# Patient Record
Sex: FEMALE | Race: WHITE | NOT HISPANIC OR LATINO | Employment: FULL TIME | URBAN - METROPOLITAN AREA
[De-identification: names, ages, dates, MRNs, and addresses within clinical notes are randomized per-mention and may not be internally consistent; named-entity substitution may affect disease eponyms.]

---

## 2017-01-17 DIAGNOSIS — F90.9 ATTENTION DEFICIT HYPERACTIVITY DISORDER (ADHD), UNSPECIFIED ADHD TYPE: Primary | ICD-10-CM

## 2017-01-17 DIAGNOSIS — G43.109 MIGRAINE WITH AURA AND WITHOUT STATUS MIGRAINOSUS, NOT INTRACTABLE: Primary | ICD-10-CM

## 2017-01-17 RX ORDER — SUMATRIPTAN 100 MG/1
100 TABLET, FILM COATED ORAL DAILY PRN
Qty: 30 TABLET | Refills: 0 | Status: SHIPPED | OUTPATIENT
Start: 2017-01-17 | End: 2017-08-01

## 2017-01-17 RX ORDER — DEXTROAMPHETAMINE SACCHARATE, AMPHETAMINE ASPARTATE, DEXTROAMPHETAMINE SULFATE AND AMPHETAMINE SULFATE 2.5; 2.5; 2.5; 2.5 MG/1; MG/1; MG/1; MG/1
10 TABLET ORAL DAILY
Qty: 30 TABLET | Refills: 0 | Status: SHIPPED | OUTPATIENT
Start: 2017-01-17 | End: 2017-02-14

## 2017-01-27 ENCOUNTER — TELEPHONE (OUTPATIENT)
Dept: NEUROLOGY | Facility: CLINIC | Age: 52
End: 2017-01-27

## 2017-01-27 NOTE — TELEPHONE ENCOUNTER
PA Initiation    Medication: aubagio  Insurance Company:  [employer]   Pharmacy Filling the Rx: [facility] - [place], [place] - [facility] [address]  Filling Pharmacy Phone: [phone]  Filling Pharmacy Fax:    Start Date: 1/27/2017    [facility] Authorization Team   Phone: [phone]  Fax: [phone]

## 2017-02-03 ENCOUNTER — TELEPHONE (OUTPATIENT)
Dept: INTERNAL MEDICINE | Facility: CLINIC | Age: 52
End: 2017-02-03

## 2017-02-03 DIAGNOSIS — F32.A DEPRESSION: Primary | ICD-10-CM

## 2017-02-03 RX ORDER — BUPROPION HYDROCHLORIDE 150 MG/1
150 TABLET, EXTENDED RELEASE ORAL DAILY
Qty: 30 TABLET | Refills: 0 | Status: SHIPPED | OUTPATIENT
Start: 2017-02-03 | End: 2017-02-28

## 2017-02-03 NOTE — Clinical Note
[facility] PRIMARY CARE CLINIC  [address]  [address] [facility] [address]      February 3, 2017      Liyah Jernigan  [address] [address]        Dear Liyah,    This letter is a reminder that you are overdue to see your Primary Care Provider for an Annual Visit and needed labs. You must be seen by your Primary Care Provider on a yearly basis and have appropriate labs drawn for continued care and prescription refills. Please call [phone] to schedule an appointment for an Annual Visit with Dr Gautam TAYLOR.       You have been given a 30 day supply/refill of your   buPROPion (WELLBUTRIN SR) 150 MG 12 hr tablet    while you get your clinic visit/labs completed.      Regards,    Primary Care Center

## 2017-02-03 NOTE — TELEPHONE ENCOUNTER
buPROPion (WELLBUTRIN SR) 150 MG 12 hr tablet  Last Written Prescription Date: 2/5/16  Last Fill Quantity: 90,   # refills: 3  Last Office Visit with G, P or [facility] prescribing provider: 2/1/16  Future Office visit:   None    PHQ-9 score:  No flowsheet data found.      Routing refill request to provider for review/approval because:   buPROPion (WELLBUTRIN SR) 150 MG 12 hr tablet.. No phq9 found. Due for annual f/u,  Not scheduled. 30 day sent to pharmacy.

## 2017-02-10 NOTE — TELEPHONE ENCOUNTER
Received forms from [facility], called [phone] to complete PA via telephone:    1) Relapsing Remitting Multiple Sclerosis? Yes  2) Has pregnancy been excluded? Yes    Patient tried and failed Rebif and Tysabri in past

## 2017-02-10 NOTE — TELEPHONE ENCOUNTER
Prior Authorization Approval    Authorization Effective Date: 2/10/2017  Authorization Expiration Date: 2/10/2019  Medication: Aubagio 14mg APPROVED  Approved Dose/Quantity: 28 PER 28DAYS  Reference #: [id number]   Insurance Company: [employer]    Expected CoPay: $1 or $3    CoPay Card Available:      Foundation Assistance Needed:    Which Pharmacy is filling the prescription (Not needed for infusion/clinic administered): [facility] - [place], [place] - [facility] [address]  Pharmacy Notified: Yes  Patient Notified: Yes, Left VM

## 2017-02-14 DIAGNOSIS — F90.9 ATTENTION DEFICIT HYPERACTIVITY DISORDER (ADHD), UNSPECIFIED ADHD TYPE: ICD-10-CM

## 2017-02-14 RX ORDER — DEXTROAMPHETAMINE SACCHARATE, AMPHETAMINE ASPARTATE, DEXTROAMPHETAMINE SULFATE AND AMPHETAMINE SULFATE 2.5; 2.5; 2.5; 2.5 MG/1; MG/1; MG/1; MG/1
10 TABLET ORAL DAILY
Qty: 30 TABLET | Refills: 0 | Status: SHIPPED | OUTPATIENT
Start: 2017-02-16 | End: 2017-08-19

## 2017-02-21 ENCOUNTER — OFFICE VISIT (OUTPATIENT)
Dept: NEUROLOGY | Facility: CLINIC | Age: 52
End: 2017-02-21
Attending: PSYCHIATRY & NEUROLOGY
Payer: COMMERCIAL

## 2017-02-21 VITALS — DIASTOLIC BLOOD PRESSURE: 87 MMHG | SYSTOLIC BLOOD PRESSURE: 133 MMHG | HEIGHT: 67 IN | HEART RATE: 64 BPM

## 2017-02-21 DIAGNOSIS — B00.9 HSV (HERPES SIMPLEX VIRUS) INFECTION: ICD-10-CM

## 2017-02-21 DIAGNOSIS — G35 MS (MULTIPLE SCLEROSIS) (H): Primary | ICD-10-CM

## 2017-02-21 PROCEDURE — 40000809 ZZH STATISTIC NO DOCUMENTATION TO SUPPORT CHARGE

## 2017-02-21 ASSESSMENT — PAIN SCALES - GENERAL: PAINLEVEL: NO PAIN (0)

## 2017-02-21 NOTE — MR AVS SNAPSHOT
After Visit Summary   2/21/2017    Liyah Jernigan    MRN: [id number]           Patient Information     Date Of Birth          1965        Visit Information        Provider Department      2/21/2017 10:00 AM Jose A Hairston MD [facility] Multiple Sclerosis         Follow-ups after your visit        Follow-up notes from your care team     Return in about 6 months (around 8/21/2017).      Your next 10 appointments already scheduled     Feb 28, 2017  3:20 PM CST   [facility]    [address] [place] [address]   [phone]            Aug 22, 2017  1:30 PM CDT   (Arrive by 1:15 PM)   Return Multiple Sclerosis with Jose A Hairston MD   [facility] Multiple Sclerosis ([facility])    [address] [address] [phone]              Who to contact     If you have questions or need follow up information about today's clinic visit or your schedule please contact [facility] MULTIPLE SCLEROSIS directly at [phone].  Normal or non-critical lab and imaging results will be communicated to you by MyChart, letter or phone within 4 business days after the clinic has received the results. If you do not hear from us within 7 days, please contact the clinic through Pediushart or phone. If you have a critical or abnormal lab result, we will notify you by phone as soon as possible.  Submit refill requests through [employer] or call your pharmacy and they will forward the refill request to us. Please allow 3 business days for your refill to be completed.          Additional Information About Your Visit        MyChart Information     [employer] gives you secure access to your electronic health record. If you see a primary care provider, you can also send messages to your care team and make appointments. If you have questions, please call  "your primary care clinic.  If you do not have a primary care provider, please call [phone] and they will assist you.        Care EveryWhere ID     This is your Care EveryWhere ID. This could be used by other organizations to access your [facility] medical records  [id number]        Your Vitals Were     Pulse Height                64 1.702 m (5' 7\")           Blood Pressure from Last 3 Encounters:   02/21/17 133/87   12/15/16 118/74   08/23/16 138/86    Weight from Last 3 Encounters:   12/15/16 53.1 kg (117 lb)   08/23/16 52.2 kg (115 lb)   02/23/16 51.7 kg (114 lb)              Today, you had the following     No orders found for display       Primary Care Provider    None Specified       No primary provider on file.        Thank you!     Thank you for choosing [facility] MULTIPLE S[facility]  for your care. Our goal is always to provide you with excellent care. Hearing back from our patients is one way we can continue to improve our services. Please take a few minutes to complete the written survey that you may receive in the mail after your visit with us. Thank you!             Your Updated Medication List - Protect others around you: Learn how to safely use, store and throw away your medicines at www.disposemymeds.org.          This list is accurate as of: 2/21/17 10:24 AM.  Always use your most recent med list.                   Brand Name Dispense Instructions for use    amphetamine-dextroamphetamine 10 MG per tablet    ADDERALL    30 tablet    Take 1 tablet (10 mg) by mouth daily . Patient needs to see primary provider for further refills.       buPROPion 150 MG 12 hr tablet    WELLBUTRIN SR    30 tablet    Take 1 tablet (150 mg) by mouth daily       ferrous gluconate 325 (36 FE) MG Tabs     60 tablet    Take 325 mg by mouth daily       order for DME     1 each    Please measure and distribute 1 pair of 20mmHg - 30mmHg THIGH high open  toe compression stockings. Jobst ultrasheer or equivalent.       " rOPINIRole 0.5 MG tablet    REQUIP    90 tablet    Take 1 tablet (0.5 mg) by mouth At Bedtime       SUMAtriptan 100 MG tablet    IMITREX    30 tablet    Take 1 tablet (100 mg) by mouth daily as needed for migraine       teriflunomide 14 MG tablet    AUBAGIO    30 tablet    Take 1 tablet (14 mg) by mouth daily       tretinoin 0.05 % cream    RETIN-A     Apply topically At Bedtime       valACYclovir 500 MG tablet    VALTREX    90 tablet    Take 1 tablet (500 mg) by mouth daily       VITAMIN D (CHOLECALCIFEROL) PO      Take 5,000 Units by mouth daily

## 2017-02-21 NOTE — PROGRESS NOTES
REASON FOR VISIT:  Liyah Jernigan is a 52-year-old woman who I follow for multiple sclerosis and who returns for routine followup today.  I last saw her in August of last year.      HISTORY OF PRESENT ILLNESS:  Liyah has been stable from an MS perspective.  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  The alopecia she had experienced with the Aubagio seems to be improving and her hair is coming back.  She tolerates the Aubagio well with no GI side effects.  She had lab tests tested in December with a normal AST and ALT and a vitamin D level of 63.  She has essentially no residual symptoms from multiple sclerosis.  Her MS presentation has been unusual with several episodes of bilateral vision loss.  She has had no new medical issues outside of MS since the previous visit.      PHYSICAL EXAMINATION:   VITAL SIGNS:  Blood pressure 133/87.  Pulse 64.  Respiratory rate is 14.   GENERAL:  She is alert and oriented.  Affect is bright.  Language functions are normal.  Eye movements are full without diplopia or nystagmus.  Facial strength and sensation are normal.  Muscle bulk, tone, strength and dexterity are normal in the arms and legs.  Coordination testing is normal to finger tapping, toe tapping and finger-nose-finger.  Deep tendon reflexes are normal and symmetric and her gait is normal.      IMPRESSION:  Relapsing remitting multiple sclerosis, clinically stable on Aubagio.      I spent 20 minutes with Liyah today, greater than 50% of which was spent in counseling and coordination of care.  We went over safety monitoring with the Aubagio as well as how long she will likely need to be on the medication.  I would anticipate treating her at least through her mid-50s.  If she remains clinically or radiologically stable 5 years from now, we could consider stopping it and following with MRI surveillance.  Thankfully, she has essentially no residual neurologic  impairment.      PLAN:   1.  She will return to clinic in 6 months.   2.  I will plan a repeat surveillance MRI in about 1 year.      MD KAY Celaya MD             D: 2017 11:55   T: 2017 14:08   MT: AKA      Name:     HODA TRUJILLO   MRN:      [id number]        Account:      [id number]   :      1965           Service Date: 2017      Document: [id number]

## 2017-02-21 NOTE — LETTER
[facility] PRIMARY CARE CLINIC  [address]  [address] [facility] [address]      February 23, 2017      Liyah Jernigan  [address] [address]        Dear Liyah,    This letter is a reminder that you are overdue to see your Primary Care Provider for an Annual Visit and needed labs. You must be seen by your Primary Care Provider on a yearly basis and have appropriate labs drawn for continued care and prescription refills. Please call [phone] to schedule an appointment for an Annual Visit with Dr Sonia Florez MD.     You have been given a 90 day supply/refill of your Valtrex while you get your clinic visit/labs completed.    This is the second letter sent this month.      PLEASE LET YOUR PHARMACY AND THE CLINIC KNOW IF YOU ARE NO LONGER COMING TO OUR CLINIC FOR YOUR PRIMARY CARE.    Regards,    Primary Care Center

## 2017-02-21 NOTE — TELEPHONE ENCOUNTER
Valacyclovir 500 mg tabs      Last Written Prescription Date:  1-25-16  Last Fill Quantity: 90,   # refills: 3  Last Office Visit : 2-1-16  Future Office visit:  None    Creatinine   Date Value Ref Range Status   12/15/2016 0.93 0.52 - 1.04 mg/dL Final   ]      Kathleen M Doege RN

## 2017-02-23 RX ORDER — VALACYCLOVIR HYDROCHLORIDE 500 MG/1
500 TABLET, FILM COATED ORAL DAILY
Qty: 90 TABLET | Refills: 0 | Status: SHIPPED
Start: 2017-02-23 | End: 2017-06-20

## 2017-02-28 ENCOUNTER — OFFICE VISIT (OUTPATIENT)
Dept: FAMILY MEDICINE | Facility: CLINIC | Age: 52
End: 2017-02-28
Payer: COMMERCIAL

## 2017-02-28 VITALS
WEIGHT: 117.2 LBS | RESPIRATION RATE: 14 BRPM | SYSTOLIC BLOOD PRESSURE: 124 MMHG | DIASTOLIC BLOOD PRESSURE: 84 MMHG | TEMPERATURE: 98.4 F | BODY MASS INDEX: 18.39 KG/M2 | HEIGHT: 67 IN | HEART RATE: 68 BPM | OXYGEN SATURATION: 99 %

## 2017-02-28 DIAGNOSIS — F33.0 MILD EPISODE OF RECURRENT MAJOR DEPRESSIVE DISORDER (H): ICD-10-CM

## 2017-02-28 DIAGNOSIS — Z30.011 ENCOUNTER FOR INITIAL PRESCRIPTION OF CONTRACEPTIVE PILLS: Primary | ICD-10-CM

## 2017-02-28 PROCEDURE — 99214 OFFICE O/P EST MOD 30 MIN: CPT | Performed by: FAMILY MEDICINE

## 2017-02-28 RX ORDER — LEVONORGESTREL/ETHIN.ESTRADIOL 0.1-0.02MG
1 TABLET ORAL DAILY
Qty: 84 TABLET | Refills: 1 | Status: SHIPPED | OUTPATIENT
Start: 2017-02-28 | End: 2017-08-01

## 2017-02-28 ASSESSMENT — PAIN SCALES - GENERAL: PAINLEVEL: NO PAIN (0)

## 2017-02-28 NOTE — PROGRESS NOTES
"  SUBJECTIVE:                                                    Liyah Jernigan is a 52 year old female who presents to clinic today for the following health issues:    Depression Followup    Status since last visit: Stable     See PHQ-9 for current symptoms.  Other associated symptoms: trouble sleeping -     Complicating factors:   Significant life event:  No   Current substance abuse:  None  Anxiety or Panic symptoms:  No    PHQ-9  English PHQ-9   Any Language          1. Pt would like to discuss   - pt's grandmother had periods until she was 65  - pt has used condoms, oral contraception  - ectopics pregnancies -- two in the past    SUBJECTIVE:  Here today to discuss contraception. Reviewed medical history with the patient in her chart, including ectopic pregnancies on both sides leading to surgery. She consulted with the GYN about the possibility of tubal ligation but has been told she is not a surgical candidate because of the previous surgeries. Still cycling and has not had a change in regularity or flow. Starting to get a few hot flashes here in. She is concerned about reduction of pregnancy but is also cognizant about upcoming menopause. No history of clotting disorders.    Review of systems otherwise negative.  Past medical, family, and social history reviewed and updated in chart.    OBJECTIVE:  /84 (BP Location: Right arm, Patient Position: Right side, Cuff Size: Adult Regular)  Pulse 68  Temp 98.4  F (36.9  C) (Oral)  Resp 14  Ht 5' 7\" (1.702 m)  Wt 117 lb 3.2 oz (53.2 kg)  LMP 02/22/2017 (Exact Date)  SpO2 99%  BMI 18.36 kg/m2  Alert, pleasant, upbeat, and in no apparent discomfort.  S1 and S2 normal, no murmurs, clicks, gallops or rubs. Regular rate and rhythm. Chest is clear; no wheezes or rales. No edema or JVD.  Past labs reviewed with the patient.     ASSESSMENT / PLAN:  (Z30.011) Encounter for initial prescription of contraceptive pills  (primary encounter diagnosis)  Comment: We discussed the " different methods including implantable Nexplanon, IUDs, oral methods - including risks and benefits of all - and she is most interested in an oral contraceptive. This may have some impact on her headaches - likely good from a regulatory standpoint, but possibly bad. It will certainly mask menopausal symptoms which can be of benefit though it makes it tricky to know when she will actually go through menopause. I think she is very low risk for blood clots  Plan: levonorgestrel-ethinyl estradiol         (RUDY ATKINS,LESSINA) 0.1-20 MG-MCG per         tablet        Discussed mechanism of action of the proposed medication, as well as potential effects, both good and bad.  Patient expressed understanding and agreed with treatment.     30 minutes spent in face-to-face time during this visit.  30 minutes were spent on counseling and coordinating care.     Follow up - as per routine for Pap smear, etc.  S. Tab Barrow MD    (Chart documentation completed in part with Dragon voice-recognition software.  Even though reviewed some grammatical, spelling, and word errors may remain.)

## 2017-02-28 NOTE — MR AVS SNAPSHOT
After Visit Summary   2/28/2017    Liyah Jernigan    MRN: [id number]           Patient Information     Date Of Birth          1965        Visit Information        Provider Department      2/28/2017 3:20 PM Jasmine Barrow MD [facility]        Today's Diagnoses     Encounter for initial prescription of contraceptive pills    -  1       Follow-ups after your visit        Follow-up notes from your care team     Return if symptoms worsen or fail to improve.      Your next 10 appointments already scheduled     Aug 22, 2017  1:30 PM CDT   (Arrive by 1:15 PM)   Return Multiple Sclerosis with Jose A Hairston MD   [facility] Multiple Sclerosis ([facility])    [address]  [address] [facility] [address] [phone]              Who to contact     If you have questions or need follow up information about today's clinic visit or your schedule please contact [facility] directly at [phone].  Normal or non-critical lab and imaging results will be communicated to you by MyChart, letter or phone within 4 business days after the clinic has received the results. If you do not hear from us within 7 days, please contact the clinic through Gamadorhart or phone. If you have a critical or abnormal lab result, we will notify you by phone as soon as possible.  Submit refill requests through Inf[employer] or call your pharmacy and they will forward the refill request to us. Please allow 3 business days for your refill to be completed.          Additional Information About Your Visit        MyChart Information     [employer] gives you secure access to your electronic health record. If you see a primary care provider, you can also send messages to your care team and make appointments. If you have questions, please call your primary care clinic.  If you do not have a primary care provider, please call [phone] and they will assist you.        Care  "EveryWhere ID     This is your Care EveryWhere ID. This could be used by other organizations to access your [place] medical records  [id number]        Your Vitals Were     Pulse Temperature Respirations Height Last Period Pulse Oximetry    68 98.4  F (36.9  C) (Oral) 14 5' 7\" (1.702 m) 02/22/2017 (Exact Date) 99%    BMI (Body Mass Index)                   18.36 kg/m2            Blood Pressure from Last 3 Encounters:   02/28/17 124/84   02/21/17 133/87   12/15/16 118/74    Weight from Last 3 Encounters:   02/28/17 117 lb 3.2 oz (53.2 kg)   12/15/16 117 lb (53.1 kg)   08/23/16 115 lb (52.2 kg)              Today, you had the following     No orders found for display         Today's Medication Changes          These changes are accurate as of: 2/28/17  4:23 PM.  If you have any questions, ask your nurse or doctor.               Start taking these medicines.        Dose/Directions    levonorgestrel-ethinyl estradiol 0.1-20 MG-MCG per tablet   Commonly known as:  RUDY ATKINS LESSINA   Used for:  Encounter for initial prescription of contraceptive pills   Started by:  Jasmine Barrow MD        Dose:  1 tablet   Take 1 tablet by mouth daily   Quantity:  84 tablet   Refills:  1            Where to get your medicines      These medications were sent to [id number]  [place], [address] AT [address], [place] [address]     Phone:  [phone]     levonorgestrel-ethinyl estradiol 0.1-20 MG-MCG per tablet                Primary Care Provider    None Specified       No primary provider on file.        Thank you!     Thank you for choosing [facility]  for your care. Our goal is always to provide you with excellent care. Hearing back from our patients is one way we can continue to improve our services. Please take a few minutes to complete the written survey that you may receive in the mail after your visit with us. Thank you!      "        Your Updated Medication List - Protect others around you: Learn how to safely use, store and throw away your medicines at www.disposemymeds.org.          This list is accurate as of: 2/28/17  4:23 PM.  Always use your most recent med list.                   Brand Name Dispense Instructions for use    amphetamine-dextroamphetamine 10 MG per tablet    ADDERALL    30 tablet    Take 1 tablet (10 mg) by mouth daily . Patient needs to see primary provider for further refills.       buPROPion 150 MG 12 hr tablet    WELLBUTRIN SR    30 tablet    Take 1 tablet (150 mg) by mouth daily       levonorgestrel-ethinyl estradiol 0.1-20 MG-MCG per tablet    AVIANE,ALESSE,LESSINA    84 tablet    Take 1 tablet by mouth daily       order for DME     1 each    Please measure and distribute 1 pair of 20mmHg - 30mmHg THIGH high open  toe compression stockings. Jobst ultrasheer or equivalent.       rOPINIRole 0.5 MG tablet    REQUIP    90 tablet    Take 1 tablet (0.5 mg) by mouth At Bedtime       SUMAtriptan 100 MG tablet    IMITREX    30 tablet    Take 1 tablet (100 mg) by mouth daily as needed for migraine       teriflunomide 14 MG tablet    AUBAGIO    30 tablet    Take 1 tablet (14 mg) by mouth daily       valACYclovir 500 MG tablet    VALTREX    90 tablet    Take 1 tablet (500 mg) by mouth daily       VITAMIN D (CHOLECALCIFEROL) PO      Take 5,000 Units by mouth daily

## 2017-02-28 NOTE — NURSING NOTE
"Chief Complaint   Patient presents with     Contraception     discuss options       Initial /84 (BP Location: Right arm, Patient Position: Right side, Cuff Size: Adult Regular)  Pulse 68  Temp 98.4  F (36.9  C) (Oral)  Resp 14  Ht 1.702 m (5' 7\")  Wt 53.2 kg (117 lb 3.2 oz)  LMP 02/22/2017 (Exact Date)  SpO2 99%  BMI 18.36 kg/m2 Estimated body mass index is 18.36 kg/(m^2) as calculated from the following:    Height as of this encounter: 1.702 m (5' 7\").    Weight as of this encounter: 53.2 kg (117 lb 3.2 oz).  Medication Reconciliation: complete     Will Elly GREENFIELD      "

## 2017-03-02 PROBLEM — F33.0 MILD EPISODE OF RECURRENT MAJOR DEPRESSIVE DISORDER (H): Status: ACTIVE | Noted: 2017-03-02

## 2017-03-02 RX ORDER — BUPROPION HYDROCHLORIDE 150 MG/1
TABLET, EXTENDED RELEASE ORAL
Qty: 90 TABLET | Refills: 1 | Status: SHIPPED | OUTPATIENT
Start: 2017-03-02 | End: 2017-07-18

## 2017-03-17 ENCOUNTER — OFFICE VISIT (OUTPATIENT)
Dept: FAMILY MEDICINE | Facility: CLINIC | Age: 52
End: 2017-03-17
Payer: COMMERCIAL

## 2017-03-17 VITALS
WEIGHT: 115.8 LBS | DIASTOLIC BLOOD PRESSURE: 90 MMHG | HEART RATE: 78 BPM | HEIGHT: 67 IN | BODY MASS INDEX: 18.18 KG/M2 | TEMPERATURE: 98.1 F | OXYGEN SATURATION: 99 % | SYSTOLIC BLOOD PRESSURE: 128 MMHG | RESPIRATION RATE: 12 BRPM

## 2017-03-17 DIAGNOSIS — H69.93 DYSFUNCTION OF EUSTACHIAN TUBE, BILATERAL: Primary | ICD-10-CM

## 2017-03-17 PROCEDURE — 99213 OFFICE O/P EST LOW 20 MIN: CPT | Performed by: PHYSICIAN ASSISTANT

## 2017-03-17 NOTE — PROGRESS NOTES
"  SUBJECTIVE:                                                    Liyah Jerngian is a 52 year old female who presents to clinic today for the following health issues:      Concern - Ear problem     Onset: a few days ago    Description:   Sharp pain went through LT ear but they both have been feeling plugged    Intensity: mild, moderate    Progression of Symptoms:  same    Accompanying Signs & Symptoms:  Has had a cough       Previous history of similar problem:   no    Precipitating factors:   Worsened by: unusure    Alleviating factors:  Improved by: nothing       Therapies Tried and outcome: nothing    Sharp pain left ear intermittently for a few days and no sharp pain right ear intermittently.  No nasal congestion.  Cough productive with yellow sputum.  No shortness of breath.      Blood pressure a little high today and unusual for her.    Reports family history  Of hypertension and hyperlipidemia and heart disease but reports they are all overweight and \"she should be the picture of health. \"      Problem list and histories reviewed & adjusted, as indicated.  Additional history: as documented    Patient Active Problem List   Diagnosis     Attention deficit hyperactivity disorder (ADHD), unspecified ADHD type     Genital herpes simplex     MS (multiple sclerosis) (H)     Excessive or frequent menstruation     Varicose vein     Restless leg syndrome     Mild episode of recurrent major depressive disorder (H)     Past Surgical History   Procedure Laterality Date     Ent surgery       tympanoplasty     Gyn surgery       laparoscopies (5) laparotomies (2) for ectopic       Social History   Substance Use Topics     Smoking status: Never Smoker     Smokeless tobacco: Not on file     Alcohol use No     Family History   Problem Relation Age of Onset     DIABETES Father           Hypertension Father      Hyperlipidemia Father      Substance Abuse Father      Obesity Father      DIABETES Mother      " "\"borderline\" for years     Hypertension Mother      Hyperlipidemia Mother      Depression Mother      Substance Abuse Mother      Asthma Mother      Thyroid Disease Mother      Obesity Mother      CEREBROVASCULAR DISEASE Maternal Grandfather      he had a few     Substance Abuse Maternal Grandfather      Breast Cancer Maternal Grandmother      in 50s     Substance Abuse Paternal Grandfather          Current Outpatient Prescriptions   Medication Sig Dispense Refill     buPROPion (WELLBUTRIN SR) 150 MG 12 hr tablet TAKE 1 TABLET BY MOUTH DAILY 90 tablet 1     levonorgestrel-ethinyl estradiol (AVIANE,ALESSE,LESSINA) 0.1-20 MG-MCG per tablet Take 1 tablet by mouth daily 84 tablet 1     valACYclovir (VALTREX) 500 MG tablet Take 1 tablet (500 mg) by mouth daily 90 tablet 0     amphetamine-dextroamphetamine (ADDERALL) 10 MG per tablet Take 1 tablet (10 mg) by mouth daily . Patient needs to see primary provider for further refills. 30 tablet 0     SUMAtriptan (IMITREX) 100 MG tablet Take 1 tablet (100 mg) by mouth daily as needed for migraine 30 tablet 0     teriflunomide (AUBAGIO) 14 MG tablet Take 1 tablet (14 mg) by mouth daily 30 tablet 11     order for DME Please measure and distribute 1 pair of 20mmHg - 30mmHg THIGH high open  toe compression stockings. Jobst ultrasheer or equivalent. 1 each 1     rOPINIRole (REQUIP) 0.5 MG tablet Take 1 tablet (0.5 mg) by mouth At Bedtime 90 tablet 3     VITAMIN D, CHOLECALCIFEROL, PO Take 5,000 Units by mouth daily         Reviewed and updated as needed this visit by clinical staff  Tobacco  Allergies  Meds  Med Hx  Surg Hx  Fam Hx  Soc Hx      Reviewed and updated as needed this visit by Provider         ROS:  Constitutional, HEENT, cardiovascular, pulmonary, gi and gu systems are negative, except as otherwise noted.    OBJECTIVE:                                                    /90  Pulse 78  Temp 98.1  F (36.7  C) (Oral)  Resp 12  Ht 1.702 m (5' 7\")  Wt 52.5 kg (115 " lb 12.8 oz)  LMP 02/22/2017 (Exact Date)  SpO2 99%  BMI 18.14 kg/m2  Body mass index is 18.14 kg/(m^2).  GENERAL: healthy, alert and no distress  EYES: Eyes grossly normal to inspection, PERRL and conjunctivae and sclerae normal  HENT: ear canals and TM's normal, nose and mouth without ulcers or lesions  NECK: no adenopathy, no asymmetry, masses, or scars and thyroid normal to palpation  RESP: lungs clear to auscultation - no rales, rhonchi or wheezes  CV: regular rate and rhythm, normal S1 S2, no S3 or S4, no murmur, click or rub, no peripheral edema and peripheral pulses strong  MS: no gross musculoskeletal defects noted, no edema    Diagnostic Test Results:  none      ASSESSMENT/PLAN:                                                            1. Dysfunction of eustachian tube, bilateral  No evidence of infection. Recommended flonase nasal spray and follow up with us if no improvement in symptoms.  Return     Elevated blood pressure noted and offered MA visit to recheck blood pressure and declines at this time stating she has blood pressure monitor at home and will continue to monitor and follow up with us if > 140/90        Jaki Blas PA-C  [facility]

## 2017-03-17 NOTE — MR AVS SNAPSHOT
After Visit Summary   3/17/2017    Liyah Jernigan    MRN: [id number]           Patient Information     Date Of Birth          1965        Visit Information        Provider Department      3/17/2017 1:20 PM Jaki Blas PA-C [facility]        Today's Diagnoses     Dysfunction of eustachian tube, bilateral    -  1       Follow-ups after your visit        Your next 10 appointments already scheduled     Mar 24, 2017  3:15 PM CDT   MA SCREENING DIGITAL BILATERAL with BKMA1   [facility]    [address] [id number] [phone]           Do not use any powder, lotion or deodorant under your arms or on your breast. If you do, we will ask you to remove it before your exam.  Wear comfortable, two-piece clothing.  If you have any allergies, tell your care team.  Bring any previous mammograms from other facilities or have them mailed to the breast center.            Aug 22, 2017  1:30 PM CDT   (Arrive by 1:15 PM)   Return Multiple Sclerosis with Jose A Hairston MD   [facility] ([facility])    [address] [address] [phone]              Who to contact     If you have questions or need follow up information about today's clinic visit or your schedule please contact [facility] directly at [phone].  Normal or non-critical lab and imaging results will be communicated to you by MyChart, letter or phone within 4 business days after the clinic has received the results. If you do not hear from us within 7 days, please contact the clinic through MyChart or phone. If you have a critical or abnormal lab result, we will notify you by phone as soon as possible.  Submit refill requests through [employer] or call your pharmacy and they will forward the refill request to us. Please allow 3 business days for your  "refill to be completed.          Additional Information About Your Visit        [employer]le[employer] Information     [employer] gives you secure access to your electronic health record. If you see a primary care provider, you can also send messages to your care team and make appointments. If you have questions, please call your primary care clinic.  If you do not have a primary care provider, please call [phone] and they will assist you.        Care EveryWhere ID     This is your Care EveryWhere ID. This could be used by other organizations to access your [place] medical records  [id number]        Your Vitals Were     Pulse Temperature Respirations Height Last Period Pulse Oximetry    78 98.1  F (36.7  C) (Oral) 12 1.702 m (5' 7\") 02/22/2017 (Exact Date) 99%    BMI (Body Mass Index)                   18.14 kg/m2            Blood Pressure from Last 3 Encounters:   03/17/17 128/90   02/28/17 124/84   02/21/17 133/87    Weight from Last 3 Encounters:   03/17/17 52.5 kg (115 lb 12.8 oz)   02/28/17 53.2 kg (117 lb 3.2 oz)   12/15/16 53.1 kg (117 lb)              Today, you had the following     No orders found for display       Primary Care Provider    None Specified       No primary provider on file.        Thank you!     Thank you for choosing [facility]  for your care. Our goal is always to provide you with excellent care. Hearing back from our patients is one way we can continue to improve our services. Please take a few minutes to complete the written survey that you may receive in the mail after your visit with us. Thank you!             Your Updated Medication List - Protect others around you: Learn how to safely use, store and throw away your medicines at www.disposemymeds.org.          This list is accurate as of: 3/17/17  1:33 PM.  Always use your most recent med list.                   Brand Name Dispense Instructions for use    amphetamine-dextroamphetamine 10 MG per tablet    ADDERALL    30 tablet    " Take 1 tablet (10 mg) by mouth daily . Patient needs to see primary provider for further refills.       buPROPion 150 MG 12 hr tablet    WELLBUTRIN SR    90 tablet    TAKE 1 TABLET BY MOUTH DAILY       levonorgestrel-ethinyl estradiol 0.1-20 MG-MCG per tablet    AVIANE,ALESSE,LESSINA    84 tablet    Take 1 tablet by mouth daily       order for DME     1 each    Please measure and distribute 1 pair of 20mmHg - 30mmHg THIGH high open  toe compression stockings. Jobst ultrasheer or equivalent.       rOPINIRole 0.5 MG tablet    REQUIP    90 tablet    Take 1 tablet (0.5 mg) by mouth At Bedtime       SUMAtriptan 100 MG tablet    IMITREX    30 tablet    Take 1 tablet (100 mg) by mouth daily as needed for migraine       teriflunomide 14 MG tablet    AUBAGIO    30 tablet    Take 1 tablet (14 mg) by mouth daily       valACYclovir 500 MG tablet    VALTREX    90 tablet    Take 1 tablet (500 mg) by mouth daily       VITAMIN D (CHOLECALCIFEROL) PO      Take 5,000 Units by mouth daily

## 2017-03-17 NOTE — NURSING NOTE
"No chief complaint on file.      Initial /90 (BP Location: Right arm, Patient Position: Chair, Cuff Size: Adult Regular)  Pulse 78  Temp 98.1  F (36.7  C) (Oral)  Resp 12  Ht 1.702 m (5' 7\")  Wt 52.5 kg (115 lb 12.8 oz)  LMP 02/22/2017 (Exact Date)  SpO2 99%  BMI 18.14 kg/m2 Estimated body mass index is 18.14 kg/(m^2) as calculated from the following:    Height as of this encounter: 1.702 m (5' 7\").    Weight as of this encounter: 52.5 kg (115 lb 12.8 oz).  Medication Reconciliation: su Rodriguez        "

## 2017-03-22 ENCOUNTER — OFFICE VISIT (OUTPATIENT)
Dept: URGENT CARE | Facility: URGENT CARE | Age: 52
End: 2017-03-22
Payer: COMMERCIAL

## 2017-03-22 VITALS
OXYGEN SATURATION: 98 % | BODY MASS INDEX: 18.17 KG/M2 | SYSTOLIC BLOOD PRESSURE: 132 MMHG | DIASTOLIC BLOOD PRESSURE: 86 MMHG | HEART RATE: 92 BPM | TEMPERATURE: 98.9 F | WEIGHT: 116 LBS

## 2017-03-22 DIAGNOSIS — R68.89 FLU-LIKE SYMPTOMS: Primary | ICD-10-CM

## 2017-03-22 PROCEDURE — 99213 OFFICE O/P EST LOW 20 MIN: CPT | Performed by: NURSE PRACTITIONER

## 2017-03-22 RX ORDER — OSELTAMIVIR PHOSPHATE 75 MG/1
75 CAPSULE ORAL 2 TIMES DAILY
Qty: 10 CAPSULE | Refills: 0 | Status: SHIPPED | OUTPATIENT
Start: 2017-03-22 | End: 2017-03-27

## 2017-03-22 NOTE — PROGRESS NOTES
"No chief complaint on file.      Initial LMP 02/22/2017 (Exact Date) Estimated body mass index is 18.14 kg/(m^2) as calculated from the following:    Height as of 3/17/17: 5' 7\" (1.702 m).    Weight as of 3/17/17: 115 lb 12.8 oz (52.5 kg).  Medication Reconciliation: zo Leggett CMA        SUBJECTIVE:                                                    Liyah Jernigan is a 52 year old female who presents to clinic today for the following health issues:      RESPIRATORY SYMPTOMS      Duration: 2 weeks    Description  nasal congestion, rhinorrhea, cough, chills, fever 100 ear pain bilateral, fatigue/malaise, hoarse voice, myalgias, nausea, stomach ache and diarrhea    Severity: moderate    Accompanying signs and symptoms: None    History (predisposing factors):  Has MS    Precipitating or alleviating factors: None    Therapies tried and outcome:  rest and fluids         No Known Allergies    No past medical history on file.      Current Outpatient Prescriptions on File Prior to Visit:  buPROPion (WELLBUTRIN SR) 150 MG 12 hr tablet TAKE 1 TABLET BY MOUTH DAILY   levonorgestrel-ethinyl estradiol (AVIANE,ALESSE,LESSINA) 0.1-20 MG-MCG per tablet Take 1 tablet by mouth daily   valACYclovir (VALTREX) 500 MG tablet Take 1 tablet (500 mg) by mouth daily   amphetamine-dextroamphetamine (ADDERALL) 10 MG per tablet Take 1 tablet (10 mg) by mouth daily . Patient needs to see primary provider for further refills.   SUMAtriptan (IMITREX) 100 MG tablet Take 1 tablet (100 mg) by mouth daily as needed for migraine   teriflunomide (AUBAGIO) 14 MG tablet Take 1 tablet (14 mg) by mouth daily   order for DME Please measure and distribute 1 pair of 20mmHg - 30mmHg THIGH high open  toe compression stockings. Jobst ultrasheer or equivalent.   rOPINIRole (REQUIP) 0.5 MG tablet Take 1 tablet (0.5 mg) by mouth At Bedtime   VITAMIN D, CHOLECALCIFEROL, PO Take 5,000 Units by mouth daily     No current facility-administered medications on " file prior to visit.     Social History   Substance Use Topics     Smoking status: Never Smoker     Smokeless tobacco: Not on file     Alcohol use No       ROS:  Consitutional: As above  ENT: As above  Respiratory: As above    OBJECTIVE:  /86 (BP Location: Left arm, Patient Position: Chair, Cuff Size: Adult Regular)  Pulse 92  Temp 98.9  F (37.2  C) (Oral)  Wt 116 lb (52.6 kg)  LMP 02/22/2017 (Exact Date)  SpO2 98%  BMI 18.17 kg/m2  GENERAL APPEARANCE: healthy, alert and no distress  EYES: conjunctiva clear  EARS:small cerumen.   Ear canals w/o erythema, TM's intact w/o erythema.    NOSE/MOUTH: Nose and mouth without ulcers, erythema or lesions  THROAT: mild erythema w/ no tonsillar enlargement . no exudates  NECK: supple, nontender, no lymphadenopathy  RESP: lungs clear to auscultation - no rales, rhonchi or wheezes  CV: regular rates and rhythm, normal S1 S2, no murmur noted  NEURO: awake, alert        ASSESSMENT:     ICD-10-CM    1. Flu-like symptoms R68.89 oseltamivir (TAMIFLU) 75 MG capsule       PLAN:  Patient has been around an individual diagnosed with influenza.   Lots of rest and fluids.  RTC if any worsening symptoms or if not improving.    Meredith Bethea FNP-BC

## 2017-03-22 NOTE — MR AVS SNAPSHOT
After Visit Summary   3/22/2017    Liyah Jernigan    MRN: [id number]           Patient Information     Date Of Birth          1965        Visit Information        Provider Department      3/22/2017 6:50 PM Meredith Bethea NP [facility]        Today's Diagnoses     Flu-like symptoms    -  1      Care Instructions      Viral Upper Respiratory Illness (Adult)  You have a viral upper respiratory illness (URI), which is another term for the common cold. This illness is contagious during the first few days. It is spread through the air by coughing and sneezing. It may also be spread by direct contact (touching the sick person and then touching your own eyes, nose, or mouth). Frequent handwashing will decrease risk of spread. Most viral illnesses go away within 7 to 10 days with rest and simple home remedies. Sometimes the illness may last for several weeks. Antibiotics will not kill a virus, and they are generally not prescribed for this condition.    Home care    If symptoms are severe, rest at home for the first 2 to 3 days. When you resume activity, don't let yourself get too tired.    Avoid being exposed to cigarette smoke (yours or others ).    You may use acetaminophen or ibuprofen to control pain and fever, unless another medicine was prescribed. (Note: If you have chronic liver or kidney disease, have ever had a stomach ulcer or gastrointestinal bleeding, or are taking blood-thinning medicines, talk with your healthcare provider before using these medicines.) Aspirin should never be given to anyone under 18 years of age who is ill with a viral infection or fever. It may cause severe liver or brain damage.    Your appetite may be poor, so a light diet is fine. Avoid dehydration by drinking 6 to 8 glasses of fluids per day (water, soft drinks, juices, tea, or soup). Extra fluids will help loosen secretions in the nose and lungs.    Over-the-counter cold medicines will not shorten the  length of time you re sick, but they may be helpful for the following symptoms: cough, sore throat, and nasal and sinus congestion. (Note: Do not use decongestants if you have high blood pressure.)  Follow-up care  Follow up with your healthcare provider, or as advised.  When to seek medical advice  Call your healthcare provider right away if any of these occur:    Cough with lots of colored sputum (mucus)    Severe headache; face, neck, or ear pain    Difficulty swallowing due to throat pain    Fever of 100.4 F (38 C)  Call 911, or get immediate medical care  Call emergency services right away if any of these occur:    Chest pain, shortness of breath, wheezing, or difficulty breathing    Coughing up blood    Inability to swallow due to throat pain    8412-1070 [employer]. [address] [place] [address], [place] [address]. All rights reserved. This information is not intended as a substitute for professional medical care. Always follow your healthcare professional's instructions.              Follow-ups after your visit        Your next 10 appointments already scheduled     Mar 24, 2017  3:15 PM CDT   MA SCREENING DIGITAL BIL[facility]AL [facility] BKMA[facility]    [address] [place] [address] [phone]           Do not use any powder, lotion or deodorant under your arms or on your breast. If you do, we will ask you to remove it before your exam.  Wear comfortable, two-piece clothing.  If you have any allergies, tell your care team.  Bring any previous mammograms from other facilities or have them mailed to the breast center.            Aug 22, 2017  1:30 PM CDT   (Arrive by 1:15 PM)   Return Multiple Sclerosis with Jose A Hairston MD   [facility] ([facility])    [address] [facility] [facility]  3rd Floor  [facility] [address] [phone]              Who to contact     If you have  questions or need follow up information about today's clinic visit or your schedule please contact [facility] directly at [phone].  Normal or non-critical lab and imaging results will be communicated to you by [employer], letter or phone within 4 business days after the clinic has received the results. If you do not hear from us within 7 days, please contact the clinic through [employer] or phone. If you have a critical or abnormal lab result, we will notify you by phone as soon as possible.  Submit refill requests through [employer] or call your pharmacy and they will forward the refill request to us. Please allow 3 business days for your refill to be completed.          Additional Information About Your Visit        [employer] Information     [employer] gives you secure access to your electronic health record. If you see a primary care provider, you can also send messages to your care team and make appointments. If you have questions, please call your primary care clinic.  If you do not have a primary care provider, please call [phone] and they will assist you.        Care EveryWhere ID     This is your Care EveryWhere ID. This could be used by other organizations to access your [place] medical records  [id number]        Your Vitals Were     Pulse Temperature Last Period Pulse Oximetry BMI (Body Mass Index)       92 98.9  F (37.2  C) (Oral) 02/22/2017 (Exact Date) 98% 18.17 kg/m2        Blood Pressure from Last 3 Encounters:   03/22/17 132/86   03/17/17 128/90   02/28/17 124/84    Weight from Last 3 Encounters:   03/22/17 116 lb (52.6 kg)   03/17/17 115 lb 12.8 oz (52.5 kg)   02/28/17 117 lb 3.2 oz (53.2 kg)              Today, you had the following     No orders found for display         Today's Medication Changes          These changes are accurate as of: 3/22/17  7:17 PM.  If you have any questions, ask your nurse or doctor.               Start taking these medicines.        Dose/Directions     oseltamivir 75 MG capsule   Commonly known as:  TAMIFLU   Used for:  Flu-like symptoms   Started by:  Meredith Bethea NP        Dose:  75 mg   Take 1 capsule (75 mg) by mouth 2 times daily for 5 days   Quantity:  10 capsule   Refills:  0            Where to get your medicines      These medications were sent to [id number] - [place] - [address] AT [address] [place] [address]     Phone:  [phone]     oseltamivir 75 MG capsule                Primary Care Provider    None Specified       No primary provider on file.        Thank you!     Thank you for choosing [facility]  for your care. Our goal is always to provide you with excellent care. Hearing back from our patients is one way we can continue to improve our services. Please take a few minutes to complete the written survey that you may receive in the mail after your visit with us. Thank you!             Your Updated Medication List - Protect others around you: Learn how to safely use, store and throw away your medicines at www.disposemymeds.org.          This list is accurate as of: 3/22/17  7:17 PM.  Always use your most recent med list.                   Brand Name Dispense Instructions for use    amphetamine-dextroamphetamine 10 MG per tablet    ADDERALL    30 tablet    Take 1 tablet (10 mg) by mouth daily . Patient needs to see primary provider for further refills.       buPROPion 150 MG 12 hr tablet    WELLBUTRIN SR    90 tablet    TAKE 1 TABLET BY MOUTH DAILY       levonorgestrel-ethinyl estradiol 0.1-20 MG-MCG per tablet    AVIANE,ALESSE,LESSINA    84 tablet    Take 1 tablet by mouth daily       order for DME     1 each    Please measure and distribute 1 pair of 20mmHg - 30mmHg THIGH high open  toe compression stockings. Jobst ultrasheer or equivalent.       oseltamivir 75 MG capsule    TAMIFLU    10 capsule    Take 1 capsule (75 mg) by mouth 2 times daily for 5 days        rOPINIRole 0.5 MG tablet    REQUIP    90 tablet    Take 1 tablet (0.5 mg) by mouth At Bedtime       SUMAtriptan 100 MG tablet    IMITREX    30 tablet    Take 1 tablet (100 mg) by mouth daily as needed for migraine       teriflunomide 14 MG tablet    AUBAGIO    30 tablet    Take 1 tablet (14 mg) by mouth daily       valACYclovir 500 MG tablet    VALTREX    90 tablet    Take 1 tablet (500 mg) by mouth daily       VITAMIN D (CHOLECALCIFEROL) PO      Take 5,000 Units by mouth daily

## 2017-03-23 NOTE — PATIENT INSTRUCTIONS

## 2017-05-09 ENCOUNTER — DOCUMENTATION ONLY (OUTPATIENT)
Dept: LAB | Facility: CLINIC | Age: 52
End: 2017-05-09

## 2017-05-09 ENCOUNTER — OFFICE VISIT (OUTPATIENT)
Dept: FAMILY MEDICINE | Facility: CLINIC | Age: 52
End: 2017-05-09
Payer: COMMERCIAL

## 2017-05-09 VITALS
SYSTOLIC BLOOD PRESSURE: 131 MMHG | WEIGHT: 119 LBS | OXYGEN SATURATION: 96 % | DIASTOLIC BLOOD PRESSURE: 86 MMHG | HEART RATE: 79 BPM | TEMPERATURE: 98.3 F | BODY MASS INDEX: 18.68 KG/M2 | HEIGHT: 67 IN

## 2017-05-09 DIAGNOSIS — Z23 NEED FOR VACCINATION: ICD-10-CM

## 2017-05-09 DIAGNOSIS — Z12.31 VISIT FOR SCREENING MAMMOGRAM: ICD-10-CM

## 2017-05-09 DIAGNOSIS — Z11.3 SCREEN FOR STD (SEXUALLY TRANSMITTED DISEASE): ICD-10-CM

## 2017-05-09 DIAGNOSIS — R03.0 ELEVATED BLOOD PRESSURE READING WITHOUT DIAGNOSIS OF HYPERTENSION: Primary | ICD-10-CM

## 2017-05-09 DIAGNOSIS — Z12.11 SCREEN FOR COLON CANCER: ICD-10-CM

## 2017-05-09 LAB
ALBUMIN SERPL-MCNC: 3.5 G/DL (ref 3.4–5)
ANION GAP SERPL CALCULATED.3IONS-SCNC: 6 MMOL/L (ref 3–14)
BUN SERPL-MCNC: 17 MG/DL (ref 7–30)
CALCIUM SERPL-MCNC: 9.2 MG/DL (ref 8.5–10.1)
CHLORIDE SERPL-SCNC: 106 MMOL/L (ref 94–109)
CO2 SERPL-SCNC: 27 MMOL/L (ref 20–32)
CREAT SERPL-MCNC: 0.92 MG/DL (ref 0.52–1.04)
GFR SERPL CREATININE-BSD FRML MDRD: 64 ML/MIN/1.7M2
GLUCOSE SERPL-MCNC: 86 MG/DL (ref 70–99)
PHOSPHATE SERPL-MCNC: 3.4 MG/DL (ref 2.5–4.5)
POTASSIUM SERPL-SCNC: 4.1 MMOL/L (ref 3.4–5.3)
SODIUM SERPL-SCNC: 139 MMOL/L (ref 133–144)

## 2017-05-09 PROCEDURE — 87340 HEPATITIS B SURFACE AG IA: CPT | Performed by: FAMILY MEDICINE

## 2017-05-09 PROCEDURE — 86706 HEP B SURFACE ANTIBODY: CPT | Performed by: FAMILY MEDICINE

## 2017-05-09 PROCEDURE — 80069 RENAL FUNCTION PANEL: CPT | Performed by: FAMILY MEDICINE

## 2017-05-09 PROCEDURE — 87389 HIV-1 AG W/HIV-1&-2 AB AG IA: CPT | Performed by: FAMILY MEDICINE

## 2017-05-09 PROCEDURE — 36415 COLL VENOUS BLD VENIPUNCTURE: CPT | Performed by: FAMILY MEDICINE

## 2017-05-09 PROCEDURE — 86803 HEPATITIS C AB TEST: CPT | Performed by: FAMILY MEDICINE

## 2017-05-09 PROCEDURE — 99213 OFFICE O/P EST LOW 20 MIN: CPT | Performed by: FAMILY MEDICINE

## 2017-05-09 PROCEDURE — 86780 TREPONEMA PALLIDUM: CPT | Performed by: FAMILY MEDICINE

## 2017-05-09 ASSESSMENT — PAIN SCALES - GENERAL: PAINLEVEL: NO PAIN (0)

## 2017-05-09 NOTE — PROGRESS NOTES
SUBJECTIVE:                                                    Liyah Jernigan is a 52 year old female who presents to clinic today for the following health issues:    Answers for HPI/ROS submitted by the patient on 5/9/2017   Annual Exam:  Getting at least 3 servings of Calcium per day:: Yes  Bi-annual eye exam:: Yes  Dental care twice a year:: NO  Sleep apnea or symptoms of sleep apnea:: None  Diet:: Low fat/cholesterol, Carbohydrate counting, Gluten-free/reduced  Frequency of exercise:: 6-7 days/week  Taking medications regularly:: Yes  Medication side effects:: Not applicable  Additional concerns today:: No  Q1: Little interest or pleasure in doing things: 0=Not at all  Q2: Feeling down, depressed or hopeless: 0=Not at all  PHQ-2 Score: 0  Duration of exercise:: 15-30 minutes    Concern - STD check     Onset: 1 year with same partner then broke up with him.     Description:   Patient had unprotected sex with one partner. She found recently that he had unprotected sex with 15 women in the last few years. She wants to do STD testings to rule out    Intensity: NA    Progression of Symptoms:  NA    Accompanying Signs & Symptoms:  None       Previous history of similar problem:   None    Precipitating factors:   Worsened by: NA    Alleviating factors:  Improved by: NA       Therapies Tried and outcome: None            Problem list and histories reviewed & adjusted, as indicated.  Additional history: as documented    Patient Active Problem List   Diagnosis     Attention deficit hyperactivity disorder (ADHD), unspecified ADHD type     Genital herpes simplex     MS (multiple sclerosis) (H)     Excessive or frequent menstruation     Varicose vein     Restless leg syndrome     Mild episode of recurrent major depressive disorder (H)     Past Surgical History:   Procedure Laterality Date     ENT SURGERY  2005    tympanoplasty     GYN SURGERY  1990    laparoscopies (5) laparotomies (2) for ectopic       Social History  "  Substance Use Topics     Smoking status: Never Smoker     Smokeless tobacco: Not on file     Alcohol use No     Family History   Problem Relation Age of Onset     DIABETES Father           Hypertension Father      Hyperlipidemia Father      Substance Abuse Father      Obesity Father      DIABETES Mother      \"borderline\" for years     Hypertension Mother      Hyperlipidemia Mother      Depression Mother      Substance Abuse Mother      Asthma Mother      Thyroid Disease Mother      Obesity Mother      CEREBROVASCULAR DISEASE Maternal Grandfather      he had a few     Substance Abuse Maternal Grandfather      Breast Cancer Maternal Grandmother      in 50s     Substance Abuse Paternal Grandfather          Current Outpatient Prescriptions   Medication Sig Dispense Refill     buPROPion (WELLBUTRIN SR) 150 MG 12 hr tablet TAKE 1 TABLET BY MOUTH DAILY 90 tablet 1     levonorgestrel-ethinyl estradiol (AVIANE,ALESSE,LESSINA) 0.1-20 MG-MCG per tablet Take 1 tablet by mouth daily 84 tablet 1     valACYclovir (VALTREX) 500 MG tablet Take 1 tablet (500 mg) by mouth daily 90 tablet 0     amphetamine-dextroamphetamine (ADDERALL) 10 MG per tablet Take 1 tablet (10 mg) by mouth daily . Patient needs to see primary provider for further refills. 30 tablet 0     SUMAtriptan (IMITREX) 100 MG tablet Take 1 tablet (100 mg) by mouth daily as needed for migraine 30 tablet 0     teriflunomide (AUBAGIO) 14 MG tablet Take 1 tablet (14 mg) by mouth daily 30 tablet 11     order for DME Please measure and distribute 1 pair of 20mmHg - 30mmHg THIGH high open  toe compression stockings. Jobst ultrasheer or equivalent. 1 each 1     rOPINIRole (REQUIP) 0.5 MG tablet Take 1 tablet (0.5 mg) by mouth At Bedtime 90 tablet 3     VITAMIN D, CHOLECALCIFEROL, PO Take 5,000 Units by mouth daily       No Known Allergies  Recent Labs   Lab Test  12/15/16   1626  16   1021  16   1041  16   1529  11/04/15   1409   LDL   --   100*   -- " "   --    --    HDL   --   121   --    --    --    TRIG   --   50   --    --    --    ALT  33   --   42   --   38   CR  0.93   --    --    --    --    GFRESTIMATED  64   --    --    --    --    GFRESTBLACK  78   --    --    --    --    POTASSIUM  3.7   --    --    --    --    TSH   --    --    --   1.93   --       BP Readings from Last 3 Encounters:   05/09/17 131/86   03/22/17 132/86   03/17/17 128/90    Wt Readings from Last 3 Encounters:   05/09/17 119 lb (54 kg)   03/22/17 116 lb (52.6 kg)   03/17/17 115 lb 12.8 oz (52.5 kg)                  Labs reviewed in EPIC    Reviewed and updated as needed this visit by clinical staff  Tobacco  Allergies  Meds  Med Hx  Surg Hx  Fam Hx  Soc Hx      Reviewed and updated as needed this visit by Provider         ROS:  Constitutional, HEENT, cardiovascular, pulmonary, gi and gu systems are negative, except as otherwise noted.    OBJECTIVE:                                                    /86 (BP Location: Right arm, Patient Position: Chair, Cuff Size: Adult Regular)  Pulse 79  Temp 98.3  F (36.8  C) (Oral)  Ht 5' 7\" (1.702 m)  Wt 119 lb (54 kg)  LMP 04/25/2017 (Approximate)  SpO2 96%  Breastfeeding? No  BMI 18.64 kg/m2  Body mass index is 18.64 kg/(m^2).  GENERAL APPEARANCE: healthy, alert and no distress  EYES: Eyes grossly normal to inspection, PERRL and conjunctivae and sclerae normal  HENT: ear canals and TM's normal, nose and mouth without ulcers or lesions, oropharynx clear and oral mucous membranes moist  NECK: no adenopathy, no asymmetry, masses, or scars and thyroid normal to palpation  RESP: lungs clear to auscultation - no rales, rhonchi or wheezes  CV: regular rates and rhythm, normal S1 S2, no S3 or S4, no murmur, click or rub, no peripheral edema and peripheral pulses strong  ABDOMEN: soft, nontender, no hepatosplenomegaly, no masses and bowel sounds normal  MS: no musculoskeletal defects are noted and gait is age appropriate without " ataxia  SKIN: no suspicious lesions or rashes  NEURO: Normal strength and tone, sensory exam grossly normal, mentation intact and speech normal  PSYCH: mentation appears normal and affect normal/bright     Diagnostic Test Results:  Results for orders placed or performed in visit on 12/15/16   Comprehensive metabolic panel   Result Value Ref Range    Sodium 141 133 - 144 mmol/L    Potassium 3.7 3.4 - 5.3 mmol/L    Chloride 108 94 - 109 mmol/L    Carbon Dioxide 24 20 - 32 mmol/L    Anion Gap 9 3 - 14 mmol/L    Glucose 91 70 - 99 mg/dL    Urea Nitrogen 23 7 - 30 mg/dL    Creatinine 0.93 0.52 - 1.04 mg/dL    GFR Estimate 64 >60 mL/min/1.7m2    GFR Estimate If Black 78 >60 mL/min/1.7m2    Calcium 8.6 8.5 - 10.1 mg/dL    Bilirubin Total 0.2 0.2 - 1.3 mg/dL    Albumin 3.8 3.4 - 5.0 g/dL    Protein Total 7.0 6.8 - 8.8 g/dL    Alkaline Phosphatase 45 40 - 150 U/L    ALT 33 0 - 50 U/L    AST 17 0 - 45 U/L   Vitamin D Deficiency   Result Value Ref Range    Vitamin D Deficiency screening 63 20 - 75 ug/L        ASSESSMENT/PLAN:                                                        Tobacco Cessation:   reports that she has never smoked. She does not have any smokeless tobacco history on file.          ICD-10-CM    1. Elevated blood pressure reading without diagnosis of hypertension R03.0 Renal panel- last GFR 64 and will check to make sure this is not declining.    2. Screen for colon cancer Z12.11 Fecal colorectal cancer screen FIT - Future (S+30)     GASTROENTEROLOGY ADULT REF PROCEDURE ONLY- colonoscopy   3. Visit for screening mammogram Z12.31 MA SCREENING DIGITAL BILAT - Future  (s+30)   4. Screen for STD (sexually transmitted disease)- high risk partner Z11.3 HIV Antigen Antibody Combo     Anti Treponema     Hepatitis C antibody     Hepatitis B surface antigen     Hepatitis B Surface Antibody     Chlamydia trachomatis PCR     Neisseria gonorrhoeae PCR                 FUTURE APPOINTMENTS:       - Follow-up for annual visit  or as needed with primary care provider at [facility], which is close to her home.   See Patient Instructions    Piedad Holt MD  [facility]

## 2017-05-09 NOTE — PATIENT INSTRUCTIONS
How to contact your care team: [phone] Pharmacy [phone]   ENRICO BHAGAT MD KATYA GEORGIEV, PA-C CHRIS JONES, PA-C NAM HO, MD JONATHAN BATES, MD ARVIN VOCAL, MD    Clinic hours M-Th 7am-7pm Fri 7am-5pm.   Urgent care M-F 11am-9pm  Sat/Sun 9am-5pm.   Pharmacy   Mon-Th:  8:00am-8pm   Fri:  8:00am-6:00pm  Sat/Sun  8:00am-5:00 pm       Understanding STDs  When it comes to sex, nothing is risk-free. Any sexual contact with the penis, vagina, anus, or mouth can spread an STD. The only sure way to prevent STDs is abstinence (not having sex). But there are ways to make sex safer. Use a latex condom each time you have sex. And choose your partner wisely.    Use condoms for safer sex  If you have sex, latex condoms provide the best protection against STDs. Latex condoms stop the exchange of body fluids that carry certain STDs. They also limit contact with affected skin. Be aware though, a condom doesn t cover all skin. So affected skin that is not covered can still transfer disease. But you re safer with a condom than without one. Use a condom even if you use other birth control. While birth control methods like the pill or IUD help prevent pregnancy, they do not protect against STDs.  Choose the right condom  Condoms made of latex prevent disease best. If you re allergic to latex, use polyurethane condoms instead. Male condoms fit over the penis. Female condoms line the vagina. Before buying a condom, read the label to be sure it prevents disease. Some novelty condoms don t.  The right lubricant helps  Buy lubricated condoms or use lubricant. This provides greater comfort and reduces the risk of condom breakage. Use only water-based lubricants such as K-Y or Astroglide. Don t use oil, lotion, or petroleum jelly. They can weaken the condom, causing breakage. Also, you may want to choose lubricants without nonoxynol-9. It s now known that this spermicide does not prevent disease and  may cause irritation.  Use condoms correctly  For condoms to work, they must be used the right way. Keep these tips in mind.    Use a new latex condom each time you have sex. Slip the condom on the penis before any contact is made.    When ready to withdraw, hold the rim of the condom as the penis pulls out. This prevents the condom from slipping off.    Check the expiration date before using a condom.    Don t store condoms in places that can get hot, such as a car or a wallet that is carried in a back pocket.  Get to know your partner  Safer sex is a process. It involves getting to know your partner and making informed choices. Ask each other how many partners you have had in the past, and how many you have now. Find out if either of you has an STD. If you decide to have sex, use a condom each time. Don t stop using condoms unless you re sure neither of you has other partners and you ve both been tested to confirm you don t have STDs. Then stay free of disease by having sex only with each other (monogamy).  Keep your cool  Don t let alcohol or drugs cloud your judgment. They could lead you to have sex with someone you wouldn t have chosen if you were sober. Or, you might forget to use a condom. If you do plan to have sex, keep a latex condom with you. Don t wait until you re in the heat of passion to try to find one.  Consider abstinence  The only way to be sure you won t get an STD is to abstain from sex. Abstinence is a choice that many people make at some point in their lives. Maybe you want to wait until you are sure you re ready before you have sex. Maybe you d like a break from the responsibilities of sex for a while. Or maybe you just want to know your partner better before taking the next step. Abstinence is a choice you can make now to protect your future.    5772-8822 [employer]. [address] [place] [address], [place] [address]. All rights reserved. This information is not intended as a  substitute for professional medical care. Always follow your healthcare professional's instructions.

## 2017-05-09 NOTE — MR AVS SNAPSHOT
After Visit Summary   5/9/2017    Liyah Jernigan    MRN: [id number]           Patient Information     Date Of Birth          1965        Visit Information        Provider Department      5/9/2017 3:40 PM Piedad Holt MD [facility]        Today's Diagnoses     Elevated blood pressure reading without diagnosis of hypertension    -  1    Screen for colon cancer        Visit for screening mammogram        Screen for STD (sexually transmitted disease)          Care Instructions    How to contact your care team: [phone] Pharmacy [phone]   ENRICO BHAGAT MD KATYA GEORGIEV, PA-C CHRIS JONES, PA-C NAM HO, MD JONATHAN BATES, MD ARVIN VOCAL, MD    Clinic hours M-Th 7am-7pm Fri 7am-5pm.   Urgent care M-F 11am-9pm  Sat/Sun 9am-5pm.   Pharmacy   Mon-Th:  8:00am-8pm   Fri:  8:00am-6:00pm  Sat/Sun  8:00am-5:00 pm       Understanding STDs  When it comes to sex, nothing is risk-free. Any sexual contact with the penis, vagina, anus, or mouth can spread an STD. The only sure way to prevent STDs is abstinence (not having sex). But there are ways to make sex safer. Use a latex condom each time you have sex. And choose your partner wisely.    Use condoms for safer sex  If you have sex, latex condoms provide the best protection against STDs. Latex condoms stop the exchange of body fluids that carry certain STDs. They also limit contact with affected skin. Be aware though, a condom doesn t cover all skin. So affected skin that is not covered can still transfer disease. But you re safer with a condom than without one. Use a condom even if you use other birth control. While birth control methods like the pill or IUD help prevent pregnancy, they do not protect against STDs.  Choose the right condom  Condoms made of latex prevent disease best. If you re allergic to latex, use polyurethane condoms instead. Male condoms fit over the penis. Female condoms line  the vagina. Before buying a condom, read the label to be sure it prevents disease. Some novelty condoms don t.  The right lubricant helps  Buy lubricated condoms or use lubricant. This provides greater comfort and reduces the risk of condom breakage. Use only water-based lubricants such as K-Y or Astroglide. Don t use oil, lotion, or petroleum jelly. They can weaken the condom, causing breakage. Also, you may want to choose lubricants without nonoxynol-9. It s now known that this spermicide does not prevent disease and may cause irritation.  Use condoms correctly  For condoms to work, they must be used the right way. Keep these tips in mind.    Use a new latex condom each time you have sex. Slip the condom on the penis before any contact is made.    When ready to withdraw, hold the rim of the condom as the penis pulls out. This prevents the condom from slipping off.    Check the expiration date before using a condom.    Don t store condoms in places that can get hot, such as a car or a wallet that is carried in a back pocket.  Get to know your partner  Safer sex is a process. It involves getting to know your partner and making informed choices. Ask each other how many partners you have had in the past, and how many you have now. Find out if either of you has an STD. If you decide to have sex, use a condom each time. Don t stop using condoms unless you re sure neither of you has other partners and you ve both been tested to confirm you don t have STDs. Then stay free of disease by having sex only with each other (monogamy).  Keep your cool  Don t let alcohol or drugs cloud your judgment. They could lead you to have sex with someone you wouldn t have chosen if you were sober. Or, you might forget to use a condom. If you do plan to have sex, keep a latex condom with you. Don t wait until you re in the heat of passion to try to find one.  Consider abstinence  The only way to be sure you won t get an STD is to abstain  from sex. Abstinence is a choice that many people make at some point in their lives. Maybe you want to wait until you are sure you re ready before you have sex. Maybe you d like a break from the responsibilities of sex for a while. Or maybe you just want to know your partner better before taking the next step. Abstinence is a choice you can make now to protect your future.    8228-5061 [employer]. [address] [place] [address], [place] [address]. All rights reserved. This information is not intended as a substitute for professional medical care. Always follow your healthcare professional's instructions.              Follow-ups after your visit        Additional Services     GASTROENTEROLOGY ADULT REF PROCEDURE ONLY       Last Lab Result: Creatinine (mg/dL)       Date                     Value                 12/15/2016               0.93             ----------  Body mass index is 18.64 kg/(m^2).      Patient will be contacted to schedule procedure.     Please be aware that coverage of these services is subject to the terms and limitations of your health insurance plan.  Call member services at your health plan with any benefit or coverage questions.  Any procedures must be performed at a [place] facility OR coordinated by your clinic's referral office.    Please bring the following with you to your appointment:    (1) Any X-Rays, CTs or MRIs which have been performed.  Contact the facility where they were done to arrange for  prior to your scheduled appointment.    (2) List of current medications   (3) This referral request   (4) Any documents/labs given to you for this referral                  Follow-up notes from your care team     Return if symptoms worsen or fail to improve.      Your next 10 appointments already scheduled     Aug 22, 2017  1:30 PM CDT   (Arrive by 1:15 PM)   Return Multiple Sclerosis with Jose A Hairston MD   [facility] Multiple Sclerosis ([facility]  "[place])    [address] [id number] [phone]              Future tests that were ordered for you today     Open Future Orders        Priority Expected Expires Ordered    Chlamydia trachomatis PCR Routine  5/9/2018 5/9/2017    Neisseria gonorrhoeae PCR Routine  5/9/2018 5/9/2017    MA SCREENING DIGITAL BILAT - Future  (s+30) Routine  5/9/2018 5/9/2017    Fecal colorectal cancer screen FIT - Future (S+30) Routine 5/30/2017 6/8/2017 5/9/2017            Who to contact     If you have questions or need follow up information about today's clinic visit or your schedule please contact [facility] directly at [phone].  Normal or non-critical lab and imaging results will be communicated to you by MyChart, letter or phone within 4 business days after the clinic has received the results. If you do not hear from us within 7 days, please contact the clinic through MyChart or phone. If you have a critical or abnormal lab result, we will notify you by phone as soon as possible.  Submit refill requests through [employer] or call your pharmacy and they will forward the refill request to us. Please allow 3 business days for your refill to be completed.          Additional Information About Your Visit        [employer] Information     [employer] gives you secure access to your electronic health record. If you see a primary care provider, you can also send messages to your care team and make appointments. If you have questions, please call your primary care clinic.  If you do not have a primary care provider, please call [phone] and they will assist you.        Care EveryWhere ID     This is your Care EveryWhere ID. This could be used by other organizations to access your [facility] medical records  [id number]        Your Vitals Were     Pulse Temperature Height Last Period Pulse Oximetry Breastfeeding?    79 98.3  F (36.8  C) (Oral) 5' 7\" (1.702 m) 04/25/2017 (Approximate) 96% " No    BMI (Body Mass Index)                   18.64 kg/m2            Blood Pressure from Last 3 Encounters:   05/09/17 131/86   03/22/17 132/86   03/17/17 128/90    Weight from Last 3 Encounters:   05/09/17 119 lb (54 kg)   03/22/17 116 lb (52.6 kg)   03/17/17 115 lb 12.8 oz (52.5 kg)              We Performed the Following     Anti Treponema     GASTROENTEROLOGY ADULT REF PROCEDURE ONLY     Hepatitis B Surface Antibody     Hepatitis B surface antigen     Hepatitis C antibody     HIV Antigen Antibody Combo     Renal panel        Primary Care Provider Office Phone # Fax #    Jasmine Tab Barrow [phone]       [address] [address]        Thank you!     Thank you for choosing [facility]  for your care. Our goal is always to provide you with excellent care. Hearing back from our patients is one way we can continue to improve our services. Please take a few minutes to complete the written survey that you may receive in the mail after your visit with us. Thank you!             Your Updated Medication List - Protect others around you: Learn how to safely use, store and throw away your medicines at www.disposemymeds.org.          This list is accurate as of: 5/9/17  5:34 PM.  Always use your most recent med list.                   Brand Name Dispense Instructions for use    amphetamine-dextroamphetamine 10 MG per tablet    ADDERALL    30 tablet    Take 1 tablet (10 mg) by mouth daily . Patient needs to see primary provider for further refills.       buPROPion 150 MG 12 hr tablet    WELLBUTRIN SR    90 tablet    TAKE 1 TABLET BY MOUTH DAILY       levonorgestrel-ethinyl estradiol 0.1-20 MG-MCG per tablet    AVIANE,ALERAEANNE,LESSINA    84 tablet    Take 1 tablet by mouth daily       order for DME     1 each    Please measure and distribute 1 pair of 20mmHg - 30mmHg THIGH high open  toe compression stockings. Jobst ultrasheer or  equivalent.       rOPINIRole 0.5 MG tablet    REQUIP    90 tablet    Take 1 tablet (0.5 mg) by mouth At Bedtime       SUMAtriptan 100 MG tablet    IMITREX    30 tablet    Take 1 tablet (100 mg) by mouth daily as needed for migraine       teriflunomide 14 MG tablet    AUBAGIO    30 tablet    Take 1 tablet (14 mg) by mouth daily       valACYclovir 500 MG tablet    VALTREX    90 tablet    Take 1 tablet (500 mg) by mouth daily       VITAMIN D (CHOLECALCIFEROL) PO      Take 5,000 Units by mouth daily

## 2017-05-10 ENCOUNTER — TELEPHONE (OUTPATIENT)
Dept: FAMILY MEDICINE | Facility: CLINIC | Age: 52
End: 2017-05-10

## 2017-05-10 LAB
HBV SURFACE AB SERPL IA-ACNC: 0.09 M[IU]/ML
HBV SURFACE AG SERPL QL IA: NONREACTIVE
HCV AB SERPL QL IA: NORMAL
HIV 1+2 AB+HIV1 P24 AG SERPL QL IA: NORMAL

## 2017-05-10 NOTE — TELEPHONE ENCOUNTER
Pt left urine sample 5-9-17 and is checking for results. Pt left a sample but received a call from a male  that insisted she needed to come back for a swab as she filled the sample over the line that was drawn on the cup. Please call pt at the above # to advise.

## 2017-05-10 NOTE — PROGRESS NOTES
Dear Liyah    Your test results are attached. I am happy to let you know that they are stable.    The screen for infection was negative or normal. We are still waiting for the chlamydia test results. You are not immune to hepatitis B and I recommend that you get the 3 shot series to protect you from hepatitis B. You can have these at [facility]. I will put in the order. I will let you know about the other tests when they come in.     Please contact me by [employer] if you have any questions about your labs or management.    Piedad Holt MD

## 2017-05-10 NOTE — TELEPHONE ENCOUNTER
RN spoke to lab was informed that they are unable to process labs if urine is over the line on the container.  Options are to drop off another urine sample, or do a self collected swab.    Patient notified.  Lab appointment was made for tomorrow.    Radha Hanson RN

## 2017-05-11 DIAGNOSIS — Z11.3 SCREEN FOR STD (SEXUALLY TRANSMITTED DISEASE): ICD-10-CM

## 2017-05-11 LAB — T PALLIDUM IGG+IGM SER QL: NEGATIVE

## 2017-05-11 PROCEDURE — 87591 N.GONORRHOEAE DNA AMP PROB: CPT | Performed by: FAMILY MEDICINE

## 2017-05-11 PROCEDURE — 87491 CHLMYD TRACH DNA AMP PROBE: CPT | Performed by: FAMILY MEDICINE

## 2017-05-12 LAB
C TRACH DNA SPEC QL NAA+PROBE: NORMAL
N GONORRHOEA DNA SPEC QL NAA+PROBE: NORMAL
SPECIMEN SOURCE: NORMAL
SPECIMEN SOURCE: NORMAL

## 2017-05-13 NOTE — PROGRESS NOTES
Dear Liyah    Your test results are attached. I am happy to let you know that they are stable.    The screen for infection was negative or normal.     Please contact me by [employer] if you have any questions about your labs or management.    Piedad Holt MD

## 2017-05-13 NOTE — PROGRESS NOTES
Dear Liyah    Your test results are attached.     All of the tests are negative.    Please contact me by MyClassest if you have any questions about your labs or management.    Piedad Holt MD

## 2017-06-05 ENCOUNTER — MYC MEDICAL ADVICE (OUTPATIENT)
Dept: NEUROLOGY | Facility: CLINIC | Age: 52
End: 2017-06-05

## 2017-06-05 NOTE — TELEPHONE ENCOUNTER
Dr Hairston, please see Liyah's [employer] message; I could put her in a spot close to the end of July, or she could come see Ceci, with regards to her insurance issues; With regards to the numbness/tingling, do you think this could be related to her Aubagio? For the vision loss, could this be migraine, or related to her elevated blood pressure? Let me know what you think she should do; Thank you.    Liyah Rosado, MS RN Care Coordinator

## 2017-06-05 NOTE — TELEPHONE ENCOUNTER
[employer] message sent to Liyah with Dr Hairston's input.    Liyah Rosado, MS RN Care Coordinator

## 2017-06-05 NOTE — TELEPHONE ENCOUNTER
1.  Aubagio can cause some increase in BP.  Usually it is small (a couple of points in both systolic and diastolic).  If she is consistsently at or above 140/90 (the most common cutoff for high blood pressure), then her PCP may want to intervene - which could be lifestyle alterations (more exercise, low salt diet), or medications.  In other words, can manage it the same as in anyone else.      2.  Aubagio can, very rarely, cause peripheral neuropathy.  If the tingling comes and goes, it is probably not that (as sensory disturbance in neuropathy tends to be present all the time, but can wax and wane).  Waking up in the night with tingling in the hands is very common.  Can check her sensory exam at next visit (whether with me, or Ceci, either is fine).    3.  I don't think either of these things at present warrant switching from the Aubagio (which she tolerates well, and seems to have her MS under control).    4.  The description of her vision abnormalities sounds like scintillating scotoma (i.e., migraine visual aura).  Does she typically get those?

## 2017-06-20 DIAGNOSIS — B00.9 HSV (HERPES SIMPLEX VIRUS) INFECTION: ICD-10-CM

## 2017-06-20 NOTE — TELEPHONE ENCOUNTER
valACYclovir (VALTREX) 500 MG tablet     Last Written Prescription Date: 02/23/17  Last Fill Quantity: 90, # refills: 0  Last Office Visit with G, [facility] or [facility] prescribing provider: 05/09/17        Creatinine   Date Value Ref Range Status   05/09/2017 0.92 0.52 - 1.04 mg/dL Final

## 2017-06-23 RX ORDER — VALACYCLOVIR HYDROCHLORIDE 500 MG/1
TABLET, FILM COATED ORAL
Qty: 90 TABLET | Refills: 1 | Status: SHIPPED | OUTPATIENT
Start: 2017-06-23 | End: 2018-04-03

## 2017-06-23 NOTE — TELEPHONE ENCOUNTER
Routing refill request to provider for review/approval because:  Medication has been filled in the past by another provider. Please advise if willing to fill.    Racquel Kong RN, [facility]

## 2017-07-18 ENCOUNTER — TELEPHONE (OUTPATIENT)
Dept: INTERNAL MEDICINE | Facility: CLINIC | Age: 52
End: 2017-07-18

## 2017-07-18 DIAGNOSIS — F33.0 MILD EPISODE OF RECURRENT MAJOR DEPRESSIVE DISORDER (H): ICD-10-CM

## 2017-07-19 NOTE — TELEPHONE ENCOUNTER
buPROPion (WELLBUTRIN SR) 150 MG 12 hr tablet       Last Written Prescription Date: 3/21/17  Last Fill Quantity: 90; # refills: 1  Last Office Visit with [facility], [facility] or [facility] prescribing provider:  3/17/17        Last PHQ-9 score on record= No flowsheet data found.    Lab Results   Component Value Date    AST 17 12/15/2016     Lab Results   Component Value Date    ALT 33 12/15/2016

## 2017-07-20 DIAGNOSIS — G35 MULTIPLE SCLEROSIS (H): ICD-10-CM

## 2017-07-20 RX ORDER — TERIFLUNOMIDE 14 MG/1
14 TABLET, FILM COATED ORAL DAILY
Qty: 30 TABLET | Refills: 11 | Status: SHIPPED | OUTPATIENT
Start: 2017-07-20 | End: 2018-07-16

## 2017-07-20 RX ORDER — BUPROPION HYDROCHLORIDE 150 MG/1
150 TABLET, EXTENDED RELEASE ORAL DAILY
Qty: 30 TABLET | Refills: 0 | Status: SHIPPED | OUTPATIENT
Start: 2017-07-20 | End: 2017-08-01

## 2017-07-20 NOTE — TELEPHONE ENCOUNTER
Received refill request for Aubagio from [facility]; Patient was last seen in February and has follow up appointment in August with Dr Hairston; Refilled for 1 year per MS refill protocol.    Liyah Rosado MS RN Care Coordinator

## 2017-07-20 NOTE — TELEPHONE ENCOUNTER
This writer attempted to contact Liyah on 07/20/17      Reason for call appointment and assessment and left detailed message.      When patient calls back, please schedule Office Visit appointment within 1 month with PCP, document that pt called and close encounter . Sent PHQ9 GAD7 over [employer].        Manuel Wild [facility]

## 2017-07-20 NOTE — TELEPHONE ENCOUNTER
Given 30 day supply. Please call patient and complete PHQ9 and GAD7.  Advise no further refills without follow up with Dr. Barrow

## 2017-07-20 NOTE — TELEPHONE ENCOUNTER
Routing refill request to provider for review/approval because:  No PHQ-9 on file for the patient.    Racquel Kong RN, [facility]

## 2017-07-24 NOTE — TELEPHONE ENCOUNTER
This writer attempted to contact Liyah on 07/24/17      Reason for call complete questionnaires - pt did read my chart message and left message to return call.      When patient calls back, please contact [facility] Care Team (MA/NORMA). If no one available, document that pt called and route to care team. routine priority .          Bo Sanabria MA

## 2017-07-28 NOTE — TELEPHONE ENCOUNTER
This writer attempted to contact Liyah on 07/28/17      Reason for call complete questionnaires and left message to return call.      When patient calls back, please contact [facility] Care Team (MA/TC). If no one available, document that pt called and route to care team. routine priority .      No response from pt - routing to PCP    Bo Sanabria MA

## 2017-07-31 DIAGNOSIS — Z30.011 ENCOUNTER FOR INITIAL PRESCRIPTION OF CONTRACEPTIVE PILLS: ICD-10-CM

## 2017-07-31 RX ORDER — LEVONORGESTREL AND ETHINYL ESTRADIOL 0.1-0.02MG
KIT ORAL
Qty: 84 TABLET | Refills: 0 | Status: CANCELLED | OUTPATIENT
Start: 2017-07-31

## 2017-07-31 NOTE — TELEPHONE ENCOUNTER
levonorgestrel-ethinyl estradiol (AVIANE,RUDY,LESSINA) 0.1-20 MG-MCG per tablet      Last Written Prescription Date: 2/28/17  Last Fill Quantity: 84, # refills: 0  Last Office Visit with G, P or [facility] prescribing provider: 5/9/17       BP Readings from Last 3 Encounters:   05/09/17 131/86   03/22/17 132/86   03/17/17 128/90     Date of last Breast Exam:

## 2017-08-01 ENCOUNTER — OFFICE VISIT (OUTPATIENT)
Dept: FAMILY MEDICINE | Facility: CLINIC | Age: 52
End: 2017-08-01
Payer: COMMERCIAL

## 2017-08-01 VITALS
RESPIRATION RATE: 12 BRPM | SYSTOLIC BLOOD PRESSURE: 128 MMHG | OXYGEN SATURATION: 98 % | DIASTOLIC BLOOD PRESSURE: 92 MMHG | HEART RATE: 75 BPM | BODY MASS INDEX: 18.15 KG/M2 | WEIGHT: 115.9 LBS | TEMPERATURE: 98.1 F

## 2017-08-01 DIAGNOSIS — G35 MS (MULTIPLE SCLEROSIS) (H): ICD-10-CM

## 2017-08-01 DIAGNOSIS — Z13.220 SCREENING FOR CHOLESTEROL LEVEL: ICD-10-CM

## 2017-08-01 DIAGNOSIS — F33.0 MILD EPISODE OF RECURRENT MAJOR DEPRESSIVE DISORDER (H): Primary | ICD-10-CM

## 2017-08-01 DIAGNOSIS — Z30.41 ENCOUNTER FOR SURVEILLANCE OF CONTRACEPTIVE PILLS: ICD-10-CM

## 2017-08-01 DIAGNOSIS — G43.109 MIGRAINE WITH AURA AND WITHOUT STATUS MIGRAINOSUS, NOT INTRACTABLE: ICD-10-CM

## 2017-08-01 PROCEDURE — 80053 COMPREHEN METABOLIC PANEL: CPT | Performed by: NURSE PRACTITIONER

## 2017-08-01 PROCEDURE — 36415 COLL VENOUS BLD VENIPUNCTURE: CPT | Performed by: NURSE PRACTITIONER

## 2017-08-01 PROCEDURE — 80061 LIPID PANEL: CPT | Performed by: NURSE PRACTITIONER

## 2017-08-01 PROCEDURE — 99214 OFFICE O/P EST MOD 30 MIN: CPT | Performed by: NURSE PRACTITIONER

## 2017-08-01 RX ORDER — BUPROPION HYDROCHLORIDE 150 MG/1
150 TABLET, EXTENDED RELEASE ORAL DAILY
Qty: 90 TABLET | Refills: 1 | Status: SHIPPED | OUTPATIENT
Start: 2017-08-01 | End: 2018-03-29

## 2017-08-01 RX ORDER — SUMATRIPTAN 100 MG/1
100 TABLET, FILM COATED ORAL DAILY PRN
Qty: 30 TABLET | Refills: 3 | Status: SHIPPED | OUTPATIENT
Start: 2017-08-01 | End: 2020-04-27

## 2017-08-01 RX ORDER — LEVONORGESTREL/ETHIN.ESTRADIOL 0.1-0.02MG
1 TABLET ORAL DAILY
Qty: 84 TABLET | Refills: 3 | Status: ON HOLD | OUTPATIENT
Start: 2017-08-01 | End: 2017-10-13

## 2017-08-01 ASSESSMENT — ANXIETY QUESTIONNAIRES
5. BEING SO RESTLESS THAT IT IS HARD TO SIT STILL: NOT AT ALL
3. WORRYING TOO MUCH ABOUT DIFFERENT THINGS: NOT AT ALL
IF YOU CHECKED OFF ANY PROBLEMS ON THIS QUESTIONNAIRE, HOW DIFFICULT HAVE THESE PROBLEMS MADE IT FOR YOU TO DO YOUR WORK, TAKE CARE OF THINGS AT HOME, OR GET ALONG WITH OTHER PEOPLE: NOT DIFFICULT AT ALL
1. FEELING NERVOUS, ANXIOUS, OR ON EDGE: SEVERAL DAYS
1. FEELING NERVOUS, ANXIOUS, OR ON EDGE: SEVERAL DAYS
5. BEING SO RESTLESS THAT IT IS HARD TO SIT STILL: NOT AT ALL
2. NOT BEING ABLE TO STOP OR CONTROL WORRYING: NOT AT ALL
3. WORRYING TOO MUCH ABOUT DIFFERENT THINGS: NOT AT ALL
6. BECOMING EASILY ANNOYED OR IRRITABLE: NOT AT ALL
IF YOU CHECKED OFF ANY PROBLEMS ON THIS QUESTIONNAIRE, HOW DIFFICULT HAVE THESE PROBLEMS MADE IT FOR YOU TO DO YOUR WORK, TAKE CARE OF THINGS AT HOME, OR GET ALONG WITH OTHER PEOPLE: NOT DIFFICULT AT ALL
7. FEELING AFRAID AS IF SOMETHING AWFUL MIGHT HAPPEN: NOT AT ALL
7. FEELING AFRAID AS IF SOMETHING AWFUL MIGHT HAPPEN: NOT AT ALL
2. NOT BEING ABLE TO STOP OR CONTROL WORRYING: NOT AT ALL
GAD7 TOTAL SCORE: 1
6. BECOMING EASILY ANNOYED OR IRRITABLE: NOT AT ALL
GAD7 TOTAL SCORE: 1

## 2017-08-01 ASSESSMENT — PATIENT HEALTH QUESTIONNAIRE - PHQ9
5. POOR APPETITE OR OVEREATING: NOT AT ALL
5. POOR APPETITE OR OVEREATING: NOT AT ALL

## 2017-08-01 NOTE — MR AVS SNAPSHOT
After Visit Summary   8/1/2017    Liyah Jernigan    MRN: [id number]           Patient Information     Date Of Birth          1965        Visit Information        Provider Department      8/1/2017 6:00 PM Tanya Clarke NP [facility]        Today's Diagnoses     Mild episode of recurrent major depressive disorder (H)    -  1    Encounter for surveillance of contraceptive pills        MS (multiple sclerosis) (H)        Migraine with aura and without status migrainosus, not intractable        Screening for cholesterol level           Follow-ups after your visit        Follow-up notes from your care team     Return if symptoms worsen or fail to improve.      Your next 10 appointments already scheduled     Aug 22, 2017  1:30 PM CDT   (Arrive by 1:15 PM)   Return Multiple Sclerosis with Jose A Hairston MD   [facility] Multiple Sclerosis ([facility])    [address] [place] [id number] [phone]              Who to contact     If you have questions or need follow up information about today's clinic visit or your schedule please contact [facility] directly at [phone].  Normal or non-critical lab and imaging results will be communicated to you by MyChart, letter or phone within 4 business days after the clinic has received the results. If you do not hear from us within 7 days, please contact the clinic through [email or web address] or phone. If you have a critical or abnormal lab result, we will notify you by phone as soon as possible.  Submit refill requests through Zuki or call your pharmacy and they will forward the refill request to us. Please allow 3 business days for your refill to be completed.          Additional Information About Your Visit        MyChart Information     Zuki gives you secure access to your electronic health record. If you see a primary care provider, you can also send messages to your care team  and make appointments. If you have questions, please call your primary care clinic.  If you do not have a primary care provider, please call [phone] and they will assist you.        Care EveryWhere ID     This is your Care EveryWhere ID. This could be used by other organizations to access your [place] medical records  [id number]        Your Vitals Were     Pulse Temperature Respirations Pulse Oximetry BMI (Body Mass Index)       75 98.1  F (36.7  C) (Oral) 12 98% 18.15 kg/m2        Blood Pressure from Last 3 Encounters:   08/01/17 (!) 128/92   05/09/17 131/86   03/22/17 132/86    Weight from Last 3 Encounters:   08/01/17 52.6 kg (115 lb 14.4 oz)   05/09/17 54 kg (119 lb)   03/22/17 52.6 kg (116 lb)              We Performed the Following     Comprehensive metabolic panel (BMP + Alb, Alk Phos, ALT, AST, Total. Bili, TP)     Lipid panel reflex to direct LDL          Where to get your medicines      These medications were sent to [facility] [id number] [place] [address] AT [address], [facility] [address]     Phone:  [phone]     buPROPion 150 MG 12 hr tablet    levonorgestrel-ethinyl estradiol 0.1-20 MG-MCG per tablet    SUMAtriptan 100 MG tablet          Primary Care Provider Office Phone # Fax #    Jasmine Tab Barrow [phone]       [address] [address]        Equal Access to Services     [facility] DEANA : Hadii ema vasquez hadasho Soomaali, waaxda luqadaha, qaybta kaalmada andrade grayson. So [facility] [phone].    ATENCIÓN: Si habla español, tiene a thorne disposición servicios gratuitos de asistencia lingüística. Llame al [phone].    We comply with applicable federal civil rights laws and [place] laws. We do not discriminate on the basis of race, color, national origin, age, disability sex, sexual orientation or gender identity.             Thank you!     Thank you for choosing [facility]  for your care. Our goal is always to provide you with excellent care. Hearing back from our patients is one way we can continue to improve our services. Please take a few minutes to complete the written survey that you may receive in the mail after your visit with us. Thank you!             Your Updated Medication List - Protect others around you: Learn how to safely use, store and throw away your medicines at www.disposemymeds.org.          This list is accurate as of: 8/1/17  6:23 PM.  Always use your most recent med list.                   Brand Name Dispense Instructions for use Diagnosis    amphetamine-dextroamphetamine 10 MG per tablet    ADDERALL    30 tablet    Take 1 tablet (10 mg) by mouth daily . Patient needs to see primary provider for further refills.    Attention deficit hyperactivity disorder (ADHD), unspecified ADHD type       buPROPion 150 MG 12 hr tablet    WELLBUTRIN SR    90 tablet    Take 1 tablet (150 mg) by mouth daily Appointment required prior to any additional refills    Mild episode of recurrent major depressive disorder (H)       levonorgestrel-ethinyl estradiol 0.1-20 MG-MCG per tablet    AVIANE,ALESSE,LESSINA    84 tablet    Take 1 tablet by mouth daily    Encounter for surveillance of contraceptive pills       order for DME     1 each    Please measure and distribute 1 pair of 20mmHg - 30mmHg THIGH high open  toe compression stockings. Jobst ultrasheer or equivalent.    Venous insufficiency       rOPINIRole 0.5 MG tablet    REQUIP    90 tablet    Take 1 tablet (0.5 mg) by mouth At Bedtime    Restless leg syndrome       SUMAtriptan 100 MG tablet    IMITREX    30 tablet    Take 1 tablet (100 mg) by mouth daily as needed for migraine    Migraine with aura and without status migrainosus, not intractable       teriflunomide 14 MG tablet    AUBAGIO    30 tablet    Take 1 tablet (14 mg) by mouth daily    Multiple sclerosis  (H)       valACYclovir 500 MG tablet    VALTREX    90 tablet    TAKE 1 TABLET BY MOUTH DAILY    HSV (herpes simplex virus) infection       VITAMIN D (CHOLECALCIFEROL) PO      Take by mouth daily

## 2017-08-01 NOTE — NURSING NOTE
"Chief Complaint   Patient presents with     Refill Request       Initial BP (!) 128/92 (BP Location: Right arm, Patient Position: Chair, Cuff Size: Adult Regular)  Pulse 75  Temp 98.1  F (36.7  C) (Oral)  Resp 12  Wt 52.6 kg (115 lb 14.4 oz)  SpO2 98%  BMI 18.15 kg/m2 Estimated body mass index is 18.15 kg/(m^2) as calculated from the following:    Height as of 5/9/17: 1.702 m (5' 7\").    Weight as of this encounter: 52.6 kg (115 lb 14.4 oz).  Medication Reconciliation: zo Rodriguez        "

## 2017-08-01 NOTE — PROGRESS NOTES
"  SUBJECTIVE:                                                    Liyah Jernigan is a 52 year old female who presents to clinic today for the following health issues:      Depression Followup    Status since last visit: Stable     See PHQ-9 for current symptoms.  Other associated symptoms: None    Complicating factors:   Significant life event:  No   Current substance abuse:  None  Anxiety or Panic symptoms:  No    PHQ-9  English  PHQ-9   Any Language      Amount of exercise or physical activity: 6-7 days/week for an average of 30-45 minutes    Problems taking medications regularly: No    Medication side effects: none  Diet: regular (no restrictions)    Some trouble sleeping: caring for son. Melatonin is not helping. Daily or every few days. Last 2-3 months.     MS is stable-Aubagio can affect liver, will check today.    Birth control refill-going well no issues      Problem list and histories reviewed & adjusted, as indicated.  Additional history: as documented    Patient Active Problem List   Diagnosis     Attention deficit hyperactivity disorder (ADHD), unspecified ADHD type     Genital herpes simplex     MS (multiple sclerosis) (H)     Excessive or frequent menstruation     Varicose vein     Restless leg syndrome     Mild episode of recurrent major depressive disorder (H)     Past Surgical History:   Procedure Laterality Date     ENT SURGERY      tympanoplasty     GYN SURGERY      laparoscopies (5) laparotomies (2) for ectopic       Social History   Substance Use Topics     Smoking status: Never Smoker     Smokeless tobacco: Never Used     Alcohol use No     Family History   Problem Relation Age of Onset     DIABETES Father           Hypertension Father      Hyperlipidemia Father      Substance Abuse Father      Obesity Father      DIABETES Mother      \"borderline\" for years     Hypertension Mother      Hyperlipidemia Mother      Depression Mother      Substance Abuse Mother      Asthma Mother      " Thyroid Disease Mother      Obesity Mother      CEREBROVASCULAR DISEASE Maternal Grandfather      he had a few     Substance Abuse Maternal Grandfather      Breast Cancer Maternal Grandmother      in 50s     Substance Abuse Paternal Grandfather          Current Outpatient Prescriptions   Medication Sig Dispense Refill     buPROPion (WELLBUTRIN SR) 150 MG 12 hr tablet Take 1 tablet (150 mg) by mouth daily Appointment required prior to any additional refills 90 tablet 1     levonorgestrel-ethinyl estradiol (AVIANE,ALESSE,LESSINA) 0.1-20 MG-MCG per tablet Take 1 tablet by mouth daily 84 tablet 3     SUMAtriptan (IMITREX) 100 MG tablet Take 1 tablet (100 mg) by mouth daily as needed for migraine 30 tablet 3     teriflunomide (AUBAGIO) 14 MG tablet Take 1 tablet (14 mg) by mouth daily 30 tablet 11     valACYclovir (VALTREX) 500 MG tablet TAKE 1 TABLET BY MOUTH DAILY 90 tablet 1     amphetamine-dextroamphetamine (ADDERALL) 10 MG per tablet Take 1 tablet (10 mg) by mouth daily . Patient needs to see primary provider for further refills. 30 tablet 0     order for DME Please measure and distribute 1 pair of 20mmHg - 30mmHg THIGH high open  toe compression stockings. Jobst ultrasheer or equivalent. 1 each 1     rOPINIRole (REQUIP) 0.5 MG tablet Take 1 tablet (0.5 mg) by mouth At Bedtime 90 tablet 3     VITAMIN D, CHOLECALCIFEROL, PO Take by mouth daily        [DISCONTINUED] buPROPion (WELLBUTRIN SR) 150 MG 12 hr tablet Take 1 tablet (150 mg) by mouth daily Appointment required prior to any additional refills 30 tablet 0     [DISCONTINUED] levonorgestrel-ethinyl estradiol (AVIANE,ALESSE,LESSINA) 0.1-20 MG-MCG per tablet Take 1 tablet by mouth daily 84 tablet 1     No Known Allergies      Reviewed and updated as needed this visit by clinical staffTobacco  Allergies  Meds  Problems  Med Hx  Surg Hx  Fam Hx  Soc Hx        Reviewed and updated as needed this visit by Provider  Allergies  Meds  Problems          ROS:  Constitutional, HEENT, cardiovascular, pulmonary, gi and gu systems are negative, except as otherwise noted.      OBJECTIVE:   BP (!) 128/92 (BP Location: Right arm, Patient Position: Chair, Cuff Size: Adult Regular)  Pulse 75  Temp 98.1  F (36.7  C) (Oral)  Resp 12  Wt 52.6 kg (115 lb 14.4 oz)  SpO2 98%  BMI 18.15 kg/m2  Body mass index is 18.15 kg/(m^2).  GENERAL: healthy, alert and no distress  RESP: lungs clear to auscultation - no rales, rhonchi or wheezes  CV: regular rate and rhythm, normal S1 S2, no S3 or S4, no murmur, click or rub, no peripheral edema  ABDOMEN: soft, nontender, no hepatosplenomegaly, no masses and bowel sounds normal  MS: no gross musculoskeletal defects noted, no edema    Diagnostic Test Results:  none     ASSESSMENT/PLAN:         1. Mild episode of recurrent major depressive disorder (H)  Stable. No changes. Having some trouble sleeping, has not been using requip, will trial that as she feels her body can't relax at night.   - buPROPion (WELLBUTRIN SR) 150 MG 12 hr tablet; Take 1 tablet (150 mg) by mouth daily Appointment required prior to any additional refills  Dispense: 90 tablet; Refill: 1    2. Encounter for surveillance of contraceptive pills  Refilled.   - levonorgestrel-ethinyl estradiol (AVIANE,ALESSE,LESSINA) 0.1-20 MG-MCG per tablet; Take 1 tablet by mouth daily  Dispense: 84 tablet; Refill: 3    3. MS (multiple sclerosis) (H)  Stable. Has follow up with neurologist in few weeks/month. Check liver and renal function  - Comprehensive metabolic panel (BMP + Alb, Alk Phos, ALT, AST, Total. Bili, TP)    4. Migraine with aura and without status migrainosus, not intractable  Having a few headaches lately, refill.   - SUMAtriptan (IMITREX) 100 MG tablet; Take 1 tablet (100 mg) by mouth daily as needed for migraine  Dispense: 30 tablet; Refill: 3    5. Screening for cholesterol level  Check today, non-fasting. Has not eaten much today.  - Lipid panel reflex to direct  LDL    FUTURE APPOINTMENTS:       - Follow-up visit  prn    MARRY Goldstein, NP-C  [facility]

## 2017-08-02 ENCOUNTER — TELEPHONE (OUTPATIENT)
Dept: INTERVENTIONAL RADIOLOGY/VASCULAR | Facility: CLINIC | Age: 52
End: 2017-08-02

## 2017-08-02 ENCOUNTER — MYC MEDICAL ADVICE (OUTPATIENT)
Dept: NEUROLOGY | Facility: CLINIC | Age: 52
End: 2017-08-02

## 2017-08-02 DIAGNOSIS — I73.9 PVD (PERIPHERAL VASCULAR DISEASE) (H): Primary | ICD-10-CM

## 2017-08-02 LAB
ALBUMIN SERPL-MCNC: 3.3 G/DL (ref 3.4–5)
ALP SERPL-CCNC: 45 U/L (ref 40–150)
ALT SERPL W P-5'-P-CCNC: 28 U/L (ref 0–50)
ANION GAP SERPL CALCULATED.3IONS-SCNC: 10 MMOL/L (ref 3–14)
AST SERPL W P-5'-P-CCNC: 19 U/L (ref 0–45)
BILIRUB SERPL-MCNC: 0.2 MG/DL (ref 0.2–1.3)
BUN SERPL-MCNC: 15 MG/DL (ref 7–30)
CALCIUM SERPL-MCNC: 8.8 MG/DL (ref 8.5–10.1)
CHLORIDE SERPL-SCNC: 106 MMOL/L (ref 94–109)
CHOLEST SERPL-MCNC: 185 MG/DL
CO2 SERPL-SCNC: 21 MMOL/L (ref 20–32)
CREAT SERPL-MCNC: 0.78 MG/DL (ref 0.52–1.04)
GFR SERPL CREATININE-BSD FRML MDRD: 78 ML/MIN/1.7M2
GLUCOSE SERPL-MCNC: 99 MG/DL (ref 70–99)
HDLC SERPL-MCNC: 82 MG/DL
LDLC SERPL CALC-MCNC: 64 MG/DL
NONHDLC SERPL-MCNC: 103 MG/DL
POTASSIUM SERPL-SCNC: 4.1 MMOL/L (ref 3.4–5.3)
PROT SERPL-MCNC: 6.8 G/DL (ref 6.8–8.8)
SODIUM SERPL-SCNC: 137 MMOL/L (ref 133–144)
TRIGL SERPL-MCNC: 194 MG/DL

## 2017-08-02 ASSESSMENT — ANXIETY QUESTIONNAIRES: GAD7 TOTAL SCORE: 1

## 2017-08-02 ASSESSMENT — PATIENT HEALTH QUESTIONNAIRE - PHQ9: SUM OF ALL RESPONSES TO PHQ QUESTIONS 1-9: 4

## 2017-08-02 NOTE — TELEPHONE ENCOUNTER
Dr Hairston, please see Liyah's MyChart message; Please let me know what you recommend; Thank you.    Liyah Rosado MS RN Care Coordinator

## 2017-08-02 NOTE — TELEPHONE ENCOUNTER
I called and spoke with patient after receiving a message that she would like to be treated for Varicose vein symptoms.  Pt has seen Dr. Maria but her old insurance denied coverage even after an appeal.  We discussed she is more symptomatic, bilateral achy feeling and restless legs at night.  She is taking iron and requip for the restless legs prescribed by her PCP, but I still having issues.  She denies swelling.  Pt wears compression occasionally but doesn't tolerate when it is hot out.  She states with her MS when she gets warm she is less functional mentally.  Patients last imaging was 6/2016.  We discussed possibility of getting repeat imaging and clinic visit with Dr. Maria.  Britni Aceves RNCC IR was updated and will contact patient.  SHAUNA Foley RN, BSN  Interventional Radiology Care Coordinator   Phone:  [phone]

## 2017-08-04 NOTE — TELEPHONE ENCOUNTER
[email or web address] message sent to patient with Dr Hairston's input.    Liyah Rosado, MS RN Care Coordinator

## 2017-08-16 ASSESSMENT — ENCOUNTER SYMPTOMS
WEIGHT LOSS: 0
TINGLING: 1
HALLUCINATIONS: 0
HEADACHES: 0
POLYDIPSIA: 0
MEMORY LOSS: 0
POLYPHAGIA: 0
TREMORS: 0
FEVER: 0
WEIGHT GAIN: 0
DIZZINESS: 1
FATIGUE: 1
NIGHT SWEATS: 1
DECREASED APPETITE: 0
LOSS OF CONSCIOUSNESS: 0
WEAKNESS: 1
INCREASED ENERGY: 1
DISTURBANCES IN COORDINATION: 0
SEIZURES: 0
ALTERED TEMPERATURE REGULATION: 0
PARALYSIS: 0
CHILLS: 0
SPEECH CHANGE: 0
NUMBNESS: 1

## 2017-08-19 ENCOUNTER — MYC REFILL (OUTPATIENT)
Dept: INTERNAL MEDICINE | Facility: CLINIC | Age: 52
End: 2017-08-19

## 2017-08-19 DIAGNOSIS — F90.9 ATTENTION DEFICIT HYPERACTIVITY DISORDER (ADHD), UNSPECIFIED ADHD TYPE: ICD-10-CM

## 2017-08-21 ENCOUNTER — OFFICE VISIT (OUTPATIENT)
Dept: RADIOLOGY | Facility: CLINIC | Age: 52
End: 2017-08-21

## 2017-08-21 VITALS
DIASTOLIC BLOOD PRESSURE: 95 MMHG | RESPIRATION RATE: 15 BRPM | HEART RATE: 72 BPM | SYSTOLIC BLOOD PRESSURE: 125 MMHG | OXYGEN SATURATION: 100 %

## 2017-08-21 DIAGNOSIS — I87.2 VENOUS (PERIPHERAL) INSUFFICIENCY: Primary | ICD-10-CM

## 2017-08-21 ASSESSMENT — PAIN SCALES - GENERAL: PAINLEVEL: MODERATE PAIN (4)

## 2017-08-21 NOTE — NURSING NOTE
Chief Complaint   Patient presents with     RECHECK     Follow up varicose veins.        Vitals:    08/21/17 1016   BP: (!) 125/95   BP Location: Left arm   Pulse: 72   Resp: 15   SpO2: 100%       There is no height or weight on file to calculate BMI.            Ca Mullins LPN

## 2017-08-21 NOTE — LETTER
8/21/2017       RE: Liyah Jernigan  [address] [facility] [address]     Dear Colleague,    Thank you for referring your patient, Liyah Jernigan, to the [facility] at [facility]. Please see a copy of my visit note below.    Mrs. Jernigan is a 52-year-old female who is seen in followup and re-evaluation for lower extremity venous insufficiency. She was originally seen in this clinic on 2/16/2016. Minimal classic symptoms of venous insufficiency were present in this patient at the time of initial presentation other than mild swelling and fatigue bilaterally, as well as bilateral restless leg sensation. She does have bilateral spider veins/telangiectasias which she states continue to progress despite ongoing regular use of compression garments. As reported at the time of her last visit, she still notes significant improvement in the restless sensation in her legs, as well as minor swelling and fatigue when she uses the stockings. She wishes to pursue treatment of her bilateral segmental great saphenous vein incompetency to manage her restless leg symptoms. Non-invasive imaging was updated today.        General: Healthy, alert, in no distress.  Cardiac: Regular rate and rhythm.  Respiratory: Lungs clear bilaterally.  Abdomen: Soft, nontender, no masses or organomegaly.  Skin: No discoloration, dryness, excoriations, or wounds on either lower extremity.  Extremities: Lower extremity symmetric in size with no appreciable subcutaneous edema.  Vascular: Extremities pink with normal capillary refill. No bulging varicosities. Reticular/spider veins scattered over both lower extremities. This includes the skin over the left popliteal fossa (reportedly new per the patient).     Imaging: Updated venous competency exam reveals worsening incompetence of the right GSV, previously involving the distal thigh and knee segments, now extending below the knee to the proximal calf.  There is also worsening incompetence of the left GSV, previously involving the mid thigh through proximal calf, now from the proximal thigh through proximal calf. The left PASV is also incompetent in the proximal thigh.      Assessment/plan: Symptomatic bilateral GSV incompetence resulting in swelling/fatigue of the lower extremities, and more importantly to the patient, restless leg sensation. Throughout a long trial of compression therapy (>1 yr) there has been progression of incompetence of the GSV bilaterally, with new reticular veins appearing.  Despite this, stocking use has resulted in significant improvement in this restless leg sensation, as well as swelling and fatigue, suggesting a clear indication for more definitive treatment. Based on the current imaging, the plan would be for endovenous laser ablation of the right above knee great saphenous vein, as well as direct stick sclerotherapy of the incompetent  truncal varicosity which arises from the great saphenous vein in the proximal to mid calf. Additionally, we will perform endovenous laser ablation of the  left above knee great saphenous vein.  Her PASV is incompetent in the proximal thigh as well but is too small for laser ablation. Will plan for direct stick sclerotherapy of the left PASV.      Continue stocking use.     I spent a total of 20 minutes with this patient, >50% of which was spent in counseling.     Again, thank you for allowing me to participate in the care of your patient.      Sincerely,    Saul Maria MD

## 2017-08-22 ENCOUNTER — OFFICE VISIT (OUTPATIENT)
Dept: NEUROLOGY | Facility: CLINIC | Age: 52
End: 2017-08-22
Attending: PSYCHIATRY & NEUROLOGY
Payer: COMMERCIAL

## 2017-08-22 VITALS
HEART RATE: 70 BPM | DIASTOLIC BLOOD PRESSURE: 95 MMHG | SYSTOLIC BLOOD PRESSURE: 139 MMHG | WEIGHT: 118.8 LBS | BODY MASS INDEX: 18.65 KG/M2 | HEIGHT: 67 IN

## 2017-08-22 DIAGNOSIS — G35 MS (MULTIPLE SCLEROSIS) (H): Primary | ICD-10-CM

## 2017-08-22 PROCEDURE — 99212 OFFICE O/P EST SF 10 MIN: CPT | Mod: ZF

## 2017-08-22 ASSESSMENT — PAIN SCALES - GENERAL: PAINLEVEL: MILD PAIN (3)

## 2017-08-22 NOTE — LETTER
"8/22/2017      RE: Liyah Jernigan  [address] [facility] [address]       REASON FOR VISIT:  Liyah Jernigan is a 52-year-old woman who I follow for multiple sclerosis and who returns for regular follow-up.  I last saw her in 02/2017.      HISTORY OF PRESENT ILLNESS:  Liyah has not had any definite new MS relapses but has had several episodes that she interprets as \"MS tapping on my shoulder to let me know it's still there.\"  These include \"weird\" pains in her arms and legs and 4 episodes where she has had vision changes affecting both eyes and lasting about an hour at a time.  This she describes as her central vision being \" like quicksilver.\"  Her initial neurologic symptoms were bilateral vision loss but on those occasions the duration was for weeks rather than minutes or hours.  She continues to take Aubagio and tolerates it pretty well.  She had rather severe alopecia but she has found her hair to be growing back.  She is still wearing hair extensions, however.  She gets occasional episodes of imbalance but has had no falls.      PHYSICAL EXAMINATION:   VITAL SIGNS:  Blood pressure elevated at 139/95.  Pulse 70.  Weight 118 pounds.   NEUROLOGIC:  She is alert and oriented.  Affect is bright and language functions are normal.  Visual fields are full to confrontation.  Eye movements are full without diplopia or nystagmus.  Facial strength and sensation are normal.  Muscle bulk, tone, strength and dexterity are normal in the arms and legs.  Light touch is intact in the hands and feet.  Coordination testing is normal to finger tapping, toe tapping and finger-nose-finger.  Deep tendon reflexes are normal and symmetric and her gait is normal.      IMPRESSION:  Relapsing remitting multiple sclerosis, clinically stable on Aubagio.  I spent 35 minutes with Liyah, greater than 50% of which was spent in counseling and coordination of care.  She asked about a friend who was on an IV infusion every 6 months and in " response to that I described ocrelizumab and rituximab in some detail.  She was intrigued and I told her I am using rituximab more and more often for relapsing MS, but I would only consider switching her to that should the Aubagio show clear evidence of inadequate disease control.  The fluctuating symptoms she has had are almost certainly not reminiscent of MS relapse, and I do not think switching to a more potent immunosuppressive medicine would make sense unless there are new CNS lesions or indisputable new relapse.      PLAN:     1.  Given her new symptoms and her concern over them, we will speed up her next surveillance MRI and do that sometime in the next few weeks.   2.  I will see her back in 6 months unless her MRI shows changes warranting earlier return.      D: 2017 16:42   T: 2017 17:05   MT: nh      Name:     HODA TRUJILLO   MRN:      [id number]        Account:      [id number]   :      1965           Service Date: 2017      Document: [id number]        Jose A Hairston MD

## 2017-08-22 NOTE — PROGRESS NOTES
"REASON FOR VISIT:  Liyah Jernigan is a 52-year-old woman who I follow for multiple sclerosis and who returns for regular follow-up.  I last saw her in 02/2017.      HISTORY OF PRESENT ILLNESS:  Liyah has not had any definite new MS relapses but has had several episodes that she interprets as \"MS tapping on my shoulder to let me know it's still there.\"  These include \"weird\" pains in her arms and legs and 4 episodes where she has had vision changes affecting both eyes and lasting about an hour at a time.  This she describes as her central vision being \" like quicksilver.\"  Her initial neurologic symptoms were bilateral vision loss but on those occasions the duration was for weeks rather than minutes or hours.  She continues to take Aubagio and tolerates it pretty well.  She had rather severe alopecia but she has found her hair to be growing back.  She is still wearing hair extensions, however.  She gets occasional episodes of imbalance but has had no falls.      PHYSICAL EXAMINATION:   VITAL SIGNS:  Blood pressure elevated at 139/95.  Pulse 70.  Weight 118 pounds.   NEUROLOGIC:  She is alert and oriented.  Affect is bright and language functions are normal.  Visual fields are full to confrontation.  Eye movements are full without diplopia or nystagmus.  Facial strength and sensation are normal.  Muscle bulk, tone, strength and dexterity are normal in the arms and legs.  Light touch is intact in the hands and feet.  Coordination testing is normal to finger tapping, toe tapping and finger-nose-finger.  Deep tendon reflexes are normal and symmetric and her gait is normal.      IMPRESSION:  Relapsing remitting multiple sclerosis, clinically stable on Aubagio.  I spent 35 minutes with Liyah, greater than 50% of which was spent in counseling and coordination of care.  She asked about a friend who was on an IV infusion every 6 months and in response to that I described ocrelizumab and rituximab in some detail.  She was intrigued " and I told her I am using rituximab more and more often for relapsing MS, but I would only consider switching her to that should the Aubagio show clear evidence of inadequate disease control.  The fluctuating symptoms she has had are almost certainly not reminiscent of MS relapse, and I do not think switching to a more potent immunosuppressive medicine would make sense unless there are new CNS lesions or indisputable new relapse.      PLAN:     1.  Given her new symptoms and her concern over them, we will speed up her next surveillance MRI and do that sometime in the next few weeks.   2.  I will see her back in 6 months unless her MRI shows changes warranting earlier return.         KAY DOLAN MD             D: 2017 16:42   T: 2017 17:05   MT: nh      Name:     HODA TRUJILLO   MRN:      [id number]        Account:      [id number]   :      1965           Service Date: 2017      Document: [id number]

## 2017-08-22 NOTE — MR AVS SNAPSHOT
After Visit Summary   8/22/2017    Liyah Jernigan    MRN: [id number]           Patient Information     Date Of Birth          1965        Visit Information        Provider Department      8/22/2017 1:30 PM Jose A Hairston MD [facility] Multiple Sclerosis        Today's Diagnoses     MS (multiple sclerosis) (H)    -  1       Follow-ups after your visit        Follow-up notes from your care team     Return in about 6 months (around 2/22/2018).      Your next 10 appointments already scheduled     Aug 24, 2017  9:00 AM CDT   MR BRAIN W/O & W CONTRAST with UUMR2   [facility] [place], MRI ([facility])    [address] [facility] [address] [phone]           Take your medicines as usual, unless your doctor tells you not to. Bring a list of your current medicines to your exam (including vitamins, minerals and over-the-counter drugs).  You will be given intravenous contrast for this exam. To prepare:   The day before your exam, drink extra fluids at least six 8-ounce glasses (unless your doctor tells you to restrict your fluids).   Have a blood test (creatinine test) within 30 days of your exam. Go to your clinic or Diagnostic Imaging Department for this test.  The MRI machine uses a strong magnet. Please wear clothes without metal (snaps, zippers). A sweatsuit works well, or we may give you a hospital gown.  Please remove any body piercings and hair extensions before you arrive. You will also remove watches, jewelry, hairpins, wallets, dentures, partial dental plates and hearing aids. You may wear contact lenses, and you may be able to wear your rings. We have a safe place to keep your personal items, but it is safer to leave them at home.   **IMPORTANT** THE INSTRUCTIONS BELOW ARE ONLY FOR THOSE PATIENTS WHO HAVE BEEN TOLD THEY WILL RECEIVE SEDATION OR GENERAL ANESTHESIA DURING THEIR MRI PROCEDURE:  IF YOU WILL RECEIVE SEDATION (take  medicine to help you relax during your exam):   You must get the medicine from your doctor before you arrive. Bring the medicine to the exam. Do not take it at home.   Arrive one hour early. Bring someone who can take you home after the test. Your medicine will make you sleepy. After the exam, you may not drive, take a bus or take a taxi by yourself.   No eating 8 hours before your exam. You may have clear liquids up until 4 hours before your exam. (Clear liquids include water, clear tea, black coffee and fruit juice without pulp.)  IF YOU WILL RECEIVE ANESTHESIA (be asleep for your exam):   Arrive 1 1/2 hours early. Bring someone who can take you home after the test. You may not drive, take a bus or take a taxi by yourself.   No eating 8 hours before your exam. You may have clear liquids up until 4 hours before your exam. (Clear liquids include water, clear tea, black coffee and fruit juice without pulp.)  Please call the Imaging Department at your exam site with any questions.            Feb 20, 2018 10:00 AM CST   (Arrive by 9:45 AM)   Return Multiple Sclerosis with Jose A Hairston MD   [facility] Multiple Sclerosis ([facility])    [address] [address] [phone]              Future tests that were ordered for you today     Open Future Orders        Priority Expected Expires Ordered    MR Brain w/o & w Contrast Routine  8/22/2018 8/22/2017            Who to contact     If you have questions or need follow up information about today's clinic visit or your schedule please contact [facility] MULTIPLE SCLEROSIS directly at [phone].  Normal or non-critical lab and imaging results will be communicated to you by MyChart, letter or phone within 4 business days after the clinic has received the results. If you do not hear from us within 7 days, please contact the clinic through MyChart or phone. If you have a critical or abnormal lab result, we  "will notify you by phone as soon as possible.  Submit refill requests through [facility] or call your pharmacy and they will forward the refill request to us. Please allow 3 business days for your refill to be completed.          Additional Information About Your Visit        [facility] Information     [facility] gives you secure access to your electronic health record. If you see a primary care provider, you can also send messages to your care team and make appointments. If you have questions, please call your primary care clinic.  If you do not have a primary care provider, please call [phone] and they will assist you.        Care EveryWhere ID     This is your Care EveryWhere ID. This could be used by other organizations to access your [place] medical records  [id number]        Your Vitals Were     Pulse Height BMI (Body Mass Index)             70 1.702 m (5' 7\") 18.61 kg/m2          Blood Pressure from Last 3 Encounters:   08/22/17 (!) 139/95   08/21/17 (!) 125/95   08/01/17 (!) 128/92    Weight from Last 3 Encounters:   08/22/17 53.9 kg (118 lb 12.8 oz)   08/01/17 52.6 kg (115 lb 14.4 oz)   05/09/17 54 kg (119 lb)               Primary Care Provider Office Phone # Fax #    Jasmine Tab Barrow [phone] [address] [facility] [address]        Equal Access to Services     [facility] AH: Hadii aad ku hadasho Soomaali, waaxda luqadaha, qaybta kaalmada adeegyada, andrade duncan . So [facility] [phone].    ATENCIÓN: Si habla español, tiene a thorne disposición servicios gratuitos de asistencia lingüística. Llame al [phone].    We comply with applicable federal civil rights laws and [place] laws. We do not discriminate on the basis of race, color, national origin, age, disability sex, sexual orientation or gender identity.            Thank you!     Thank you for choosing [facility] MULTIPLE SCLEROSIS  for your care. Our goal is always to provide you with " excellent care. Hearing back from our patients is one way we can continue to improve our services. Please take a few minutes to complete the written survey that you may receive in the mail after your visit with us. Thank you!             Your Updated Medication List - Protect others around you: Learn how to safely use, store and throw away your medicines at www.disposemymeds.org.          This list is accurate as of: 8/22/17  2:58 PM.  Always use your most recent med list.                   Brand Name Dispense Instructions for use Diagnosis    amphetamine-dextroamphetamine 10 MG per tablet    ADDERALL    30 tablet    Take 1 tablet (10 mg) by mouth daily . Patient needs to see primary provider for further refills.    Attention deficit hyperactivity disorder (ADHD), unspecified ADHD type       buPROPion 150 MG 12 hr tablet    WELLBUTRIN SR    90 tablet    Take 1 tablet (150 mg) by mouth daily Appointment required prior to any additional refills    Mild episode of recurrent major depressive disorder (H)       levonorgestrel-ethinyl estradiol 0.1-20 MG-MCG per tablet    AVIANE,ALESSE,LESSINA    84 tablet    Take 1 tablet by mouth daily    Encounter for surveillance of contraceptive pills       order for DME     1 each    Please measure and distribute 1 pair of 20mmHg - 30mmHg THIGH high open  toe compression stockings. Jobst ultrasheer or equivalent.    Venous insufficiency       rOPINIRole 0.5 MG tablet    REQUIP    90 tablet    Take 1 tablet (0.5 mg) by mouth At Bedtime    Restless leg syndrome       SUMAtriptan 100 MG tablet    IMITREX    30 tablet    Take 1 tablet (100 mg) by mouth daily as needed for migraine    Migraine with aura and without status migrainosus, not intractable       teriflunomide 14 MG tablet    AUBAGIO    30 tablet    Take 1 tablet (14 mg) by mouth daily    Multiple sclerosis (H)       valACYclovir 500 MG tablet    VALTREX    90 tablet    TAKE 1 TABLET BY MOUTH DAILY    HSV (herpes simplex virus)  infection       VITAMIN D (CHOLECALCIFEROL) PO      Take by mouth daily

## 2017-08-22 NOTE — NURSING NOTE
"Chief Complaint   Patient presents with     RECHECK     [facility] RETURN - MULTIPLE SCLEROSIS       Initial BP (!) 139/95 (BP Location: Left arm, Patient Position: Sitting, Cuff Size: Adult Regular)  Pulse 70  Ht 1.702 m (5' 7\")  Wt 53.9 kg (118 lb 12.8 oz)  BMI 18.61 kg/m2 Estimated body mass index is 18.61 kg/(m^2) as calculated from the following:    Height as of this encounter: 1.702 m (5' 7\").    Weight as of this encounter: 53.9 kg (118 lb 12.8 oz).  Medication Reconciliation: complete     Rosanna Brenner MA    .  "

## 2017-08-24 ENCOUNTER — HOSPITAL ENCOUNTER (OUTPATIENT)
Dept: MRI IMAGING | Facility: CLINIC | Age: 52
Discharge: HOME | End: 2017-08-24
Attending: PSYCHIATRY & NEUROLOGY | Admitting: PSYCHIATRY & NEUROLOGY
Payer: COMMERCIAL

## 2017-08-24 DIAGNOSIS — G35 MS (MULTIPLE SCLEROSIS) (H): ICD-10-CM

## 2017-08-24 PROCEDURE — 70553 MRI BRAIN STEM W/O & W/DYE: CPT

## 2017-08-24 PROCEDURE — 25000128 H RX IP 250 OP 636: Performed by: PSYCHIATRY & NEUROLOGY

## 2017-08-24 PROCEDURE — A9585 GADOBUTROL INJECTION: HCPCS | Performed by: PSYCHIATRY & NEUROLOGY

## 2017-08-24 RX ORDER — GADOBUTROL 604.72 MG/ML
7.5 INJECTION INTRAVENOUS ONCE
Status: COMPLETED | OUTPATIENT
Start: 2017-08-24 | End: 2017-08-24

## 2017-08-24 RX ADMIN — GADOBUTROL 5 ML: 604.72 INJECTION INTRAVENOUS at 09:48

## 2017-08-28 NOTE — PROGRESS NOTES
Mrs. Jernigan is a 52-year-old female who is seen in followup and re-evaluation for lower extremity venous insufficiency. She was originally seen in this clinic on 2/16/2016. Minimal classic symptoms of venous insufficiency were present in this patient at the time of initial presentation other than mild swelling and fatigue bilaterally, as well as bilateral restless leg sensation. She does have bilateral spider veins/telangiectasias which she states continue to progress despite ongoing regular use of compression garments. As reported at the time of her last visit, she still notes significant improvement in the restless sensation in her legs, as well as minor swelling and fatigue when she uses the stockings. She wishes to pursue treatment of her bilateral segmental great saphenous vein incompetency to manage her restless leg symptoms. Non-invasive imaging was updated today.        General: Healthy, alert, in no distress.  Cardiac: Regular rate and rhythm.  Respiratory: Lungs clear bilaterally.  Abdomen: Soft, nontender, no masses or organomegaly.  Skin: No discoloration, dryness, excoriations, or wounds on either lower extremity.  Extremities: Lower extremity symmetric in size with no appreciable subcutaneous edema.  Vascular: Extremities pink with normal capillary refill. No bulging varicosities. Reticular/spider veins scattered over both lower extremities. This includes the skin over the left popliteal fossa (reportedly new per the patient).     Imaging: Updated venous competency exam reveals worsening incompetence of the right GSV, previously involving the distal thigh and knee segments, now extending below the knee to the proximal calf. There is also worsening incompetence of the left GSV, previously involving the mid thigh through proximal calf, now from the proximal thigh through proximal calf. The left PASV is also incompetent in the proximal thigh.      Assessment/plan: Symptomatic bilateral GSV incompetence  resulting in swelling/fatigue of the lower extremities, and more importantly to the patient, restless leg sensation. Throughout a long trial of compression therapy (>1 yr) there has been progression of incompetence of the GSV bilaterally, with new reticular veins appearing.  Despite this, stocking use has resulted in significant improvement in this restless leg sensation, as well as swelling and fatigue, suggesting a clear indication for more definitive treatment. Based on the current imaging, the plan would be for endovenous laser ablation of the right above knee great saphenous vein, as well as direct stick sclerotherapy of the incompetent  truncal varicosity which arises from the great saphenous vein in the proximal to mid calf. Additionally, we will perform endovenous laser ablation of the  left above knee great saphenous vein.  Her PASV is incompetent in the proximal thigh as well but is too small for laser ablation. Will plan for direct stick sclerotherapy of the left PASV.      Continue stocking use.     I spent a total of 20 minutes with this patient, >50% of which was spent in counseling.

## 2017-08-29 ENCOUNTER — TELEPHONE (OUTPATIENT)
Dept: FAMILY MEDICINE | Facility: CLINIC | Age: 52
End: 2017-08-29

## 2017-08-29 RX ORDER — DEXTROAMPHETAMINE SACCHARATE, AMPHETAMINE ASPARTATE, DEXTROAMPHETAMINE SULFATE AND AMPHETAMINE SULFATE 2.5; 2.5; 2.5; 2.5 MG/1; MG/1; MG/1; MG/1
10 TABLET ORAL DAILY
Qty: 30 TABLET | Refills: 0 | Status: SHIPPED | OUTPATIENT
Start: 2017-08-29 | End: 2018-02-08 | Stop reason: ALTCHOICE

## 2017-08-29 NOTE — TELEPHONE ENCOUNTER
Recent visit 8/1/17 but ADD not addressed.  Never seen by Dr Barrow or this dyad's providers for this.

## 2017-08-29 NOTE — TELEPHONE ENCOUNTER
Reason for Call:  Medication or medication refill:    Do you use a [facility]?  Name of the pharmacy and phone number for the current request:  [facility] [address] [place] [address] AT [facility]    Name of the medication requested: amphetamine-dextroamphetamine (ADDERALL) 10 MG     Other request: none    Can we leave a detailed message on this number? YES    Phone number patient can be reached at: Cell number on file:    Telephone Information:   Mobile [phone]       Best Time: Any    Call taken on 8/29/2017 at 12:26 PM by Evaristo Eldridge

## 2017-09-12 ENCOUNTER — TELEPHONE (OUTPATIENT)
Dept: INTERVENTIONAL RADIOLOGY/VASCULAR | Facility: CLINIC | Age: 52
End: 2017-09-12

## 2017-09-12 ENCOUNTER — TEAM CONFERENCE (OUTPATIENT)
Dept: INTERVENTIONAL RADIOLOGY/VASCULAR | Facility: CLINIC | Age: 52
End: 2017-09-12

## 2017-09-12 DIAGNOSIS — I83.899 VARICOSE VEINS OF LOWER EXTREMITIES WITH COMPLICATIONS: Primary | ICD-10-CM

## 2017-09-12 NOTE — TELEPHONE ENCOUNTER
This is approved for 3 visits from 08/29/17-02/28/17.     Thank you,     Migel White  Financial

## 2017-09-12 NOTE — TELEPHONE ENCOUNTER
Called and left a msg asking for pt to return my call regarding Prior Auth for her varicose vein treatment.   Informed her that I did hear back from her insurance company.    Left direct line for her.     Britni Aceves RN, BSN  Interventional Radiology Nurse Coordinator   Phone: [phone]

## 2017-09-13 ENCOUNTER — OFFICE VISIT (OUTPATIENT)
Dept: FAMILY MEDICINE | Facility: CLINIC | Age: 52
End: 2017-09-13
Payer: COMMERCIAL

## 2017-09-13 VITALS
OXYGEN SATURATION: 99 % | BODY MASS INDEX: 18.94 KG/M2 | WEIGHT: 120.7 LBS | HEART RATE: 76 BPM | SYSTOLIC BLOOD PRESSURE: 126 MMHG | HEIGHT: 67 IN | TEMPERATURE: 98.3 F | RESPIRATION RATE: 16 BRPM | DIASTOLIC BLOOD PRESSURE: 86 MMHG

## 2017-09-13 DIAGNOSIS — F33.0 MILD EPISODE OF RECURRENT MAJOR DEPRESSIVE DISORDER (H): ICD-10-CM

## 2017-09-13 DIAGNOSIS — F90.9 ATTENTION DEFICIT HYPERACTIVITY DISORDER (ADHD), UNSPECIFIED ADHD TYPE: Primary | ICD-10-CM

## 2017-09-13 DIAGNOSIS — N92.0 EXCESSIVE OR FREQUENT MENSTRUATION: ICD-10-CM

## 2017-09-13 DIAGNOSIS — Z23 NEED FOR HEPATITIS B VACCINATION: ICD-10-CM

## 2017-09-13 DIAGNOSIS — Z23 NEED FOR PROPHYLACTIC VACCINATION AND INOCULATION AGAINST INFLUENZA: ICD-10-CM

## 2017-09-13 LAB
FSH SERPL-ACNC: 0.5 IU/L
LH SERPL-ACNC: <0.2 IU/L
TSH SERPL DL<=0.005 MIU/L-ACNC: 1.57 MU/L (ref 0.4–4)

## 2017-09-13 PROCEDURE — 90471 IMMUNIZATION ADMIN: CPT | Performed by: FAMILY MEDICINE

## 2017-09-13 PROCEDURE — 84443 ASSAY THYROID STIM HORMONE: CPT | Performed by: FAMILY MEDICINE

## 2017-09-13 PROCEDURE — 90472 IMMUNIZATION ADMIN EACH ADD: CPT | Performed by: FAMILY MEDICINE

## 2017-09-13 PROCEDURE — 90686 IIV4 VACC NO PRSV 0.5 ML IM: CPT | Performed by: FAMILY MEDICINE

## 2017-09-13 PROCEDURE — 99214 OFFICE O/P EST MOD 30 MIN: CPT | Mod: 25 | Performed by: FAMILY MEDICINE

## 2017-09-13 PROCEDURE — 83002 ASSAY OF GONADOTROPIN (LH): CPT | Performed by: FAMILY MEDICINE

## 2017-09-13 PROCEDURE — 36415 COLL VENOUS BLD VENIPUNCTURE: CPT | Performed by: FAMILY MEDICINE

## 2017-09-13 PROCEDURE — 83001 ASSAY OF GONADOTROPIN (FSH): CPT | Performed by: FAMILY MEDICINE

## 2017-09-13 PROCEDURE — 90746 HEPB VACCINE 3 DOSE ADULT IM: CPT | Performed by: FAMILY MEDICINE

## 2017-09-13 RX ORDER — DEXTROAMPHETAMINE SACCHARATE, AMPHETAMINE ASPARTATE, DEXTROAMPHETAMINE SULFATE AND AMPHETAMINE SULFATE 2.5; 2.5; 2.5; 2.5 MG/1; MG/1; MG/1; MG/1
10 TABLET ORAL DAILY
Qty: 30 TABLET | Refills: 0 | Status: SHIPPED | OUTPATIENT
Start: 2017-11-14 | End: 2017-12-14

## 2017-09-13 RX ORDER — DEXTROAMPHETAMINE SACCHARATE, AMPHETAMINE ASPARTATE, DEXTROAMPHETAMINE SULFATE AND AMPHETAMINE SULFATE 2.5; 2.5; 2.5; 2.5 MG/1; MG/1; MG/1; MG/1
10 TABLET ORAL DAILY
Qty: 30 TABLET | Refills: 0 | Status: SHIPPED | OUTPATIENT
Start: 2017-09-13 | End: 2017-10-13

## 2017-09-13 RX ORDER — DEXTROAMPHETAMINE SACCHARATE, AMPHETAMINE ASPARTATE, DEXTROAMPHETAMINE SULFATE AND AMPHETAMINE SULFATE 2.5; 2.5; 2.5; 2.5 MG/1; MG/1; MG/1; MG/1
10 TABLET ORAL DAILY
Qty: 30 TABLET | Refills: 0 | Status: SHIPPED | OUTPATIENT
Start: 2017-10-14 | End: 2017-11-13

## 2017-09-13 RX ORDER — MEDROXYPROGESTERONE ACETATE 10 MG
10 TABLET ORAL DAILY
Qty: 10 TABLET | Refills: 0 | Status: SHIPPED | OUTPATIENT
Start: 2017-09-13 | End: 2017-09-23

## 2017-09-13 ASSESSMENT — PAIN SCALES - GENERAL: PAINLEVEL: NO PAIN (0)

## 2017-09-13 NOTE — NURSING NOTE
"Chief Complaint   Patient presents with     Recheck Medication       Initial /86 (BP Location: Right arm, Patient Position: Right side, Cuff Size: Adult Regular)  Pulse 76  Temp 98.3  F (36.8  C) (Oral)  Resp 16  Ht 1.702 m (5' 7\")  Wt 54.7 kg (120 lb 11.2 oz)  SpO2 99%  BMI 18.9 kg/m2 Estimated body mass index is 18.9 kg/(m^2) as calculated from the following:    Height as of this encounter: 1.702 m (5' 7\").    Weight as of this encounter: 54.7 kg (120 lb 11.2 oz).  Medication Reconciliation: complete     Will Elly GREENFIELD      "

## 2017-09-13 NOTE — MR AVS SNAPSHOT
After Visit Summary   9/13/2017    Liyah Jernigan    MRN: [id number]           Patient Information     Date Of Birth          1965        Visit Information        Provider Department      9/13/2017 10:20 AM Jasmine Barrow MD [facility]        Today's Diagnoses     Attention deficit hyperactivity disorder (ADHD), unspecified ADHD type    -  1    Excessive or frequent menstruation        Mild episode of recurrent major depressive disorder (H)        Need for prophylactic vaccination and inoculation against influenza        Need for hepatitis B vaccination           Follow-ups after your visit        Follow-up notes from your care team     Return if symptoms worsen or fail to improve.      Your next 10 appointments already scheduled     Feb 20, 2018 10:00 AM CST   (Arrive by 9:45 AM)   Return Multiple Sclerosis with Jose A Hairston MD   [facility] Multiple Sclerosis ([facility])    [address] [place] [id number] [phone]              Future tests that were ordered for you today     Open Future Orders        Priority Expected Expires Ordered    IR Stab Phlebectomy < 10 Stabs Routine  9/12/2018 9/13/2017    IR Stab Phlebectomy < 10 Stabs Routine  9/12/2018 9/13/2017    US Lower Extremity Venous Duplex Right Routine  9/13/2018 9/13/2017            Who to contact     If you have questions or need follow up information about today's clinic visit or your schedule please contact [facility] directly at [phone].  Normal or non-critical lab and imaging results will be communicated to you by MyChart, letter or phone within 4 business days after the clinic has received the results. If you do not hear from us within 7 days, please contact the clinic through MyChart or phone. If you have a critical or abnormal lab result, we will notify you by phone as soon as possible.  Submit refill requests through [employer]  "or call your pharmacy and they will forward the refill request to us. Please allow 3 business days for your refill to be completed.          Additional Information About Your Visit        [employer] Information     [employer] gives you secure access to your electronic health record. If you see a primary care provider, you can also send messages to your care team and make appointments. If you have questions, please call your primary care clinic.  If you do not have a primary care provider, please call [phone] and they will assist you.        Care EveryWhere ID     This is your Care EveryWhere ID. This could be used by other organizations to access your [facility] medical records  [id number]        Your Vitals Were     Pulse Temperature Respirations Height Last Period Pulse Oximetry    76 98.3  F (36.8  C) (Oral) 16 1.702 m (5' 7\") 08/18/2017 (Exact Date) 99%    BMI (Body Mass Index)                   18.9 kg/m2            Blood Pressure from Last 3 Encounters:   09/13/17 126/86   08/22/17 (!) 139/95   08/21/17 (!) 125/95    Weight from Last 3 Encounters:   09/13/17 54.7 kg (120 lb 11.2 oz)   08/22/17 53.9 kg (118 lb 12.8 oz)   08/01/17 52.6 kg (115 lb 14.4 oz)              We Performed the Following     FLU VAC, SPLIT VIRUS IM > 3 YO (QUADRIVALENT) [73248]     Follicle stimulating hormone     HEPATITIS B VACCINE,  ADULT  [46656]     Lutropin     TSH with free T4 reflex     Vaccine Administration, Initial [25838]          Today's Medication Changes          These changes are accurate as of: 9/13/17 12:47 PM.  If you have any questions, ask your nurse or doctor.               Start taking these medicines.        Dose/Directions    medroxyPROGESTERone 10 MG tablet   Commonly known as:  PROVERA   Used for:  Excessive or frequent menstruation   Started by:  Jasmine Barrow MD        Dose:  10 mg   Take 1 tablet (10 mg) by mouth daily for 10 days   Quantity:  10 tablet   Refills:  0         These medicines have " changed or have updated prescriptions.        Dose/Directions    * amphetamine-dextroamphetamine 10 MG per tablet   Commonly known as:  ADDERALL   This may have changed:  Another medication with the same name was added. Make sure you understand how and when to take each.   Used for:  Attention deficit hyperactivity disorder (ADHD), unspecified ADHD type   Changed by:  Sonia Florez MD        Dose:  10 mg   Take 1 tablet (10 mg) by mouth daily . Patient needs to see primary provider for further refills.   Quantity:  30 tablet   Refills:  0       * amphetamine-dextroamphetamine 10 MG per tablet   Commonly known as:  ADDERALL   This may have changed:  You were already taking a medication with the same name, and this prescription was added. Make sure you understand how and when to take each.   Used for:  Attention deficit hyperactivity disorder (ADHD), unspecified ADHD type   Changed by:  Jasmine Barrow MD        Dose:  10 mg   Take 1 tablet (10 mg) by mouth daily   Quantity:  30 tablet   Refills:  0       * amphetamine-dextroamphetamine 10 MG per tablet   Commonly known as:  ADDERALL   This may have changed:  You were already taking a medication with the same name, and this prescription was added. Make sure you understand how and when to take each.   Used for:  Attention deficit hyperactivity disorder (ADHD), unspecified ADHD type   Changed by:  Jasmine Barrow MD        Dose:  10 mg   Start taking on:  10/14/2017   Take 1 tablet (10 mg) by mouth daily   Quantity:  30 tablet   Refills:  0       * amphetamine-dextroamphetamine 10 MG per tablet   Commonly known as:  ADDERALL   This may have changed:  You were already taking a medication with the same name, and this prescription was added. Make sure you understand how and when to take each.   Used for:  Attention deficit hyperactivity disorder (ADHD), unspecified ADHD type   Changed by:  Jasmine Barrow MD        Dose:  10 mg   Start taking  on:  11/14/2017   Take 1 tablet (10 mg) by mouth daily   Quantity:  30 tablet   Refills:  0       * Notice:  This list has 4 medication(s) that are the same as other medications prescribed for you. Read the directions carefully, and ask your doctor or other care provider to review them with you.         Where to get your medicines      These medications were sent to [id number] - [place] [address] AT [address] [place], [place] [address]     Phone:  [phone]     medroxyPROGESTERone 10 MG tablet         Some of these will need a paper prescription and others can be bought over the counter.  Ask your nurse if you have questions.     Bring a paper prescription for each of these medications     amphetamine-dextroamphetamine 10 MG per tablet    amphetamine-dextroamphetamine 10 MG per tablet    amphetamine-dextroamphetamine 10 MG per tablet                Primary Care Provider Office Phone # Fax #    Jasmine Tab Barrow [phone] [address] [facility] [address]        Equal Access to Services     [facility]: Hadii aad ku hadasho Soomaali, waaxda luqadaha, qaybta kaalmada adeegyada, waxmaribel duncan . So [facility] [phone].    ATENCIÓN: Si habla español, tiene a thorne disposición servicios gratuitos de asistencia lingüística. Llame al [phone].    We comply with applicable federal civil rights laws and [place] laws. We do not discriminate on the basis of race, color, national origin, age, disability sex, sexual orientation or gender identity.            Thank you!     Thank you for choosing [facility]  for your care. Our goal is always to provide you with excellent care. Hearing back from our patients is one way we can continue to improve our services. Please take a few minutes to complete the written survey that you may receive in the mail after your visit with us. Thank  you!             Your Updated Medication List - Protect others around you: Learn how to safely use, store and throw away your medicines at www.disposemymeds.org.          This list is accurate as of: 9/13/17 12:47 PM.  Always use your most recent med list.                   Brand Name Dispense Instructions for use Diagnosis    * amphetamine-dextroamphetamine 10 MG per tablet    ADDERALL    30 tablet    Take 1 tablet (10 mg) by mouth daily . Patient needs to see primary provider for further refills.    Attention deficit hyperactivity disorder (ADHD), unspecified ADHD type       * amphetamine-dextroamphetamine 10 MG per tablet    ADDERALL    30 tablet    Take 1 tablet (10 mg) by mouth daily    Attention deficit hyperactivity disorder (ADHD), unspecified ADHD type       * amphetamine-dextroamphetamine 10 MG per tablet   Start taking on:  10/14/2017    ADDERALL    30 tablet    Take 1 tablet (10 mg) by mouth daily    Attention deficit hyperactivity disorder (ADHD), unspecified ADHD type       * amphetamine-dextroamphetamine 10 MG per tablet   Start taking on:  11/14/2017    ADDERALL    30 tablet    Take 1 tablet (10 mg) by mouth daily    Attention deficit hyperactivity disorder (ADHD), unspecified ADHD type       buPROPion 150 MG 12 hr tablet    WELLBUTRIN SR    90 tablet    Take 1 tablet (150 mg) by mouth daily Appointment required prior to any additional refills    Mild episode of recurrent major depressive disorder (H)       levonorgestrel-ethinyl estradiol 0.1-20 MG-MCG per tablet    AVIANE,ALESSE,LESSINA    84 tablet    Take 1 tablet by mouth daily    Encounter for surveillance of contraceptive pills       medroxyPROGESTERone 10 MG tablet    PROVERA    10 tablet    Take 1 tablet (10 mg) by mouth daily for 10 days    Excessive or frequent menstruation       order for DME     1 each    Please measure and distribute 1 pair of 20mmHg - 30mmHg THIGH high open  toe compression stockings. Jobst ultrasheer or equivalent.     Venous insufficiency       rOPINIRole 0.5 MG tablet    REQUIP    90 tablet    Take 1 tablet (0.5 mg) by mouth At Bedtime    Restless leg syndrome       SUMAtriptan 100 MG tablet    IMITREX    30 tablet    Take 1 tablet (100 mg) by mouth daily as needed for migraine    Migraine with aura and without status migrainosus, not intractable       teriflunomide 14 MG tablet    AUBAGIO    30 tablet    Take 1 tablet (14 mg) by mouth daily    Multiple sclerosis (H)       valACYclovir 500 MG tablet    VALTREX    90 tablet    TAKE 1 TABLET BY MOUTH DAILY    HSV (herpes simplex virus) infection       VITAMIN D (CHOLECALCIFEROL) PO      Take by mouth daily        * Notice:  This list has 4 medication(s) that are the same as other medications prescribed for you. Read the directions carefully, and ask your doctor or other care provider to review them with you.

## 2017-09-13 NOTE — TELEPHONE ENCOUNTER
Returning pt's phone call. She is aware that she was approved as well and would like to schedule her procedure however she is going into  A talk right now and so we will touchbase tomorrow.     Britni Aceves RN, BSN  Interventional Radiology Nurse Coordinator   Phone: [phone]

## 2017-09-15 DIAGNOSIS — I83.899 VARICOSE VEINS WITH COMPLICATIONS: Primary | ICD-10-CM

## 2017-09-15 NOTE — PROGRESS NOTES
Well,  Your hormone levels look just fine.  Definitely not in menopause (although I'm not really sure how the OCP affects the test).  So what are the odds of us finding that original brand at maybe a different pharmacy?  Or should we change to something else?  And has the provera helped?  TAMI Barrow M.D.

## 2017-09-18 ENCOUNTER — TELEPHONE (OUTPATIENT)
Dept: INTERVENTIONAL RADIOLOGY/VASCULAR | Facility: CLINIC | Age: 52
End: 2017-09-18

## 2017-09-22 ENCOUNTER — APPOINTMENT (OUTPATIENT)
Dept: INTERVENTIONAL RADIOLOGY/VASCULAR | Facility: CLINIC | Age: 52
End: 2017-09-22
Attending: RADIOLOGY
Payer: COMMERCIAL

## 2017-09-22 ENCOUNTER — HOSPITAL ENCOUNTER (OUTPATIENT)
Facility: CLINIC | Age: 52
Discharge: HOME | End: 2017-09-22
Attending: RADIOLOGY | Admitting: RADIOLOGY
Payer: COMMERCIAL

## 2017-09-22 ENCOUNTER — APPOINTMENT (OUTPATIENT)
Dept: MEDSURG UNIT | Facility: CLINIC | Age: 52
End: 2017-09-22
Attending: RADIOLOGY
Payer: COMMERCIAL

## 2017-09-22 VITALS
HEART RATE: 81 BPM | OXYGEN SATURATION: 97 % | DIASTOLIC BLOOD PRESSURE: 102 MMHG | SYSTOLIC BLOOD PRESSURE: 127 MMHG | TEMPERATURE: 97.8 F | RESPIRATION RATE: 18 BRPM

## 2017-09-22 DIAGNOSIS — I83.899 VARICOSE VEINS OF LOWER EXTREMITIES WITH COMPLICATIONS: ICD-10-CM

## 2017-09-22 PROCEDURE — 25000132 ZZH RX MED GY IP 250 OP 250 PS 637: Performed by: PHYSICIAN ASSISTANT

## 2017-09-22 PROCEDURE — 27210732 ZZH ACCESSORY CR1

## 2017-09-22 PROCEDURE — 36478 ENDOVENOUS LASER 1ST VEIN: CPT

## 2017-09-22 PROCEDURE — 27210908 ZZH NEEDLE CR4

## 2017-09-22 PROCEDURE — 40000166 ZZH STATISTIC PP CARE STAGE 1

## 2017-09-22 PROCEDURE — 76937 US GUIDE VASCULAR ACCESS: CPT

## 2017-09-22 PROCEDURE — 25000125 ZZHC RX 250: Performed by: RADIOLOGY

## 2017-09-22 PROCEDURE — 25000128 H RX IP 250 OP 636: Performed by: RADIOLOGY

## 2017-09-22 PROCEDURE — 27210776

## 2017-09-22 RX ORDER — ACETAMINOPHEN 325 MG/1
650 TABLET ORAL EVERY 4 HOURS PRN
Status: DISCONTINUED | OUTPATIENT
Start: 2017-09-22 | End: 2017-09-22

## 2017-09-22 RX ORDER — LIDOCAINE HYDROCHLORIDE 10 MG/ML
10 INJECTION, SOLUTION EPIDURAL; INFILTRATION; INTRACAUDAL; PERINEURAL ONCE
Status: COMPLETED | OUTPATIENT
Start: 2017-09-22 | End: 2017-09-22

## 2017-09-22 RX ORDER — DIAZEPAM 5 MG
10 TABLET ORAL ONCE
Status: COMPLETED | OUTPATIENT
Start: 2017-09-22 | End: 2017-09-22

## 2017-09-22 RX ORDER — ACETAMINOPHEN 500 MG
1000 TABLET ORAL EVERY 4 HOURS PRN
Status: DISCONTINUED | OUTPATIENT
Start: 2017-09-22 | End: 2017-09-22 | Stop reason: HOSPADM

## 2017-09-22 RX ORDER — ACETAMINOPHEN 325 MG/1
975 TABLET ORAL EVERY 4 HOURS PRN
Status: DISCONTINUED | OUTPATIENT
Start: 2017-09-22 | End: 2017-09-22

## 2017-09-22 RX ADMIN — TETRADECYL HYDROGEN SULFATE (ESTER) 12.5 MG: 10 INJECTION, SOLUTION INTRAVENOUS at 12:33

## 2017-09-22 RX ADMIN — LIDOCAINE HYDROCHLORIDE 100 MG: 10 INJECTION, SOLUTION EPIDURAL; INFILTRATION; INTRACAUDAL; PERINEURAL at 12:41

## 2017-09-22 RX ADMIN — DIAZEPAM 10 MG: 5 TABLET ORAL at 10:10

## 2017-09-22 RX ADMIN — Medication: at 12:20

## 2017-09-22 NOTE — PROGRESS NOTES
Liyah arrived on 2a to prepare for her stab phlebectomy procedure.  Pre-procedure assessment is complete.  SHe has not been consented.  She is prepped and ready for consent.

## 2017-09-22 NOTE — H&P
Interventional Radiology Pre-Procedure Sedation Assessment   Time of Assessment: 10:03 AM    Expected Level: Local anesthetic and oral anxiolysis    Indication: Sedation is required for the following type of Procedure: Venous    Sedation and procedural consent: Risks, benefits and alternatives were discussed with Patient    PO Intake: Appropriately NPO for procedure    ASA Class: Class 2 - MILD SYSTEMIC DISEASE, NO ACUTE PROBLEMS, NO FUNCTIONAL LIMITATIONS.    Mallampati: Grade 2:  Soft palate, base of uvula, tonsillar pillars, and portion of posterior pharyngeal wall visible    Lungs: Lungs Clear with good breath sounds bilaterally    Heart: Normal heart sounds and rate    Focused history and physical completed prior to procedure. I have reviewed the lab findings, diagnostic data, medications, and the plan for sedation. I have determined this patient to be an appropriate candidate for the planned sedation and procedure and have reassessed the patient IMMEDIATELY PRIOR to sedation and procedure.    Jayson Mixon MD

## 2017-09-22 NOTE — PROGRESS NOTES
Interventional Radiology Intra-procedural Nursing Note    Patient Name: Liyah Jernigan  Medical Record Number: [id number]  Today's Date: September 22, 2017  Time Out :  1128  Start Time:  1129  End of procedure time: 1240  Procedure: Left above knee greater saphenous vein laser ablation and direct stick sclero below left knee  Report given to: BRANDAN Miller 2A  Time pt departs:  1245  : No  Other Notes:  Patient arrived from 2A and drowsy due to valium taken on 2A.  Reviewed consent with the patient and she has no further questions.  Pre procedure ultrasound done by Dr Maria.  Prep completed per protocol.  Tumescent anesthesia infiltrated by Dr Maria.  Laser ablation and direct stick sclero completed on the left lower extremity.  Sites cleansed , dressings and compression stocking positioned.     Trixie Hudson

## 2017-09-22 NOTE — BRIEF OP NOTE
Interventional Radiology Brief Post Procedure Note    Procedure: Left lower extremity venous laser ablation and sclerotherapy    Proceduralist: Saul Maria MD    Assistant: Jayson Mixon MD    Time Out: Prior to the start of the procedure and with procedural staff participation, I verbally confirmed the patient s identity using two indicators, relevant allergies, that the procedure was appropriate and matched the consent or emergent situation, and that the correct equipment/implants were available. Immediately prior to starting the procedure I conducted the Time Out with the procedural staff and re-confirmed the patient s name, procedure, and site/side. (The Joint Commission universal protocol was followed.)  Yes        Sedation: None. Local Anesthestic used    Findings: Left GSV endovascular laser ablation and sclerotherapy.    Estimated Blood Loss: None    Fluoroscopy Time:  minute(s)    SPECIMENS: None    Complications: 1. None     Condition: Stable    Plan: Patient to 2A for postprocedure cares. 1 week followup ultrasound. Will return in 3 weeks for sclero check and right leg treatment. Followup to be arranged by Dr Maria's nurse coordinator.    Comments: See dictated procedure note for full details.    Jayson Mixon MD

## 2017-09-22 NOTE — PROGRESS NOTES
Liayh did well throughout her recovery.  She took 1000 mg tylenol for her leg discomfort.  Discharge instructions were reviewed prior to discharge.  She was discharged to home accompanied by her mother.

## 2017-09-22 NOTE — IP AVS SNAPSHOT
[address] [address]                                       After Visit Summary   9/22/2017    Liyah Jernigan    MRN: [id number]           After Visit Summary Signature Page     I have received my discharge instructions, and my questions have been answered. I have discussed any challenges I see with this plan with the nurse or doctor.    ..........................................................................................................................................  Patient/Patient Representative Signature      ..........................................................................................................................................  Patient Representative Print Name and Relationship to Patient    ..................................................               ................................................  Date                                            Time    ..........................................................................................................................................  Reviewed by Signature/Title    ...................................................              ..............................................  Date                                                            Time

## 2017-09-22 NOTE — IP AVS SNAPSHOT
MRN:[id number]                      After Visit Summary   9/22/2017    Liyah Jernigan    MRN: [id number]           Visit Information        Department      9/22/2017  8:41 AM [address] [facility]          Review of your medicines      UNREVIEWED medicines. Ask your doctor about these medicines        Dose / Directions    * amphetamine-dextroamphetamine 10 MG per tablet   Commonly known as:  ADDERALL   Used for:  Attention deficit hyperactivity disorder (ADHD), unspecified ADHD type        Dose:  10 mg   Take 1 tablet (10 mg) by mouth daily . Patient needs to see primary provider for further refills.   Quantity:  30 tablet   Refills:  0       * amphetamine-dextroamphetamine 10 MG per tablet   Commonly known as:  ADDERALL   Used for:  Attention deficit hyperactivity disorder (ADHD), unspecified ADHD type        Dose:  10 mg   Take 1 tablet (10 mg) by mouth daily   Quantity:  30 tablet   Refills:  0       * amphetamine-dextroamphetamine 10 MG per tablet   Commonly known as:  ADDERALL   Used for:  Attention deficit hyperactivity disorder (ADHD), unspecified ADHD type        Dose:  10 mg   Start taking on:  10/14/2017   Take 1 tablet (10 mg) by mouth daily   Quantity:  30 tablet   Refills:  0       * amphetamine-dextroamphetamine 10 MG per tablet   Commonly known as:  ADDERALL   Used for:  Attention deficit hyperactivity disorder (ADHD), unspecified ADHD type        Dose:  10 mg   Start taking on:  11/14/2017   Take 1 tablet (10 mg) by mouth daily   Quantity:  30 tablet   Refills:  0       biotin 2.5 mg/mL Susp        Dose:  20 mg   Take 20 mg by mouth daily   Refills:  0       buPROPion 150 MG 12 hr tablet   Commonly known as:  WELLBUTRIN SR   Used for:  Mild episode of recurrent major depressive disorder (H)        Dose:  150 mg   Take 1 tablet (150 mg) by mouth daily Appointment required prior to any additional refills   Quantity:  90 tablet   Refills:  1       IRON SUPPLEMENT PO        Dose:  1  tablet   Take 1 tablet by mouth daily   Refills:  0       levonorgestrel-ethinyl estradiol 0.1-20 MG-MCG per tablet   Commonly known as:  RUDY ATKINS LESSINA   Used for:  Encounter for surveillance of contraceptive pills        Dose:  1 tablet   Take 1 tablet by mouth daily   Quantity:  84 tablet   Refills:  3       medroxyPROGESTERone 10 MG tablet   Commonly known as:  PROVERA   Used for:  Excessive or frequent menstruation        Dose:  10 mg   Take 1 tablet (10 mg) by mouth daily for 10 days   Quantity:  10 tablet   Refills:  0       rOPINIRole 0.5 MG tablet   Commonly known as:  REQUIP   Used for:  Restless leg syndrome        Dose:  0.5 mg   Take 1 tablet (0.5 mg) by mouth At Bedtime   Quantity:  90 tablet   Refills:  3       SUMAtriptan 100 MG tablet   Commonly known as:  IMITREX   Used for:  Migraine with aura and without status migrainosus, not intractable        Dose:  100 mg   Take 1 tablet (100 mg) by mouth daily as needed for migraine   Quantity:  30 tablet   Refills:  3       teriflunomide 14 MG tablet   Commonly known as:  AUBAGIO   Used for:  Multiple sclerosis (H)        Dose:  14 mg   Take 1 tablet (14 mg) by mouth daily   Quantity:  30 tablet   Refills:  11       valACYclovir 500 MG tablet   Commonly known as:  VALTREX   Used for:  HSV (herpes simplex virus) infection        TAKE 1 TABLET BY MOUTH DAILY   Quantity:  90 tablet   Refills:  1       VITAMIN C PO        Dose:  500 mg   Take 500 mg by mouth daily   Refills:  0       VITAMIN D (CHOLECALCIFEROL) PO        Take by mouth daily   Refills:  0       * Notice:  This list has 4 medication(s) that are the same as other medications prescribed for you. Read the directions carefully, and ask your doctor or other care provider to review them with you.      CONTINUE these medicines which have NOT CHANGED        Dose / Directions    order for DME   Used for:  Venous insufficiency        Please measure and distribute 1 pair of 20mmHg - 30mmHg THIGH  high open  toe compression stockings. Jobst ultrasheer or equivalent.   Quantity:  1 each   Refills:  1                Protect others around you: Learn how to safely use, store and throw away your medicines at www.disposemymeds.org.         Follow-ups after your visit        Your next 10 appointments already scheduled     Sep 28, 2017  9:00 AM CDT   US LOWER EXTREMITY VENOUS DUPLEX RIGHT with UCUSV2   [facility] US ([facility])    [address]  1st Floor  [facility] [id number] [phone]           Please bring a list of your medicines (including vitamins, minerals and over-the-counter drugs). Also, tell your doctor about any allergies you may have. Wear comfortable clothes and leave your valuables at home.  You do not need to do anything special to prepare for your exam.  Please call the Imaging Department at your exam site with any questions.            Oct 13, 2017 11:30 AM CDT   Procedure 4.5 hour with U2A ROOM 2   [address] [facility] [place] ([facility])    [address] [id number]               Oct 13, 2017  1:30 PM CDT   IR STAB PHLEBECTOMY < 10 STABS with UUIR5   [facility] [place], Interventional Radiology ([facility])    [address] [id number] [phone]           1. You will need to have had a history and physical exam within 7 days of the procedure. 2. Laboratory test are to be obtained by your doctor prior to the exam (CBCP, INR and PTT) 3. Someone will need to drive you to and from the hospital. 4. If you are or may be pregnant, contact your doctor or a Radiology nurse prior to the day of the exam. 5. If you have diabetes, check with your doctor or a Radiology nurse to see if your insulin needs to be adjusted for the exam. 6. If you are taking Coumadin (to thin you blood) please contact your doctor or a Radiology nurse at  least 3 days before the exam for special instructions. 7. The day before your exam you may eat your regular diet and are encouraged to drink at least 2 quarts of clear liquids. Drink no alcoholic beverages for 24 hours prior to the exam. 8. Do not eat any solid food or milk products for 6 hours prior to the exam. You may drink clear liquids until 2 hours prior to the exam. Clear liquids include the following: water, Jell-O, clear broth, apple juice or any noncarbonated drink that you can see through (no pop!) 9. The morning of the exam you may brush your teeth and take medications as directed with a sip of water. 10. Tell the Radiology nurse if you have any allergies.            Oct 20, 2017  9:00 AM CDT   US LOWER EXTREMITY VENOUS DUPLEX LEFT with UCUSV2   [facility] ([facility])    [address] [place] [id number] [phone]           Please bring a list of your medicines (including vitamins, minerals and over-the-counter drugs). Also, tell your doctor about any allergies you may have. Wear comfortable clothes and leave your valuables at home.  You do not need to do anything special to prepare for your exam.  Please call the Imaging Department at your exam site with any questions.            Feb 20, 2018 10:00 AM CST   (Arrive by 9:45 AM)   Return Multiple Sclerosis with Jose A Hairston MD   [facility] Multiple Sclerosis ([facility])    [address] [place] [id number] [phone]               Care Instructions        After Care Instructions     Discharge Instructions       Activity:  Resume your normal activity and go for at least a 20 min walk per day.     Dressings and compression hose:    Stocking needs to be kept on over the first night and then worn daily for 2 weeks. Takeoff stockings at night to shower after first night and continue to do so for two weeks.     Apply moist heat  to treated areas twice a day as needed for discomfort.   Warm compress - warm wash cloth     Medication:  Ibuprofen 600 mg by mouth every 8 hours as needed for leg discomfort.     Follow up visits:  You will have a follow up IR clinic appointment in three weeks which should already be set up for you.    For any questions contact the clinic number: [phone]. ([facility])                  Further instructions from your care team                                                                                                                                                   Additional Information About Your Visit        MyChart Information     [employer] gives you secure access to your electronic health record. If you see a primary care provider, you can also send messages to your care team and make appointments. If you have questions, please call your primary care clinic.  If you do not have a primary care provider, please call [phone] and they will assist you.        Care EveryWhere ID     This is your Care EveryWhere ID. This could be used by other organizations to access your [place] medical records  [id number]        Your Vitals Were     Blood Pressure Pulse Temperature Respirations Last Period Pulse Oximetry    127/102 (BP Location: Right arm) 81 97.8  F (36.6  C) 18 08/18/2017 (Exact Date) 97%       Primary Care Provider Office Phone # Fax #    Jasmine Tab Barrow [phone]      Equal Access to Services     MARY LEBLANC : Aislinn Doherty, wajoshda berna, qaybta kaalmada adenazyada, andrade aj. So [facility] [phone].    ATENCIÓN: Si habla español, tiene a thorne disposición servicios gratuitos de asistencia lingüística. Llame al [phone].    We comply with applicable federal civil rights laws and [place] laws. We do not discriminate on the basis of race, color, national origin, age, disability sex, sexual orientation or gender identity.             Thank you!     Thank you for choosing [facility] for your care. Our goal is always to provide you with excellent care. Hearing back from our patients is one way we can continue to improve our services. Please take a few minutes to complete the written survey that you may receive in the mail after you visit with us. Thank you!             Medication List: This is a list of all your medications and when to take them. Check marks below indicate your daily home schedule. Keep this list as a reference.      Medications           Morning Afternoon Evening Bedtime As Needed    * amphetamine-dextroamphetamine 10 MG per tablet   Commonly known as:  ADDERALL   Take 1 tablet (10 mg) by mouth daily . Patient needs to see primary provider for further refills.                                * amphetamine-dextroamphetamine 10 MG per tablet   Commonly known as:  ADDERALL   Take 1 tablet (10 mg) by mouth daily                                * amphetamine-dextroamphetamine 10 MG per tablet   Commonly known as:  ADDERALL   Take 1 tablet (10 mg) by mouth daily   Start taking on:  10/14/2017                                * amphetamine-dextroamphetamine 10 MG per tablet   Commonly known as:  ADDERALL   Take 1 tablet (10 mg) by mouth daily   Start taking on:  11/14/2017                                biotin 2.5 mg/mL Susp   Take 20 mg by mouth daily                                buPROPion 150 MG 12 hr tablet   Commonly known as:  WELLBUTRIN SR   Take 1 tablet (150 mg) by mouth daily Appointment required prior to any additional refills                                IRON SUPPLEMENT PO   Take 1 tablet by mouth daily                                levonorgestrel-ethinyl estradiol 0.1-20 MG-MCG per tablet   Commonly known as:  AVIANE,ALESSE,LESSINA   Take 1 tablet by mouth daily                                medroxyPROGESTERone 10 MG tablet   Commonly known as:  PROVERA   Take 1 tablet (10 mg) by mouth daily for 10 days                                 order for DME   Please measure and distribute 1 pair of 20mmHg - 30mmHg THIGH high open  toe compression stockings. Jobst ultrasheer or equivalent.                                rOPINIRole 0.5 MG tablet   Commonly known as:  REQUIP   Take 1 tablet (0.5 mg) by mouth At Bedtime                                SUMAtriptan 100 MG tablet   Commonly known as:  IMITREX   Take 1 tablet (100 mg) by mouth daily as needed for migraine                                teriflunomide 14 MG tablet   Commonly known as:  AUBAGIO   Take 1 tablet (14 mg) by mouth daily                                valACYclovir 500 MG tablet   Commonly known as:  VALTREX   TAKE 1 TABLET BY MOUTH DAILY                                VITAMIN C PO   Take 500 mg by mouth daily                                VITAMIN D (CHOLECALCIFEROL) PO   Take by mouth daily                                * Notice:  This list has 4 medication(s) that are the same as other medications prescribed for you. Read the directions carefully, and ask your doctor or other care provider to review them with you.

## 2017-09-22 NOTE — PROGRESS NOTES
Pt back from IR s/p left leg satb phlebectomy. VSS.  Pt alert and oriented x4. No IV sedation given during the procedure.  Pt c/o pain in left lower leg. Left leg sites F/D/I.  Pt's mother will be picking her up.

## 2017-10-04 ENCOUNTER — TELEPHONE (OUTPATIENT)
Dept: INTERVENTIONAL RADIOLOGY/VASCULAR | Facility: CLINIC | Age: 52
End: 2017-10-04

## 2017-10-04 NOTE — TELEPHONE ENCOUNTER
Called pt to f/u on her s/p varicose vein treatment in which she states that she's doing well. She is currently wearing her compression stockings.     I informed her that I wanted to f/u on her US in which all looks good. The treated veins look closed and that there is no clot noted.   I also informed her of her f/u appt for her leg treatment in which she states that she is aware it is next week Friday 10/13 with Dr. Maria.   She verbalized understanding and will call with any other questions.     Britni Aceves RN, BSN  Interventional Radiology Nurse Coordinator   Phone: [phone]

## 2017-10-09 ENCOUNTER — TELEPHONE (OUTPATIENT)
Dept: INTERVENTIONAL RADIOLOGY/VASCULAR | Facility: CLINIC | Age: 52
End: 2017-10-09

## 2017-10-13 ENCOUNTER — HOSPITAL ENCOUNTER (OUTPATIENT)
Facility: CLINIC | Age: 52
Discharge: HOME | End: 2017-10-13
Attending: RADIOLOGY | Admitting: RADIOLOGY
Payer: COMMERCIAL

## 2017-10-13 ENCOUNTER — APPOINTMENT (OUTPATIENT)
Dept: INTERVENTIONAL RADIOLOGY/VASCULAR | Facility: CLINIC | Age: 52
End: 2017-10-13
Attending: RADIOLOGY
Payer: COMMERCIAL

## 2017-10-13 ENCOUNTER — APPOINTMENT (OUTPATIENT)
Dept: MEDSURG UNIT | Facility: CLINIC | Age: 52
End: 2017-10-13
Attending: RADIOLOGY
Payer: COMMERCIAL

## 2017-10-13 VITALS
HEIGHT: 67 IN | BODY MASS INDEX: 18.05 KG/M2 | SYSTOLIC BLOOD PRESSURE: 136 MMHG | RESPIRATION RATE: 16 BRPM | OXYGEN SATURATION: 98 % | WEIGHT: 115 LBS | DIASTOLIC BLOOD PRESSURE: 99 MMHG | TEMPERATURE: 98.6 F | HEART RATE: 81 BPM

## 2017-10-13 DIAGNOSIS — I83.899 VARICOSE VEINS OF LOWER EXTREMITIES WITH COMPLICATIONS: ICD-10-CM

## 2017-10-13 LAB — B-HCG SERPL-ACNC: 2 IU/L (ref 0–5)

## 2017-10-13 PROCEDURE — 40000166 ZZH STATISTIC PP CARE STAGE 1

## 2017-10-13 PROCEDURE — 25000125 ZZHC RX 250: Performed by: RADIOLOGY

## 2017-10-13 PROCEDURE — 25000125 ZZHC RX 250: Performed by: PHYSICIAN ASSISTANT

## 2017-10-13 PROCEDURE — 36470 NJX SCLRSNT 1 INCMPTNT VEIN: CPT

## 2017-10-13 PROCEDURE — 27210732 ZZH ACCESSORY CR1

## 2017-10-13 PROCEDURE — 84702 CHORIONIC GONADOTROPIN TEST: CPT | Performed by: RADIOLOGY

## 2017-10-13 PROCEDURE — 25000132 ZZH RX MED GY IP 250 OP 250 PS 637: Performed by: PHYSICIAN ASSISTANT

## 2017-10-13 PROCEDURE — 76942 ECHO GUIDE FOR BIOPSY: CPT

## 2017-10-13 RX ORDER — FLUMAZENIL 0.1 MG/ML
0.2 INJECTION, SOLUTION INTRAVENOUS
Status: DISCONTINUED | OUTPATIENT
Start: 2017-10-13 | End: 2017-10-13 | Stop reason: HOSPADM

## 2017-10-13 RX ORDER — NALOXONE HYDROCHLORIDE 0.4 MG/ML
.1-.4 INJECTION, SOLUTION INTRAMUSCULAR; INTRAVENOUS; SUBCUTANEOUS
Status: DISCONTINUED | OUTPATIENT
Start: 2017-10-13 | End: 2017-10-13 | Stop reason: HOSPADM

## 2017-10-13 RX ORDER — DIAZEPAM 5 MG
10 TABLET ORAL ONCE
Status: COMPLETED | OUTPATIENT
Start: 2017-10-13 | End: 2017-10-13

## 2017-10-13 RX ORDER — FENTANYL CITRATE 50 UG/ML
25-50 INJECTION, SOLUTION INTRAMUSCULAR; INTRAVENOUS EVERY 5 MIN PRN
Status: DISCONTINUED | OUTPATIENT
Start: 2017-10-13 | End: 2017-10-13 | Stop reason: HOSPADM

## 2017-10-13 RX ADMIN — TETRADECYL HYDROGEN SULFATE (ESTER) 20 MG: 10 INJECTION, SOLUTION INTRAVENOUS at 15:29

## 2017-10-13 RX ADMIN — DIAZEPAM 10 MG: 5 TABLET ORAL at 14:50

## 2017-10-13 RX ADMIN — LIDOCAINE HYDROCHLORIDE 1 ML: 10 INJECTION, SOLUTION EPIDURAL; INFILTRATION; INTRACAUDAL; PERINEURAL at 15:29

## 2017-10-13 NOTE — BRIEF OP NOTE
Interventional Radiology Brief Post Procedure Note    Procedure: Right GSV sclerotherapy    Proceduralist: Saul Maria MD    Assistant: Jayson Mixon MD    Time Out: Prior to the start of the procedure and with procedural staff participation, I verbally confirmed the patient s identity using two indicators, relevant allergies, that the procedure was appropriate and matched the consent or emergent situation, and that the correct equipment/implants were available. Immediately prior to starting the procedure I conducted the Time Out with the procedural staff and re-confirmed the patient s name, procedure, and site/side. (The Joint Commission universal protocol was followed.)  Yes        Sedation: None. Local Anesthestic used    Findings: Sclerotherapy of right GSV    Estimated Blood Loss: None    Fluoroscopy Time:  minute(s)    SPECIMENS: None    Complications: 1. None     Condition: Stable    Plan: Bedrest for 1 hour or until meets criteria. Followup in IR in 3 weeks for sclero check with Dr. Maria.    Comments: See dictated procedure note for full details.    Jayson Mixon MD

## 2017-10-13 NOTE — PROGRESS NOTES
Tolerated bedrest without problems.  VSS.  Right leg covered with elastic stocking, CDI.  Reviewed discharge instructions with patient.  PIV removed.  Discharged to home with mother.

## 2017-10-13 NOTE — IP AVS SNAPSHOT
MRN:[id number]                      After Visit Summary   10/13/2017    Liyah Jernigan    MRN: [id number]           Visit Information        Department      10/13/2017 11:27 AM [address] [facility]          Review of your medicines      UNREVIEWED medicines. Ask your doctor about these medicines        Dose / Directions    * amphetamine-dextroamphetamine 10 MG per tablet   Commonly known as:  ADDERALL   Used for:  Attention deficit hyperactivity disorder (ADHD), unspecified ADHD type        Dose:  10 mg   Take 1 tablet (10 mg) by mouth daily . Patient needs to see primary provider for further refills.   Quantity:  30 tablet   Refills:  0       * amphetamine-dextroamphetamine 10 MG per tablet   Commonly known as:  ADDERALL   Used for:  Attention deficit hyperactivity disorder (ADHD), unspecified ADHD type        Dose:  10 mg   Take 1 tablet (10 mg) by mouth daily   Quantity:  30 tablet   Refills:  0       * amphetamine-dextroamphetamine 10 MG per tablet   Commonly known as:  ADDERALL   Used for:  Attention deficit hyperactivity disorder (ADHD), unspecified ADHD type        Dose:  10 mg   Start taking on:  10/14/2017   Take 1 tablet (10 mg) by mouth daily   Quantity:  30 tablet   Refills:  0       * amphetamine-dextroamphetamine 10 MG per tablet   Commonly known as:  ADDERALL   Used for:  Attention deficit hyperactivity disorder (ADHD), unspecified ADHD type        Dose:  10 mg   Start taking on:  11/14/2017   Take 1 tablet (10 mg) by mouth daily   Quantity:  30 tablet   Refills:  0       biotin 2.5 mg/mL Susp        Dose:  20 mg   Take 20 mg by mouth daily   Refills:  0       buPROPion 150 MG 12 hr tablet   Commonly known as:  WELLBUTRIN SR   Used for:  Mild episode of recurrent major depressive disorder (H)        Dose:  150 mg   Take 1 tablet (150 mg) by mouth daily Appointment required prior to any additional refills   Quantity:  90 tablet   Refills:  1       IRON SUPPLEMENT PO        Dose:  1  tablet   Take 1 tablet by mouth daily   Refills:  0       rOPINIRole 0.5 MG tablet   Commonly known as:  REQUIP   Used for:  Restless leg syndrome        Dose:  0.5 mg   Take 1 tablet (0.5 mg) by mouth At Bedtime   Quantity:  90 tablet   Refills:  3       SUMAtriptan 100 MG tablet   Commonly known as:  IMITREX   Used for:  Migraine with aura and without status migrainosus, not intractable        Dose:  100 mg   Take 1 tablet (100 mg) by mouth daily as needed for migraine   Quantity:  30 tablet   Refills:  3       teriflunomide 14 MG tablet   Commonly known as:  AUBAGIO   Used for:  Multiple sclerosis (H)        Dose:  14 mg   Take 1 tablet (14 mg) by mouth daily   Quantity:  30 tablet   Refills:  11       valACYclovir 500 MG tablet   Commonly known as:  VALTREX   Used for:  HSV (herpes simplex virus) infection        TAKE 1 TABLET BY MOUTH DAILY   Quantity:  90 tablet   Refills:  1       VITAMIN C PO        Dose:  500 mg   Take 500 mg by mouth daily   Refills:  0       VITAMIN D (CHOLECALCIFEROL) PO        Take by mouth daily   Refills:  0       * Notice:  This list has 4 medication(s) that are the same as other medications prescribed for you. Read the directions carefully, and ask your doctor or other care provider to review them with you.      CONTINUE these medicines which have NOT CHANGED        Dose / Directions    order for DME   Used for:  Venous insufficiency        Please measure and distribute 1 pair of 20mmHg - 30mmHg THIGH high open  toe compression stockings. Jobst ultrasheer or equivalent.   Quantity:  1 each   Refills:  1                Protect others around you: Learn how to safely use, store and throw away your medicines at www.disposemymeds.org.         Follow-ups after your visit        Your next 10 appointments already scheduled     Oct 20, 2017  9:00 AM CDT   US LOWER EXTREMITY VENOUS DUPLEX RIGHT with UCUSV2    [facility])    [address]    [address] [facility] [place] [facility] [address] [phone]           Please bring a list of your medicines (including vitamins, minerals and over-the-counter drugs). Also, tell your doctor about any allergies you may have. Wear comfortable clothes and leave your valuables at home.  You do not need to do anything special to prepare for your exam.  Please call the Imaging Department at your exam site with any questions.            Feb 20, 2018 10:00 AM CST   (Arrive by 9:45 AM)   Return Multiple Sclerosis with Jose A Hairston MD   [facility] Multiple Sclerosis ([facility])    [address] [address] [phone]               Care Instructions        After Care Instructions     Discharge Instructions       Activity:  Resume your normal activity and go for at least a 20 min walk per day.     Dressings and compression hose:    Stocking needs to be kept on over the first night and then worn daily for 2 weeks. Takeoff stockings at night to shower after first night and continue to do so for two weeks.     Apply moist heat to treated areas twice a day as needed for discomfort.   Warm compress - warm wash cloth     Medication:  Ibuprofen 600 mg by mouth every 8 hours as needed for leg discomfort.     Follow up visits:  You will have a follow up IR clinic appointment in three weeks which should already be set up for you.    For any questions contact the clinic number: [phone]. ([facility])                  Further instructions from your care team       [facility]  Going Home after Sedation for a GSV Sclerotherapy      For 24 hours:    An adult should stay with you.    Relax and take it easy.    DO NOT make any important legal decisions.    DO NOT drive or operate machines at home or at work.    Resume your regular diet and drink plenty of fluids.    CALL THE PHYSICIAN IF:    You develop nausea or vomiting    You develop hives or a rash or any  "unexplained itching    ADDITIONAL INSTRUCTIONS:  Britni Aceves will telephone with follow up appointment.    [facility] INTERVENTIONAL RADIOLOGY DEPARTMENT        Procedure Physician:  Marleni Maria and Oral                               Date of procedure:October 13, 2017        Telephone numbers:     [phone]      Monday-Friday 8:00 am to 4:30 pm                                              [phone]       After 4:30 pm Monday-Friday, Weekends  & Holidays. Ask for the Interventional                                                                                     Radiologist on call. Someone is  available 24 hours/day        [facility] toll free number: [phone]  Monday-Friday 8:00 am to 4:30 pm                                                                                                                                                                                                                        Additional Information About Your Visit        [facility] Information     [employer] gives you secure access to your electronic health record. If you see a primary care provider, you can also send messages to your care team and make appointments. If you have questions, please call your primary care clinic.  If you do not have a primary care provider, please call [phone] and they will assist you.        Care EveryWhere ID     This is your Care EveryWhere ID. This could be used by other organizations to access your [facility] medical records  [id number]        Your Vitals Were     Blood Pressure Pulse Temperature Respirations Height Weight    136/99 (BP Location: Right arm) 81 98.6  F (37  C) (Oral) 16 1.702 m (5' 7\") 52.2 kg (115 lb)    Last Period Pulse Oximetry BMI (Body Mass Index)             08/18/2017 (Exact Date) 98% 18.01 kg/m2          Primary Care Provider Office Phone # Fax #    Jasmine Tab Barrow [phone]      Equal Access to Services     MARY LEBLANC AH: Aislinn cook " Chas, aidenda luhariadaha, qaelizabethta kanguyễn grayson, andrade sterlingin hayaan brittanaz noemibobo ladaynanestor madai. [facility] [phone].    ATENCIÓN: Si jeane hernández, tiene a thorne disposición servicios gratuitos de asistencia lingüística. Cheryl al [phone].    We comply with applicable federal civil rights laws and [place] laws. We do not discriminate on the basis of race, color, national origin, age, disability, sex, sexual orientation, or gender identity.            Thank you!     Thank you for choosing [facility] for your care. Our goal is always to provide you with excellent care. Hearing back from our patients is one way we can continue to improve our services. Please take a few minutes to complete the written survey that you may receive in the mail after you visit with us. Thank you!             Medication List: This is a list of all your medications and when to take them. Check marks below indicate your daily home schedule. Keep this list as a reference.      Medications           Morning Afternoon Evening Bedtime As Needed    * amphetamine-dextroamphetamine 10 MG per tablet   Commonly known as:  ADDERALL   Take 1 tablet (10 mg) by mouth daily . Patient needs to see primary provider for further refills.                                * amphetamine-dextroamphetamine 10 MG per tablet   Commonly known as:  ADDERALL   Take 1 tablet (10 mg) by mouth daily                                * amphetamine-dextroamphetamine 10 MG per tablet   Commonly known as:  ADDERALL   Take 1 tablet (10 mg) by mouth daily   Start taking on:  10/14/2017                                * amphetamine-dextroamphetamine 10 MG per tablet   Commonly known as:  ADDERALL   Take 1 tablet (10 mg) by mouth daily   Start taking on:  11/14/2017                                biotin 2.5 mg/mL Susp   Take 20 mg by mouth daily                                buPROPion 150 MG 12 hr tablet   Commonly known as:  WELLBUTRIN SR   Take 1 tablet (150 mg) by mouth daily  Appointment required prior to any additional refills                                IRON SUPPLEMENT PO   Take 1 tablet by mouth daily                                order for DME   Please measure and distribute 1 pair of 20mmHg - 30mmHg THIGH high open  toe compression stockings. Jobst ultrasheer or equivalent.                                rOPINIRole 0.5 MG tablet   Commonly known as:  REQUIP   Take 1 tablet (0.5 mg) by mouth At Bedtime                                SUMAtriptan 100 MG tablet   Commonly known as:  IMITREX   Take 1 tablet (100 mg) by mouth daily as needed for migraine                                teriflunomide 14 MG tablet   Commonly known as:  AUBAGIO   Take 1 tablet (14 mg) by mouth daily                                valACYclovir 500 MG tablet   Commonly known as:  VALTREX   TAKE 1 TABLET BY MOUTH DAILY                                VITAMIN C PO   Take 500 mg by mouth daily                                VITAMIN D (CHOLECALCIFEROL) PO   Take by mouth daily                                * Notice:  This list has 4 medication(s) that are the same as other medications prescribed for you. Read the directions carefully, and ask your doctor or other care provider to review them with you.

## 2017-10-13 NOTE — PROGRESS NOTES
Interventional Radiology Intra-procedural Nursing Note    Patient Name: Liyah Jernigan  Medical Record Number: [id number]  Today's Date: 10/13/17    Start Time: 1445  End of procedure time: 1525  Procedure: Saphenous vein sclerotherapy   Report given to: 2A  Time pt departs:  1530    Patient arrived to IR #5 from 2A. 10 mg Valium given po prior to procedure. Positioned supine on table, prepped and draped by technologist.  Sclerotherapy completed by Dr. Maria and Dr. Mixon.  VSS,  Patient tolerated procedure well. No sedation given    Ned Giraldo RN

## 2017-10-13 NOTE — PROCEDURES
Interventional Radiology Pre-Procedure Sedation Assessment   Time of Assessment: 1:11 PM    Expected Level: Moderate Sedation    Indication: Sedation is required for the following type of Procedure: microphlebectomy    Sedation consent: Risks, benefits and alternatives were discussed with the patient    PO Intake: Appropriately NPO for procedure    ASA Class: Class 2 - MILD SYSTEMIC DISEASE, NO ACUTE PROBLEMS, NO FUNCTIONAL LIMITATIONS.    Mallampati: Grade 1:  Soft palate, uvula, tonsillar pillars, and posterior pharyngeal wall visible    Lungs: Lungs Clear with good breath sounds bilaterally    Heart: Normal heart sounds and rate    History and physical reviewed and no updates needed. I have reviewed the lab findings, diagnostic data, medications, and the plan for sedation. I have determined this patient to be an appropriate candidate for the planned sedation and procedure and have reassessed the patient IMMEDIATELY PRIOR to sedation and procedure.    Tab Cisneros PA-C

## 2017-10-13 NOTE — IP AVS SNAPSHOT
[address] [address]                                       After Visit Summary   10/13/2017    Liyah Jernigan    MRN: [id number]           After Visit Summary Signature Page     I have received my discharge instructions, and my questions have been answered. I have discussed any challenges I see with this plan with the nurse or doctor.    ..........................................................................................................................................  Patient/Patient Representative Signature      ..........................................................................................................................................  Patient Representative Print Name and Relationship to Patient    ..................................................               ................................................  Date                                            Time    ..........................................................................................................................................  Reviewed by Signature/Title    ...................................................              ..............................................  Date                                                            Time

## 2017-10-13 NOTE — DISCHARGE INSTRUCTIONS
[facility]  Going Home after Sedation for a GSV Sclerotherapy      For 24 hours:    An adult should stay with you.    Relax and take it easy.    DO NOT make any important legal decisions.    DO NOT drive or operate machines at home or at work.    Resume your regular diet and drink plenty of fluids.    CALL THE PHYSICIAN IF:    You develop nausea or vomiting    You develop hives or a rash or any unexplained itching    ADDITIONAL INSTRUCTIONS:  Britni Ketan will telephone with follow up appointment.    [facility] INTERVENTIONAL RADIOLOGY DEPARTMENT        Procedure Physician:  Marleni Maria and Oral                               Date of procedure:October 13, 2017        Telephone numbers:     [phone]      Monday-Friday 8:00 am to 4:30 pm                                              [phone]       After 4:30 pm Monday-Friday, Weekends  & Holidays. Ask for the Interventional                                                                                     Radiologist on call. Someone is  available 24 hours/day        [facility] toll free number: [phone]  Monday-Friday 8:00 am to 4:30 pm

## 2017-10-13 NOTE — PROGRESS NOTES
"Arrived from home for a phlebectomy of leg.  Patient has concern about her diastolic blood pressure, and is c/o a headache and feeling pressure in neck and head.  Anxious about procedure, as last time the vein \"got stuck\" and  Procedure was then very painful for patient.  Notified team of patient's concerns for pain.  PIV placed.  Hcg sent.  Needs consent.  "

## 2017-10-17 DIAGNOSIS — I83.899 VARICOSE VEINS OF LOWER EXTREMITIES WITH COMPLICATIONS: Primary | ICD-10-CM

## 2017-10-30 ENCOUNTER — MYC MEDICAL ADVICE (OUTPATIENT)
Dept: FAMILY MEDICINE | Facility: CLINIC | Age: 52
End: 2017-10-30

## 2017-10-31 ENCOUNTER — TELEPHONE (OUTPATIENT)
Dept: INTERVENTIONAL RADIOLOGY/VASCULAR | Facility: CLINIC | Age: 52
End: 2017-10-31

## 2017-11-02 ENCOUNTER — HOSPITAL ENCOUNTER (OUTPATIENT)
Facility: CLINIC | Age: 52
Discharge: HOME | End: 2017-11-02
Attending: RADIOLOGY | Admitting: RADIOLOGY
Payer: COMMERCIAL

## 2017-11-02 ENCOUNTER — APPOINTMENT (OUTPATIENT)
Dept: INTERVENTIONAL RADIOLOGY/VASCULAR | Facility: CLINIC | Age: 52
End: 2017-11-02
Attending: RADIOLOGY
Payer: COMMERCIAL

## 2017-11-02 DIAGNOSIS — I83.899 VARICOSE VEINS OF LOWER EXTREMITIES WITH COMPLICATIONS: ICD-10-CM

## 2017-11-02 PROCEDURE — 76942 ECHO GUIDE FOR BIOPSY: CPT

## 2017-11-02 PROCEDURE — 25000125 ZZHC RX 250: Performed by: RADIOLOGY

## 2017-11-02 PROCEDURE — 36470 NJX SCLRSNT 1 INCMPTNT VEIN: CPT

## 2017-11-02 RX ADMIN — TETRADECYL HYDROGEN SULFATE (ESTER) 20 MG: 10 INJECTION, SOLUTION INTRAVENOUS at 12:52

## 2017-11-02 NOTE — BRIEF OP NOTE
Interventional Radiology Brief Post Procedure Note    Procedure: IR FOLLOW UP VISIT OUTPATIENT    Proceduralist: Saul Maria MD    Assistant: Tj Atkinson MD    Time Out: Prior to the start of the procedure and with procedural staff participation, I verbally confirmed the patient s identity using two indicators, relevant allergies, that the procedure was appropriate and matched the consent or emergent situation, and that the correct equipment/implants were available. Immediately prior to starting the procedure I conducted the Time Out with the procedural staff and re-confirmed the patient s name, procedure, and site/side. (The Joint Commission universal protocol was followed.)  Yes    Medications   Medication Event Details Admin User Admin Time   sodium tetradecyl sulfate (SOTRADECOL) injection 20 mg Medication Given by Other Dose: 20 mg; Route: Intravenous; Scheduled Time: 12:45 PM Lisset Jaimes RN 11/2/2017 12:52 PM       Sedation: None. Local Anesthestic used    Findings: sclerotherapy performed of the right thigh GSV    Estimated Blood Loss: None    Fluoroscopy Time: 0 minute(s)    SPECIMENS: None    Complications: 1. None     Condition: Stable    Plan: d/c.     Comments: See dictated procedure note for full details.    Tj Atkinson MD

## 2017-11-02 NOTE — PROGRESS NOTES
Patient Name: Liyah Jernigan  Medical Record Number: [id number]  Today's Date: 11/2/2017    Procedure: sclerotherapy   Proceduralist: Dr. Maria     No sedation / 1% sotradecol used by provider      Pt arrived ambulatory to IR room 6 from [facility] non accompanied. Pt denies any questions or concerns regarding procedure. Pt positioned supine and monitored per protocol. Pt tolerated procedure without any noted complications.

## 2018-01-04 ENCOUNTER — TELEPHONE (OUTPATIENT)
Dept: NEUROLOGY | Facility: CLINIC | Age: 53
End: 2018-01-04

## 2018-01-04 NOTE — TELEPHONE ENCOUNTER
PA approved.  Effective date: 01/04/2018-01/04/2019  PA reference #: [id number]  Pt. notified:   Yes   Pt. informed directly.    [facility] Prior Authorization Team   Phone: [phone]  Fax: [phone]    *DID NOT RECE PA APPROVAL LETTER.

## 2018-01-08 ENCOUNTER — TELEPHONE (OUTPATIENT)
Dept: FAMILY MEDICINE | Facility: CLINIC | Age: 53
End: 2018-01-08

## 2018-01-08 NOTE — TELEPHONE ENCOUNTER
Received PA for bupropion 150mg tablets    Phone# [phone]    ID# [id number]    PA or Alternative    Venita Cotto

## 2018-01-16 NOTE — TELEPHONE ENCOUNTER
Central Prior Authorization Team   Phone: [phone]      PA Initiation    Medication: bupropion 150mg tablets  Insurance Company: [employer] - Phone [phone] Fax [phone]  Pharmacy Filling the Rx: [facility] [address] [address] AT [facility] [address]  Filling Pharmacy Phone: [phone]  Filling Pharmacy Fax:    Start Date: 1/16/2018

## 2018-01-23 ENCOUNTER — OFFICE VISIT (OUTPATIENT)
Dept: FAMILY MEDICINE | Facility: CLINIC | Age: 53
End: 2018-01-23
Payer: COMMERCIAL

## 2018-01-23 VITALS
HEART RATE: 78 BPM | WEIGHT: 119.2 LBS | BODY MASS INDEX: 18.67 KG/M2 | SYSTOLIC BLOOD PRESSURE: 136 MMHG | RESPIRATION RATE: 16 BRPM | OXYGEN SATURATION: 97 % | TEMPERATURE: 98.5 F | DIASTOLIC BLOOD PRESSURE: 94 MMHG

## 2018-01-23 DIAGNOSIS — F33.0 MILD EPISODE OF RECURRENT MAJOR DEPRESSIVE DISORDER (H): ICD-10-CM

## 2018-01-23 DIAGNOSIS — F51.01 PRIMARY INSOMNIA: ICD-10-CM

## 2018-01-23 DIAGNOSIS — N91.2 AMENORRHEA: ICD-10-CM

## 2018-01-23 DIAGNOSIS — I10 BENIGN ESSENTIAL HYPERTENSION: Primary | ICD-10-CM

## 2018-01-23 DIAGNOSIS — G35 MS (MULTIPLE SCLEROSIS) (H): ICD-10-CM

## 2018-01-23 LAB
ALBUMIN SERPL-MCNC: 3.9 G/DL (ref 3.4–5)
ALP SERPL-CCNC: 58 U/L (ref 40–150)
ALT SERPL W P-5'-P-CCNC: 46 U/L (ref 0–50)
ANION GAP SERPL CALCULATED.3IONS-SCNC: 7 MMOL/L (ref 3–14)
AST SERPL W P-5'-P-CCNC: 32 U/L (ref 0–45)
BILIRUB SERPL-MCNC: 0.3 MG/DL (ref 0.2–1.3)
BUN SERPL-MCNC: 28 MG/DL (ref 7–30)
CALCIUM SERPL-MCNC: 9.5 MG/DL (ref 8.5–10.1)
CHLORIDE SERPL-SCNC: 105 MMOL/L (ref 94–109)
CO2 SERPL-SCNC: 27 MMOL/L (ref 20–32)
CREAT SERPL-MCNC: 0.95 MG/DL (ref 0.52–1.04)
FSH SERPL-ACNC: 68.3 IU/L
GFR SERPL CREATININE-BSD FRML MDRD: 62 ML/MIN/1.7M2
GLUCOSE SERPL-MCNC: 92 MG/DL (ref 70–99)
LH SERPL-ACNC: 38.9 IU/L
POTASSIUM SERPL-SCNC: 4.2 MMOL/L (ref 3.4–5.3)
PROT SERPL-MCNC: 7.3 G/DL (ref 6.8–8.8)
SODIUM SERPL-SCNC: 139 MMOL/L (ref 133–144)
TSH SERPL DL<=0.005 MIU/L-ACNC: 2.37 MU/L (ref 0.4–4)

## 2018-01-23 PROCEDURE — 80053 COMPREHEN METABOLIC PANEL: CPT | Performed by: FAMILY MEDICINE

## 2018-01-23 PROCEDURE — 99214 OFFICE O/P EST MOD 30 MIN: CPT | Performed by: FAMILY MEDICINE

## 2018-01-23 PROCEDURE — 36415 COLL VENOUS BLD VENIPUNCTURE: CPT | Performed by: FAMILY MEDICINE

## 2018-01-23 PROCEDURE — 84443 ASSAY THYROID STIM HORMONE: CPT | Performed by: FAMILY MEDICINE

## 2018-01-23 PROCEDURE — 83001 ASSAY OF GONADOTROPIN (FSH): CPT | Performed by: FAMILY MEDICINE

## 2018-01-23 PROCEDURE — 83002 ASSAY OF GONADOTROPIN (LH): CPT | Performed by: FAMILY MEDICINE

## 2018-01-23 RX ORDER — METOPROLOL SUCCINATE 25 MG/1
25 TABLET, EXTENDED RELEASE ORAL DAILY
Qty: 30 TABLET | Refills: 1 | Status: SHIPPED | OUTPATIENT
Start: 2018-01-23 | End: 2018-03-05

## 2018-01-23 RX ORDER — TRAZODONE HYDROCHLORIDE 50 MG/1
50 TABLET, FILM COATED ORAL AT BEDTIME
Qty: 30 TABLET | Refills: 1 | Status: SHIPPED | OUTPATIENT
Start: 2018-01-23 | End: 2018-03-05

## 2018-01-23 ASSESSMENT — PATIENT HEALTH QUESTIONNAIRE - PHQ9
SUM OF ALL RESPONSES TO PHQ QUESTIONS 1-9: 14
5. POOR APPETITE OR OVEREATING: SEVERAL DAYS

## 2018-01-23 ASSESSMENT — ANXIETY QUESTIONNAIRES
GAD7 TOTAL SCORE: 5
1. FEELING NERVOUS, ANXIOUS, OR ON EDGE: SEVERAL DAYS
5. BEING SO RESTLESS THAT IT IS HARD TO SIT STILL: SEVERAL DAYS
3. WORRYING TOO MUCH ABOUT DIFFERENT THINGS: SEVERAL DAYS
7. FEELING AFRAID AS IF SOMETHING AWFUL MIGHT HAPPEN: NOT AT ALL
6. BECOMING EASILY ANNOYED OR IRRITABLE: NOT AT ALL
2. NOT BEING ABLE TO STOP OR CONTROL WORRYING: SEVERAL DAYS

## 2018-01-23 ASSESSMENT — PAIN SCALES - GENERAL: PAINLEVEL: NO PAIN (0)

## 2018-01-23 NOTE — PATIENT INSTRUCTIONS
Labs today. I will notify you of results when I receive them.     Continue healthy eating habits with low sodium diet.     Take Metoprolol Succinate 25mg daily for blood pressure.     Follow up in three weeks for blood pressure with the Medical Assistant.    Take Trazodone up to 50mg as needed for sleep aid.      At [facility], we strive to deliver an exceptional experience to you, every time we see you.  If you receive a survey in the mail, please send us back your thoughts. We really do value your feedback.    Based on your medical history, these are the current health maintenance/preventive care services that you are due for (some may have been done at this visit.)  Health Maintenance Due   Topic Date Due     MAMMO SCREEN Q2 YR (SYSTEM ASSIGNED)  01/02/2017     COLON CANCER SCREEN (SYSTEM ASSIGNED)  01/02/2017         Suggested websites for health information:  Www.[employer].org : Up to date and easily searchable information on multiple topics.  Www.[employer].gov : medication info, interactive tutorials, watch real surgeries online  Www.familydoctor.org : good info from the Academy of Family Physicians  Www.cdc.gov : public health info, travel advisories, epidemics (H1N1)  Www.aap.org : children's health info, normal development, vaccinations  Www.health.state.[place].us : [place] dept of health, public health issues in [place], N1N1    Your care team:     Family Medicine   ENRICO Mccabe MD Emily Bunt, APRN CNP   S. MD Yajaira Oates MD Angela Wermerskirchen, MD         Clinic hours: Monday - Wednesday 7 am-7 pm   Thursdays and Fridays 7 am-5 pm.     [facility]: Monday - Friday 11 am-9 pm,   Saturday and Sunday 9 am-5 pm.    [facility]: Monday -Thursday 8 am-8 pm; Friday 8 am-6 pm; Saturday and Sunday 9 am-5 pm.     [facility]: Monday Thursday 8 am   7 pm; Friday 8 am   6 pm    Clinic: [phone]   [facility]: [phone]   [facility]: [phone]

## 2018-01-23 NOTE — MR AVS SNAPSHOT
After Visit Summary   1/23/2018    Liyah Jernigan    MRN: [id number]           Patient Information     Date Of Birth          1965        Visit Information        Provider Department      1/23/2018 10:00 AM Yajaira Anand MD [facility]        Today's Diagnoses     Benign essential hypertension    -  1    Primary insomnia        Amenorrhea          Care Instructions    Labs today. I will notify you of results when I receive them.     Continue healthy eating habits with low sodium diet.     Take Metoprolol Succinate 25mg daily for blood pressure.     Follow up in three weeks for blood pressure with the Medical Assistant.    Take Trazodone up to 50mg as needed for sleep aid.      At [facility], we strive to deliver an exceptional experience to you, every time we see you.  If you receive a survey in the mail, please send us back your thoughts. We really do value your feedback.    Based on your medical history, these are the current health maintenance/preventive care services that you are due for (some may have been done at this visit.)  Health Maintenance Due   Topic Date Due     MAMMO SCREEN Q2 YR (SYSTEM ASSIGNED)  01/02/2017     COLON CANCER SCREEN (SYSTEM ASSIGNED)  01/02/2017         Suggested websites for health information:  Www.SeniorLiving.Net : Up to date and easily searchable information on multiple topics.  Www.medlineplus.gov : medication info, interactive tutorials, watch real surgeries online  Www.familydoctor.org : good info from the Academy of Family Physicians  Www.cdc.gov : public health info, travel advisories, epidemics (H1N1)  Www.aap.org : children's health info, normal development, vaccinations  Www.health.state.[place].us : [place] dept of health, public health issues in [place], N1N1    Your care team:     Family Medicine   ENRICO Mccabe MD Emily Bunt, APRN CNP   S. MD Yajaira Oates MD Angela  MD [place]         Clinic hours: Monday - Wednesday 7 am-7 pm   Thursdays and Fridays 7 am-5 pm.     [facility]: Monday - Friday 11 am-9 pm,   Saturday and Sunday 9 am-5 pm.    [facility]: Monday -Thursday 8 am-8 pm; Friday 8 am-6 pm; Saturday and Sunday 9 am-5 pm.     [facility]: Monday - Thursday 8 am - 7 pm; Friday 8 am - 6 pm    Clinic: [phone]   [facility]: [phone]   [facility]: [phone]                  Follow-ups after your visit        Your next 10 appointments already scheduled     Feb 20, 2018 10:00 AM CST   (Arrive by 9:45 AM)   Return Multiple Sclerosis with Jose A Hairston MD   [facility] Multiple Sclerosis ([facility])    [address] [place] [id number] [phone]              Who to contact     If you have questions or need follow up information about today's clinic visit or your schedule please contact [facility] directly at [phone].  Normal or non-critical lab and imaging results will be communicated to you by MyChart, letter or phone within 4 business days after the clinic has received the results. If you do not hear from us within 7 days, please contact the clinic through MyChart or phone. If you have a critical or abnormal lab result, we will notify you by phone as soon as possible.  Submit refill requests through [employer] or call your pharmacy and they will forward the refill request to us. Please allow 3 business days for your refill to be completed.          Additional Information About Your Visit        [employer] Information     [employer] gives you secure access to your electronic health record. If you see a primary care provider, you can also send messages to your care team and make appointments. If you have questions, please call your primary care clinic.  If you do not have a primary care provider, please call  [phone] and they will assist you.        Care EveryWhere ID     This is your Care EveryWhere ID. This could be used by other organizations to access your [facility] medical records  [id number]        Your Vitals Were     Pulse Temperature Respirations Last Period Pulse Oximetry Breastfeeding?    78 98.5  F (36.9  C) (Oral) 16 08/23/2017 97% No    BMI (Body Mass Index)                   18.67 kg/m2            Blood Pressure from Last 3 Encounters:   01/23/18 (!) 136/94   10/13/17 (!) 136/99   09/22/17 (!) 127/102    Weight from Last 3 Encounters:   01/23/18 54.1 kg (119 lb 3.2 oz)   10/13/17 52.2 kg (115 lb)   09/13/17 54.7 kg (120 lb 11.2 oz)              We Performed the Following     Comprehensive metabolic panel (BMP + Alb, Alk Phos, ALT, AST, Total. Bili, TP)     Follicle stimulating hormone     Lutropin     TSH with free T4 reflex          Today's Medication Changes          These changes are accurate as of: 1/23/18 10:50 AM.  If you have any questions, ask your nurse or doctor.               Start taking these medicines.        Dose/Directions    metoprolol succinate 25 MG 24 hr tablet   Commonly known as:  TOPROL-XL   Used for:  Benign essential hypertension   Started by:  Yajaira Anand MD        Dose:  25 mg   Take 1 tablet (25 mg) by mouth daily   Quantity:  30 tablet   Refills:  1       traZODone 50 MG tablet   Commonly known as:  DESYREL   Used for:  Primary insomnia   Started by:  Yajaira Anand MD        Dose:  50 mg   Take 1 tablet (50 mg) by mouth At Bedtime   Quantity:  30 tablet   Refills:  1            Where to get your medicines      These medications were sent to [id number] - [place] RUBALCAVA - [address] AT [address] [place], [place] [address]     Phone:  [phone]     metoprolol succinate 25 MG 24 hr tablet    traZODone 50 MG tablet                Primary Care Provider Office Phone # Fax #    Jasmine Barrow MD  [phone]       [address] [facility] [address]        Equal Access to Services     MARY LEBLANC : Hadii aad ku haddarline Doherty, wajoshda luemiliano, qaelizabethta karahelda kenan, andrade mccann brittanaz ignacio laJayneroxanna aj. So [facility] [phone].    ATENCIÓN: Si habla español, tiene a thorne disposición servicios gratuitos de asistencia lingüística. Llame al [phone].    We comply with applicable federal civil rights laws and [place] laws. We do not discriminate on the basis of race, color, national origin, age, disability, sex, sexual orientation, or gender identity.            Thank you!     Thank you for choosing [facility]  for your care. Our goal is always to provide you with excellent care. Hearing back from our patients is one way we can continue to improve our services. Please take a few minutes to complete the written survey that you may receive in the mail after your visit with us. Thank you!             Your Updated Medication List - Protect others around you: Learn how to safely use, store and throw away your medicines at www.disposemymeds.org.          This list is accurate as of: 1/23/18 10:50 AM.  Always use your most recent med list.                   Brand Name Dispense Instructions for use Diagnosis    amphetamine-dextroamphetamine 10 MG per tablet    ADDERALL    30 tablet    Take 1 tablet (10 mg) by mouth daily . Patient needs to see primary provider for further refills.    Attention deficit hyperactivity disorder (ADHD), unspecified ADHD type       biotin 2.5 mg/mL Susp      Take 20 mg by mouth daily        buPROPion 150 MG 12 hr tablet    WELLBUTRIN SR    90 tablet    Take 1 tablet (150 mg) by mouth daily Appointment required prior to any additional refills    Mild episode of recurrent major depressive disorder (H)       IRON SUPPLEMENT PO      Take 1 tablet by mouth daily        metoprolol succinate 25 MG 24 hr tablet    TOPROL-XL    30 tablet    Take 1  tablet (25 mg) by mouth daily    Benign essential hypertension       order for DME     1 each    Please measure and distribute 1 pair of 20mmHg - 30mmHg THIGH high open  toe compression stockings. Jobst ultrasheer or equivalent.    Venous insufficiency       rOPINIRole 0.5 MG tablet    REQUIP    90 tablet    Take 1 tablet (0.5 mg) by mouth At Bedtime    Restless leg syndrome       SUMAtriptan 100 MG tablet    IMITREX    30 tablet    Take 1 tablet (100 mg) by mouth daily as needed for migraine    Migraine with aura and without status migrainosus, not intractable       teriflunomide 14 MG tablet    AUBAGIO    30 tablet    Take 1 tablet (14 mg) by mouth daily    Multiple sclerosis (H)       traZODone 50 MG tablet    DESYREL    30 tablet    Take 1 tablet (50 mg) by mouth At Bedtime    Primary insomnia       valACYclovir 500 MG tablet    VALTREX    90 tablet    TAKE 1 TABLET BY MOUTH DAILY    HSV (herpes simplex virus) infection       VITAMIN C PO      Take 500 mg by mouth daily        VITAMIN D (CHOLECALCIFEROL) PO      Take by mouth daily

## 2018-01-23 NOTE — PROGRESS NOTES
SUBJECTIVE:   Liyah Jernigan is a 53 year old female who presents to clinic today for the following health issues:    Hypertension Follow-up      Outpatient blood pressures are being checked at home.  Results are 145/95.    Low Salt Diet: low salt    Liyah was seen at the optometrist recently, and was told to follow up at primary care for hypertension concerns. Concern was raised about BP due to what was noted in the blood vessels in back of eyes. Patient is currently on a low sodium diet, and uses sodium substitutes. Has active lifestyle.        Problem list and histories reviewed & adjusted, as indicated.  Additional history: as documented    Reviewed and updated as needed this visit by clinical staffTobacco  Allergies  Meds  Med Hx  Surg Hx  Fam Hx  Soc Hx           Headaches have been occurring more often in the past month. She attributes this to stress, decrease in sleep, and not doing yoga as previously. She cares for her son who is handicapped, and has recently moved in a new home. She is constantly moving around, and waking early to help her son with injections. She is on Wellbutrin once daily in the morning since 2008. She was prescribed an SSRI with initial diagnoses of MS, but has weaned off this. She often experiences an increase in a heart beat, and chest palpitations. Denies tachycardia, but tachycardia is induced with Imitrex. She takes Adderall 5mg daily in the morning - this helps with focus and attention to detail in caring for her son. She does not drink caffeine.   Of note Melatonin induces an increase in RLS.     Menses  She was given Provera in September to terminate menses, and has not had a period since September. She took a blood test that did not reveal menopause, but she was also on contraceptives.  She has some symptoms of hot flashes lasting five seconds at night, but does not know if this is attributed to MS, hypertension, or menopause.     Depression Followup    Status since last  visit: Worsened     See PHQ-9 for current symptoms.  Other associated symptoms: none    Complicating factors:   Significant life event:  Moved, care for handicapped son.   Current substance abuse:  None  Anxiety or Panic symptoms:  No    PHQ-9 8/1/2017 8/1/2017 1/23/2018   Total Score - 4 14   Q9: Suicide Ideation Not at all Not at all Not at all     ROSELINE-7 SCORE 8/1/2017 8/1/2017 1/23/2018   Total Score 1 1 5     Multiple Sclerosis - care with Neurology.  No recent flares - Aubagio for treatment.      PHQ-9  English  PHQ-9   Any Language  Suicide Assessment Five-step Evaluation and Treatment (SAFE-T)      Reviewed and updated as needed this visit by Provider  Tobacco  Meds  Problems  Med Hx  Surg Hx  Fam Hx  Soc Hx        ROS:  Constitutional, HEENT, cardiovascular, pulmonary, GI, , musculoskeletal, neuro, skin, endocrine and psych systems are negative, except as otherwise noted.    This document serves as a record of the services and decisions personally performed and made by Yajaira Anand MD. It was created on her behalf by Bryan Ocasio, a trained medical scribe. The creation of this document is based on the provider's statements to the medical scribe.  Bryan Ocasio 10:17 AM January 23, 2018      OBJECTIVE:   /90 (BP Location: Right arm, Patient Position: Sitting, Cuff Size: Adult Regular)  Pulse 78  Temp 98.5  F (36.9  C) (Oral)  Resp 16  Wt 54.1 kg (119 lb 3.2 oz)  LMP 08/23/2017  SpO2 97%  Breastfeeding? No  BMI 18.67 kg/m2  Body mass index is 18.67 kg/(m^2).  GENERAL: healthy, alert and no distress  NECK: no adenopathy, no asymmetry, masses, or scars and thyroid normal to palpation  RESP: lungs clear to auscultation - no rales, rhonchi or wheezes  CV: regular rate and rhythm, normal S1 S2, no S3 or S4, no murmur, click or rub, no peripheral edema and peripheral pulses strong  ABDOMEN: soft, nontender, no hepatosplenomegaly, no masses and bowel sounds normal  MS: no gross  "musculoskeletal defects noted, no edema  PSYCH: mentation appears normal, affect flat, anxious, judgement and insight intact and appearance well groomed    Diagnostic Test Results:  No results found for this or any previous visit (from the past 24 hour(s)).  Results for orders placed or performed in visit on 01/23/18   Follicle stimulating hormone   Result Value Ref Range    FSH 68.3 IU/L   TSH with free T4 reflex   Result Value Ref Range    TSH 2.37 0.40 - 4.00 mU/L   Comprehensive metabolic panel (BMP + Alb, Alk Phos, ALT, AST, Total. Bili, TP)   Result Value Ref Range    Sodium 139 133 - 144 mmol/L    Potassium 4.2 3.4 - 5.3 mmol/L    Chloride 105 94 - 109 mmol/L    Carbon Dioxide 27 20 - 32 mmol/L    Anion Gap 7 3 - 14 mmol/L    Glucose 92 70 - 99 mg/dL    Urea Nitrogen 28 7 - 30 mg/dL    Creatinine 0.95 0.52 - 1.04 mg/dL    GFR Estimate 62 >60 mL/min/1.7m2    GFR Estimate If Black 75 >60 mL/min/1.7m2    Calcium 9.5 8.5 - 10.1 mg/dL    Bilirubin Total 0.3 0.2 - 1.3 mg/dL    Albumin 3.9 3.4 - 5.0 g/dL    Protein Total 7.3 6.8 - 8.8 g/dL    Alkaline Phosphatase 58 40 - 150 U/L    ALT 46 0 - 50 U/L    AST 32 0 - 45 U/L   Lutropin   Result Value Ref Range    Lutropin 38.9 IU/L       ASSESSMENT/PLAN:       BMI:   Estimated body mass index is 18.67 kg/(m^2) as calculated from the following:    Height as of 10/13/17: 1.702 m (5' 7\").    Weight as of this encounter: 54.1 kg (119 lb 3.2 oz).           1. Benign essential hypertension  Begin medication as noted.  Follow up BP check in 2-4 weeks.    - metoprolol succinate (TOPROL-XL) 25 MG 24 hr tablet; Take 1 tablet (25 mg) by mouth daily  Dispense: 30 tablet; Refill: 1    2. Primary insomnia  Contributing to mood changes - begin trazodone for sleep and mood symptoms.  If not improved consider use of SSRI  - traZODone (DESYREL) 50 MG tablet; Take 1 tablet (50 mg) by mouth At Bedtime  Dispense: 30 tablet; Refill: 1  - Comprehensive metabolic panel (BMP + Alb, Alk Phos, " ALT, AST, Total. Bili, TP)    3. Amenorrhea  Menopausal levels.    - Follicle stimulating hormone  - TSH with free T4 reflex  - Lutropin    4. Mild episode of recurrent major depressive disorder (H)  As noted above.  Follow up for recheck symptoms when sleeping well.     5. MS (multiple sclerosis) (H)  Care with neurology.  Continue current treatment.      Patient Instructions     Labs today. I will notify you of results when I receive them.     Continue healthy eating habits with low sodium diet.     Take Metoprolol Succinate 25mg daily for blood pressure.     Follow up in three weeks for blood pressure with the Medical Assistant.    Take Trazodone up to 50mg as needed for sleep aid.        The information in this document, created by the medical scribe for me, accurately reflects the services I personally performed and the decisions made by me. I have reviewed and approved this document for accuracy prior to leaving the patient care area.  January 23, 2018 11:54 AM    Yajaira Anand MD  [facility]

## 2018-01-23 NOTE — NURSING NOTE
"Chief Complaint   Patient presents with     RECHECK     blood pressure        Initial /90 (BP Location: Right arm, Patient Position: Sitting, Cuff Size: Adult Regular)  Pulse 78  Temp 98.5  F (36.9  C) (Oral)  Resp 16  Wt 54.1 kg (119 lb 3.2 oz)  LMP 08/23/2017  SpO2 97%  Breastfeeding? No  BMI 18.67 kg/m2 Estimated body mass index is 18.67 kg/(m^2) as calculated from the following:    Height as of 10/13/17: 1.702 m (5' 7\").    Weight as of this encounter: 54.1 kg (119 lb 3.2 oz).  Medication Reconciliation: complete     Nola Perez      "

## 2018-01-24 ASSESSMENT — ANXIETY QUESTIONNAIRES: GAD7 TOTAL SCORE: 5

## 2018-01-25 ENCOUNTER — MYC MEDICAL ADVICE (OUTPATIENT)
Dept: FAMILY MEDICINE | Facility: CLINIC | Age: 53
End: 2018-01-25

## 2018-01-25 DIAGNOSIS — F90.9 ATTENTION DEFICIT HYPERACTIVITY DISORDER (ADHD), UNSPECIFIED ADHD TYPE: Primary | ICD-10-CM

## 2018-01-25 NOTE — PROGRESS NOTES
Your hormone levels are now menopausal.  This explains the absence of periods and hot flashes.  Your thyroid testing is normal  Blood sugar, kidney and liver testing are all normal.  Please call or MyChart message me if you have any questions.    PSK

## 2018-02-08 ENCOUNTER — MYC MEDICAL ADVICE (OUTPATIENT)
Dept: NEUROLOGY | Facility: CLINIC | Age: 53
End: 2018-02-08

## 2018-02-08 RX ORDER — DEXTROAMPHETAMINE SACCHARATE, AMPHETAMINE ASPARTATE MONOHYDRATE, DEXTROAMPHETAMINE SULFATE AND AMPHETAMINE SULFATE 2.5; 2.5; 2.5; 2.5 MG/1; MG/1; MG/1; MG/1
10 CAPSULE, EXTENDED RELEASE ORAL DAILY
Qty: 30 CAPSULE | Refills: 0 | Status: SHIPPED | OUTPATIENT
Start: 2018-02-08 | End: 2018-03-07

## 2018-02-08 NOTE — TELEPHONE ENCOUNTER
Script available for , notified patient/family via [employer] - script to .  PSK

## 2018-02-08 NOTE — TELEPHONE ENCOUNTER
Patient is scheduled for an appointment with Dr. Hairston and can't make that; [employer] message sent to patient with other options.    Liyah Rosado, MS RN Care Coordinator

## 2018-02-13 ENCOUNTER — OFFICE VISIT (OUTPATIENT)
Dept: NEUROLOGY | Facility: CLINIC | Age: 53
End: 2018-02-13
Attending: PSYCHIATRY & NEUROLOGY
Payer: COMMERCIAL

## 2018-02-13 VITALS
WEIGHT: 119 LBS | SYSTOLIC BLOOD PRESSURE: 124 MMHG | BODY MASS INDEX: 18.68 KG/M2 | HEART RATE: 73 BPM | DIASTOLIC BLOOD PRESSURE: 85 MMHG | HEIGHT: 67 IN | OXYGEN SATURATION: 98 %

## 2018-02-13 DIAGNOSIS — G35 MULTIPLE SCLEROSIS (H): Primary | ICD-10-CM

## 2018-02-13 DIAGNOSIS — R20.2 PARESTHESIA: ICD-10-CM

## 2018-02-13 DIAGNOSIS — G35 MULTIPLE SCLEROSIS (H): ICD-10-CM

## 2018-02-13 LAB
ALBUMIN UR-MCNC: NEGATIVE MG/DL
APPEARANCE UR: CLEAR
BASOPHILS # BLD AUTO: 0.1 10E9/L (ref 0–0.2)
BASOPHILS NFR BLD AUTO: 0.9 %
BILIRUB UR QL STRIP: NEGATIVE
COLOR UR AUTO: YELLOW
DIFFERENTIAL METHOD BLD: NORMAL
EOSINOPHIL # BLD AUTO: 0.4 10E9/L (ref 0–0.7)
EOSINOPHIL NFR BLD AUTO: 6.6 %
ERYTHROCYTE [DISTWIDTH] IN BLOOD BY AUTOMATED COUNT: 11.6 % (ref 10–15)
GLUCOSE UR STRIP-MCNC: NEGATIVE MG/DL
HCT VFR BLD AUTO: 40.5 % (ref 35–47)
HGB BLD-MCNC: 13.3 G/DL (ref 11.7–15.7)
HGB UR QL STRIP: NEGATIVE
IMM GRANULOCYTES # BLD: 0 10E9/L (ref 0–0.4)
IMM GRANULOCYTES NFR BLD: 0.3 %
KETONES UR STRIP-MCNC: NEGATIVE MG/DL
LEUKOCYTE ESTERASE UR QL STRIP: NEGATIVE
LYMPHOCYTES # BLD AUTO: 2.3 10E9/L (ref 0.8–5.3)
LYMPHOCYTES NFR BLD AUTO: 34.5 %
MCH RBC QN AUTO: 31.8 PG (ref 26.5–33)
MCHC RBC AUTO-ENTMCNC: 32.8 G/DL (ref 31.5–36.5)
MCV RBC AUTO: 97 FL (ref 78–100)
MONOCYTES # BLD AUTO: 0.5 10E9/L (ref 0–1.3)
MONOCYTES NFR BLD AUTO: 8 %
MUCOUS THREADS #/AREA URNS LPF: PRESENT /LPF
NEUTROPHILS # BLD AUTO: 3.3 10E9/L (ref 1.6–8.3)
NEUTROPHILS NFR BLD AUTO: 49.7 %
NITRATE UR QL: NEGATIVE
NRBC # BLD AUTO: 0 10*3/UL
NRBC BLD AUTO-RTO: 0 /100
PH UR STRIP: 5 PH (ref 5–7)
PLATELET # BLD AUTO: 279 10E9/L (ref 150–450)
RBC # BLD AUTO: 4.18 10E12/L (ref 3.8–5.2)
RBC #/AREA URNS AUTO: <1 /HPF (ref 0–2)
SOURCE: ABNORMAL
SP GR UR STRIP: 1.02 (ref 1–1.03)
SQUAMOUS #/AREA URNS AUTO: <1 /HPF (ref 0–1)
UROBILINOGEN UR STRIP-MCNC: 0 MG/DL (ref 0–2)
WBC # BLD AUTO: 6.5 10E9/L (ref 4–11)
WBC #/AREA URNS AUTO: 4 /HPF (ref 0–2)

## 2018-02-13 PROCEDURE — 85025 COMPLETE CBC W/AUTO DIFF WBC: CPT

## 2018-02-13 PROCEDURE — G0463 HOSPITAL OUTPT CLINIC VISIT: HCPCS | Mod: ZF

## 2018-02-13 PROCEDURE — 81001 URINALYSIS AUTO W/SCOPE: CPT

## 2018-02-13 RX ORDER — GABAPENTIN 300 MG/1
300 CAPSULE ORAL
Qty: 30 CAPSULE | Refills: 3 | Status: SHIPPED | OUTPATIENT
Start: 2018-02-13 | End: 2018-05-31

## 2018-02-13 ASSESSMENT — PAIN SCALES - GENERAL: PAINLEVEL: MODERATE PAIN (4)

## 2018-02-13 NOTE — PROGRESS NOTES
"Neurology Clinic - MS      REASON FOR VISIT: MS follow up.       HISTORY OF PRESENT ILLNESS:   54 yo F with no new MS flares since last return. She refers that for the last 6 months she has episodes of painful tingling in her hands. The duration is seconds. It occurs multiple times during the day and night, including waking her up from sleep.  Those sensations are not associated with weakness nor numbness.     She also noticed pain in her bones, about the same time.     Her gait is not changed, nor her memory. She does have some fatigue, but she is still be able to take care of her son that needs constant attention.     HEr mood is good.     No changes in urinary control or bowel. No changes in her vision.     Review Of Systems    Eyes: negative  Ears/Nose/Throat: negative  Respiratory: No shortness of breath, dyspnea on exertion, cough, or hemoptysis  Cardiovascular: negative  Gastrointestinal: see HPI for bowel   Genitourinary: see HPI for urine. Refers cloudy urine also  Musculoskeletal:see HPI for pain   Neurologic:  see HPI for MS related complains  Psychiatric: see HPI for Mood         PHYSICAL EXAMINATION:   /85 (BP Location: Right arm, Patient Position: Sitting, Cuff Size: Adult Regular)  Pulse 73  Ht 1.702 m (5' 7\")  Wt 54 kg (119 lb)  SpO2 98%  BMI 18.64 kg/m2    Constitutional : petite woman seated in chair  Head: atraumatic, symmetric, no tongue lesions.   Eyes: anicteric  CVC: rhythmic   LUng:  On room air. No respiratory distress.   Adomen: non tender.   Extremities: No edema. Well perfused  Psychiatry: in good mood. Collaborative  Neuroexam  Alert and oriented to place, date, self and reasons in the doctor's office  Follow commands  Face symmetric with preserved mimic  Eyes: motility preserved, no nystagmus  Tongue centered  No tremors  No dysmetria (arms and legs tested)  Strength preserved on arms and legs , proximally and distally  Reflexes preserved  Sensory exam to light touch and " pinprick: preserved   No aphasia  No dysarthria  Gait preserved      IMPRESSION:  Relapsing remitting multiple sclerosis, clinically and radiology stable on Aubagio. Her last MRI was 8/2017. Her paroxysms of tingling is likely sec to paroxysmal phenomena in MS. Gabapentin is a good medication for that.  Her bone pain is not related to MS. It was discussed the possibility of bone Xrays but she would like to see if gabapentin will also help with that, otherwise she will enter in contact with her PCP.   - Gabapentin 300 mg at night, she can titrate up to BID  - ua and CBC    Patient seen and discussed with Dr. Marika jorgensen  PGY4 Neuro  3585     I saw and examined this patient, and have read and edited the resident's note.  I agree with the findings, assessment, and plan.  I spent 25 minutes with the patient, greater than 50% in counseling and coordination of care.  We discussed paroxysmal symptoms in MS, including paroxysmal sensory symptoms.  The typical treatment with those is anticonvulsants, most commonly carbamazepine, although I have been using levetiracetam most often recently.  After discussion, we decided on a trial of gabapentin.    Jose A Hairston MD

## 2018-02-13 NOTE — LETTER
"2/13/2018      RE: Liyah Jernigan  [address] [facility] [address]       Neurology Clinic - MS      REASON FOR VISIT: MS follow up.       HISTORY OF PRESENT ILLNESS:   54 yo F with no new MS flares since last return. She refers that for the last 6 months she has episodes of painful tingling in her hands. The duration is seconds. It occurs multiple times during the day and night, including waking her up from sleep.  Those sensations are not associated with weakness nor numbness.     She also noticed pain in her bones, about the same time.     Her gait is not changed, nor her memory. She does have some fatigue, but she is still be able to take care of her son that needs constant attention.     HEr mood is good.     No changes in urinary control or bowel. No changes in her vision.     Review Of Systems    Eyes: negative  Ears/Nose/Throat: negative  Respiratory: No shortness of breath, dyspnea on exertion, cough, or hemoptysis  Cardiovascular: negative  Gastrointestinal: see HPI for bowel   Genitourinary: see HPI for urine. Refers cloudy urine also  Musculoskeletal:see HPI for pain   Neurologic:  see HPI for MS related complains  Psychiatric: see HPI for Mood         PHYSICAL EXAMINATION:   /85 (BP Location: Right arm, Patient Position: Sitting, Cuff Size: Adult Regular)  Pulse 73  Ht 1.702 m (5' 7\")  Wt 54 kg (119 lb)  SpO2 98%  BMI 18.64 kg/m2    Constitutional : petite woman seated in chair  Head: atraumatic, symmetric, no tongue lesions.   Eyes: anicteric  CVC: rhythmic   LUng:  On room air. No respiratory distress.   Adomen: non tender.   Extremities: No edema. Well perfused  Psychiatry: in good mood. Collaborative  Neuroexam  Alert and oriented to place, date, self and reasons in the doctor's office  Follow commands  Face symmetric with preserved mimic  Eyes: motility preserved, no nystagmus  Tongue centered  No tremors  No dysmetria (arms and legs tested)  Strength preserved on arms and legs " , proximally and distally  Reflexes preserved  Sensory exam to light touch and pinprick: preserved   No aphasia  No dysarthria  Gait preserved      IMPRESSION:  Relapsing remitting multiple sclerosis, clinically and radiology stable on Aubagio. Her last MRI was 8/2017. Her paroxysms of tingling is likely sec to paroxysmal phenomena in MS. Gabapentin is a good medication for that.  Her bone pain is not related to MS. It was discussed the possibility of bone Xrays but she would like to see if gabapentin will also help with that, otherwise she will enter in contact with her PCP.   - Gabapentin 300 mg at night, she can titrate up to BID  - ua and CBC    Patient seen and discussed with Dr. Marika jorgensen  PGY4 Neuro  3585     I saw and examined this patient, and have read and edited the resident's note.  I agree with the findings, assessment, and plan.  I spent 25 minutes with the patient, greater than 50% in counseling and coordination of care.  We discussed paroxysmal symptoms in MS, including paroxysmal sensory symptoms.  The typical treatment with those is anticonvulsants, most commonly carbamazepine, although I have been using levetiracetam most often recently.  After discussion, we decided on a trial of gabapentin.      Jose A Hairston MD

## 2018-02-13 NOTE — MR AVS SNAPSHOT
"              After Visit Summary   2/13/2018    Liyah Jernigan    MRN: [id number]           Patient Information     Date Of Birth          1965        Visit Information        Provider Department      2/13/2018 3:00 PM Jose A Hairston MD [facility] Multiple Sclerosis        Today's Diagnoses     Multiple sclerosis (H)    -  1    Paresthesia           Follow-ups after your visit        Follow-up notes from your care team     Return in about 1 year (around 2/13/2019).      Who to contact     If you have questions or need follow up information about today's clinic visit or your schedule please contact [facility]CL[facility] directly at [phone].  Normal or non-critical lab and imaging results will be communicated to you by MyChart, letter or phone within 4 business days after the clinic has received the results. If you do not hear from us within 7 days, please contact the clinic through bTendohart or phone. If you have a critical or abnormal lab result, we will notify you by phone as soon as possible.  Submit refill requests through Joome or call your pharmacy and they will forward the refill request to us. Please allow 3 business days for your refill to be completed.          Additional Information About Your Visit        MyChart Information     Joome gives you secure access to your electronic health record. If you see a primary care provider, you can also send messages to your care team and make appointments. If you have questions, please call your primary care clinic.  If you do not have a primary care provider, please call [phone] and they will assist you.        Care EveryWhere ID     This is your Care EveryWhere ID. This could be used by other organizations to access your [facility] medical records  [id number]        Your Vitals Were     Pulse Height Pulse Oximetry BMI (Body Mass Index)          73 1.702 m (5' 7\") 98% 18.64 kg/m2         Blood Pressure from Last 3 Encounters:   02/13/18 " 124/85   01/23/18 (!) 136/94   10/13/17 (!) 136/99    Weight from Last 3 Encounters:   02/13/18 54 kg (119 lb)   01/23/18 54.1 kg (119 lb 3.2 oz)   10/13/17 52.2 kg (115 lb)                 Today's Medication Changes          These changes are accurate as of 2/13/18 11:59 PM.  If you have any questions, ask your nurse or doctor.               Start taking these medicines.        Dose/Directions    gabapentin 300 MG capsule   Commonly known as:  NEURONTIN   Used for:  Multiple sclerosis (H), Paresthesia   Started by:  Jose A Hairston MD        Dose:  300 mg   Take 1 capsule (300 mg) by mouth nightly as needed   Quantity:  30 capsule   Refills:  3         These medicines have changed or have updated prescriptions.        Dose/Directions    traZODone 50 MG tablet   Commonly known as:  DESYREL   This may have changed:    - when to take this  - reasons to take this   Used for:  Primary insomnia        Dose:  50 mg   Take 1 tablet (50 mg) by mouth At Bedtime   Quantity:  30 tablet   Refills:  1            Where to get your medicines      These medications were sent to [id number] - [place], [place] [facility] AT [address], [place] [address]     Phone:  [phone]     gabapentin 300 MG capsule                Primary Care Provider Office Phone # Fax #    Jasmine Tab Barrow [phone] [address] [facility] [address]        Equal Access to Services     JODY LEBLANC AH: Hadii aad ku hadasho Soomaali, waaxda luqadaha, qaybta kaalmada adeegyada, waxay reynaldo hayroxanna aj. So [facility] [phone].    ATENCIÓN: Si habla ucheañol, tiene a thorne disposición servicios gratuitos de asistencia lingüística. Llame al [phone].    We comply with applicable federal civil rights laws and [place] laws. We do not discriminate on the basis of race, color, national origin, age, disability, sex, sexual orientation, or gender  identity.            Thank you!     Thank you for choosing [facility]  for your care. Our goal is always to provide you with excellent care. Hearing back from our patients is one way we can continue to improve our services. Please take a few minutes to complete the written survey that you may receive in the mail after your visit with us. Thank you!             Your Updated Medication List - Protect others around you: Learn how to safely use, store and throw away your medicines at www.disposemymeds.org.          This list is accurate as of 2/13/18 11:59 PM.  Always use your most recent med list.                   Brand Name Dispense Instructions for use Diagnosis    amphetamine-dextroamphetamine 10 MG per 24 hr capsule    ADDERALL XR    30 capsule    Take 1 capsule (10 mg) by mouth daily    Attention deficit hyperactivity disorder (ADHD), unspecified ADHD type       biotin 2.5 mg/mL Susp      Take 20 mg by mouth daily        buPROPion 150 MG 12 hr tablet    WELLBUTRIN SR    90 tablet    Take 1 tablet (150 mg) by mouth daily Appointment required prior to any additional refills    Mild episode of recurrent major depressive disorder (H)       gabapentin 300 MG capsule    NEURONTIN    30 capsule    Take 1 capsule (300 mg) by mouth nightly as needed    Multiple sclerosis (H), Paresthesia       IRON SUPPLEMENT PO      Take 1 tablet by mouth daily        metoprolol succinate 25 MG 24 hr tablet    TOPROL-XL    30 tablet    Take 1 tablet (25 mg) by mouth daily    Benign essential hypertension       order for DME     1 each    Please measure and distribute 1 pair of 20mmHg - 30mmHg THIGH high open  toe compression stockings. Jobst ultrasheer or equivalent.    Venous insufficiency       SUMAtriptan 100 MG tablet    IMITREX    30 tablet    Take 1 tablet (100 mg) by mouth daily as needed for migraine    Migraine with aura and without status migrainosus, not intractable       teriflunomide 14 MG tablet    AUBAGIO     30 tablet    Take 1 tablet (14 mg) by mouth daily    Multiple sclerosis (H)       traZODone 50 MG tablet    DESYREL    30 tablet    Take 1 tablet (50 mg) by mouth At Bedtime    Primary insomnia       valACYclovir 500 MG tablet    VALTREX    90 tablet    TAKE 1 TABLET BY MOUTH DAILY    HSV (herpes simplex virus) infection       VITAMIN C PO      Take 500 mg by mouth daily        VITAMIN D (CHOLECALCIFEROL) PO      Take by mouth daily

## 2018-03-05 DIAGNOSIS — I10 BENIGN ESSENTIAL HYPERTENSION: ICD-10-CM

## 2018-03-05 DIAGNOSIS — F51.01 PRIMARY INSOMNIA: ICD-10-CM

## 2018-03-05 NOTE — TELEPHONE ENCOUNTER
"Requested Prescriptions   Pending Prescriptions Disp Refills     metoprolol succinate (TOPROL-XL) 25 MG 24 hr tablet [Pharmacy Med Name: METOPROLOL ER SUCCINATE 25MG TABS] 30 tablet 0     Sig: TAKE 1 TABLET BY MOUTH DAILY    Beta-Blockers Protocol Passed    3/5/2018 12:15 PM       Passed - Blood pressure under 140/90 in past 12 months    BP Readings from Last 3 Encounters:   02/13/18 124/85   01/23/18 (!) 136/94   10/13/17 (!) 136/99                Passed - Patient is age 6 or older       Passed - Recent (12 mo) or future (30 days) visit within the authorizing provider's specialty    Patient had office visit in the last year or has a visit in the next 30 days with authorizing provider.  See \"Patient Info\" tab in inbasket, or \"Choose Columns\" in Meds & Orders section of the refill encounter.             traZODone (DESYREL) 50 MG tablet [Pharmacy Med Name: TRAZODONE 50MG TABLETS] 30 tablet 0     Sig: TAKE 1 TABLET BY MOUTH DAILY    Serotonin Modulators Failed    3/5/2018 12:15 PM       Failed - No positive pregnancy test in past 12 months       Passed - Recent (12 mo) or future (30 days) visit within the authorizing provider's specialty    Patient had office visit in the last year or has a visit in the next 30 days with authorizing provider.  See \"Patient Info\" tab in inbasket, or \"Choose Columns\" in Meds & Orders section of the refill encounter.            Passed - Patient is age 18 or older       Passed - No active pregnancy on record        metoprolol succinate (TOPROL-XL) 25 MG 24 hr tablet  Last Written Prescription Date:  1/23/18  Last Fill Quantity: 30,  # refills: 1   Last office visit: 1/23/2018 with prescribing provider:  Dr. Barrow   Future Office Visit:      traZODone (DESYREL) 50 MG tablet  Last Written Prescription Date:  1/23/18  Last Fill Quantity: 30,  # refills: 1   Last office visit: 1/23/2018 with prescribing provider:  Dr. Barrow   Future Office Visit:      PHQ-9 SCORE 8/1/2017 1/23/2018   Total " Score 4 14     ROSELINE-7 SCORE 8/1/2017 8/1/2017 1/23/2018   Total Score 1 1 5

## 2018-03-07 ENCOUNTER — MYC MEDICAL ADVICE (OUTPATIENT)
Dept: FAMILY MEDICINE | Facility: CLINIC | Age: 53
End: 2018-03-07

## 2018-03-07 ENCOUNTER — MYC REFILL (OUTPATIENT)
Dept: FAMILY MEDICINE | Facility: CLINIC | Age: 53
End: 2018-03-07

## 2018-03-07 DIAGNOSIS — F90.9 ATTENTION DEFICIT HYPERACTIVITY DISORDER (ADHD), UNSPECIFIED ADHD TYPE: ICD-10-CM

## 2018-03-07 RX ORDER — TRAZODONE HYDROCHLORIDE 50 MG/1
50 TABLET, FILM COATED ORAL DAILY
Qty: 30 TABLET | Refills: 0 | Status: SHIPPED | OUTPATIENT
Start: 2018-03-07 | End: 2018-04-12

## 2018-03-07 RX ORDER — METOPROLOL SUCCINATE 25 MG/1
25 TABLET, EXTENDED RELEASE ORAL DAILY
Qty: 30 TABLET | Refills: 0 | Status: SHIPPED | OUTPATIENT
Start: 2018-03-07 | End: 2018-04-16

## 2018-03-07 RX ORDER — DEXTROAMPHETAMINE SACCHARATE, AMPHETAMINE ASPARTATE MONOHYDRATE, DEXTROAMPHETAMINE SULFATE AND AMPHETAMINE SULFATE 2.5; 2.5; 2.5; 2.5 MG/1; MG/1; MG/1; MG/1
10 CAPSULE, EXTENDED RELEASE ORAL DAILY
Qty: 30 CAPSULE | Refills: 0 | Status: SHIPPED | OUTPATIENT
Start: 2018-03-07 | End: 2018-04-16

## 2018-03-07 NOTE — TELEPHONE ENCOUNTER
Requested Prescriptions   Pending Prescriptions Disp Refills     amphetamine-dextroamphetamine (ADDERALL XR) 10 MG per 24 hr capsule 30 capsule 0     Sig: Take 1 capsule (10 mg) by mouth daily    There is no refill protocol information for this order        amphetamine-dextroamphetamine (ADDERALL XR) 10 MG per 24 hr capsule      Last Written Prescription Date:  2/8/18  Last Fill Quantity: 30,   # refills: 0  Last Office Visit: 1/23/18  Future Office visit:       Routing refill request to provider for review/approval because:  Drug not on the [facility], P or [facility] refill protocol or controlled substance

## 2018-03-07 NOTE — TELEPHONE ENCOUNTER
Message from MyChart:  Original authorizing provider: MD Liyah Stewart would like a refill of the following medications:  amphetamine-dextroamphetamine (ADDERALL XR) 10 MG per 24 hr capsule [Yajaira Anand MD]    Preferred pharmacy: [facility] [address] [place] [address] [address] [place] [address] AT [facility]    Comment:

## 2018-03-07 NOTE — TELEPHONE ENCOUNTER
Routing refill request to provider for review/approval because:  New medications. Both started 1/23/18. Pt needs follow up with provider regarding status.  RN unable to fill per protocol.    Emilie Dunlap RN  [facility]

## 2018-03-08 DIAGNOSIS — G43.109 MIGRAINE WITH AURA AND WITHOUT STATUS MIGRAINOSUS, NOT INTRACTABLE: ICD-10-CM

## 2018-03-09 RX ORDER — SUMATRIPTAN 100 MG/1
TABLET, FILM COATED ORAL
Qty: 30 TABLET | Refills: 0 | OUTPATIENT
Start: 2018-03-09

## 2018-03-19 ENCOUNTER — OFFICE VISIT (OUTPATIENT)
Dept: FAMILY MEDICINE | Facility: CLINIC | Age: 53
End: 2018-03-19
Payer: COMMERCIAL

## 2018-03-19 VITALS
OXYGEN SATURATION: 99 % | RESPIRATION RATE: 18 BRPM | HEART RATE: 73 BPM | TEMPERATURE: 98.4 F | BODY MASS INDEX: 18.41 KG/M2 | WEIGHT: 117.3 LBS | SYSTOLIC BLOOD PRESSURE: 110 MMHG | HEIGHT: 67 IN | DIASTOLIC BLOOD PRESSURE: 82 MMHG

## 2018-03-19 DIAGNOSIS — J02.9 ACUTE PHARYNGITIS, UNSPECIFIED ETIOLOGY: Primary | ICD-10-CM

## 2018-03-19 LAB
DEPRECATED S PYO AG THROAT QL EIA: NORMAL
SPECIMEN SOURCE: NORMAL

## 2018-03-19 PROCEDURE — 87081 CULTURE SCREEN ONLY: CPT | Performed by: FAMILY MEDICINE

## 2018-03-19 PROCEDURE — 87880 STREP A ASSAY W/OPTIC: CPT | Performed by: FAMILY MEDICINE

## 2018-03-19 PROCEDURE — 99213 OFFICE O/P EST LOW 20 MIN: CPT | Performed by: FAMILY MEDICINE

## 2018-03-19 RX ORDER — AMOXICILLIN 875 MG
875 TABLET ORAL 2 TIMES DAILY
Qty: 14 TABLET | Refills: 0 | Status: SHIPPED | OUTPATIENT
Start: 2018-03-19 | End: 2019-02-11

## 2018-03-19 ASSESSMENT — PAIN SCALES - GENERAL: PAINLEVEL: MODERATE PAIN (4)

## 2018-03-19 NOTE — PROGRESS NOTES
"  SUBJECTIVE:   Liyah Jernigan is a 53 year old female who presents to clinic today for the following health issues:      Acute Illness   Acute illness concerns: sore throat  Onset: One day    Fever: YES    Chills/Sweats: no    Headache (location?): YES    Sinus Pressure:no    Conjunctivitis:  YES: bilateral    Ear Pain: no    Rhinorrhea: no    Congestion: YES - throat    Sore Throat: YES     Cough: no    Wheeze: no    Decreased Appetite: no    Nausea: YES    Vomiting: no    Diarrhea:  no    Dysuria/Freq.: no    Fatigue/Achiness: YES    Sick/Strep Exposure: yes     Therapies Tried and outcome: throat coat tea, advil but none of that is lasting    SUBJECTIVE:  Here today with the above symptoms that started in the last day.  The patient feels no cold symptoms whatsoever, such as nasal congestion, sinus symptoms, cough.  It all seems in her throat.  Says she is worried about the development of strep.  Has not been around anybody specifically with strep, but has been around a lot of folks who have been sick with a variety of illnesses.    Review of systems otherwise negative.  Past medical, family, and social history reviewed and updated in chart.    OBJECTIVE:  /82 (BP Location: Right arm, Patient Position: Sitting, Cuff Size: Adult Regular)  Pulse 73  Temp 98.4  F (36.9  C) (Oral)  Resp 18  Ht 1.702 m (5' 7\")  Wt 53.2 kg (117 lb 4.8 oz)  SpO2 99%  BMI 18.37 kg/m2  Alert, pleasant, upbeat, and in no apparent discomfort.   voice throaty  Ears normal. Throat and pharynx normal. Neck supple. No adenopathy or masses in the neck or supraclavicular regions. Sinuses non tender.  S1 and S2 normal, no murmurs, clicks, gallops or rubs. Regular rate and rhythm. Chest is clear; no wheezes or rales. No edema or JVD.  The abdomen is soft without tenderness, guarding, mass, rebound or organomegaly. Bowel sounds are normal. No CVA tenderness or inguinal adenopathy noted.  I note only benign skin findings. No unusual rashes " or suspicious skin lesions noted. Nails appear normal.   Past labs reviewed with the patient.   QS negative     ASSESSMENT / PLAN:  (J02.9) Acute pharyngitis, unspecified etiology  (primary encounter diagnosis)  Comment: Initial strep negative but we will start her on a course of antibiotics awaiting the culture.  Can stop if culture negative and patient is feeling better  Plan: Strep, Rapid Screen, Beta strep group A         culture, amoxicillin (AMOXIL) 875 MG tablet            Follow up as needed   S. Tab Barrow MD    (Chart documentation completed in part with Dragon voice-recognition software.  Even though reviewed some grammatical, spelling, and word errors may remain.)

## 2018-03-19 NOTE — NURSING NOTE
"Chief Complaint   Patient presents with     Pharyngitis       Initial /82 (BP Location: Right arm, Patient Position: Sitting, Cuff Size: Adult Regular)  Pulse 73  Temp 98.4  F (36.9  C) (Oral)  Resp 18  Ht 1.702 m (5' 7\")  Wt 53.2 kg (117 lb 4.8 oz)  SpO2 99%  BMI 18.37 kg/m2 Estimated body mass index is 18.37 kg/(m^2) as calculated from the following:    Height as of this encounter: 1.702 m (5' 7\").    Weight as of this encounter: 53.2 kg (117 lb 4.8 oz).  Medication Reconciliation: zo Rodriguez        "

## 2018-03-19 NOTE — LETTER
My Depression Action Plan  Name: Liyah Jernigan   Date of Birth 1965  Date: 3/19/2018    My doctor: Jasmine Barrow   My clinic: [address] [address] [phone]          GREEN    ZONE   Good Control    What it looks like:     Things are going generally well. You have normal up s and down s. You may even feel depressed from time to time, but bad moods usually last less than a day.   What you need to do:  1. Continue to care for yourself (see self care plan)  2. Check your depression survival kit and update it as needed  3. Follow your physician s recommendations including any medication.  4. Do not stop taking medication unless you consult with your physician first.           YELLOW         ZONE Getting Worse    What it looks like:     Depression is starting to interfere with your life.     It may be hard to get out of bed; you may be starting to isolate yourself from others.    Symptoms of depression are starting to last most all day and this has happened for several days.     You may have suicidal thoughts but they are not constant.   What you need to do:     1. Call your care team, your response to treatment will improve if you keep your care team informed of your progress. Yellow periods are signs an adjustment may need to be made.     2. Continue your self-care, even if you have to fake it!    3. Talk to someone in your support network    4. Open up your depression survival kit           RED    ZONE Medical Alert - Get Help    What it looks like:     Depression is seriously interfering with your life.     You may experience these or other symptoms: You can t get out of bed most days, can t work or engage in other necessary activities, you have trouble taking care of basic hygiene, or basic responsibilities, thoughts of suicide or death that will not go away, self-injurious behavior.     What you need to do:  1. Call your care  team and request a same-day appointment. If they are not available (weekends or after hours) call your local crisis line, emergency room or 911.            Depression Self Care Plan / Survival Kit    Self-Care for Depression  Here s the deal. Your body and mind are really not as separate as most people think.  What you do and think affects how you feel and how you feel influences what you do and think. This means if you do things that people who feel good do, it will help you feel better.  Sometimes this is all it takes.  There is also a place for medication and therapy depending on how severe your depression is, so be sure to consult with your medical provider and/ or Behavioral Health Consultant if your symptoms are worsening or not improving.     In order to better manage my stress, I will:    Exercise  Get some form of exercise, every day. This will help reduce pain and release endorphins, the  feel good  chemicals in your brain. This is almost as good as taking antidepressants!  This is not the same as joining a gym and then never going! (they count on that by the way ) It can be as simple as just going for a walk or doing some gardening, anything that will get you moving.      Hygiene   Maintain good hygiene (Get out of bed in the morning, Make your bed, Brush your teeth, Take a shower, and Get dressed like you were going to work, even if you are unemployed).  If your clothes don't fit try to get ones that do.    Diet  I will strive to eat foods that are good for me, drink plenty of water, and avoid excessive sugar, caffeine, alcohol, and other mood-altering substances.  Some foods that are helpful in depression are: complex carbohydrates, B vitamins, flaxseed, fish or fish oil, fresh fruits and vegetables.    Psychotherapy  I agree to participate in Individual Therapy (if recommended).    Medication  If prescribed medications, I agree to take them.  Missing doses can result in serious side effects.  I  understand that drinking alcohol, or other illicit drug use, may cause potential side effects.  I will not stop my medication abruptly without first discussing it with my provider.    Staying Connected With Others  I will stay in touch with my friends, family members, and my primary care provider/team.    Use your imagination  Be creative.  We all have a creative side; it doesn t matter if it s oil painting, sand castles, or mud pies! This will also kick up the endorphins.    Witness Beauty  (AKA stop and smell the roses) Take a look outside, even in mid-winter. Notice colors, textures. Watch the squirrels and birds.     Service to others  Be of service to others.  There is always someone else in need.  By helping others we can  get out of ourselves  and remember the really important things.  This also provides opportunities for practicing all the other parts of the program.    Humor  Laugh and be silly!  Adjust your TV habits for less news and crime-drama and more comedy.    Control your stress  Try breathing deep, massage therapy, biofeedback, and meditation. Find time to relax each day.     My support system    Clinic Contact:  Phone number:    Contact 1:  Phone number:    Contact 2:  Phone number:    Jain/:  Phone number:    Therapist:  Phone number:    Local crisis center:    Phone number:    Other community support:  Phone number:

## 2018-03-19 NOTE — MR AVS SNAPSHOT
"              After Visit Summary   3/19/2018    Liyah Jernigan    MRN: [id number]           Patient Information     Date Of Birth          1965        Visit Information        Provider Department      3/19/2018 3:20 PM Jasmine Barrow MD [facility]        Today's Diagnoses     Acute pharyngitis, unspecified etiology    -  1       Follow-ups after your visit        Follow-up notes from your care team     Return if symptoms worsen or fail to improve.      Who to contact     If you have questions or need follow up information about today's clinic visit or your schedule please contact [facility] directly at [phone].  Normal or non-critical lab and imaging results will be communicated to you by MyChart, letter or phone within 4 business days after the clinic has received the results. If you do not hear from us within 7 days, please contact the clinic through [employer] or phone. If you have a critical or abnormal lab result, we will notify you by phone as soon as possible.  Submit refill requests through [employer] or call your pharmacy and they will forward the refill request to us. Please allow 3 business days for your refill to be completed.          Additional Information About Your Visit        MyChart Information     [employer] gives you secure access to your electronic health record. If you see a primary care provider, you can also send messages to your care team and make appointments. If you have questions, please call your primary care clinic.  If you do not have a primary care provider, please call [phone] and they will assist you.        Care EveryWhere ID     This is your Care EveryWhere ID. This could be used by other organizations to access your [facility] medical records  [id number]        Your Vitals Were     Pulse Temperature Respirations Height Pulse Oximetry BMI (Body Mass Index)    73 98.4  F (36.9  C) (Oral) 18 1.702 m (5' 7\") 99% 18.37 kg/m2       Blood " Pressure from Last 3 Encounters:   03/19/18 110/82   02/13/18 124/85   01/23/18 (!) 136/94    Weight from Last 3 Encounters:   03/19/18 53.2 kg (117 lb 4.8 oz)   02/13/18 54 kg (119 lb)   01/23/18 54.1 kg (119 lb 3.2 oz)              We Performed the Following     Beta strep group A culture     Strep, Rapid Screen          Today's Medication Changes          These changes are accurate as of 3/19/18  4:11 PM.  If you have any questions, ask your nurse or doctor.               Start taking these medicines.        Dose/Directions    amoxicillin 875 MG tablet   Commonly known as:  AMOXIL   Used for:  Acute pharyngitis, unspecified etiology   Started by:  Jasmine Barrow MD        Dose:  875 mg   Take 1 tablet (875 mg) by mouth 2 times daily for 7 days   Quantity:  14 tablet   Refills:  0            Where to get your medicines      These medications were sent to [facility] [id number] [place] [address] [address], [facility] [address]     Phone:  [phone]     amoxicillin 875 MG tablet                Primary Care Provider Office Phone # Fax #    Jasmine Barrow [phone] [address] [facility] [address]        Equal Access to Services     JODY LEBLANC : Hadii aad ku hadasho Soomaali, waaxda luqadaha, qaybta kaalmada adeegyada, andrade aj. So [facility] [phone].    ATENCIÓN: Si habla español, tiene a thorne disposición servicios gratuitos de asistencia lingüística. Cheryl al [phone].    We comply with applicable federal civil rights laws and [place] laws. We do not discriminate on the basis of race, color, national origin, age, disability, sex, sexual orientation, or gender identity.            Thank you!     Thank you for choosing [facility]  for your care. Our goal is always to provide you with excellent care. Hearing back from our patients is one way we can  continue to improve our services. Please take a few minutes to complete the written survey that you may receive in the mail after your visit with us. Thank you!             Your Updated Medication List - Protect others around you: Learn how to safely use, store and throw away your medicines at www.disposemymeds.org.          This list is accurate as of 3/19/18  4:11 PM.  Always use your most recent med list.                   Brand Name Dispense Instructions for use Diagnosis    amoxicillin 875 MG tablet    AMOXIL    14 tablet    Take 1 tablet (875 mg) by mouth 2 times daily for 7 days    Acute pharyngitis, unspecified etiology       amphetamine-dextroamphetamine 10 MG per 24 hr capsule    ADDERALL XR    30 capsule    Take 1 capsule (10 mg) by mouth daily Needs to be seen for any further refill.    Attention deficit hyperactivity disorder (ADHD), unspecified ADHD type       biotin 2.5 mg/mL Susp      Take 20 mg by mouth daily        buPROPion 150 MG 12 hr tablet    WELLBUTRIN SR    90 tablet    Take 1 tablet (150 mg) by mouth daily Appointment required prior to any additional refills    Mild episode of recurrent major depressive disorder (H)       gabapentin 300 MG capsule    NEURONTIN    30 capsule    Take 1 capsule (300 mg) by mouth nightly as needed    Multiple sclerosis (H), Paresthesia       IRON SUPPLEMENT PO      Take 1 tablet by mouth daily        metoprolol succinate 25 MG 24 hr tablet    TOPROL-XL    30 tablet    Take 1 tablet (25 mg) by mouth daily +++ appointment needed for additional refills+++    Benign essential hypertension       order for DME     1 each    Please measure and distribute 1 pair of 20mmHg - 30mmHg THIGH high open  toe compression stockings. Jobst ultrasheer or equivalent.    Venous insufficiency       SUMAtriptan 100 MG tablet    IMITREX    30 tablet    Take 1 tablet (100 mg) by mouth daily as needed for migraine    Migraine with aura and without status migrainosus, not intractable        teriflunomide 14 MG tablet    AUBAGIO    30 tablet    Take 1 tablet (14 mg) by mouth daily    Multiple sclerosis (H)       traZODone 50 MG tablet    DESYREL    30 tablet    Take 1 tablet (50 mg) by mouth daily +++ appointment needed for additional refills +++    Primary insomnia       valACYclovir 500 MG tablet    VALTREX    90 tablet    TAKE 1 TABLET BY MOUTH DAILY    HSV (herpes simplex virus) infection       VITAMIN C PO      Take 500 mg by mouth daily        VITAMIN D (CHOLECALCIFEROL) PO      Take by mouth daily

## 2018-03-20 LAB
BACTERIA SPEC CULT: NORMAL
SPECIMEN SOURCE: NORMAL

## 2018-03-20 NOTE — PROGRESS NOTES
Liyah,  The strep culture was negative as well.  So feel free to continue the antibiotics or stop them if you're feeling better.  TAMI Barrow M.D.

## 2018-03-29 DIAGNOSIS — F33.0 MILD EPISODE OF RECURRENT MAJOR DEPRESSIVE DISORDER (H): ICD-10-CM

## 2018-03-29 NOTE — TELEPHONE ENCOUNTER
Routing refill request to provider for review/approval because: Office visit for Bupropion states needs office visit for further refills of this drug        Candace Huggins RN

## 2018-03-29 NOTE — TELEPHONE ENCOUNTER
"Requested Prescriptions   Pending Prescriptions Disp Refills     buPROPion (WELLBUTRIN SR) 150 MG 12 hr tablet 90 tablet 1     Sig: Take 1 tablet (150 mg) by mouth daily Appointment required prior to any additional refills    SSRIs Protocol Failed    3/29/2018  8:10 AM       Failed - PHQ-9 score less than 5 in past 6 months    Please review last PHQ-9 score.          Failed - No positive pregnancy test in last 12 months       Passed - Medication is Bupropion    If the medication is Bupropion (Wellbutrin), and the patient is taking for smoking cessation; OK to refill.         Passed - Patient is age 18 or older       Passed - No active pregnancy on record       Passed - Recent (6 mo) or future (30 days) visit within the authorizing provider's specialty    Patient had office visit in the last 6 months or has a visit in the next 30 days with authorizing provider or within the authorizing provider's specialty.  See \"Patient Info\" tab in inbasket, or \"Choose Columns\" in Meds & Orders section of the refill encounter.            buPROPion (WELLBUTRIN SR) 150 MG 12 hr tablet  Last Written Prescription Date:  8/1/17  Last Fill Quantity: 90,  # refills: 1   Last office visit: 3/19/2018 with prescribing provider:  Dr. Barrow   Future Office Visit:      PHQ-9 SCORE 8/1/2017 1/23/2018   Total Score 4 14     ROSELINE-7 SCORE 8/1/2017 8/1/2017 1/23/2018   Total Score 1 1 5           "

## 2018-03-30 RX ORDER — BUPROPION HYDROCHLORIDE 150 MG/1
150 TABLET, EXTENDED RELEASE ORAL DAILY
Qty: 90 TABLET | Refills: 0 | Status: SHIPPED | OUTPATIENT
Start: 2018-03-30 | End: 2018-05-21

## 2018-04-03 DIAGNOSIS — A60.00 GENITAL HERPES SIMPLEX, UNSPECIFIED SITE: Primary | ICD-10-CM

## 2018-04-03 NOTE — TELEPHONE ENCOUNTER
"Requested Prescriptions   Pending Prescriptions Disp Refills     valACYclovir (VALTREX) 500 MG tablet [Pharmacy Med Name: VALACYCLOVIR 500MG TABLETS]  Last Written Prescription Date:  6/23/17  Last Fill Quantity: 90 tablet,  # refills: 1   Last office visit: 3/19/2018 with prescribing provider:  Dr. Barrow   Future Office Visit:   90 tablet 0     Sig: TAKE 1 TABLET BY MOUTH DAILY    Antivirals for Herpes Protocol Passed    4/3/2018  3:37 AM       Passed - Patient is age 12 or older       Passed - Recent (12 mo) or future (30 days) visit within the authorizing provider's specialty    Patient had office visit in the last 12 months or has a visit in the next 30 days with authorizing provider or within the authorizing provider's specialty.  See \"Patient Info\" tab in inbasket, or \"Choose Columns\" in Meds & Orders section of the refill encounter.           Passed - Normal serum creatinine on file in past 12 months    Recent Labs   Lab Test  01/23/18   1052   CR  0.95               "

## 2018-04-04 PROBLEM — A60.00 GENITAL HERPES SIMPLEX, UNSPECIFIED SITE: Status: ACTIVE | Noted: 2018-04-04

## 2018-04-04 RX ORDER — VALACYCLOVIR HYDROCHLORIDE 500 MG/1
TABLET, FILM COATED ORAL
Qty: 90 TABLET | Refills: 1 | Status: SHIPPED | OUTPATIENT
Start: 2018-04-04 | End: 2018-07-05

## 2018-04-04 NOTE — TELEPHONE ENCOUNTER
Routing refill request to provider for review/approval because:  A break in medication  Radha Hanson RN

## 2018-04-12 DIAGNOSIS — I10 BENIGN ESSENTIAL HYPERTENSION: ICD-10-CM

## 2018-04-12 DIAGNOSIS — F51.01 PRIMARY INSOMNIA: ICD-10-CM

## 2018-04-12 RX ORDER — METOPROLOL SUCCINATE 25 MG/1
TABLET, EXTENDED RELEASE ORAL
Qty: 30 TABLET | Refills: 0 | Status: CANCELLED | OUTPATIENT
Start: 2018-04-12

## 2018-04-12 NOTE — TELEPHONE ENCOUNTER
"Requested Prescriptions   Pending Prescriptions Disp Refills     metoprolol succinate (TOPROL-XL) 25 MG 24 hr tablet [Pharmacy Med Name: METOPROLOL ER SUCCINATE 25MG TABS] 30 tablet 0     Sig: TAKE 1 TABLET BY MOUTH DAILY    Beta-Blockers Protocol Passed    4/12/2018  5:13 AM       Passed - Blood pressure under 140/90 in past 12 months    BP Readings from Last 3 Encounters:   03/19/18 110/82   02/13/18 124/85   01/23/18 (!) 136/94                Passed - Patient is age 6 or older       Passed - Recent (12 mo) or future (30 days) visit within the authorizing provider's specialty    Patient had office visit in the last 12 months or has a visit in the next 30 days with authorizing provider or within the authorizing provider's specialty.  See \"Patient Info\" tab in inbasket, or \"Choose Columns\" in Meds & Orders section of the refill encounter.            traZODone (DESYREL) 100 MG tablet [Pharmacy Med Name: TRAZODONE 100MG TABLETS] 15 tablet 0     Sig: TAKE ONE-HALF TABLET BY MOUTH DAILY    Serotonin Modulators Failed    4/12/2018  5:13 AM       Failed - No positive pregnancy test in past 12 months       Passed - Recent (12 mo) or future (30 days) visit within the authorizing provider's specialty    Patient had office visit in the last 12 months or has a visit in the next 30 days with authorizing provider or within the authorizing provider's specialty.  See \"Patient Info\" tab in inbasket, or \"Choose Columns\" in Meds & Orders section of the refill encounter.           Passed - Patient is age 18 or older       Passed - No active pregnancy on record        metoprolol succinate (TOPROL-XL) 25 MG 24 hr tablet  Last Written Prescription Date:  3/7/18  Last Fill Quantity: 30,  # refills: 0   Last office visit: 3/19/2018 with prescribing provider:  Dr. Anand   Future Office Visit:      traZODone (DESYREL) 50 MG tablet  Last Written Prescription Date:  3/7/18  Last Fill Quantity: 30,  # refills: 0   Last office visit: 3/19/2018 " with prescribing provider:  Dr. Anand   Future Office Visit:

## 2018-04-15 ENCOUNTER — MYC MEDICAL ADVICE (OUTPATIENT)
Dept: FAMILY MEDICINE | Facility: CLINIC | Age: 53
End: 2018-04-15

## 2018-04-15 DIAGNOSIS — F90.9 ATTENTION DEFICIT HYPERACTIVITY DISORDER (ADHD), UNSPECIFIED ADHD TYPE: ICD-10-CM

## 2018-04-15 DIAGNOSIS — I10 BENIGN ESSENTIAL HYPERTENSION: ICD-10-CM

## 2018-04-16 RX ORDER — DEXTROAMPHETAMINE SACCHARATE, AMPHETAMINE ASPARTATE MONOHYDRATE, DEXTROAMPHETAMINE SULFATE AND AMPHETAMINE SULFATE 2.5; 2.5; 2.5; 2.5 MG/1; MG/1; MG/1; MG/1
10 CAPSULE, EXTENDED RELEASE ORAL DAILY
Qty: 30 CAPSULE | Refills: 0 | Status: SHIPPED | OUTPATIENT
Start: 2018-04-16 | End: 2018-05-21

## 2018-04-16 RX ORDER — METOPROLOL SUCCINATE 25 MG/1
25 TABLET, EXTENDED RELEASE ORAL DAILY
Qty: 30 TABLET | Refills: 0 | Status: SHIPPED | OUTPATIENT
Start: 2018-04-16 | End: 2018-05-21

## 2018-04-16 NOTE — TELEPHONE ENCOUNTER
Provider to review if additional appointment is needed. The patient was given a 30 day on both medications on 3/7/18 noting to be seen. The patient was seen in clinic on 3/19/18 for a non associated reason.     Will provider fill medications.    Racquel Kong RN, [facility] Triage

## 2018-04-16 NOTE — TELEPHONE ENCOUNTER
Provider to review if additional appointment is needed. The patient was given a 30 day on both medications on 3/7/18 noting to be seen. The patient was seen in clinic on 3/19/18 for a non associated reason.     Will provider fill medications.    Racquel Kong RN, [facility] Triage

## 2018-04-16 NOTE — TELEPHONE ENCOUNTER
Monik messaged patient letting her know her RX for Adderall XR is ready for  at the [facility] desk.    Venita Cotto

## 2018-04-17 RX ORDER — TRAZODONE HYDROCHLORIDE 100 MG/1
TABLET ORAL
Qty: 15 TABLET | Refills: 0 | Status: SHIPPED | OUTPATIENT
Start: 2018-04-17 | End: 2018-05-26

## 2018-05-21 ENCOUNTER — OFFICE VISIT (OUTPATIENT)
Dept: FAMILY MEDICINE | Facility: CLINIC | Age: 53
End: 2018-05-21
Payer: COMMERCIAL

## 2018-05-21 VITALS
TEMPERATURE: 98 F | HEIGHT: 66 IN | BODY MASS INDEX: 18.48 KG/M2 | SYSTOLIC BLOOD PRESSURE: 120 MMHG | OXYGEN SATURATION: 99 % | WEIGHT: 115 LBS | HEART RATE: 55 BPM | DIASTOLIC BLOOD PRESSURE: 86 MMHG

## 2018-05-21 DIAGNOSIS — I10 BENIGN ESSENTIAL HYPERTENSION: ICD-10-CM

## 2018-05-21 DIAGNOSIS — F33.0 MILD EPISODE OF RECURRENT MAJOR DEPRESSIVE DISORDER (H): ICD-10-CM

## 2018-05-21 DIAGNOSIS — Z12.11 COLON CANCER SCREENING: ICD-10-CM

## 2018-05-21 DIAGNOSIS — A60.00 GENITAL HERPES SIMPLEX, UNSPECIFIED SITE: ICD-10-CM

## 2018-05-21 DIAGNOSIS — N95.1 PERIMENOPAUSAL: ICD-10-CM

## 2018-05-21 DIAGNOSIS — F90.9 ATTENTION DEFICIT HYPERACTIVITY DISORDER (ADHD), UNSPECIFIED ADHD TYPE: Primary | ICD-10-CM

## 2018-05-21 DIAGNOSIS — F51.01 PRIMARY INSOMNIA: ICD-10-CM

## 2018-05-21 DIAGNOSIS — G35 MS (MULTIPLE SCLEROSIS) (H): ICD-10-CM

## 2018-05-21 PROCEDURE — 99214 OFFICE O/P EST MOD 30 MIN: CPT | Performed by: FAMILY MEDICINE

## 2018-05-21 RX ORDER — BUPROPION HYDROCHLORIDE 150 MG/1
150 TABLET, EXTENDED RELEASE ORAL DAILY
Qty: 90 TABLET | Refills: 1 | Status: SHIPPED | OUTPATIENT
Start: 2018-05-21 | End: 2019-02-05

## 2018-05-21 RX ORDER — METOPROLOL SUCCINATE 25 MG/1
25 TABLET, EXTENDED RELEASE ORAL DAILY
Qty: 90 TABLET | Refills: 1 | Status: SHIPPED | OUTPATIENT
Start: 2018-05-21 | End: 2018-11-14

## 2018-05-21 RX ORDER — DEXTROAMPHETAMINE SACCHARATE, AMPHETAMINE ASPARTATE MONOHYDRATE, DEXTROAMPHETAMINE SULFATE AND AMPHETAMINE SULFATE 2.5; 2.5; 2.5; 2.5 MG/1; MG/1; MG/1; MG/1
10 CAPSULE, EXTENDED RELEASE ORAL DAILY
Qty: 30 CAPSULE | Refills: 0 | Status: SHIPPED | OUTPATIENT
Start: 2018-05-21 | End: 2018-06-28

## 2018-05-21 ASSESSMENT — ANXIETY QUESTIONNAIRES
3. WORRYING TOO MUCH ABOUT DIFFERENT THINGS: NOT AT ALL
2. NOT BEING ABLE TO STOP OR CONTROL WORRYING: NOT AT ALL
GAD7 TOTAL SCORE: 0
6. BECOMING EASILY ANNOYED OR IRRITABLE: NOT AT ALL
1. FEELING NERVOUS, ANXIOUS, OR ON EDGE: NOT AT ALL
7. FEELING AFRAID AS IF SOMETHING AWFUL MIGHT HAPPEN: NOT AT ALL
5. BEING SO RESTLESS THAT IT IS HARD TO SIT STILL: NOT AT ALL
IF YOU CHECKED OFF ANY PROBLEMS ON THIS QUESTIONNAIRE, HOW DIFFICULT HAVE THESE PROBLEMS MADE IT FOR YOU TO DO YOUR WORK, TAKE CARE OF THINGS AT HOME, OR GET ALONG WITH OTHER PEOPLE: NOT DIFFICULT AT ALL

## 2018-05-21 ASSESSMENT — PAIN SCALES - GENERAL: PAINLEVEL: NO PAIN (0)

## 2018-05-21 ASSESSMENT — PATIENT HEALTH QUESTIONNAIRE - PHQ9: 5. POOR APPETITE OR OVEREATING: NOT AT ALL

## 2018-05-21 NOTE — PROGRESS NOTES
SUBJECTIVE:   Liyah Jernigan is a 53 year old female who presents to clinic today for the following health issues:      Depression Followup    Status since last visit: Stable     See PHQ-9 for current symptoms.  Other associated symptoms: None    Complicating factors:   Significant life event:  No   Current substance abuse:  None  Anxiety or Panic symptoms:  No    PHQ-9 8/1/2017 1/23/2018 5/21/2018   Total Score 4 14 0   Q9: Suicide Ideation Not at all Not at all Not at all     ROSELINE-7 SCORE 8/1/2017 1/23/2018 5/21/2018   Total Score 1 5 0         PHQ-9  English  PHQ-9   Any Language  Suicide Assessment Five-step Evaluation and Treatment (SAFE-T)    Amount of exercise or physical activity: 6-7 days/week for an average of 15-30 minutes    Problems taking medications regularly: No    Medication side effects: none    Diet: regular (no restrictions)      Medication Followup of Adderall    Taking Medication as prescribed: yes    Side Effects:  None    Medication Helping Symptoms:  Yes    No concerns with medication.       Irregular menses - with prolonged menses.  No menses from Aug 2017 - recurrent menses last weekend - regular flow with 7 days of bleeding. Hot flashes in past but have resolved recently.     Herpes - was on suppressive treatment daily but has stopped that.  Has valtrex on hand for use if outbreak occurs.  Wondering dosing for outbreak.  Recommendations given.      MS - care with neuro - symptoms stable on current treatment.    Problem list and histories reviewed & adjusted, as indicated.      Additional history: as documented    BP Readings from Last 3 Encounters:   05/21/18 120/86   03/19/18 110/82   02/13/18 124/85    Wt Readings from Last 3 Encounters:   05/21/18 52.2 kg (115 lb)   03/19/18 53.2 kg (117 lb 4.8 oz)   02/13/18 54 kg (119 lb)                    Reviewed and updated as needed this visit by clinical staff  Tobacco  Allergies  Meds       Reviewed and updated as needed this visit by  "Provider  Tobacco  Allergies  Meds  Med Hx  Surg Hx  Fam Hx  Soc Hx        ROS:  Constitutional, HEENT, cardiovascular, pulmonary, gi and gu systems are negative, except as otherwise noted.    OBJECTIVE:     /86 (BP Location: Right arm, Patient Position: Chair, Cuff Size: Adult Regular)  Pulse 55  Temp 98  F (36.7  C) (Oral)  Ht 1.67 m (5' 5.75\")  Wt 52.2 kg (115 lb)  SpO2 99%  Breastfeeding? No  BMI 18.7 kg/m2  Body mass index is 18.7 kg/(m^2).  GENERAL: healthy, alert and no distress  HENT: ear canals and TM's normal, nose and mouth without ulcers or lesions  NECK: no adenopathy, no asymmetry, masses, or scars and thyroid normal to palpation  RESP: lungs clear to auscultation - no rales, rhonchi or wheezes  CV: regular rate and rhythm, normal S1 S2, no S3 or S4, no murmur, click or rub, no peripheral edema and peripheral pulses strong  MS: no gross musculoskeletal defects noted, no edema  NEURO: Normal strength and tone, mentation intact and speech normal  BACK: no CVA tenderness, no paralumbar tenderness  PSYCH: mentation appears normal, affect normal/bright    Diagnostic Test Results:  none     ASSESSMENT/PLAN:     Hypertension; controlled   Associated with the following complications:    None   Plan:  No changes in the patient's current treatment plan    - metoprolol succinate (TOPROL-XL) 25 MG 24 hr tablet; Take 1 tablet (25 mg) by mouth daily  Dispense: 90 tablet; Refill: 1      Depression; recurrent episode-- Full remission   Associated with the following complications:    None   Plan:  No changes in the patient's current treatment plan  wellbutrin daily.      1. Attention deficit hyperactivity disorder (ADHD), unspecified ADHD type  Call for refills monthly.  Follow up in 6 months.  - amphetamine-dextroamphetamine (ADDERALL XR) 10 MG per 24 hr capsule; Take 1 capsule (10 mg) by mouth daily  Dispense: 30 capsule; Refill: 0    4. Colon cancer screening  Declines colonoscopy -yearly FIT " planned.  - Fecal colorectal cancer screen (FIT); Future    5. Genital herpes simplex, unspecified site  Instructions for treatment for outbreaks given.  Discussed in recurrent outbreaks occur - preventative dosing recommended.    6. MS (multiple sclerosis) (H)  Follow up with neuro as previous    7. Perimenopausal  Monitor bleeding.  Follow up if heavy flow or frequent bleeding.      Patient Instructions   For the valtrex - if outbreak occurs take 500 mg twice a day for 3 days.    Refill Adderall XR    Refill wellbutrin and metoprolol XL      Follow up if prolonged menstrual flow occurs or significant pain occurs with menses          Yajaira Anand MD  [facility]

## 2018-05-21 NOTE — PATIENT INSTRUCTIONS
For the valtrex - if outbreak occurs take 500 mg twice a day for 3 days.    Refill Adderall XR    Refill wellbutrin and metoprolol XL      Follow up if prolonged menstrual flow occurs or significant pain occurs with menses

## 2018-05-21 NOTE — MR AVS SNAPSHOT
After Visit Summary   5/21/2018    Liyah Jernigan    MRN: [id number]           Patient Information     Date Of Birth          1965        Visit Information        Provider Department      5/21/2018 3:40 PM Yajaira Anand MD [facility]        Today's Diagnoses     Attention deficit hyperactivity disorder (ADHD), unspecified ADHD type    -  1    Mild episode of recurrent major depressive disorder (H)        Benign essential hypertension        Colon cancer screening          Care Instructions    For the valtrex - if outbreak occurs take 500 mg twice a day for 3 days.    Refill Adderall XR    Refill wellbutrin and metoprolol XL      Follow up if prolonged menstrual flow occurs or significant pain occurs with menses              Follow-ups after your visit        Future tests that were ordered for you today     Open Future Orders        Priority Expected Expires Ordered    Fecal colorectal cancer screen (FIT) Routine 6/11/2018 8/13/2018 5/21/2018            Who to contact     If you have questions or need follow up information about today's clinic visit or your schedule please contact [facility] directly at [phone].  Normal or non-critical lab and imaging results will be communicated to you by HST[employer]hart, letter or phone within 4 business days after the clinic has received the results. If you do not hear from us within 7 days, please contact the clinic through HSTYLE[employer] or phone. If you have a critical or abnormal lab result, we will notify you by phone as soon as possible.  Submit refill requests through [employer] or call your pharmacy and they will forward the refill request to us. Please allow 3 business days for your refill to be completed.          Additional Information About Your Visit        MyChart Information     [employer] gives you secure access to your electronic health record. If you see a primary care provider, you can also send messages to your care team and  "make appointments. If you have questions, please call your primary care clinic.  If you do not have a primary care provider, please call [phone] and they will assist you.        Care EveryWhere ID     This is your Care EveryWhere ID. This could be used by other organizations to access your [facility] medical records  [id number]        Your Vitals Were     Pulse Temperature Height Pulse Oximetry Breastfeeding? BMI (Body Mass Index)    55 98  F (36.7  C) (Oral) 1.67 m (5' 5.75\") 99% No 18.7 kg/m2       Blood Pressure from Last 3 Encounters:   05/21/18 120/86   03/19/18 110/82   02/13/18 124/85    Weight from Last 3 Encounters:   05/21/18 52.2 kg (115 lb)   03/19/18 53.2 kg (117 lb 4.8 oz)   02/13/18 54 kg (119 lb)                 Today's Medication Changes          These changes are accurate as of 5/21/18  4:29 PM.  If you have any questions, ask your nurse or doctor.               These medicines have changed or have updated prescriptions.        Dose/Directions    amphetamine-dextroamphetamine 10 MG per 24 hr capsule   Commonly known as:  ADDERALL XR   This may have changed:  additional instructions   Used for:  Attention deficit hyperactivity disorder (ADHD), unspecified ADHD type   Changed by:  Yajaira Anand MD        Dose:  10 mg   Take 1 capsule (10 mg) by mouth daily   Quantity:  30 capsule   Refills:  0       buPROPion 150 MG 12 hr tablet   Commonly known as:  WELLBUTRIN SR   This may have changed:  additional instructions   Used for:  Mild episode of recurrent major depressive disorder (H)   Changed by:  Yajaira Anand MD        Dose:  150 mg   Take 1 tablet (150 mg) by mouth daily   Quantity:  90 tablet   Refills:  1       metoprolol succinate 25 MG 24 hr tablet   Commonly known as:  TOPROL-XL   This may have changed:  additional instructions   Used for:  Benign essential hypertension   Changed by:  Yajaira Anand MD        Dose:  25 mg   Take 1 tablet (25 mg) by mouth daily   Quantity:  90 " tablet   Refills:  1            Where to get your medicines      These medications were sent to [id number] - [place] - [facility] AT [address] [place] [address]     Phone:  [phone]     buPROPion 150 MG 12 hr tablet    metoprolol succinate 25 MG 24 hr tablet         Some of these will need a paper prescription and others can be bought over the counter.  Ask your nurse if you have questions.     Bring a paper prescription for each of these medications     amphetamine-dextroamphetamine 10 MG per 24 hr capsule                Primary Care Provider Office Phone # Fax #    Jasmine Tab Barrow [phone] [address] [facility] [address]        Equal Access to Services     MARY LEBLANC : Hadii ema vasquez hadasho Soomaali, waaxda luqadaha, qaybta kaalmada adeegyada, andrade katzin hayroxanna duncan . So [facility] [phone].    ATENCIÓN: Si habla español, tiene a thorne disposición servicios gratuitos de asistencia lingüística. [facility] [phone].    We comply with applicable federal civil rights laws and [place] laws. We do not discriminate on the basis of race, color, national origin, age, disability, sex, sexual orientation, or gender identity.            Thank you!     Thank you for choosing [facility]  for your care. Our goal is always to provide you with excellent care. Hearing back from our patients is one way we can continue to improve our services. Please take a few minutes to complete the written survey that you may receive in the mail after your visit with us. Thank you!             Your Updated Medication List - Protect others around you: Learn how to safely use, store and throw away your medicines at www.disposemymeds.org.          This list is accurate as of 5/21/18  4:29 PM.  Always use your most recent med list.                   Brand Name Dispense Instructions for use Diagnosis     amphetamine-dextroamphetamine 10 MG per 24 hr capsule    ADDERALL XR    30 capsule    Take 1 capsule (10 mg) by mouth daily    Attention deficit hyperactivity disorder (ADHD), unspecified ADHD type       biotin 2.5 mg/mL Susp      Take 20 mg by mouth daily        buPROPion 150 MG 12 hr tablet    WELLBUTRIN SR    90 tablet    Take 1 tablet (150 mg) by mouth daily    Mild episode of recurrent major depressive disorder (H)       gabapentin 300 MG capsule    NEURONTIN    30 capsule    Take 1 capsule (300 mg) by mouth nightly as needed    Multiple sclerosis (H), Paresthesia       IRON SUPPLEMENT PO      Take 1 tablet by mouth daily        metoprolol succinate 25 MG 24 hr tablet    TOPROL-XL    90 tablet    Take 1 tablet (25 mg) by mouth daily    Benign essential hypertension       order for DME     1 each    Please measure and distribute 1 pair of 20mmHg - 30mmHg THIGH high open  toe compression stockings. Jobst ultrasheer or equivalent.    Venous insufficiency       SUMAtriptan 100 MG tablet    IMITREX    30 tablet    Take 1 tablet (100 mg) by mouth daily as needed for migraine    Migraine with aura and without status migrainosus, not intractable       teriflunomide 14 MG tablet    AUBAGIO    30 tablet    Take 1 tablet (14 mg) by mouth daily    Multiple sclerosis (H)       traZODone 100 MG tablet    DESYREL    15 tablet    TAKE ONE-HALF TABLET BY MOUTH DAILY    Primary insomnia       valACYclovir 500 MG tablet    VALTREX    90 tablet    TAKE 1 TABLET BY MOUTH DAILY    Genital herpes simplex, unspecified site       VITAMIN C PO      Take 500 mg by mouth daily        VITAMIN D (CHOLECALCIFEROL) PO      Take by mouth daily

## 2018-05-22 ASSESSMENT — PATIENT HEALTH QUESTIONNAIRE - PHQ9: SUM OF ALL RESPONSES TO PHQ QUESTIONS 1-9: 0

## 2018-05-22 ASSESSMENT — ANXIETY QUESTIONNAIRES: GAD7 TOTAL SCORE: 0

## 2018-05-26 RX ORDER — TRAZODONE HYDROCHLORIDE 100 MG/1
TABLET ORAL
Qty: 45 TABLET | Refills: 1 | Status: SHIPPED | OUTPATIENT
Start: 2018-05-26 | End: 2018-11-14

## 2018-05-29 PROBLEM — I10 BENIGN ESSENTIAL HYPERTENSION: Status: ACTIVE | Noted: 2018-05-29

## 2018-05-31 DIAGNOSIS — R20.2 PARESTHESIA: ICD-10-CM

## 2018-05-31 DIAGNOSIS — G35 MULTIPLE SCLEROSIS (H): ICD-10-CM

## 2018-06-01 RX ORDER — GABAPENTIN 300 MG/1
CAPSULE ORAL
Qty: 30 CAPSULE | Refills: 11 | Status: SHIPPED | OUTPATIENT
Start: 2018-06-01 | End: 2019-06-09

## 2018-06-01 NOTE — TELEPHONE ENCOUNTER
Received refill request for Gabapentin from [facility]; Patient was last seen in February and has no follow up appointment with Dr. Hairston at this time; Refilled for 1 year per MS refill protocol.    Liyah Rosado MS RN Care Coordinator

## 2018-06-22 DIAGNOSIS — F33.0 MILD EPISODE OF RECURRENT MAJOR DEPRESSIVE DISORDER (H): ICD-10-CM

## 2018-06-22 RX ORDER — BUPROPION HYDROCHLORIDE 150 MG/1
150 TABLET, EXTENDED RELEASE ORAL DAILY
Qty: 90 TABLET | Refills: 1 | Status: CANCELLED | OUTPATIENT
Start: 2018-06-22

## 2018-06-22 NOTE — TELEPHONE ENCOUNTER
"Requested Prescriptions   Pending Prescriptions Disp Refills     buPROPion (WELLBUTRIN SR) 150 MG 12 hr tablet  Last Written Prescription Date:  5/21/18  Last Fill Quantity: 90 tablet,  # refills: 1   Last office visit: 5/21/2018 with prescribing provider:   Dr. Anand  Future Office Visit:   90 tablet 1     Sig: Take 1 tablet (150 mg) by mouth daily    SSRIs Protocol Passed    6/22/2018  9:59 AM       Passed - PHQ-9 score less than 5 in past 6 months    Please review last PHQ-9 score.   PHQ-9 SCORE 8/1/2017 1/23/2018 5/21/2018   Total Score 4 14 0     ROSELINE-7 SCORE 8/1/2017 1/23/2018 5/21/2018   Total Score 1 5 0          Passed - Medication is Bupropion    If the medication is Bupropion (Wellbutrin), and the patient is taking for smoking cessation; OK to refill.         Passed - Patient is age 18 or older       Passed - No active pregnancy on record       Passed - No positive pregnancy test in last 12 months       Passed - Recent (6 mo) or future (30 days) visit within the authorizing provider's specialty    Patient had office visit in the last 6 months or has a visit in the next 30 days with authorizing provider or within the authorizing provider's specialty.  See \"Patient Info\" tab in inbasket, or \"Choose Columns\" in Meds & Orders section of the refill encounter.              "

## 2018-06-28 ENCOUNTER — MYC REFILL (OUTPATIENT)
Dept: FAMILY MEDICINE | Facility: CLINIC | Age: 53
End: 2018-06-28

## 2018-06-28 DIAGNOSIS — F90.9 ATTENTION DEFICIT HYPERACTIVITY DISORDER (ADHD), UNSPECIFIED ADHD TYPE: ICD-10-CM

## 2018-06-28 RX ORDER — DEXTROAMPHETAMINE SACCHARATE, AMPHETAMINE ASPARTATE MONOHYDRATE, DEXTROAMPHETAMINE SULFATE AND AMPHETAMINE SULFATE 2.5; 2.5; 2.5; 2.5 MG/1; MG/1; MG/1; MG/1
10 CAPSULE, EXTENDED RELEASE ORAL DAILY
Qty: 30 CAPSULE | Refills: 0 | Status: SHIPPED | OUTPATIENT
Start: 2018-06-28 | End: 2018-09-05

## 2018-06-28 NOTE — TELEPHONE ENCOUNTER
Requested Prescriptions   Pending Prescriptions Disp Refills     amphetamine-dextroamphetamine (ADDERALL XR) 10 MG per 24 hr capsule      Last Written Prescription Date:  5/21/18  Last Fill Quantity: 30 capsule,   # refills: 0  Last Office Visit:5/21/18 Dr. Anand  Future Office visit:       Routing refill request to provider for review/approval because:  Drug not on the G, P or [facility] refill protocol or controlled substance 30 capsule 0     Sig: Take 1 capsule (10 mg) by mouth daily    There is no refill protocol information for this order

## 2018-06-28 NOTE — TELEPHONE ENCOUNTER
Message from MyChart:  Original authorizing provider: MD Liyah Stewart would like a refill of the following medications:  amphetamine-dextroamphetamine (ADDERALL XR) 10 MG per 24 hr capsule [Yajaira Anand MD]    Preferred pharmacy: [facility] [employer] [address] [place] [address] [address] [place] [address] AT [facility]    Comment:

## 2018-07-16 ENCOUNTER — MYC REFILL (OUTPATIENT)
Dept: NEUROLOGY | Facility: CLINIC | Age: 53
End: 2018-07-16

## 2018-07-16 DIAGNOSIS — G35 MULTIPLE SCLEROSIS (H): ICD-10-CM

## 2018-07-16 RX ORDER — TERIFLUNOMIDE 14 MG/1
14 TABLET, FILM COATED ORAL DAILY
Qty: 30 TABLET | Refills: 11 | Status: SHIPPED | OUTPATIENT
Start: 2018-07-16 | End: 2019-06-11

## 2018-07-16 NOTE — TELEPHONE ENCOUNTER
Received refill request for Aubagio from [facility]; Patient was last seen in February and has follow up appointment in August with Dr. Hairston; Refilled for 1 year per MS refill protocol.    Liyah Rosado MS RN Care Coordinator

## 2018-07-16 NOTE — TELEPHONE ENCOUNTER
Message from [employer]:  Original authorizing provider: MD Liyah Celaya would like a refill of the following medications:  teriflunomide (AUBAGIO) 14 MG tablet [Jose A Hairston MD]    Preferred pharmacy: [facility] - [place], [place] - [facility] [address]    Comment:  Dr. Hairston - There are no refills for [facility], so we need to renew. Thanks, Liyah

## 2018-08-14 ENCOUNTER — TELEPHONE (OUTPATIENT)
Dept: FAMILY MEDICINE | Facility: CLINIC | Age: 53
End: 2018-08-14

## 2018-09-03 ENCOUNTER — MYC MEDICAL ADVICE (OUTPATIENT)
Dept: FAMILY MEDICINE | Facility: CLINIC | Age: 53
End: 2018-09-03

## 2018-09-03 DIAGNOSIS — F90.9 ATTENTION DEFICIT HYPERACTIVITY DISORDER (ADHD), UNSPECIFIED ADHD TYPE: ICD-10-CM

## 2018-09-04 ENCOUNTER — TELEPHONE (OUTPATIENT)
Dept: NEUROLOGY | Facility: CLINIC | Age: 53
End: 2018-09-04

## 2018-09-04 NOTE — TELEPHONE ENCOUNTER
Prior Authorization Specialty Medication Request    Medication/Dose: Aubagio 14mg  ICD code (if different than what is on RX):  Relapsing Remitting Multiple Sclerosis, G35  Previously Tried and Failed:  Rebif and Tysabri     Important Lab Values: n/a  Rationale: Continuation of current disease modifying therapy for demyelinating disease, currently clinically stable on, please approve.    [facility]  Insurance ID: [id number]  Insurance Phone Number: [phone]    Pharmacy Information (if different than what is on RX)  Name:  n/a  Phone:  n/a

## 2018-09-05 RX ORDER — DEXTROAMPHETAMINE SACCHARATE, AMPHETAMINE ASPARTATE, DEXTROAMPHETAMINE SULFATE AND AMPHETAMINE SULFATE 2.5; 2.5; 2.5; 2.5 MG/1; MG/1; MG/1; MG/1
10 TABLET ORAL DAILY
Qty: 30 TABLET | Refills: 0 | Status: SHIPPED | OUTPATIENT
Start: 2018-09-05 | End: 2018-10-16

## 2018-09-05 NOTE — TELEPHONE ENCOUNTER
Prior Authorization Not Needed per Insurance    Medication: Aubagio 14mg - NO PA NEEDED  Insurance Company: [employer] - Phone [phone] Fax [phone]  Expected CoPay:      Pharmacy Filling the Rx: [facility] - [place], [place] - [address] [place] [address]  Pharmacy Notified:    Patient Notified:      ** Per pharmacy notes, current Aubagio PA good thru 1/4/2019. Copay with new insurance is $20. [facility] in process of filling for pt.

## 2018-09-10 ENCOUNTER — PATIENT OUTREACH (OUTPATIENT)
Dept: CARE COORDINATION | Facility: CLINIC | Age: 53
End: 2018-09-10

## 2018-09-11 ENCOUNTER — OFFICE VISIT (OUTPATIENT)
Dept: NEUROLOGY | Facility: CLINIC | Age: 53
End: 2018-09-11
Attending: PSYCHIATRY & NEUROLOGY
Payer: COMMERCIAL

## 2018-09-11 VITALS
HEART RATE: 74 BPM | SYSTOLIC BLOOD PRESSURE: 116 MMHG | DIASTOLIC BLOOD PRESSURE: 79 MMHG | TEMPERATURE: 98.8 F | OXYGEN SATURATION: 97 %

## 2018-09-11 DIAGNOSIS — G35 MS (MULTIPLE SCLEROSIS) (H): ICD-10-CM

## 2018-09-11 DIAGNOSIS — G35 MS (MULTIPLE SCLEROSIS) (H): Primary | ICD-10-CM

## 2018-09-11 LAB
ALT SERPL W P-5'-P-CCNC: 41 U/L (ref 0–50)
AST SERPL W P-5'-P-CCNC: 30 U/L (ref 0–45)
IRON SATN MFR SERPL: 32 % (ref 15–46)
IRON SERPL-MCNC: 95 UG/DL (ref 35–180)
TIBC SERPL-MCNC: 296 UG/DL (ref 240–430)

## 2018-09-11 PROCEDURE — 84450 TRANSFERASE (AST) (SGOT): CPT

## 2018-09-11 PROCEDURE — G0463 HOSPITAL OUTPT CLINIC VISIT: HCPCS | Mod: ZF

## 2018-09-11 PROCEDURE — 83540 ASSAY OF IRON: CPT | Performed by: PSYCHIATRY & NEUROLOGY

## 2018-09-11 PROCEDURE — 82306 VITAMIN D 25 HYDROXY: CPT

## 2018-09-11 PROCEDURE — 83550 IRON BINDING TEST: CPT | Performed by: PSYCHIATRY & NEUROLOGY

## 2018-09-11 PROCEDURE — 84460 ALANINE AMINO (ALT) (SGPT): CPT

## 2018-09-11 ASSESSMENT — PAIN SCALES - GENERAL: PAINLEVEL: NO PAIN (0)

## 2018-09-11 NOTE — LETTER
"9/11/2018      RE: Liyah Jernigan  [facility] [facility] [address]       Service Date: 09/11/2018      REASON FOR VISIT:  Liyah Jernigan is a 53-year-old woman who I follow for multiple sclerosis.  She returns for routine followup.  I last saw her in 02/2018.      HISTORY OF PRESENT ILLNESS:  Liyah has not had any new symptoms reminiscent of MS relapse.  She has struggled a bit with the hot weather, saying it \"cuts my IQ in half.\"  She was off of the Aubagio for a couple of weeks due to an insurance lapse but is back on it now.  She notes that since taking it she has required daily Valtrex to keep herpes outbreaks under control.  She tells me she was diagnosed with genital herpes as a child and had extremely rare outbreaks until starting the Aubagio several years ago.  The alopecia she experienced with that medicine has now stabilized.  She has had some ongoing pain in her hips which she attributes to a pelvic fracture when she was younger.  She is taking gabapentin at bedtime which helps with pain.  She is continuing to take care of her son who is disabled from an injury.  He is doing well and will be starting some sort of day program which she is looking forward to having a bit of a break with that.      PHYSICAL EXAMINATION:   VITAL SIGNS:  Blood pressure 116/79.  Pulse 74.  Respiratory rate 14.     NEUROLOGIC:  She is alert and oriented.  Affect is bright and language functions are normal.  Cranial nerves are unremarkable.  Muscle bulk, tone, strength and dexterity are normal in the arms and legs.  Light touch is intact in the hands and feet.  Deep tendon reflexes are normal and symmetric and her gait is normal.      IMPRESSION:  Relapsing remitting multiple sclerosis, stable on Aubagio.  I spent 45 minutes with Liyah today, greater than 50% of which was spent in counseling and coordination of care.  We discussed a variety of topics today, mainly on the natural history of MS and the difference between the " relapsing and progressive stages of the disorder.  I have seen no indication of her transitioning into the progressive phase.  We discussed the comparative degrees and mechanisms of immunosuppression among the MS medications at her request.      PLAN:   1.  AST, ALT and vitamin D level today.  At her request, I will also check an iron level.   2.  Return to clinic in 6 months with repeat brain MRI at that time.     D: 2018   T: 2018   MT: nh      Name:     HODA TRUJILLO   MRN:      [id number]        Account:      [id number]   :      1965           Service Date: 2018      Document: [id number]        Jose A Hairston MD

## 2018-09-11 NOTE — MR AVS SNAPSHOT
After Visit Summary   9/11/2018    Liyah Jernigan    MRN: [id number]           Patient Information     Date Of Birth          1965        Visit Information        Provider Department      9/11/2018 3:00 PM Jose A Hairston MD [facility] Multiple Sclerosis        Today's Diagnoses     MS (multiple sclerosis) (H)    -  1       Follow-ups after your visit        Follow-up notes from your care team     Return in about 6 months (around 3/11/2019) for with MRI.      Your next 10 appointments already scheduled     Sep 11, 2018  4:45 PM CDT   LAB with  LAB   [facility] ([facility])    [address] [id number] [phone]           Please do not eat 10-12 hours before your appointment if you are coming in fasting for labs on lipids, cholesterol, or glucose (sugar). This does not apply to pregnant women. Water, hot tea and black coffee (with nothing added) are okay. Do not drink other fluids, diet soda or chew gum.            Mar 12, 2019 11:30 AM CDT   MR BRAIN W/O CONTRAST with [id number]   [facility] ([facility])    [address] [id number] [phone]           Take your medicines as usual, unless your doctor tells you not to. Bring a list of your current medicines to your exam (including vitamins, minerals and over-the-counter drugs). Also bring the results of similar scans you may have had.  Please remove any body piercings and hair extensions before you arrive.  Follow your doctor s orders. If you do not, we may have to postpone your exam.  You may or may not receive IV contrast for this exam pending the discretion of the Radiologist.  You do not need to do anything special to prepare.  The MRI machine uses a strong magnet. Please wear clothes without metal (snaps, zippers). A sweatsuit works well, or we may give you a hospital gown.   **IMPORTANT** THE  INSTRUCTIONS BELOW ARE ONLY FOR THOSE PATIENTS WHO HAVE BEEN PRESCRIBED SEDATION OR GENERAL ANESTHESIA DURING THEIR MRI PROCEDURE:  IF YOUR DOCTOR PRESCRIBED ORAL SEDATION (take medicine to help you relax during your exam):   You must get the medicine from your doctor (oral medication) before you arrive. Bring the medicine to the exam. Do not take it at home. You ll be told when to take it upon arriving for your exam.   Arrive one hour early. Bring someone who can take you home after the test. Your medicine will make you sleepy. After the exam, you may not drive, take a bus or take a taxi by yourself.  IF YOUR DOCTOR PRESCRIBED IV SEDATION:   Arrive one hour early. Bring someone who can take you home after the test. Your medicine will make you sleepy. After the exam, you may not drive, take a bus or take a taxi by yourself.   No eating 6 hours before your exam. You may have clear liquids up until 4 hours before your exam. (Clear liquids include water, clear tea, black coffee and fruit juice without pulp.)  IF YOUR DOCTOR PRESCRIBED ANESTHESIA (be asleep for your exam):   Arrive 1 1/2 hours early. Bring someone who can take you home after the test. You may not drive, take a bus or take a taxi by yourself.   No eating 8 hours before your exam. You may have clear liquids up until 4 hours before your exam. (Clear liquids include water, clear tea, black coffee and fruit juice without pulp.)   You will spend four to five hours in the recovery room.  Please call the Imaging Department at your exam site with any questions.            Mar 12, 2019 12:30 PM CDT   (Arrive by 12:15 PM)   Return Multiple Sclerosis with Jose A Hairston MD   [facility] ([facility])    [address] [place] [address]  [address] [place] [address] [phone]              Future tests that were ordered for you today     Open Future Orders        Priority Expected Expires Ordered    MR Brain w/o  Contrast Routine 3/12/2019 9/11/2019 9/11/2018    AST Routine  9/11/2019 9/11/2018    ALT Routine  9/11/2019 9/11/2018    Vitamin D Deficiency Screening Routine  9/11/2019 9/11/2018            Who to contact     If you have questions or need follow up information about today's clinic visit or your schedule please contact [facility] directly at [phone].  Normal or non-critical lab and imaging results will be communicated to you by [employer], letter or phone within 4 business days after the clinic has received the results. If you do not hear from us within 7 days, please contact the clinic through [employer] or phone. If you have a critical or abnormal lab result, we will notify you by phone as soon as possible.  Submit refill requests through [employer] or call your pharmacy and they will forward the refill request to us. Please allow 3 business days for your refill to be completed.          Additional Information About Your Visit        [employer] Information     [employer] gives you secure access to your electronic health record. If you see a primary care provider, you can also send messages to your care team and make appointments. If you have questions, please call your primary care clinic.  If you do not have a primary care provider, please call [phone] and they will assist you.        Care EveryWhere ID     This is your Care EveryWhere ID. This could be used by other organizations to access your [place] medical records  [id number]        Your Vitals Were     Pulse Temperature Pulse Oximetry Breastfeeding?          74 98.8  F (37.1  C) (Oral) 97% No         Blood Pressure from Last 3 Encounters:   09/11/18 116/79   05/21/18 120/86   03/19/18 110/82    Weight from Last 3 Encounters:   05/21/18 52.2 kg (115 lb)   03/19/18 53.2 kg (117 lb 4.8 oz)   02/13/18 54 kg (119 lb)              We Performed the Following     Iron and iron binding capacity        Primary Care Provider Office Phone # Fax #     Jasmine Barrow [phone]       [address] [facility] [address]        Equal Access to Services     MARY LEBLANC : Hadii ema vasquez joey Petersenzen, jennifer zacharykostasha, callie karahelda kenan, andrade ignacio ladaynanestor madai. So [facility] [phone].    ATENCIÓN: Si habla español, tiene a thorne disposición servicios gratuitos de asistencia lingüística. Llame al [phone].    We comply with applicable federal civil rights laws and [place] laws. We do not discriminate on the basis of race, color, national origin, age, disability, sex, sexual orientation, or gender identity.            Thank you!     Thank you for choosing [facility]  for your care. Our goal is always to provide you with excellent care. Hearing back from our patients is one way we can continue to improve our services. Please take a few minutes to complete the written survey that you may receive in the mail after your visit with us. Thank you!             Your Updated Medication List - Protect others around you: Learn how to safely use, store and throw away your medicines at www.disposemymeds.org.          This list is accurate as of 9/11/18  4:15 PM.  Always use your most recent med list.                   Brand Name Dispense Instructions for use Diagnosis    amphetamine-dextroamphetamine 10 MG per tablet    ADDERALL    30 tablet    Take 1 tablet (10 mg) by mouth daily    Attention deficit hyperactivity disorder (ADHD), unspecified ADHD type       biotin 2.5 mg/mL Susp      Take 20 mg by mouth daily        buPROPion 150 MG 12 hr tablet    WELLBUTRIN SR    90 tablet    Take 1 tablet (150 mg) by mouth daily    Mild episode of recurrent major depressive disorder (H)       gabapentin 300 MG capsule    NEURONTIN    30 capsule    TAKE ONE CAPSULE BY MOUTH NIGHTLY AS NEEDED    Multiple sclerosis (H), Paresthesia       IRON SUPPLEMENT PO      Take 1 tablet by mouth daily        metoprolol succinate  25 MG 24 hr tablet    TOPROL-XL    90 tablet    Take 1 tablet (25 mg) by mouth daily    Benign essential hypertension       order for DME     1 each    Please measure and distribute 1 pair of 20mmHg - 30mmHg THIGH high open  toe compression stockings. Jobst ultrasheer or equivalent.    Venous insufficiency       SUMAtriptan 100 MG tablet    IMITREX    30 tablet    Take 1 tablet (100 mg) by mouth daily as needed for migraine    Migraine with aura and without status migrainosus, not intractable       teriflunomide 14 MG tablet    AUBAGIO    30 tablet    Take 1 tablet (14 mg) by mouth daily    Multiple sclerosis (H)       traZODone 100 MG tablet    DESYREL    45 tablet    TAKE ONE-HALF TABLET BY MOUTH DAILY    Primary insomnia       valACYclovir 500 MG tablet    VALTREX    90 tablet    Take 1 tablet (500 mg) by mouth daily    Genital herpes simplex, unspecified site       VITAMIN C PO      Take 500 mg by mouth daily        VITAMIN D (CHOLECALCIFEROL) PO      Take by mouth daily

## 2018-09-11 NOTE — NURSING NOTE
Chief Complaint   Patient presents with     RECHECK     [facility] RETURN MS 6MO F/U       /79 (BP Location: Right arm, Patient Position: Chair, Cuff Size: Adult Regular)  Pulse 74  Temp 98.8  F (37.1  C) (Oral)  SpO2 97%  Breastfeeding? No    Preventive Care:    Breast Cancer Screening: During our visit today, we discussed that it is recommended you receive breast cancer screening. Please call or make an appointment with your primary care provider to discuss this with them. You may also call the [facility] scheduling line ([phone]) to set up a mammography appointment at the Breast Center within the [facility].    Colorectal Cancer Screening: During our visit today, we discussed that it is recommended you receive colorectal cancer screening. Please call or make an appointment with your primary care provider to discuss this. You may also call the [facility] scheduling line ([phone]) to set up a colonoscopy appointment.      Gillian Samson, EMT

## 2018-09-12 LAB — DEPRECATED CALCIDIOL+CALCIFEROL SERPL-MC: 59 UG/L (ref 20–75)

## 2018-09-14 NOTE — PROGRESS NOTES
"Service Date: 09/11/2018      REASON FOR VISIT:  Liyah Jernigan is a 53-year-old woman who I follow for multiple sclerosis.  She returns for routine followup.  I last saw her in 02/2018.      HISTORY OF PRESENT ILLNESS:  Liyah has not had any new symptoms reminiscent of MS relapse.  She has struggled a bit with the hot weather, saying it \"cuts my IQ in half.\"  She was off of the Aubagio for a couple of weeks due to an insurance lapse but is back on it now.  She notes that since taking it she has required daily Valtrex to keep herpes outbreaks under control.  She tells me she was diagnosed with genital herpes as a child and had extremely rare outbreaks until starting the Aubagio several years ago.  The alopecia she experienced with that medicine has now stabilized.  She has had some ongoing pain in her hips which she attributes to a pelvic fracture when she was younger.  She is taking gabapentin at bedtime which helps with pain.  She is continuing to take care of her son who is disabled from an injury.  He is doing well and will be starting some sort of day program which she is looking forward to having a bit of a break with that.      PHYSICAL EXAMINATION:   VITAL SIGNS:  Blood pressure 116/79.  Pulse 74.  Respiratory rate 14.     NEUROLOGIC:  She is alert and oriented.  Affect is bright and language functions are normal.  Cranial nerves are unremarkable.  Muscle bulk, tone, strength and dexterity are normal in the arms and legs.  Light touch is intact in the hands and feet.  Deep tendon reflexes are normal and symmetric and her gait is normal.      IMPRESSION:  Relapsing remitting multiple sclerosis, stable on Aubagio.  I spent 45 minutes with Liyah today, greater than 50% of which was spent in counseling and coordination of care.  We discussed a variety of topics today, mainly on the natural history of MS and the difference between the relapsing and progressive stages of the disorder.  I have seen no indication of her " transitioning into the progressive phase.  We discussed the comparative degrees and mechanisms of immunosuppression among the MS medications at her request.      PLAN:   1.  AST, ALT and vitamin D level today.  At her request, I will also check an iron level.   2.  Return to clinic in 6 months with repeat brain MRI at that time.         KAY DOLAN MD             D: 2018   T: 2018   MT: nh      Name:     HODA TRUJILLO   MRN:      [id number]        Account:      [id number]   :      1965           Service Date: 2018      Document: [id number]

## 2018-09-17 NOTE — TELEPHONE ENCOUNTER
Hub documentation that pt not eligible for copay assistance due to state-sponsored health plan:

## 2018-10-09 NOTE — NURSING NOTE
"Chief Complaint   Patient presents with     RECHECK     [facility] RETURN - MULTIPLE SCLEROSIS       Initial /85 (BP Location: Right arm, Patient Position: Sitting, Cuff Size: Adult Regular)  Pulse 73  Ht 1.702 m (5' 7\")  Wt 54 kg (119 lb)  SpO2 98%  BMI 18.64 kg/m2 Estimated body mass index is 18.64 kg/(m^2) as calculated from the following:    Height as of this encounter: 1.702 m (5' 7\").    Weight as of this encounter: 54 kg (119 lb).  Medication Reconciliation: complete   Rosanna Brenner MA      "
No

## 2018-10-16 ENCOUNTER — MYC REFILL (OUTPATIENT)
Dept: FAMILY MEDICINE | Facility: CLINIC | Age: 53
End: 2018-10-16

## 2018-10-16 DIAGNOSIS — F90.9 ATTENTION DEFICIT HYPERACTIVITY DISORDER (ADHD), UNSPECIFIED ADHD TYPE: ICD-10-CM

## 2018-10-16 RX ORDER — DEXTROAMPHETAMINE SACCHARATE, AMPHETAMINE ASPARTATE, DEXTROAMPHETAMINE SULFATE AND AMPHETAMINE SULFATE 2.5; 2.5; 2.5; 2.5 MG/1; MG/1; MG/1; MG/1
10 TABLET ORAL DAILY
Qty: 30 TABLET | Refills: 0 | Status: SHIPPED | OUTPATIENT
Start: 2018-10-16 | End: 2018-12-04

## 2018-10-16 NOTE — TELEPHONE ENCOUNTER
Requested Prescriptions   Pending Prescriptions Disp Refills     amphetamine-dextroamphetamine (ADDERALL) 10 MG per tablet  Last Written Prescription Date:  09/05/18  Last Fill Quantity: 30 tablet,  # refills: 0   Last office visit: 3/19/2018 with prescribing provider:  Dr. Barrow   Future Office Visit:      Routing refill request to provider for review/approval because:  Drug not on the G, P or [facility] refill protocol or controlled substance   30 tablet 0     Sig: Take 1 tablet (10 mg) by mouth daily    There is no refill protocol information for this order

## 2018-10-16 NOTE — TELEPHONE ENCOUNTER
Message from MyChart:  Original authorizing provider: MD Liyah Ho would like a refill of the following medications:  amphetamine-dextroamphetamine (ADDERALL) 10 MG per tablet [Jasmine Barrow MD]    Preferred pharmacy: [facility] [address] [place] [address] [address] [place] [address] AT [facility]    Comment:

## 2018-11-07 ENCOUNTER — ALLIED HEALTH/NURSE VISIT (OUTPATIENT)
Dept: NURSING | Facility: CLINIC | Age: 53
End: 2018-11-07
Payer: COMMERCIAL

## 2018-11-07 DIAGNOSIS — Z23 NEED FOR PROPHYLACTIC VACCINATION AND INOCULATION AGAINST INFLUENZA: Primary | ICD-10-CM

## 2018-11-07 PROCEDURE — 90471 IMMUNIZATION ADMIN: CPT

## 2018-11-07 PROCEDURE — 99207 ZZC NO CHARGE NURSE ONLY: CPT

## 2018-11-07 PROCEDURE — 90686 IIV4 VACC NO PRSV 0.5 ML IM: CPT

## 2018-11-07 NOTE — PROGRESS NOTES

## 2018-11-07 NOTE — MR AVS SNAPSHOT
After Visit Summary   11/7/2018    Liyah Jernigan    MRN: [id number]           Patient Information     Date Of Birth          1965        Visit Information        Provider Department      11/7/2018 9:20 AM [facility]        Today's Diagnoses     Need for prophylactic vaccination and inoculation against influenza    -  1       Follow-ups after your visit        Your next 10 appointments already scheduled     Mar 12, 2019 11:30 AM CDT   MR BRAIN W/O CONTRAST with [id number]   [facility] ([facility] [place])    [address] [place] [id number] [phone]           How do I prepare for my exam? (Food and drink instructions) **If you will be receiving sedation or general anesthesia, please see special notes below.**  How do I prepare for my exam? (Other instructions) Take your medicines as usual, unless your doctor tells you not to. Please remove any body piercings and hair extensions before you arrive. Follow your doctor s orders. If you do not, we may have to postpone your exam. You may or may not receive IV contrast for this exam pending the discretion of the Radiologist.  You do not need to do anything special to prepare. **If you will be receiving sedation or general anesthesia, please see special notes below.**  What should I wear:  The MRI machine uses a strong magnet. Please wear clothes without metal (snaps, zippers). A sweatsuit works well, or we may give you a hospital gown.  How long does the exam take: Most tests take 30 to 60 minutes.  HOWEVER, IF YOUR DOCTOR PRESCRIBES ANESTHESIA please plan on spending four to five hours in the recovery room.  What should I bring: Bring a list of your current medicines to your exam (including vitamins, minerals and over-the-counter drugs). Also bring the results of similar scans you may have had.  Do I need a : **If you will be receiving sedation or general  anesthesia, please see special notes below.**  What should I do after the exam? No Restrictions, You may resume normal activities.  What is this test: MRI (magnetic resonance imaging) uses a strong magnet and radio waves to look inside the body. An MRA (magnetic resonance angiogram) does the same thing, but it lets us look at your blood vessels. A computer turns the radio waves into pictures showing cross sections of the body, much like slices of bread. This helps us see any problems more clearly.  Who should I call with questions: Please call the Imaging Department at your exam site with any questions. Directions, parking instructions, and other information is available on our website, [email or web address].  How do I prepare if I m having sedation or anesthesia? **IMPORTANT** THE INSTRUCTIONS BELOW ARE ONLY FOR THOSE PATIENTS WHO HAVE BEEN TOLD THEY WILL RECEIVE SEDATION OR GENERAL ANESTHESIA DURING THEIR MRI PROCEDURE:  IF YOU WILL RECEIVE SEDATION (take medicine to help you relax during your exam): You must get the medicine from your doctor before you arrive. Bring the medicine to the exam. Do not take it at home. Arrive one hour early. Bring someone who can take you home after the test. Your medicine will make you sleepy. After the exam, you may not drive, take a bus or take a taxi by yourself. No eating 8 hours before your exam. You may have clear liquids up until 4 hours before your exam. (Clear liquids include water, clear tea, black coffee and fruit juice without pulp.)  IF YOU WILL RECEIVE ANESTHESIA (be asleep for your exam): Arrive 1 1/2 hours early. Bring someone who can take you home after the test. You may not drive, take a bus or take a taxi by yourself. No eating 8 hours before your exam. You may have clear liquids up until 4 hours before your exam. (Clear liquids include water, clear tea, black coffee and fruit juice without pulp.) You will spend four to five hours in the recovery room.             Mar 12, 2019 12:30 PM CDT   (Arrive by 12:15 PM)   Return Multiple Sclerosis with Jose A Hairston MD   [facility] Multiple Sclerosis ([facility] [place])    [address]  [address] [place] [id number] [phone]              Who to contact     If you have questions or need follow up information about today's clinic visit or your schedule please contact [facility] [place] directly at [phone].  Normal or non-critical lab and imaging results will be communicated to you by [employer], letter or phone within 4 business days after the clinic has received the results. If you do not hear from us within 7 days, please contact the clinic through [employer] or phone. If you have a critical or abnormal lab result, we will notify you by phone as soon as possible.  Submit refill requests through [employer] or call your pharmacy and they will forward the refill request to us. Please allow 3 business days for your refill to be completed.          Additional Information About Your Visit        [employer] Information     [employer] gives you secure access to your electronic health record. If you see a primary care provider, you can also send messages to your care team and make appointments. If you have questions, please call your primary care clinic.  If you do not have a primary care provider, please call [phone] and they will assist you.        Care EveryWhere ID     This is your Care EveryWhere ID. This could be used by other organizations to access your [facility] medical records  [id number]         Blood Pressure from Last 3 Encounters:   09/11/18 116/79   05/21/18 120/86   03/19/18 110/82    Weight from Last 3 Encounters:   05/21/18 115 lb (52.2 kg)   03/19/18 117 lb 4.8 oz (53.2 kg)   02/13/18 119 lb (54 kg)              We Performed the Following     FLU VACCINE, SPLIT VIRUS, IM (QUADRIVALENT) [50854]- >3 YRS     Vaccine Administration, Initial [24797]        Primary Care  Provider Office Phone # Fax #    Jasmine Tab Barrow [phone] [address] [facility] [address]        Equal Access to Services     MARY LEBLANC : Hadsusannah ema vasquez mynoro Sozen, waaxda luqadaha, qaybta kaalmada adeashutosh, andrade ignacio dakota aj. So [facility] [phone].    ATENCIÓN: Si habla español, tiene a thorne disposición servicios gratuitos de asistencia lingüística. Llame al [phone].    We comply with applicable federal civil rights laws and [place] laws. We do not discriminate on the basis of race, color, national origin, age, disability, sex, sexual orientation, or gender identity.            Thank you!     Thank you for choosing [facility]  for your care. Our goal is always to provide you with excellent care. Hearing back from our patients is one way we can continue to improve our services. Please take a few minutes to complete the written survey that you may receive in the mail after your visit with us. Thank you!             Your Updated Medication List - Protect others around you: Learn how to safely use, store and throw away your medicines at www.disposemymeds.org.          This list is accurate as of 11/7/18  9:52 AM.  Always use your most recent med list.                   Brand Name Dispense Instructions for use Diagnosis    amphetamine-dextroamphetamine 10 MG per tablet    ADDERALL    30 tablet    Take 1 tablet (10 mg) by mouth daily    Attention deficit hyperactivity disorder (ADHD), unspecified ADHD type       biotin 2.5 mg/mL Susp      Take 20 mg by mouth daily        buPROPion 150 MG 12 hr tablet    WELLBUTRIN SR    90 tablet    Take 1 tablet (150 mg) by mouth daily    Mild episode of recurrent major depressive disorder (H)       gabapentin 300 MG capsule    NEURONTIN    30 capsule    TAKE ONE CAPSULE BY MOUTH NIGHTLY AS NEEDED    Multiple sclerosis (H), Paresthesia       IRON SUPPLEMENT PO      Take 1 tablet by mouth  daily        metoprolol succinate 25 MG 24 hr tablet    TOPROL-XL    90 tablet    Take 1 tablet (25 mg) by mouth daily    Benign essential hypertension       order for DME     1 each    Please measure and distribute 1 pair of 20mmHg - 30mmHg THIGH high open  toe compression stockings. Jobst ultrasheer or equivalent.    Venous insufficiency       SUMAtriptan 100 MG tablet    IMITREX    30 tablet    Take 1 tablet (100 mg) by mouth daily as needed for migraine    Migraine with aura and without status migrainosus, not intractable       teriflunomide 14 MG tablet    AUBAGIO    30 tablet    Take 1 tablet (14 mg) by mouth daily    Multiple sclerosis (H)       traZODone 100 MG tablet    DESYREL    45 tablet    TAKE ONE-HALF TABLET BY MOUTH DAILY    Primary insomnia       valACYclovir 500 MG tablet    VALTREX    90 tablet    Take 1 tablet (500 mg) by mouth daily    Genital herpes simplex, unspecified site       VITAMIN C PO      Take 500 mg by mouth daily        VITAMIN D (CHOLECALCIFEROL) PO      Take by mouth daily

## 2018-11-14 DIAGNOSIS — I10 BENIGN ESSENTIAL HYPERTENSION: ICD-10-CM

## 2018-11-14 DIAGNOSIS — F51.01 PRIMARY INSOMNIA: ICD-10-CM

## 2018-11-14 NOTE — TELEPHONE ENCOUNTER
"Requested Prescriptions   Pending Prescriptions Disp Refills     traZODone (DESYREL) 100 MG tablet [Pharmacy Med Name: TRAZODONE 100MG TABLETS] 45 tablet 0     Sig: TAKE 1/2 TABLET BY MOUTH DAILY    Serotonin Modulators Passed    11/14/2018  6:20 AM       Passed - Recent (12 mo) or future (30 days) visit within the authorizing provider's specialty    Patient had office visit in the last 12 months or has a visit in the next 30 days with authorizing provider or within the authorizing provider's specialty.  See \"Patient Info\" tab in inbasket, or \"Choose Columns\" in Meds & Orders section of the refill encounter.             Passed - Patient is age 18 or older       Passed - No active pregnancy on record       Passed - No positive pregnancy test in past 12 months        metoprolol succinate (TOPROL-XL) 25 MG 24 hr tablet [Pharmacy Med Name: METOPROLOL ER SUCCINATE 25MG TABS] 90 tablet 0     Sig: TAKE 1 TABLET(25 MG) BY MOUTH DAILY    Beta-Blockers Protocol Passed    11/14/2018  6:20 AM       Passed - Blood pressure under 140/90 in past 12 months    BP Readings from Last 3 Encounters:   09/11/18 116/79   05/21/18 120/86   03/19/18 110/82                Passed - Patient is age 6 or older       Passed - Recent (12 mo) or future (30 days) visit within the authorizing provider's specialty    Patient had office visit in the last 12 months or has a visit in the next 30 days with authorizing provider or within the authorizing provider's specialty.  See \"Patient Info\" tab in inbasket, or \"Choose Columns\" in Meds & Orders section of the refill encounter.              traZODone (DESYREL) 100 MG tablet  Last Written Prescription Date:  5/26/18  Last Fill Quantity: 45,  # refills: 1   Last office visit: 5/21/2018 with prescribing provider:  Dr. Anand   Future Office Visit:      metoprolol succinate (TOPROL-XL) 25 MG 24 hr tablet  Last Written Prescription Date:  5/21/18  Last Fill Quantity: 90,  # refills: 1   Last office visit: " 5/21/2018 with prescribing provider:  Dr. Anand   Future Office Visit:

## 2018-11-19 RX ORDER — METOPROLOL SUCCINATE 25 MG/1
TABLET, EXTENDED RELEASE ORAL
Qty: 90 TABLET | Refills: 0 | Status: SHIPPED | OUTPATIENT
Start: 2018-11-19 | End: 2018-12-04

## 2018-11-19 RX ORDER — TRAZODONE HYDROCHLORIDE 100 MG/1
TABLET ORAL
Qty: 45 TABLET | Refills: 0 | Status: SHIPPED | OUTPATIENT
Start: 2018-11-19 | End: 2019-03-03

## 2018-11-19 NOTE — TELEPHONE ENCOUNTER
Medication is being filled for 1 time refill only due to:  Patient needs to be seen because due for 6 month recheck.   Liyah Swanson RN

## 2018-12-04 ENCOUNTER — TELEPHONE (OUTPATIENT)
Dept: FAMILY MEDICINE | Facility: CLINIC | Age: 53
End: 2018-12-04

## 2018-12-04 ENCOUNTER — OFFICE VISIT (OUTPATIENT)
Dept: FAMILY MEDICINE | Facility: CLINIC | Age: 53
End: 2018-12-04
Payer: COMMERCIAL

## 2018-12-04 VITALS
OXYGEN SATURATION: 98 % | BODY MASS INDEX: 18.4 KG/M2 | SYSTOLIC BLOOD PRESSURE: 110 MMHG | HEIGHT: 66 IN | DIASTOLIC BLOOD PRESSURE: 82 MMHG | RESPIRATION RATE: 18 BRPM | WEIGHT: 114.5 LBS | HEART RATE: 73 BPM

## 2018-12-04 DIAGNOSIS — Z86.2 HISTORY OF ANEMIA: ICD-10-CM

## 2018-12-04 DIAGNOSIS — F90.9 ATTENTION DEFICIT HYPERACTIVITY DISORDER (ADHD), UNSPECIFIED ADHD TYPE: ICD-10-CM

## 2018-12-04 DIAGNOSIS — Z13.1 SCREENING FOR DIABETES MELLITUS: ICD-10-CM

## 2018-12-04 DIAGNOSIS — Z13.220 SCREENING FOR CHOLESTEROL LEVEL: ICD-10-CM

## 2018-12-04 DIAGNOSIS — I10 BENIGN ESSENTIAL HYPERTENSION: Primary | ICD-10-CM

## 2018-12-04 PROCEDURE — 99214 OFFICE O/P EST MOD 30 MIN: CPT | Performed by: NURSE PRACTITIONER

## 2018-12-04 RX ORDER — DEXTROAMPHETAMINE SACCHARATE, AMPHETAMINE ASPARTATE, DEXTROAMPHETAMINE SULFATE AND AMPHETAMINE SULFATE 2.5; 2.5; 2.5; 2.5 MG/1; MG/1; MG/1; MG/1
10 TABLET ORAL DAILY
Qty: 30 TABLET | Refills: 0 | Status: SHIPPED | OUTPATIENT
Start: 2018-12-04 | End: 2019-01-03

## 2018-12-04 ASSESSMENT — PATIENT HEALTH QUESTIONNAIRE - PHQ9
SUM OF ALL RESPONSES TO PHQ QUESTIONS 1-9: 9
5. POOR APPETITE OR OVEREATING: SEVERAL DAYS

## 2018-12-04 ASSESSMENT — ANXIETY QUESTIONNAIRES
6. BECOMING EASILY ANNOYED OR IRRITABLE: NOT AT ALL
IF YOU CHECKED OFF ANY PROBLEMS ON THIS QUESTIONNAIRE, HOW DIFFICULT HAVE THESE PROBLEMS MADE IT FOR YOU TO DO YOUR WORK, TAKE CARE OF THINGS AT HOME, OR GET ALONG WITH OTHER PEOPLE: NOT DIFFICULT AT ALL
GAD7 TOTAL SCORE: 6
7. FEELING AFRAID AS IF SOMETHING AWFUL MIGHT HAPPEN: SEVERAL DAYS
1. FEELING NERVOUS, ANXIOUS, OR ON EDGE: SEVERAL DAYS
5. BEING SO RESTLESS THAT IT IS HARD TO SIT STILL: SEVERAL DAYS
3. WORRYING TOO MUCH ABOUT DIFFERENT THINGS: SEVERAL DAYS
2. NOT BEING ABLE TO STOP OR CONTROL WORRYING: SEVERAL DAYS

## 2018-12-04 ASSESSMENT — PAIN SCALES - GENERAL: PAINLEVEL: NO PAIN (0)

## 2018-12-04 NOTE — TELEPHONE ENCOUNTER
Panel Management Review        Last Office Visit with this department: 8/14/2018    Fail List measure: Colon      Patient is due/failing the following:   FIT    Action needed:   Needs to complete and return FIT test    Type of outreach:    Sent [employer] message.    Questions for provider review:    None                                                                                                                                    Dena Wilson

## 2018-12-04 NOTE — MR AVS SNAPSHOT
After Visit Summary   12/4/2018    Liyah Jernigan    MRN: [id number]           Patient Information     Date Of Birth          1965        Visit Information        Provider Department      12/4/2018 2:00 PM Tanya Clarke NP [facility]        Today's Diagnoses     Benign essential hypertension    -  1    Attention deficit hyperactivity disorder (ADHD), unspecified ADHD type        Screening for cholesterol level        Screening for diabetes mellitus        History of anemia           Follow-ups after your visit        Follow-up notes from your care team     Return in about 3 months (around 3/4/2019) for Routine Visit.      Your next 10 appointments already scheduled     Mar 12, 2019 11:30 AM CDT   MR BRAIN W/O CONTRAST with [id number]   [facility] ([facility] [place])    [address] [address] [phone]           How do I prepare for my exam? (Food and drink instructions) **If you will be receiving sedation or general anesthesia, please see special notes below.**  How do I prepare for my exam? (Other instructions) Take your medicines as usual, unless your doctor tells you not to. Please remove any body piercings and hair extensions before you arrive. Follow your doctor s orders. If you do not, we may have to postpone your exam. You may or may not receive IV contrast for this exam pending the discretion of the Radiologist.  You do not need to do anything special to prepare. **If you will be receiving sedation or general anesthesia, please see special notes below.**  What should I wear:  The MRI machine uses a strong magnet. Please wear clothes without metal (snaps, zippers). A sweatsuit works well, or we may give you a hospital gown.  How long does the exam take: Most tests take 30 to 60 minutes.  HOWEVER, IF YOUR DOCTOR PRESCRIBES ANESTHESIA please plan on spending four to five hours in the recovery room.  What  should I bring: Bring a list of your current medicines to your exam (including vitamins, minerals and over-the-counter drugs). Also bring the results of similar scans you may have had.  Do I need a : **If you will be receiving sedation or general anesthesia, please see special notes below.**  What should I do after the exam? No Restrictions, You may resume normal activities.  What is this test: MRI (magnetic resonance imaging) uses a strong magnet and radio waves to look inside the body. An MRA (magnetic resonance angiogram) does the same thing, but it lets us look at your blood vessels. A computer turns the radio waves into pictures showing cross sections of the body, much like slices of bread. This helps us see any problems more clearly.  Who should I call with questions: Please call the Imaging Department at your exam site with any questions. Directions, parking instructions, and other information is available on our website, [email or web address].  How do I prepare if I m having sedation or anesthesia? **IMPORTANT** THE INSTRUCTIONS BELOW ARE ONLY FOR THOSE PATIENTS WHO HAVE BEEN TOLD THEY WILL RECEIVE SEDATION OR GENERAL ANESTHESIA DURING THEIR MRI PROCEDURE:  IF YOU WILL RECEIVE SEDATION (take medicine to help you relax during your exam): You must get the medicine from your doctor before you arrive. Bring the medicine to the exam. Do not take it at home. Arrive one hour early. Bring someone who can take you home after the test. Your medicine will make you sleepy. After the exam, you may not drive, take a bus or take a taxi by yourself. No eating 8 hours before your exam. You may have clear liquids up until 4 hours before your exam. (Clear liquids include water, clear tea, black coffee and fruit juice without pulp.)  IF YOU WILL RECEIVE ANESTHESIA (be asleep for your exam): Arrive 1 1/2 hours early. Bring someone who can take you home after the test. You may not drive, take a bus or take a taxi by  yourself. No eating 8 hours before your exam. You may have clear liquids up until 4 hours before your exam. (Clear liquids include water, clear tea, black coffee and fruit juice without pulp.) You will spend four to five hours in the recovery room.            Mar 12, 2019 12:30 PM CDT   (Arrive by 12:15 PM)   Return Multiple Sclerosis with Jose A Hairston MD   [facility] Multiple Sclerosis ([facility])    [address] [place] [address]  [address] [place] [address] [phone]              Future tests that were ordered for you today     Open Future Orders        Priority Expected Expires Ordered    Lipid panel reflex to direct LDL Fasting Routine  12/4/2019 12/5/2018    Glucose Routine  12/4/2019 12/5/2018    Hemoglobin Routine  12/4/2019 12/5/2018    Basic metabolic panel Routine  12/5/2019 12/5/2018            Who to contact     If you have questions or need follow up information about today's clinic visit or your schedule please contact [facility] directly at [phone].  Normal or non-critical lab and imaging results will be communicated to you by [email or web address], letter or phone within 4 business days after the clinic has received the results. If you do not hear from us within 7 days, please contact the clinic through [email or web address] or phone. If you have a critical or abnormal lab result, we will notify you by phone as soon as possible.  Submit refill requests through Fixmo or call your pharmacy and they will forward the refill request to us. Please allow 3 business days for your refill to be completed.          Additional Information About Your Visit        Fixmo Information     [employer]mo gives you secure access to your electronic health record. If you see a primary care provider, you can also send messages to your care team and make appointments. If you have questions, please call your primary care clinic.  If you do not have a primary care provider, please call  "[phone] and they will assist you.        Care EveryWhere ID     This is your Care EveryWhere ID. This could be used by other organizations to access your [place] medical records  [id number]        Your Vitals Were     Pulse Respirations Height Last Period Pulse Oximetry BMI (Body Mass Index)    73 18 1.67 m (5' 5.75\") 07/15/2018 98% 18.62 kg/m2       Blood Pressure from Last 3 Encounters:   12/04/18 110/82   09/11/18 116/79   05/21/18 120/86    Weight from Last 3 Encounters:   12/04/18 51.9 kg (114 lb 8 oz)   05/21/18 52.2 kg (115 lb)   03/19/18 53.2 kg (117 lb 4.8 oz)                 Today's Medication Changes          These changes are accurate as of 12/4/18 11:59 PM.  If you have any questions, ask your nurse or doctor.               These medicines have changed or have updated prescriptions.        Dose/Directions    metoprolol succinate ER 25 MG 24 hr tablet   Commonly known as:  TOPROL-XL   This may have changed:  See the new instructions.   Used for:  Benign essential hypertension   Changed by:  Tanya Clarke, NP        TAKE 1 TABLET(12.5 mg) half tab BY MOUTH DAILY   Quantity:  90 tablet   Refills:  0            Where to get your medicines      Some of these will need a paper prescription and others can be bought over the counter.  Ask your nurse if you have questions.     Bring a paper prescription for each of these medications     amphetamine-dextroamphetamine 10 MG tablet                Primary Care Provider Office Phone # Fax #    Jasmine Tab Barrow [phone] [address] [facility] [address]        Equal Access to Services     [facility] CenterCÉSAR AH: Hadii ema Doherty, waaxda luqadaha, qaybta kaalmaandrade le. So [facility] [phone].    ATENCIÓN: Si habla español, tiene a thorne disposición servicios gratuitos de asistencia lingüística. Llame al [phone].    We comply with applicable federal civil rights laws and " [place] laws. We do not discriminate on the basis of race, color, national origin, age, disability, sex, sexual orientation, or gender identity.            Thank you!     Thank you for choosing [facility]  for your care. Our goal is always to provide you with excellent care. Hearing back from our patients is one way we can continue to improve our services. Please take a few minutes to complete the written survey that you may receive in the mail after your visit with us. Thank you!             Your Updated Medication List - Protect others around you: Learn how to safely use, store and throw away your medicines at www.disposemymeds.org.          This list is accurate as of 12/4/18 11:59 PM.  Always use your most recent med list.                   Brand Name Dispense Instructions for use Diagnosis    amphetamine-dextroamphetamine 10 MG tablet    ADDERALL    30 tablet    Take 1 tablet (10 mg) by mouth daily    Attention deficit hyperactivity disorder (ADHD), unspecified ADHD type       biotin 2.5 mg/mL Susp      Take 20 mg by mouth daily        buPROPion 150 MG 12 hr tablet    WELLBUTRIN SR    90 tablet    Take 1 tablet (150 mg) by mouth daily    Mild episode of recurrent major depressive disorder (H)       gabapentin 300 MG capsule    NEURONTIN    30 capsule    TAKE ONE CAPSULE BY MOUTH NIGHTLY AS NEEDED    Multiple sclerosis (H), Paresthesia       IRON SUPPLEMENT PO      Take 1 tablet by mouth daily        metoprolol succinate ER 25 MG 24 hr tablet    TOPROL-XL    90 tablet    TAKE 1 TABLET(12.5 mg) half tab BY MOUTH DAILY    Benign essential hypertension       order for DME     1 each    Please measure and distribute 1 pair of 20mmHg - 30mmHg THIGH high open  toe compression stockings. Jobst ultrasheer or equivalent.    Venous insufficiency       SUMAtriptan 100 MG tablet    IMITREX    30 tablet    Take 1 tablet (100 mg) by mouth daily as needed for migraine    Migraine with aura and without status  migrainosus, not intractable       teriflunomide 14 MG tablet    AUBAGIO    30 tablet    Take 1 tablet (14 mg) by mouth daily    Multiple sclerosis (H)       traZODone 100 MG tablet    DESYREL    45 tablet    TAKE 1/2 TABLET BY MOUTH DAILY    Primary insomnia       valACYclovir 500 MG tablet    VALTREX    90 tablet    Take 1 tablet (500 mg) by mouth daily    Genital herpes simplex, unspecified site       VITAMIN C PO      Take 500 mg by mouth daily        VITAMIN D (CHOLECALCIFEROL) PO      Take by mouth daily

## 2018-12-04 NOTE — PROGRESS NOTES
SUBJECTIVE:   Liyah Jernigan is a 53 year old female who presents to clinic today for the following health issues:      Hypertension Follow-up      Outpatient blood pressures are being checked at home.  Results are 120/80.    Low Salt Diet: no added salt      Amount of exercise or physical activity: 4-5 days/week for an average of 30-45 minutes    Problems taking medications regularly: No    Medication side effects: none    Diet: regular (no restrictions)          Stopped iron for past year.  Iron studies were checked in September and they were normal.    Weaning off trazodone and gabapentin.  She does not like taking medication.  Wondering about decreasing or stopping her medication.    We stated let us trial going down to 12.5 mg of the metoprolol.  She should check her blood pressure at home.  If things are getting higher than where the rash, she should go back to a full pill.  Follow-up again in about 3-4 months.    She does not recall she has had her cholesterol checked recently.  She will return for fasting labs in the next week or so.    She needs a refill the Adderall.  Things are going well.  She does not always take it every day.     Her son is currently in the hospital.  He was in a severe accident and is disabled years ago when she has been the primary caretaker.        Problem list and histories reviewed & adjusted, as indicated.  Additional history: as documented    Patient Active Problem List   Diagnosis     Attention deficit hyperactivity disorder (ADHD), unspecified ADHD type     MS (multiple sclerosis) (H)     Varicose vein     Restless leg syndrome     Mild episode of recurrent major depressive disorder (H)     Genital herpes simplex, unspecified site     Benign essential hypertension     Past Surgical History:   Procedure Laterality Date     ENT SURGERY  2005    tympanoplasty     GYN SURGERY  1990    laparoscopies (5) laparotomies (2) for ectopic       Social History   Substance Use Topics      "Smoking status: Never Smoker     Smokeless tobacco: Never Used     Alcohol use No     Family History   Problem Relation Age of Onset     Diabetes Father           Hypertension Father      Hyperlipidemia Father      Substance Abuse Father      Obesity Father      Diabetes Mother      \"borderline\" for years     Hypertension Mother      Hyperlipidemia Mother      Depression Mother      Substance Abuse Mother      Asthma Mother      Thyroid Disease Mother      Obesity Mother      Cerebrovascular Disease Maternal Grandfather      he had a few     Substance Abuse Maternal Grandfather      Breast Cancer Maternal Grandmother      in 50s     Substance Abuse Paternal Grandfather          Current Outpatient Prescriptions   Medication Sig Dispense Refill     amphetamine-dextroamphetamine (ADDERALL) 10 MG tablet Take 1 tablet (10 mg) by mouth daily 30 tablet 0     Ascorbic Acid (VITAMIN C PO) Take 500 mg by mouth daily       biotin 2.5 mg/mL SUSP Take 20 mg by mouth daily       buPROPion (WELLBUTRIN SR) 150 MG 12 hr tablet Take 1 tablet (150 mg) by mouth daily 90 tablet 1     Ferrous Sulfate (IRON SUPPLEMENT PO) Take 1 tablet by mouth daily       gabapentin (NEURONTIN) 300 MG capsule TAKE ONE CAPSULE BY MOUTH NIGHTLY AS NEEDED 30 capsule 11     metoprolol succinate ER (TOPROL-XL) 25 MG 24 hr tablet TAKE 1 TABLET(12.5 mg) half tab BY MOUTH DAILY 90 tablet 0     order for DME Please measure and distribute 1 pair of 20mmHg - 30mmHg THIGH high open  toe compression stockings. Jobst ultrasheer or equivalent. 1 each 1     SUMAtriptan (IMITREX) 100 MG tablet Take 1 tablet (100 mg) by mouth daily as needed for migraine 30 tablet 3     teriflunomide (AUBAGIO) 14 MG tablet Take 1 tablet (14 mg) by mouth daily 30 tablet 11     traZODone (DESYREL) 100 MG tablet TAKE 1/2 TABLET BY MOUTH DAILY 45 tablet 0     valACYclovir (VALTREX) 500 MG tablet Take 1 tablet (500 mg) by mouth daily 90 tablet 1     VITAMIN D, CHOLECALCIFEROL, PO Take " "by mouth daily        No Known Allergies    Reviewed and updated as needed this visit by clinical staff  Tobacco  Allergies  Meds  Problems  Med Hx  Surg Hx  Fam Hx  Soc Hx        Reviewed and updated as needed this visit by Provider  Allergies  Meds  Problems         ROS:  Constitutional, cardiovascular, pulmonary, gi and gu systems are negative, except as otherwise noted.    OBJECTIVE:     /82 (BP Location: Right arm, Patient Position: Sitting, Cuff Size: Adult Regular)  Pulse 73  Resp 18  Ht 1.67 m (5' 5.75\")  Wt 51.9 kg (114 lb 8 oz)  LMP 07/15/2018  SpO2 98%  BMI 18.62 kg/m2  Body mass index is 18.62 kg/(m^2).  GENERAL: healthy, alert and no distress  RESP: lungs clear to auscultation - no rales, rhonchi or wheezes  CV: regular rate and rhythm, normal S1 S2, no S3 or S4, no murmur, click or rub  MS: no gross musculoskeletal defects noted, no edema    Diagnostic Test Results:  none     ASSESSMENT/PLAN:         1. Benign essential hypertension  Decrease metoprolol to 12.5 mg daily.  Monitor blood pressure at home.  Follow-up in about 3-4 months.  Check kidney function  - metoprolol succinate ER (TOPROL-XL) 25 MG 24 hr tablet; TAKE 1 TABLET(12.5 mg) half tab BY MOUTH DAILY  Dispense: 90 tablet; Refill: 0  - Basic metabolic panel; Future    2. Attention deficit hyperactivity disorder (ADHD), unspecified ADHD type  Gave refill of Adderall.  She does not take it every day just as needed in the morning  - amphetamine-dextroamphetamine (ADDERALL) 10 MG tablet; Take 1 tablet (10 mg) by mouth daily  Dispense: 30 tablet; Refill: 0    3. Screening for cholesterol level  Screening  - Lipid panel reflex to direct LDL Fasting; Future    4. Screening for diabetes mellitus  Screening  - Glucose; Future    5. History of anemia  History of anemia, check hemoglobin  - Hemoglobin; Future    FUTURE APPOINTMENTS:       - Follow-up visit in 3-4 months    MARRY Goldstein, NP-C  [facility]"

## 2018-12-05 RX ORDER — METOPROLOL SUCCINATE 25 MG/1
TABLET, EXTENDED RELEASE ORAL
Qty: 90 TABLET | Refills: 0 | COMMUNITY
Start: 2018-12-05 | End: 2019-04-09 | Stop reason: DRUGHIGH

## 2018-12-05 ASSESSMENT — ANXIETY QUESTIONNAIRES: GAD7 TOTAL SCORE: 6

## 2018-12-24 ENCOUNTER — TELEPHONE (OUTPATIENT)
Dept: NEUROLOGY | Facility: CLINIC | Age: 53
End: 2018-12-24

## 2018-12-24 NOTE — TELEPHONE ENCOUNTER
PA Initiation    Medication: Aubagio 14MG tablets (APPROVED)  Insurance Company: [employer] - Phone [phone] Fax [phone]  Pharmacy Filling the Rx: [facility] - [place], [place] - [facility] [address]  Filling Pharmacy Phone: [phone]  Filling Pharmacy Fax: [phone]  Start Date: 12/24/2018    Received fax from [facility] to initiated PA renewal for Aubagio.

## 2018-12-24 NOTE — TELEPHONE ENCOUNTER
Prior Authorization Approval    Authorization Effective Date: 11/24/2018  Authorization Expiration Date: 12/24/2019  Medication: Aubagio 14MG tablets (APPROVED)  Approved Dose/Quantity: 28  Reference #: CMM KEY: [id number]   Insurance Company: [employer] - Phone [phone] Fax [phone]  Expected CoPay:       CoPay Card Available:      Foundation Assistance Needed:    Which Pharmacy is filling the prescription (Not needed for infusion/clinic administered): [facility] - [place] [address] [address]  Pharmacy Notified: Yes  Patient Notified: Yes

## 2019-01-03 ENCOUNTER — MYC REFILL (OUTPATIENT)
Dept: FAMILY MEDICINE | Facility: CLINIC | Age: 54
End: 2019-01-03

## 2019-01-03 DIAGNOSIS — F90.9 ATTENTION DEFICIT HYPERACTIVITY DISORDER (ADHD), UNSPECIFIED ADHD TYPE: ICD-10-CM

## 2019-01-03 NOTE — TELEPHONE ENCOUNTER
Requested Prescriptions   Pending Prescriptions Disp Refills     amphetamine-dextroamphetamine (ADDERALL) 10 MG tablet 30 tablet 0     Sig: Take 1 tablet (10 mg) by mouth daily    There is no refill protocol information for this order          amphetamine-dextroamphetamine (ADDERALL) 10 MG tablet      Last Written Prescription Date:  12/4/18  Last Fill Quantity: 30,   # refills: 0  Last Office Visit: 12/4/18  Future Office visit:       Routing refill request to provider for review/approval because:  Drug not on the [facility], [facility] or [facility] refill protocol or controlled substance

## 2019-01-04 RX ORDER — DEXTROAMPHETAMINE SACCHARATE, AMPHETAMINE ASPARTATE, DEXTROAMPHETAMINE SULFATE AND AMPHETAMINE SULFATE 2.5; 2.5; 2.5; 2.5 MG/1; MG/1; MG/1; MG/1
10 TABLET ORAL DAILY
Qty: 30 TABLET | Refills: 0 | Status: SHIPPED | OUTPATIENT
Start: 2019-01-04 | End: 2019-02-05

## 2019-02-05 ENCOUNTER — MYC REFILL (OUTPATIENT)
Dept: FAMILY MEDICINE | Facility: CLINIC | Age: 54
End: 2019-02-05

## 2019-02-05 DIAGNOSIS — F33.0 MILD EPISODE OF RECURRENT MAJOR DEPRESSIVE DISORDER (H): ICD-10-CM

## 2019-02-05 DIAGNOSIS — F90.9 ATTENTION DEFICIT HYPERACTIVITY DISORDER (ADHD), UNSPECIFIED ADHD TYPE: ICD-10-CM

## 2019-02-05 RX ORDER — DEXTROAMPHETAMINE SACCHARATE, AMPHETAMINE ASPARTATE, DEXTROAMPHETAMINE SULFATE AND AMPHETAMINE SULFATE 2.5; 2.5; 2.5; 2.5 MG/1; MG/1; MG/1; MG/1
10 TABLET ORAL DAILY
Qty: 30 TABLET | Refills: 0 | Status: SHIPPED | OUTPATIENT
Start: 2019-02-05 | End: 2019-03-05

## 2019-02-05 NOTE — TELEPHONE ENCOUNTER
"Requested Prescriptions   Pending Prescriptions Disp Refills     buPROPion (WELLBUTRIN SR) 150 MG 12 hr tablet [Pharmacy Med Name: BUPROPION SR 150MG TABLETS (12 H)]  Last Written Prescription Date:  5/21/18  Last Fill Quantity: 90 tablet,  # refills: 1   Last office visit: 12/4/2018 with prescribing provider:  Dr. Anand   Future Office Visit:   90 tablet 0     Sig: TAKE 1 TABLET(150 MG) BY MOUTH DAILY    SSRIs Protocol Failed - 2/5/2019 12:39 PM       Failed - PHQ-9 score less than 5 in past 6 months    Please review last PHQ-9 score.   PHQ-9 SCORE 1/23/2018 5/21/2018 12/4/2018   PHQ-9 Total Score 14 0 9     ROSELINE-7 SCORE 1/23/2018 5/21/2018 12/4/2018   Total Score 5 0 6            Passed - Medication is Bupropion    If the medication is Bupropion (Wellbutrin), and the patient is taking for smoking cessation; OK to refill.         Passed - Medication is active on med list       Passed - Patient is age 18 or older       Passed - No active pregnancy on record       Passed - No positive pregnancy test in last 12 months       Passed - Recent (6 mo) or future (30 days) visit within the authorizing provider's specialty    Patient had office visit in the last 6 months or has a visit in the next 30 days with authorizing provider or within the authorizing provider's specialty.  See \"Patient Info\" tab in inbasket, or \"Choose Columns\" in Meds & Orders section of the refill encounter.              "

## 2019-02-05 NOTE — TELEPHONE ENCOUNTER
Script ready for . Due for follow up office visit in 2 months.  MARRY Goldstein, NP-C  [facility]

## 2019-02-05 NOTE — TELEPHONE ENCOUNTER
Requested Prescriptions   Pending Prescriptions Disp Refills     amphetamine-dextroamphetamine (ADDERALL) 10 MG tablet 30 tablet 0     Sig: Take 1 tablet (10 mg) by mouth daily    There is no refill protocol information for this order        Routing refill request to provider for review/approval because:  Drug not on the [facility] refill protocol       Miguelito Hollis RN, BSN

## 2019-02-06 RX ORDER — BUPROPION HYDROCHLORIDE 150 MG/1
TABLET, EXTENDED RELEASE ORAL
Qty: 90 TABLET | Refills: 0 | Status: SHIPPED | OUTPATIENT
Start: 2019-02-06 | End: 2019-05-07

## 2019-02-07 NOTE — TELEPHONE ENCOUNTER
Routing refill request to provider for review/approval because:  Labs out of range:  PHQ-9 > 4 in the last 6 months  A break in medication - last filled in May 2018 for a 6 month supply    Juanita Hercules RN

## 2019-02-11 ENCOUNTER — OFFICE VISIT (OUTPATIENT)
Dept: FAMILY MEDICINE | Facility: CLINIC | Age: 54
End: 2019-02-11
Payer: COMMERCIAL

## 2019-02-11 VITALS
OXYGEN SATURATION: 97 % | HEIGHT: 66 IN | WEIGHT: 117.4 LBS | TEMPERATURE: 97.9 F | SYSTOLIC BLOOD PRESSURE: 109 MMHG | DIASTOLIC BLOOD PRESSURE: 73 MMHG | HEART RATE: 66 BPM | RESPIRATION RATE: 16 BRPM | BODY MASS INDEX: 18.87 KG/M2

## 2019-02-11 DIAGNOSIS — H92.03 OTALGIA OF BOTH EARS: Primary | ICD-10-CM

## 2019-02-11 PROCEDURE — 99213 OFFICE O/P EST LOW 20 MIN: CPT | Performed by: NURSE PRACTITIONER

## 2019-02-11 ASSESSMENT — PAIN SCALES - GENERAL: PAINLEVEL: MODERATE PAIN (4)

## 2019-02-11 ASSESSMENT — MIFFLIN-ST. JEOR: SCORE: 1155.3

## 2019-02-11 NOTE — PROGRESS NOTES
"  SUBJECTIVE:   Liyah Jernigan is a 54 year old female who presents to clinic today for the following health issues:      Acute Illness   Acute illness concerns: Bilateral ear pain  Onset: 10 days    Fever: no    Chills/Sweats: no    Headache (location?): Yes    Sinus Pressure:no    Conjunctivitis:  YES: bilateral maybe a little crusting.     Ear Pain: YES: bilateral    Rhinorrhea: YES    Congestion: YES    Sore Throat: no     Cough: no    Wheeze: no    Decreased Appetite: no    Nausea: no    Vomiting: no    Diarrhea:  no    Dysuria/Freq.: no    Fatigue/Achiness: YES    Sick/Strep Exposure: no     Therapies Tried and outcome:  Tylenol unsure if it helped though    Son recently dx with ear infection and they share ear buds. Having a little dizziness, a little achy. A little dizzy at times. Fatigued.         Problem list and histories reviewed & adjusted, as indicated.  Additional history: as documented    Patient Active Problem List   Diagnosis     Attention deficit hyperactivity disorder (ADHD), unspecified ADHD type     MS (multiple sclerosis) (H)     Varicose vein     Restless leg syndrome     Mild episode of recurrent major depressive disorder (H)     Genital herpes simplex, unspecified site     Benign essential hypertension     Past Surgical History:   Procedure Laterality Date     ENT SURGERY      tympanoplasty     GYN SURGERY      laparoscopies (5) laparotomies (2) for ectopic       Social History     Tobacco Use     Smoking status: Never Smoker     Smokeless tobacco: Never Used   Substance Use Topics     Alcohol use: No     Alcohol/week: 0.0 oz     Family History   Problem Relation Age of Onset     Diabetes Father              Hypertension Father      Hyperlipidemia Father      Substance Abuse Father      Obesity Father      Diabetes Mother         \"borderline\" for years     Hypertension Mother      Hyperlipidemia Mother      Depression Mother      Substance Abuse Mother      Asthma Mother      " Thyroid Disease Mother      Obesity Mother      Cerebrovascular Disease Maternal Grandfather         he had a few     Substance Abuse Maternal Grandfather      Breast Cancer Maternal Grandmother         in 50s     Substance Abuse Paternal Grandfather          Current Outpatient Medications   Medication Sig Dispense Refill     amphetamine-dextroamphetamine (ADDERALL) 10 MG tablet Take 1 tablet (10 mg) by mouth daily 30 tablet 0     Ascorbic Acid (VITAMIN C PO) Take 500 mg by mouth daily       biotin 2.5 mg/mL SUSP Take 20 mg by mouth daily       buPROPion (WELLBUTRIN SR) 150 MG 12 hr tablet TAKE 1 TABLET(150 MG) BY MOUTH DAILY 90 tablet 0     Ferrous Sulfate (IRON SUPPLEMENT PO) Take 1 tablet by mouth daily       gabapentin (NEURONTIN) 300 MG capsule TAKE ONE CAPSULE BY MOUTH NIGHTLY AS NEEDED 30 capsule 11     metoprolol succinate ER (TOPROL-XL) 25 MG 24 hr tablet TAKE 1 TABLET(12.5 mg) half tab BY MOUTH DAILY 90 tablet 0     order for DME Please measure and distribute 1 pair of 20mmHg - 30mmHg THIGH high open  toe compression stockings. Jobst ultrasheer or equivalent. 1 each 1     SUMAtriptan (IMITREX) 100 MG tablet Take 1 tablet (100 mg) by mouth daily as needed for migraine 30 tablet 3     teriflunomide (AUBAGIO) 14 MG tablet Take 1 tablet (14 mg) by mouth daily 30 tablet 11     traZODone (DESYREL) 100 MG tablet TAKE 1/2 TABLET BY MOUTH DAILY 45 tablet 0     valACYclovir (VALTREX) 500 MG tablet Take 1 tablet (500 mg) by mouth daily 90 tablet 1     VITAMIN D, CHOLECALCIFEROL, PO Take by mouth daily        No Known Allergies    Reviewed and updated as needed this visit by clinical staff  Tobacco  Allergies  Meds  Problems  Med Hx  Surg Hx  Fam Hx  Soc Hx        Reviewed and updated as needed this visit by Provider  Tobacco  Allergies  Meds  Problems  Med Hx  Surg Hx  Fam Hx         ROS:  Constitutional, HEENT-as above, cardiovascular, pulmonary, gi and gu systems are negative, except as otherwise  "noted.    OBJECTIVE:     /73 (BP Location: Right arm, Patient Position: Sitting)   Pulse 66   Temp 97.9  F (36.6  C) (Oral)   Resp 16   Ht 1.67 m (5' 5.75\")   Wt 53.3 kg (117 lb 6.4 oz)   LMP 01/30/2019   SpO2 97%   BMI 19.09 kg/m    Body mass index is 19.09 kg/m .  GENERAL: healthy, alert and no distress  EYES: Eyes grossly normal to inspection, PERRL and conjunctivae and sclerae normal  HENT: ear canals and TM's normal-right TM maybe tiny bit pink in one area but no exudate or drainage, nose and mouth without ulcers or lesions  NECK: no adenopathy, no asymmetry, masses, or scars and thyroid normal to palpation  RESP: lungs clear to auscultation - no rales, rhonchi or wheezes  CV: regular rate and rhythm, normal S1 S2, no S3 or S4, no murmur, click or rub    Diagnostic Test Results:  none     ASSESSMENT/PLAN:         1. Otalgia of both ears  No noted reason for pain. Encouraged decongestant or allergy medication. Call if not improving or fever/chills. She is okay with monitoring for now vs antibiotic      FUTURE APPOINTMENTS:       - Follow-up visit in 7 days prn    MARRY Goldstein, NP-C  [facility]    "

## 2019-02-19 ENCOUNTER — DOCUMENTATION ONLY (OUTPATIENT)
Dept: CARE COORDINATION | Facility: CLINIC | Age: 54
End: 2019-02-19

## 2019-02-25 DIAGNOSIS — I10 BENIGN ESSENTIAL HYPERTENSION: ICD-10-CM

## 2019-02-25 NOTE — TELEPHONE ENCOUNTER
"Requested Prescriptions   Pending Prescriptions Disp Refills     metoprolol succinate ER (TOPROL-XL) 25 MG 24 hr tablet [Pharmacy Med Name: METOPROLOL ER SUCCINATE 25MG TABS] 90 tablet 0     Sig: TAKE 1 TABLET(25 MG) BY MOUTH DAILY    Beta-Blockers Protocol Passed - 2/25/2019  1:01 PM       Passed - Blood pressure under 140/90 in past 12 months    BP Readings from Last 3 Encounters:   02/11/19 109/73   12/04/18 110/82   09/11/18 116/79                Passed - Patient is age 6 or older       Passed - Recent (12 mo) or future (30 days) visit within the authorizing provider's specialty    Patient had office visit in the last 12 months or has a visit in the next 30 days with authorizing provider or within the authorizing provider's specialty.  See \"Patient Info\" tab in inbasket, or \"Choose Columns\" in Meds & Orders section of the refill encounter.             Passed - Medication is active on med list        metoprolol succinate ER (TOPROL-XL) 25 MG 24 hr tablet  Last Written Prescription Date:  12/5/18  Last Fill Quantity: 90,  # refills: 0   Last office visit: 2/11/2019 with prescribing provider:  Dr. Anand   Future Office Visit:      "

## 2019-02-27 RX ORDER — METOPROLOL SUCCINATE 25 MG/1
TABLET, EXTENDED RELEASE ORAL
Qty: 90 TABLET | Refills: 2 | Status: SHIPPED | OUTPATIENT
Start: 2019-02-27 | End: 2019-11-14

## 2019-02-27 NOTE — TELEPHONE ENCOUNTER
Prescription approved per [facility] [place].      Miguelito Hollis RN, BSN

## 2019-03-03 DIAGNOSIS — F51.01 PRIMARY INSOMNIA: ICD-10-CM

## 2019-03-05 ENCOUNTER — MYC REFILL (OUTPATIENT)
Dept: FAMILY MEDICINE | Facility: CLINIC | Age: 54
End: 2019-03-05

## 2019-03-05 DIAGNOSIS — F90.9 ATTENTION DEFICIT HYPERACTIVITY DISORDER (ADHD), UNSPECIFIED ADHD TYPE: ICD-10-CM

## 2019-03-05 RX ORDER — TRAZODONE HYDROCHLORIDE 100 MG/1
TABLET ORAL
Qty: 45 TABLET | Refills: 0 | Status: SHIPPED | OUTPATIENT
Start: 2019-03-05 | End: 2019-05-07

## 2019-03-05 RX ORDER — DEXTROAMPHETAMINE SACCHARATE, AMPHETAMINE ASPARTATE, DEXTROAMPHETAMINE SULFATE AND AMPHETAMINE SULFATE 2.5; 2.5; 2.5; 2.5 MG/1; MG/1; MG/1; MG/1
10 TABLET ORAL DAILY
Qty: 30 TABLET | Refills: 0 | Status: SHIPPED | OUTPATIENT
Start: 2019-03-05 | End: 2019-04-15

## 2019-03-05 NOTE — TELEPHONE ENCOUNTER
Requested Prescriptions   Pending Prescriptions Disp Refills     amphetamine-dextroamphetamine (ADDERALL) 10 MG tablet  Last Written Prescription Date:  2/5/19  Last Fill Quantity: 30 tablet,  # refills: 0   Last office visit: 2/11/2019 with prescribing provider:  Tanya Clarke NP   Future Office Visit:    .  Routing refill request to provider for review/approval because:  Drug not on the [facility] or [facility] refill protocol or controlled substance     30 tablet 0     Sig: Take 1 tablet (10 mg) by mouth daily    There is no refill protocol information for this order

## 2019-04-09 ENCOUNTER — ANCILLARY PROCEDURE (OUTPATIENT)
Dept: MRI IMAGING | Facility: CLINIC | Age: 54
End: 2019-04-09
Attending: PSYCHIATRY & NEUROLOGY
Payer: COMMERCIAL

## 2019-04-09 ENCOUNTER — OFFICE VISIT (OUTPATIENT)
Dept: NEUROLOGY | Facility: CLINIC | Age: 54
End: 2019-04-09
Attending: PSYCHIATRY & NEUROLOGY
Payer: COMMERCIAL

## 2019-04-09 VITALS
SYSTOLIC BLOOD PRESSURE: 122 MMHG | DIASTOLIC BLOOD PRESSURE: 85 MMHG | HEART RATE: 69 BPM | BODY MASS INDEX: 19.09 KG/M2 | HEIGHT: 66 IN

## 2019-04-09 DIAGNOSIS — G35 MS (MULTIPLE SCLEROSIS) (H): Primary | ICD-10-CM

## 2019-04-09 DIAGNOSIS — G35 MS (MULTIPLE SCLEROSIS) (H): ICD-10-CM

## 2019-04-09 LAB
ALT SERPL W P-5'-P-CCNC: 34 U/L (ref 0–50)
AST SERPL W P-5'-P-CCNC: 27 U/L (ref 0–45)

## 2019-04-09 PROCEDURE — 36415 COLL VENOUS BLD VENIPUNCTURE: CPT | Performed by: PSYCHIATRY & NEUROLOGY

## 2019-04-09 PROCEDURE — 84460 ALANINE AMINO (ALT) (SGPT): CPT | Performed by: PSYCHIATRY & NEUROLOGY

## 2019-04-09 PROCEDURE — 84450 TRANSFERASE (AST) (SGOT): CPT | Performed by: PSYCHIATRY & NEUROLOGY

## 2019-04-09 PROCEDURE — G0463 HOSPITAL OUTPT CLINIC VISIT: HCPCS | Mod: ZF

## 2019-04-09 NOTE — LETTER
"4/9/2019      RE: Liyah Jernigan  [address] [place] [id number]       Service Date: 04/09/2019      REASON FOR VISIT:  Liyah Jernigan is a 54-year-old woman who I follow for multiple sclerosis.  She returns for routine followup and MRI review.  I last saw her in 09/2018.      HISTORY OF PRESENT ILLNESS:  Liyah notes that \"something is going on with my eyes.\"  She finds it difficult to see anything clearly if it is straight ahead of her, and finds her looking (at people, objects, etc.) with her head slightly turned in either direction to see it better.  She does not have any double vision.  She finds it hard to describe what exactly is worse visually with objects straight ahead, saying \"I don't know if it is blurry, or my brain just does not understand what is going on.\"  Otherwise, she has no residual neurologic symptoms.  Sensory and motor function in the arms and legs is good and walking is fine.  Cognition, bladder and bowel function are all fine.  She continues to take teriflunomide and tolerates it well.  She had alopecia in the past but that has resolved.      PHYSICAL EXAMINATION:   VITAL SIGNS:  Blood pressure 122/85, pulse 69, respiratory rate 14.   NEUROLOGIC EXAM:  She is alert and oriented.  Affect is bright and language functions are normal.  Cranial nerves show pupils that are equal and normally reactive.  There is no afferent pupillary defect.  The ophthalmoscopic exam is normal.  Visual acuity is 20/20 each eye on a phone-based acuity chart.  Eye movements are full without diplopia or nystagmus.  Facial strength and sensation are normal.  Palate elevates midline.  Muscle bulk, tone, strength and dexterity are normal in the arms and legs.  Light touch is intact in the hands and feet.  Deep tendon reflexes are asymmetric at the biceps, brisker on the left.  They are normal and symmetric in the knees and ankles.  Her gait is narrow-based and stable with normal tandem walk and negative Romberg. "     We reviewed her MRI of the brain done earlier today together and compared it to the prior exam from 2017.  Again seen is a moderate burden of T2 hyperintense lesions typical for MS.  Since the prior exam, there were no new lesions.      IMPRESSION:  Relapsing remitting multiple sclerosis, stable on teriflunomide.  I spent 33 minutes with her today, greater than 50% of which was spent in counseling and coordination of care and reviewing her MRI.  The MRI suggests the Aubagio continues to be effective at controlling her MS.  I am not sure what to make of her visual complaints.  I found no abnormalities of visual acuity and, no disconjugate gaze or diplopia today.  I suppose it could be that she has retinal damage from MS affecting mainly the macular fibers that would explain the subjectively poor central vision.  Ultimately, I suggested referring her to Neurophthalmology and she agreed.      PLAN:   1. Consult to  Neurophthalmology, Dr. Smallwood or Dr. Muse.   2. AST and ALT today.   3. Return to clinic in 6 months.      KAY DOLAN MD       D: 2019   T: 2019   MT: [email or web address]      Name:     HODA TRUJILLO   MRN:      [id number]        Account:      [id number]   :      1965           Service Date: 2019      Document: [id number]

## 2019-04-09 NOTE — PROGRESS NOTES
"Service Date: 04/09/2019      REASON FOR VISIT:  Liyah Jernigan is a 54-year-old woman who I follow for multiple sclerosis.  She returns for routine followup and MRI review.  I last saw her in 09/2018.      HISTORY OF PRESENT ILLNESS:  Liyah notes that \"something is going on with my eyes.\"  She finds it difficult to see anything clearly if it is straight ahead of her, and finds her looking (at people, objects, etc.) with her head slightly turned in either direction to see it better.  She does not have any double vision.  She finds it hard to describe what exactly is worse visually with objects straight ahead, saying \"I don't know if it is blurry, or my brain just does not understand what is going on.\"  Otherwise, she has no residual neurologic symptoms.  Sensory and motor function in the arms and legs is good and walking is fine.  Cognition, bladder and bowel function are all fine.  She continues to take teriflunomide and tolerates it well.  She had alopecia in the past but that has resolved.      PHYSICAL EXAMINATION:   VITAL SIGNS:  Blood pressure 122/85, pulse 69, respiratory rate 14.   NEUROLOGIC EXAM:  She is alert and oriented.  Affect is bright and language functions are normal.  Cranial nerves show pupils that are equal and normally reactive.  There is no afferent pupillary defect.  The ophthalmoscopic exam is normal.  Visual acuity is 20/20 each eye on a phone-based acuity chart.  Eye movements are full without diplopia or nystagmus.  Facial strength and sensation are normal.  Palate elevates midline.  Muscle bulk, tone, strength and dexterity are normal in the arms and legs.  Light touch is intact in the hands and feet.  Deep tendon reflexes are asymmetric at the biceps, brisker on the left.  They are normal and symmetric in the knees and ankles.  Her gait is narrow-based and stable with normal tandem walk and negative Romberg.     We reviewed her MRI of the brain done earlier today together and compared it to " the prior exam from 2017.  Again seen is a moderate burden of T2 hyperintense lesions typical for MS.  Since the prior exam, there were no new lesions.      IMPRESSION:  Relapsing remitting multiple sclerosis, stable on teriflunomide.  I spent 33 minutes with her today, greater than 50% of which was spent in counseling and coordination of care and reviewing her MRI.  The MRI suggests the Aubagio continues to be effective at controlling her MS.  I am not sure what to make of her visual complaints.  I found no abnormalities of visual acuity and, no disconjugate gaze or diplopia today.  I suppose it could be that she has retinal damage from MS affecting mainly the macular fibers that would explain the subjectively poor central vision.  Ultimately, I suggested referring her to Neurophthalmology and she agreed.      PLAN:   1. Consult to  Neurophthalmology, Dr. Smallwood or Dr. Muse.   2. AST and ALT today.   3. Return to clinic in 6 months.         KAY DOLAN MD             D: 2019   T: 2019   MT: tb      Name:     HOAD TRUJILLO   MRN:      [id number]        Account:      [id number]   :      1965           Service Date: 2019      Document: [id number]

## 2019-04-15 ENCOUNTER — MYC REFILL (OUTPATIENT)
Dept: FAMILY MEDICINE | Facility: CLINIC | Age: 54
End: 2019-04-15

## 2019-04-15 DIAGNOSIS — A60.00 GENITAL HERPES SIMPLEX, UNSPECIFIED SITE: ICD-10-CM

## 2019-04-15 DIAGNOSIS — F90.9 ATTENTION DEFICIT HYPERACTIVITY DISORDER (ADHD), UNSPECIFIED ADHD TYPE: ICD-10-CM

## 2019-04-15 RX ORDER — DEXTROAMPHETAMINE SACCHARATE, AMPHETAMINE ASPARTATE, DEXTROAMPHETAMINE SULFATE AND AMPHETAMINE SULFATE 2.5; 2.5; 2.5; 2.5 MG/1; MG/1; MG/1; MG/1
10 TABLET ORAL DAILY
Qty: 30 TABLET | Refills: 0 | Status: SHIPPED | OUTPATIENT
Start: 2019-04-15 | End: 2019-05-24

## 2019-04-15 NOTE — TELEPHONE ENCOUNTER
Requested Prescriptions   Pending Prescriptions Disp Refills     amphetamine-dextroamphetamine (ADDERALL) 10 MG tablet 30 tablet 0     Sig: Take 1 tablet (10 mg) by mouth daily       There is no refill protocol information for this order        Routing refill request to provider for review/approval because:  Drug not on the [facility] refill protocol     Juanita Hercules RN

## 2019-04-15 NOTE — TELEPHONE ENCOUNTER
"Requested Prescriptions   Pending Prescriptions Disp Refills     valACYclovir (VALTREX) 500 MG tablet [Pharmacy Med Name: VALACYCLOVIR 500MG TABLETS] 90 tablet 0     Sig: TAKE 1 TABLET BY MOUTH DAILY       Antivirals for Herpes Protocol Failed - 4/15/2019  4:20 PM        Failed - Normal serum creatinine on file in past 12 months     Recent Labs   Lab Test 01/23/18  1052   CR 0.95             Passed - Patient is age 12 or older        Passed - Recent (12 mo) or future (30 days) visit within the authorizing provider's specialty     Patient had office visit in the last 12 months or has a visit in the next 30 days with authorizing provider or within the authorizing provider's specialty.  See \"Patient Info\" tab in inbasket, or \"Choose Columns\" in Meds & Orders section of the refill encounter.              Passed - Medication is active on med list        valACYclovir (VALTREX) 500 MG tablet  Last Written Prescription Date:  7/5/18  Last Fill Quantity: 90,  # refills: 1   Last office visit: 2/11/2019 with prescribing provider:  Dr. Barrow   Future Office Visit:      "

## 2019-04-16 RX ORDER — VALACYCLOVIR HYDROCHLORIDE 500 MG/1
TABLET, FILM COATED ORAL
Qty: 90 TABLET | Refills: 0 | Status: SHIPPED | OUTPATIENT
Start: 2019-04-16 | End: 2019-09-09

## 2019-04-16 NOTE — TELEPHONE ENCOUNTER
Routing refill request to provider for review/approval because:  Labs not current:  Creatinine      Miguelito Hollis RN, BSN

## 2019-04-29 ENCOUNTER — OFFICE VISIT (OUTPATIENT)
Dept: FAMILY MEDICINE | Facility: CLINIC | Age: 54
End: 2019-04-29
Payer: COMMERCIAL

## 2019-04-29 ENCOUNTER — TELEPHONE (OUTPATIENT)
Dept: FAMILY MEDICINE | Facility: CLINIC | Age: 54
End: 2019-04-29

## 2019-04-29 VITALS
HEART RATE: 66 BPM | SYSTOLIC BLOOD PRESSURE: 112 MMHG | BODY MASS INDEX: 18.96 KG/M2 | DIASTOLIC BLOOD PRESSURE: 80 MMHG | RESPIRATION RATE: 16 BRPM | WEIGHT: 118 LBS | TEMPERATURE: 98.6 F | OXYGEN SATURATION: 99 % | HEIGHT: 66 IN

## 2019-04-29 DIAGNOSIS — F90.9 ATTENTION DEFICIT HYPERACTIVITY DISORDER (ADHD), UNSPECIFIED ADHD TYPE: ICD-10-CM

## 2019-04-29 DIAGNOSIS — H92.01 RIGHT EAR PAIN: Primary | ICD-10-CM

## 2019-04-29 PROCEDURE — 99213 OFFICE O/P EST LOW 20 MIN: CPT | Performed by: NURSE PRACTITIONER

## 2019-04-29 RX ORDER — CIPROFLOXACIN AND DEXAMETHASONE 3; 1 MG/ML; MG/ML
4 SUSPENSION/ DROPS AURICULAR (OTIC) 2 TIMES DAILY
Qty: 2 ML | Refills: 0 | Status: SHIPPED | OUTPATIENT
Start: 2019-04-29 | End: 2019-04-30

## 2019-04-29 ASSESSMENT — MIFFLIN-ST. JEOR: SCORE: 1158.02

## 2019-04-29 NOTE — TELEPHONE ENCOUNTER
Reason for Call:  Other prescription    Detailed comments: asking to change the medication that was prescribed, CIPRO HC, to CIPRODEX so the insurance will cover it.    Phone Number Patient can be reached at: Other phone number:[phone]      Best Time: any    Can we leave a detailed message on this number? YES    Call taken on 4/29/2019 at 3:06 PM by Cally Moya

## 2019-04-29 NOTE — PROGRESS NOTES
SUBJECTIVE:   Liyah Jernigan is a 54 year old female who presents to clinic today for the following   health issues:        Concern - RT ear pain  Onset: Since last visit    Description:   Sharp intermittent pain in RT ear that has not gone away since february    Intensity: moderate, severe    Progression of Symptoms:  worsening    Accompanying Signs & Symptoms:          Nasal drainage, ears popping    Previous history of similar problem:   yes    Precipitating factors:   Worsened by:     Alleviating factors:  Improved by: nothing    Therapies Tried and outcome: nothing    Some discharge a few weeks ago. Cannot even put eat buds in.     Adderall: doesn't take daily, sometimes takes 1/2 dose prn. Depending on how long her son sleeps and if she has lots of projects to do. It helps her focus well. 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 Sleeping well. No GI issues. Sleep is fine. And she eats all the time. - 6 months in person.         Additional history: as documented    Reviewed  and updated as needed this visit by clinical staff  Tobacco  Allergies  Meds  Problems  Med Hx  Surg Hx  Fam Hx  Soc Hx          Reviewed and updated as needed this visit by Provider  Tobacco  Allergies  Meds  Problems  Med Hx  Surg Hx  Fam Hx         Patient Active Problem List   Diagnosis     Attention deficit hyperactivity disorder (ADHD), unspecified ADHD type     MS (multiple sclerosis) (H)     Varicose vein     Restless leg syndrome     Mild episode of recurrent major depressive disorder (H)     Genital herpes simplex, unspecified site     Benign essential hypertension     Past Surgical History:   Procedure Laterality Date     ENT SURGERY  2005    tympanoplasty     GYN SURGERY  1990    laparoscopies (5) laparotomies (2) for ectopic       Social History     Tobacco Use     Smoking status: Never Smoker     Smokeless tobacco: Never Used   Substance Use Topics     Alcohol use: No     Alcohol/week: 0.0  "oz     Family History   Problem Relation Age of Onset     Diabetes Father              Hypertension Father      Hyperlipidemia Father      Substance Abuse Father      Obesity Father      Diabetes Mother         \"borderline\" for years     Hypertension Mother      Hyperlipidemia Mother      Depression Mother      Substance Abuse Mother      Asthma Mother      Thyroid Disease Mother      Obesity Mother      Cerebrovascular Disease Maternal Grandfather         he had a few     Substance Abuse Maternal Grandfather      Breast Cancer Maternal Grandmother         in 50s     Substance Abuse Paternal Grandfather          Current Outpatient Medications   Medication Sig Dispense Refill     amphetamine-dextroamphetamine (ADDERALL) 10 MG tablet Take 1 tablet (10 mg) by mouth daily Needs to be seen for any further refill. 30 tablet 0     Ascorbic Acid (VITAMIN C PO) Take 500 mg by mouth daily       biotin 2.5 mg/mL SUSP Take 20 mg by mouth daily       buPROPion (WELLBUTRIN SR) 150 MG 12 hr tablet TAKE 1 TABLET(150 MG) BY MOUTH DAILY 90 tablet 0     Ferrous Sulfate (IRON SUPPLEMENT PO) Take 1 tablet by mouth daily       gabapentin (NEURONTIN) 300 MG capsule TAKE ONE CAPSULE BY MOUTH NIGHTLY AS NEEDED 30 capsule 11     metoprolol succinate ER (TOPROL-XL) 25 MG 24 hr tablet TAKE 1 TABLET(25 MG) BY MOUTH DAILY 90 tablet 2     order for DME Please measure and distribute 1 pair of 20mmHg - 30mmHg THIGH high open  toe compression stockings. Jobst ultrasheer or equivalent. 1 each 1     SUMAtriptan (IMITREX) 100 MG tablet Take 1 tablet (100 mg) by mouth daily as needed for migraine 30 tablet 3     teriflunomide (AUBAGIO) 14 MG tablet Take 1 tablet (14 mg) by mouth daily 30 tablet 11     traZODone (DESYREL) 100 MG tablet TAKE 1/2 TABLET BY MOUTH DAILY 45 tablet 0     valACYclovir (VALTREX) 500 MG tablet TAKE 1 TABLET BY MOUTH DAILY 90 tablet 0     VITAMIN D, CHOLECALCIFEROL, PO Take by mouth daily        ciprofloxacin-dexamethasone " "(CIPRODEX) 0.3-0.1 % otic suspension Place 4 drops into the right ear 2 times daily for 5 days 2 mL 0     No Known Allergies    ROS:  Constitutional, HEENT-as above, cardiovascular, pulmonary, gi and gu systems are negative, except as otherwise noted.    OBJECTIVE:     /80 (BP Location: Right arm, Patient Position: Sitting, Cuff Size: Adult Regular)   Pulse 66   Temp 98.6  F (37  C) (Oral)   Resp 16   Ht 1.67 m (5' 5.75\")   Wt 53.5 kg (118 lb)   SpO2 99%   BMI 19.19 kg/m    Body mass index is 19.19 kg/m .  GENERAL: healthy, alert and no distress  EYES: Eyes grossly normal to inspection, PERRL and conjunctivae and sclerae normal  HENT: ear canals and TM's normal, nose and mouth without ulcers or lesions  NECK: no adenopathy, no asymmetry, masses, or scars and thyroid normal to palpation  RESP: lungs clear to auscultation - no rales, rhonchi or wheezes  CV: regular rate and rhythm, normal S1 S2, no S3 or S4, no murmur, click or rub  MS: no gross musculoskeletal defects noted  GI: no N/V/D    Diagnostic Test Results:  none     ASSESSMENT/PLAN:         1. Right ear pain  Trial Ciprodex in the ear.  4 drops twice a day for 5 days.  Let provider know later in the week all things are going.  May need to consider ENT referral?    2. Attention deficit hyperactivity disorder (ADHD), unspecified ADHD type  ADHD is going well.  She recently got a refill from her PCP.  Does not use it every day, sometimes takes half a tab.  Likes the immediate release not the extended.  She feels like it makes her more calm and focus better.  Follow-up in person in 6 months for recheck      FUTURE APPOINTMENTS:       - Follow-up visit in 6 months for ADHD, later this week for ear pain    MARRY Goldstein, NP-C  [facility]    This chart was documented by provider using a voice activated software called Dragon in addition to manual typing. There may be vocabulary errors or other grammatical errors due to this.       "

## 2019-04-30 ENCOUNTER — TELEPHONE (OUTPATIENT)
Dept: FAMILY MEDICINE | Facility: CLINIC | Age: 54
End: 2019-04-30

## 2019-04-30 DIAGNOSIS — H92.01 RIGHT EAR PAIN: ICD-10-CM

## 2019-04-30 RX ORDER — CIPROFLOXACIN AND DEXAMETHASONE 3; 1 MG/ML; MG/ML
4 SUSPENSION/ DROPS AURICULAR (OTIC) 2 TIMES DAILY
Qty: 2 ML | Refills: 0 | Status: SHIPPED | OUTPATIENT
Start: 2019-04-30 | End: 2019-07-08

## 2019-04-30 NOTE — TELEPHONE ENCOUNTER
Message from [facility] [phone]    ciprofloxacin-dexamethasone (CIPRODEX) 0.3-0.1 % otic suspension not covered by patients plan.    The preferred alternative is Ciprodex.  Please call/fax pharmacy to change medication.    Venita Cotto

## 2019-05-07 ENCOUNTER — OFFICE VISIT (OUTPATIENT)
Dept: FAMILY MEDICINE | Facility: CLINIC | Age: 54
End: 2019-05-07
Payer: COMMERCIAL

## 2019-05-07 VITALS
OXYGEN SATURATION: 99 % | SYSTOLIC BLOOD PRESSURE: 114 MMHG | WEIGHT: 118 LBS | HEART RATE: 63 BPM | BODY MASS INDEX: 18.96 KG/M2 | HEIGHT: 66 IN | TEMPERATURE: 98.4 F | DIASTOLIC BLOOD PRESSURE: 80 MMHG

## 2019-05-07 DIAGNOSIS — F51.01 PRIMARY INSOMNIA: ICD-10-CM

## 2019-05-07 DIAGNOSIS — J01.90 ACUTE NON-RECURRENT SINUSITIS, UNSPECIFIED LOCATION: Primary | ICD-10-CM

## 2019-05-07 DIAGNOSIS — F33.0 MILD EPISODE OF RECURRENT MAJOR DEPRESSIVE DISORDER (H): ICD-10-CM

## 2019-05-07 PROCEDURE — 99214 OFFICE O/P EST MOD 30 MIN: CPT | Performed by: FAMILY MEDICINE

## 2019-05-07 RX ORDER — BUPROPION HYDROCHLORIDE 150 MG/1
TABLET, EXTENDED RELEASE ORAL
Qty: 90 TABLET | Refills: 0 | Status: SHIPPED | OUTPATIENT
Start: 2019-05-07 | End: 2019-07-11

## 2019-05-07 RX ORDER — TRAZODONE HYDROCHLORIDE 100 MG/1
TABLET ORAL
Qty: 45 TABLET | Refills: 0 | Status: SHIPPED | OUTPATIENT
Start: 2019-05-07 | End: 2019-10-21

## 2019-05-07 ASSESSMENT — MIFFLIN-ST. JEOR: SCORE: 1158.02

## 2019-05-07 NOTE — PROGRESS NOTES
"  SUBJECTIVE:   Liyah Jernigan is a 54 year old female who presents to clinic today for the following   health issues:        Acute Illness   Acute illness concerns: URI  Onset: 3 weeks    Fever: YES low grade 99.6    Chills/Sweats: no     Headache (location?): YES    Sinus Pressure:YES    Conjunctivitis:  no    Ear Pain: YES: both    Rhinorrhea: YES    Congestion: YES    Sore Throat: YES     Cough: YES-non-productive to productive of liquid sputum    Wheeze: YES    Decreased Appetite: no     Nausea: no     Vomiting: no     Diarrhea:  no     Dysuria/Freq.: no     Fatigue/Achiness: YES    Sick/Strep Exposure: YES- son      Therapies Tried and outcome: Zyrtec, algera and cough drops     SUBJECTIVE:  Here today for the above symptoms which started with a cold a few weeks ago.  Just does not seem to be getting any better.  At times she has had some low-grade fevers with lots of sinus pressure and postnasal drip.  Recently developed some cough and a loss of her voice.  Not particularly short of breath at times she feels somewhat wheezy.  Does not suffer from asthma but does have some seasonal allergies which actually feel like they are under pretty decent control.    Review of systems otherwise negative.  Past medical, family, and social history reviewed and updated in chart.    OBJECTIVE:  /80 (BP Location: Right arm, Patient Position: Chair, Cuff Size: Adult Regular)   Pulse 63   Temp 98.4  F (36.9  C) (Oral)   Ht 1.67 m (5' 5.75\")   Wt 53.5 kg (118 lb)   SpO2 99%   Breastfeeding? No   BMI 19.19 kg/m    Alert, pleasant, upbeat, and in no apparent discomfort.  Voice very rough and hoarse  Ears normal. Throat and pharynx normal. Neck supple. No adenopathy or masses in the neck or supraclavicular regions. Sinuses non tender.  S1 and S2 normal, no murmurs, clicks, gallops or rubs. Regular rate and rhythm. Chest is clear; no wheezes or rales. No edema or JVD.  I note only benign skin findings. No unusual rashes " or suspicious skin lesions noted. Nails appear normal.   Past labs reviewed with the patient.     ASSESSMENT / PLAN:  (J01.90) Acute non-recurrent sinusitis, unspecified location  (primary encounter diagnosis)  Comment: Discussed mechanism of action of the proposed medication, as well as potential effects, both good and bad.  Patient expressed understanding and agreed with treatment.   Plan: amoxicillin-clavulanate (AUGMENTIN) 875-125 MG         tablet            (F33.0) Mild episode of recurrent major depressive disorder (H)  Comment: Stable on current dosage.  Refilled  Plan: buPROPion (WELLBUTRIN SR) 150 MG 12 hr tablet            (F51.01) Primary insomnia  Comment: Stable on current dosage.  Refilled  Plan: traZODone (DESYREL) 100 MG tablet            Follow up 1 week if not improving  S. Tab Barrow MD    (Chart documentation completed in part with Dragon voice-recognition software.  Even though reviewed some grammatical, spelling, and word errors may remain.)

## 2019-05-24 ENCOUNTER — MYC REFILL (OUTPATIENT)
Dept: FAMILY MEDICINE | Facility: CLINIC | Age: 54
End: 2019-05-24

## 2019-05-24 DIAGNOSIS — F90.9 ATTENTION DEFICIT HYPERACTIVITY DISORDER (ADHD), UNSPECIFIED ADHD TYPE: ICD-10-CM

## 2019-05-24 NOTE — TELEPHONE ENCOUNTER
Requested Prescriptions   Pending Prescriptions Disp Refills     amphetamine-dextroamphetamine (ADDERALL) 10 MG tablet  Last Written Prescription Date:  4/15/19  Last Fill Quantity: 30 tablet,  # refills: 0   Last office visit: 5/7/2019 with prescribing provider:  Dr. Barrow   Future Office Visit:      Routing refill request to provider for review/approval because:  Drug not on the [facility], [facility] or [facility] refill protocol or controlled substance   30 tablet 0     Sig: Take 1 tablet (10 mg) by mouth daily Needs to be seen for any further refill.       There is no refill protocol information for this order

## 2019-05-28 RX ORDER — DEXTROAMPHETAMINE SACCHARATE, AMPHETAMINE ASPARTATE, DEXTROAMPHETAMINE SULFATE AND AMPHETAMINE SULFATE 2.5; 2.5; 2.5; 2.5 MG/1; MG/1; MG/1; MG/1
10 TABLET ORAL DAILY
Qty: 30 TABLET | Refills: 0 | Status: SHIPPED | OUTPATIENT
Start: 2019-05-28 | End: 2019-07-08

## 2019-06-09 DIAGNOSIS — R20.2 PARESTHESIA: ICD-10-CM

## 2019-06-09 DIAGNOSIS — G35 MULTIPLE SCLEROSIS (H): ICD-10-CM

## 2019-06-10 RX ORDER — GABAPENTIN 300 MG/1
CAPSULE ORAL
Qty: 30 CAPSULE | Refills: 11 | Status: SHIPPED | OUTPATIENT
Start: 2019-06-10 | End: 2020-04-14

## 2019-06-10 NOTE — TELEPHONE ENCOUNTER
Received refill request for gabapentin from [facility]; Patient was last seen in April and has no follow up appointment with Dr. Hairston; Refilled for 1 year per MS refill protocol.    Liyah Rosado MS RN Care Coordinator

## 2019-06-11 ENCOUNTER — TELEPHONE (OUTPATIENT)
Dept: FAMILY MEDICINE | Facility: CLINIC | Age: 54
End: 2019-06-11

## 2019-06-11 DIAGNOSIS — G35 MULTIPLE SCLEROSIS (H): ICD-10-CM

## 2019-06-11 RX ORDER — TERIFLUNOMIDE 14 MG/1
14 TABLET, FILM COATED ORAL DAILY
Qty: 30 TABLET | Refills: 11 | Status: SHIPPED | OUTPATIENT
Start: 2019-06-11 | End: 2020-06-15

## 2019-06-11 NOTE — TELEPHONE ENCOUNTER
Received refill request for Aubagio from [facility]; Patient was last seen in April and has no follow up appointment with Dr. Hairston; Refilled for 1 year per [facility] refill protocol.    Liyah Rosado MS RN Care Coordinator

## 2019-06-11 NOTE — TELEPHONE ENCOUNTER
Panel Management Review   One phone call and send letter if unable to reach them or MyChart message and send letter if not read after 2 weeks (You will get a message to your inbasket)      BP Readings from Last 1 Encounters:   05/07/19 114/80    , No results found for: A1C, 5/7/2019    Health Maintenance Due   Topic Date Due     MAMMO SCREENING  1965     COLONOSCOPY  01/02/1977     PREVENTIVE CARE VISIT  11/04/2016     ZOSTER IMMUNIZATION (1 of 2) 01/02/2017     PHQ-9  06/04/2019        Fail List measure: Mammogram        Patient is due/failing the following:   MAMMOGRAM    Action needed:   Mammogra,m    Type of outreach:    Sent [employer] message.    Questions for provider review:    None                                                                                       Chart routed to Care Team .                                            Jacy Rodriguez, CMA

## 2019-07-08 ENCOUNTER — OFFICE VISIT (OUTPATIENT)
Dept: FAMILY MEDICINE | Facility: CLINIC | Age: 54
End: 2019-07-08
Payer: COMMERCIAL

## 2019-07-08 ENCOUNTER — MYC REFILL (OUTPATIENT)
Dept: FAMILY MEDICINE | Facility: CLINIC | Age: 54
End: 2019-07-08

## 2019-07-08 VITALS
RESPIRATION RATE: 18 BRPM | DIASTOLIC BLOOD PRESSURE: 74 MMHG | OXYGEN SATURATION: 99 % | SYSTOLIC BLOOD PRESSURE: 115 MMHG | HEIGHT: 66 IN | BODY MASS INDEX: 18.47 KG/M2 | WEIGHT: 114.9 LBS | HEART RATE: 55 BPM | TEMPERATURE: 97.9 F

## 2019-07-08 DIAGNOSIS — N30.01 ACUTE CYSTITIS WITH HEMATURIA: ICD-10-CM

## 2019-07-08 DIAGNOSIS — F33.0 MILD EPISODE OF RECURRENT MAJOR DEPRESSIVE DISORDER (H): ICD-10-CM

## 2019-07-08 DIAGNOSIS — R30.0 DYSURIA: Primary | ICD-10-CM

## 2019-07-08 DIAGNOSIS — F90.9 ATTENTION DEFICIT HYPERACTIVITY DISORDER (ADHD), UNSPECIFIED ADHD TYPE: ICD-10-CM

## 2019-07-08 DIAGNOSIS — R82.71 BACTERIA IN URINE: ICD-10-CM

## 2019-07-08 DIAGNOSIS — B37.31 YEAST INFECTION OF THE VAGINA: ICD-10-CM

## 2019-07-08 LAB
ALBUMIN SERPL-MCNC: 4.1 G/DL (ref 3.4–5)
ALBUMIN UR-MCNC: ABNORMAL MG/DL
ALP SERPL-CCNC: 63 U/L (ref 40–150)
ALT SERPL W P-5'-P-CCNC: 35 U/L (ref 0–50)
AMORPH CRY #/AREA URNS HPF: ABNORMAL /HPF
ANION GAP SERPL CALCULATED.3IONS-SCNC: 6 MMOL/L (ref 3–14)
APPEARANCE UR: CLEAR
AST SERPL W P-5'-P-CCNC: 28 U/L (ref 0–45)
BACTERIA #/AREA URNS HPF: ABNORMAL /HPF
BILIRUB SERPL-MCNC: 0.4 MG/DL (ref 0.2–1.3)
BILIRUB UR QL STRIP: NEGATIVE
BUN SERPL-MCNC: 16 MG/DL (ref 7–30)
CALCIUM SERPL-MCNC: 9 MG/DL (ref 8.5–10.1)
CHLORIDE SERPL-SCNC: 103 MMOL/L (ref 94–109)
CO2 SERPL-SCNC: 26 MMOL/L (ref 20–32)
COLOR UR AUTO: YELLOW
CREAT SERPL-MCNC: 0.84 MG/DL (ref 0.52–1.04)
ERYTHROCYTE [DISTWIDTH] IN BLOOD BY AUTOMATED COUNT: 11.5 % (ref 10–15)
GFR SERPL CREATININE-BSD FRML MDRD: 79 ML/MIN/{1.73_M2}
GLUCOSE SERPL-MCNC: 84 MG/DL (ref 70–99)
GLUCOSE UR STRIP-MCNC: NEGATIVE MG/DL
HCT VFR BLD AUTO: 38.1 % (ref 35–47)
HGB BLD-MCNC: 12.6 G/DL (ref 11.7–15.7)
HGB UR QL STRIP: ABNORMAL
KETONES UR STRIP-MCNC: NEGATIVE MG/DL
LEUKOCYTE ESTERASE UR QL STRIP: ABNORMAL
MCH RBC QN AUTO: 32.3 PG (ref 26.5–33)
MCHC RBC AUTO-ENTMCNC: 33.1 G/DL (ref 31.5–36.5)
MCV RBC AUTO: 98 FL (ref 78–100)
MUCOUS THREADS #/AREA URNS LPF: PRESENT /LPF
NITRATE UR QL: NEGATIVE
NON-SQ EPI CELLS #/AREA URNS LPF: ABNORMAL /LPF
PH UR STRIP: 7.5 PH (ref 5–7)
PLATELET # BLD AUTO: 244 10E9/L (ref 150–450)
POTASSIUM SERPL-SCNC: 4.1 MMOL/L (ref 3.4–5.3)
PROT SERPL-MCNC: 7.1 G/DL (ref 6.8–8.8)
RBC # BLD AUTO: 3.9 10E12/L (ref 3.8–5.2)
RBC #/AREA URNS AUTO: ABNORMAL /HPF
SODIUM SERPL-SCNC: 135 MMOL/L (ref 133–144)
SOURCE: ABNORMAL
SP GR UR STRIP: 1.01 (ref 1–1.03)
UROBILINOGEN UR STRIP-ACNC: 0.2 EU/DL (ref 0.2–1)
WBC # BLD AUTO: 7.3 10E9/L (ref 4–11)
WBC #/AREA URNS AUTO: ABNORMAL /HPF

## 2019-07-08 PROCEDURE — 81001 URINALYSIS AUTO W/SCOPE: CPT | Performed by: NURSE PRACTITIONER

## 2019-07-08 PROCEDURE — 85027 COMPLETE CBC AUTOMATED: CPT | Performed by: NURSE PRACTITIONER

## 2019-07-08 PROCEDURE — 87088 URINE BACTERIA CULTURE: CPT | Performed by: NURSE PRACTITIONER

## 2019-07-08 PROCEDURE — 87186 SC STD MICRODIL/AGAR DIL: CPT | Performed by: NURSE PRACTITIONER

## 2019-07-08 PROCEDURE — 80053 COMPREHEN METABOLIC PANEL: CPT | Performed by: NURSE PRACTITIONER

## 2019-07-08 PROCEDURE — 36415 COLL VENOUS BLD VENIPUNCTURE: CPT | Performed by: NURSE PRACTITIONER

## 2019-07-08 PROCEDURE — 99214 OFFICE O/P EST MOD 30 MIN: CPT | Performed by: NURSE PRACTITIONER

## 2019-07-08 PROCEDURE — 87086 URINE CULTURE/COLONY COUNT: CPT | Performed by: NURSE PRACTITIONER

## 2019-07-08 RX ORDER — NITROFURANTOIN 25; 75 MG/1; MG/1
100 CAPSULE ORAL 2 TIMES DAILY
Qty: 14 CAPSULE | Refills: 0 | Status: SHIPPED | OUTPATIENT
Start: 2019-07-08 | End: 2019-09-09

## 2019-07-08 RX ORDER — DEXTROAMPHETAMINE SACCHARATE, AMPHETAMINE ASPARTATE, DEXTROAMPHETAMINE SULFATE AND AMPHETAMINE SULFATE 2.5; 2.5; 2.5; 2.5 MG/1; MG/1; MG/1; MG/1
10 TABLET ORAL DAILY
Qty: 30 TABLET | Refills: 0 | Status: CANCELLED | OUTPATIENT
Start: 2019-07-08

## 2019-07-08 RX ORDER — DEXTROAMPHETAMINE SACCHARATE, AMPHETAMINE ASPARTATE, DEXTROAMPHETAMINE SULFATE AND AMPHETAMINE SULFATE 2.5; 2.5; 2.5; 2.5 MG/1; MG/1; MG/1; MG/1
10 TABLET ORAL DAILY
Qty: 30 TABLET | Refills: 0 | Status: SHIPPED | OUTPATIENT
Start: 2019-07-08 | End: 2019-09-04

## 2019-07-08 RX ORDER — FLUCONAZOLE 150 MG/1
150 TABLET ORAL ONCE
Qty: 1 TABLET | Refills: 0 | Status: SHIPPED | OUTPATIENT
Start: 2019-07-08 | End: 2019-07-08

## 2019-07-08 ASSESSMENT — ANXIETY QUESTIONNAIRES
2. NOT BEING ABLE TO STOP OR CONTROL WORRYING: NOT AT ALL
6. BECOMING EASILY ANNOYED OR IRRITABLE: NOT AT ALL
3. WORRYING TOO MUCH ABOUT DIFFERENT THINGS: NOT AT ALL
1. FEELING NERVOUS, ANXIOUS, OR ON EDGE: NOT AT ALL
5. BEING SO RESTLESS THAT IT IS HARD TO SIT STILL: NOT AT ALL
GAD7 TOTAL SCORE: 0
7. FEELING AFRAID AS IF SOMETHING AWFUL MIGHT HAPPEN: NOT AT ALL
IF YOU CHECKED OFF ANY PROBLEMS ON THIS QUESTIONNAIRE, HOW DIFFICULT HAVE THESE PROBLEMS MADE IT FOR YOU TO DO YOUR WORK, TAKE CARE OF THINGS AT HOME, OR GET ALONG WITH OTHER PEOPLE: NOT DIFFICULT AT ALL

## 2019-07-08 ASSESSMENT — MIFFLIN-ST. JEOR: SCORE: 1143.96

## 2019-07-08 ASSESSMENT — PATIENT HEALTH QUESTIONNAIRE - PHQ9
5. POOR APPETITE OR OVEREATING: NOT AT ALL
SUM OF ALL RESPONSES TO PHQ QUESTIONS 1-9: 0

## 2019-07-08 ASSESSMENT — PAIN SCALES - GENERAL: PAINLEVEL: MILD PAIN (2)

## 2019-07-08 NOTE — PROGRESS NOTES
Subjective     Liyah Jernigan is a 54 year old female who presents to clinic today for the following health issues:    HPI   URINARY TRACT SYMPTOMS  Onset: This past weekend    Description:   Painful urination (Dysuria): no   Blood in urine (Hematuria): no   Delay in urine (Hesitency): YES    Intensity: mild    Progression of Symptoms:  worsening    Accompanying Signs & Symptoms:  Fever/chills: no   Flank pain no   Nausea and vomiting: no   Any vaginal symptoms: none  Abdominal/Pelvic Pain: YES    History:   History of frequent UTI's: no   History of kidney stones: no   Sexually Active: YES  Possibility of pregnancy: No    Precipitating factors:   unsure    Therapies Tried and outcome: Increase fluid intake        No hematuria. Normal BMs. Didn't have fever/chills last time she had pyelonephritis. It has been a long time since she has had a UTI. Call for refill of fluconazole if needed Friday, she gets yeast infections with antibiotics and notes that she felt some symptoms already this past weekend    ADHD: return in 6 months, call for refills, stable.      Patient Active Problem List   Diagnosis     Attention deficit hyperactivity disorder (ADHD), unspecified ADHD type     MS (multiple sclerosis) (H)     Varicose vein     Restless leg syndrome     Mild episode of recurrent major depressive disorder (H)     Genital herpes simplex, unspecified site     Benign essential hypertension     Past Surgical History:   Procedure Laterality Date     ENT SURGERY      tympanoplasty     GYN SURGERY      laparoscopies (5) laparotomies (2) for ectopic       Social History     Tobacco Use     Smoking status: Never Smoker     Smokeless tobacco: Never Used   Substance Use Topics     Alcohol use: No     Alcohol/week: 0.0 oz     Family History   Problem Relation Age of Onset     Diabetes Father              Hypertension Father      Hyperlipidemia Father      Substance Abuse Father      Obesity Father      Diabetes Mother   "       \"borderline\" for years     Hypertension Mother      Hyperlipidemia Mother      Depression Mother      Substance Abuse Mother      Asthma Mother      Thyroid Disease Mother      Obesity Mother      Cerebrovascular Disease Maternal Grandfather         he had a few     Substance Abuse Maternal Grandfather      Breast Cancer Maternal Grandmother         in 50s     Substance Abuse Paternal Grandfather          Current Outpatient Medications   Medication Sig Dispense Refill     amphetamine-dextroamphetamine (ADDERALL) 10 MG tablet Take 1 tablet (10 mg) by mouth daily 30 tablet 0     Ascorbic Acid (VITAMIN C PO) Take 500 mg by mouth daily       biotin 2.5 mg/mL SUSP Take 20 mg by mouth daily       buPROPion (WELLBUTRIN SR) 150 MG 12 hr tablet TAKE 1 TABLET(150 MG) BY MOUTH DAILY 90 tablet 0     Ferrous Sulfate (IRON SUPPLEMENT PO) Take 1 tablet by mouth daily       gabapentin (NEURONTIN) 300 MG capsule TAKE 1 CAPSULE BY MOUTH EVERY NIGHT AS NEEDED 30 capsule 11     metoprolol succinate ER (TOPROL-XL) 25 MG 24 hr tablet TAKE 1 TABLET(25 MG) BY MOUTH DAILY 90 tablet 2     nitroFURantoin macrocrystal-monohydrate (MACROBID) 100 MG capsule Take 1 capsule (100 mg) by mouth 2 times daily for 7 days 14 capsule 0     order for DME Please measure and distribute 1 pair of 20mmHg - 30mmHg THIGH high open  toe compression stockings. Jobst ultrasheer or equivalent. 1 each 1     SUMAtriptan (IMITREX) 100 MG tablet Take 1 tablet (100 mg) by mouth daily as needed for migraine 30 tablet 3     teriflunomide (AUBAGIO) 14 MG tablet Take 1 tablet (14 mg) by mouth daily 30 tablet 11     traZODone (DESYREL) 100 MG tablet TAKE 1/2 TABLET BY MOUTH DAILY 45 tablet 0     valACYclovir (VALTREX) 500 MG tablet TAKE 1 TABLET BY MOUTH DAILY 90 tablet 0     VITAMIN D, CHOLECALCIFEROL, PO Take by mouth daily        No Known Allergies  Reviewed and updated as needed this visit by Provider  Tobacco  Allergies  Meds  Problems  Med Hx  Surg Hx  Fam " "Hx         Review of Systems   ROS COMP: Constitutional, cardiovascular, pulmonary, gi and gu-as above, psych as above systems are negative, except as otherwise noted.      Objective    /74 (BP Location: Right arm, Patient Position: Sitting, Cuff Size: Adult Regular)   Pulse 55   Temp 97.9  F (36.6  C) (Oral)   Resp 18   Ht 1.67 m (5' 5.75\")   Wt 52.1 kg (114 lb 14.4 oz)   LMP 06/26/2019   SpO2 99%   BMI 18.69 kg/m    Body mass index is 18.69 kg/m .  Physical Exam   GENERAL: healthy, alert and no distress  RESP: lungs clear to auscultation - no rales, rhonchi or wheezes  CV: regular rate and rhythm, normal S1 S2, no S3 or S4, no murmur, click or rub  ABDOMEN: soft, slight lower abdomen tenderness, no hepatosplenomegaly, no masses and bowel sounds normal  MS: no gross musculoskeletal defects noted  Psych: normal affect    Diagnostic Test Results:  Labs reviewed in Epic  Results for orders placed or performed in visit on 07/08/19 (from the past 24 hour(s))   UA reflex to Microscopic and Culture   Result Value Ref Range    Color Urine Yellow     Appearance Urine Clear     Glucose Urine Negative NEG^Negative mg/dL    Bilirubin Urine Negative NEG^Negative    Ketones Urine Negative NEG^Negative mg/dL    Specific Gravity Urine 1.015 1.003 - 1.035    Blood Urine Trace (A) NEG^Negative    pH Urine 7.5 (H) 5.0 - 7.0 pH    Protein Albumin Urine Trace (A) NEG^Negative mg/dL    Urobilinogen Urine 0.2 0.2 - 1.0 EU/dL    Nitrite Urine Negative NEG^Negative    Leukocyte Esterase Urine Trace (A) NEG^Negative    Source Urine    Urine Microscopic   Result Value Ref Range    WBC Urine 5-10 (A) OTO5^0 - 5 /HPF    RBC Urine 2-5 (A) OTO2^O - 2 /HPF    Squamous Epithelial /LPF Urine Few FEW^Few /LPF    Bacteria Urine Moderate (A) NEG^Negative /HPF    Amorphous Crystals Few (A) NEG^Negative /HPF    Mucous Urine Present (A) NEG^Negative /LPF   Urine Culture Aerobic Bacterial   Result Value Ref Range    Specimen Description " Urine     Culture Micro       Referred to [facility], await final report.   CBC with platelets   Result Value Ref Range    WBC 7.3 4.0 - 11.0 10e9/L    RBC Count 3.90 3.8 - 5.2 10e12/L    Hemoglobin 12.6 11.7 - 15.7 g/dL    Hematocrit 38.1 35.0 - 47.0 %    MCV 98 78 - 100 fl    MCH 32.3 26.5 - 33.0 pg    MCHC 33.1 31.5 - 36.5 g/dL    RDW 11.5 10.0 - 15.0 %    Platelet Count 244 150 - 450 10e9/L   Comprehensive metabolic panel (BMP + Alb, Alk Phos, ALT, AST, Total. Bili, TP)   Result Value Ref Range    Sodium 135 133 - 144 mmol/L    Potassium 4.1 3.4 - 5.3 mmol/L    Chloride 103 94 - 109 mmol/L    Carbon Dioxide 26 20 - 32 mmol/L    Anion Gap 6 3 - 14 mmol/L    Glucose 84 70 - 99 mg/dL    Urea Nitrogen 16 7 - 30 mg/dL    Creatinine 0.84 0.52 - 1.04 mg/dL    GFR Estimate 79 >60 mL/min/[1.73_m2]    GFR Estimate If Black >90 >60 mL/min/[1.73_m2]    Calcium 9.0 8.5 - 10.1 mg/dL    Bilirubin Total 0.4 0.2 - 1.3 mg/dL    Albumin 4.1 3.4 - 5.0 g/dL    Protein Total 7.1 6.8 - 8.8 g/dL    Alkaline Phosphatase 63 40 - 150 U/L    ALT 35 0 - 50 U/L    AST 28 0 - 45 U/L           Assessment & Plan     1. Dysuria  Labs stable  - UA reflex to Microscopic and Culture  - CBC with platelets  - Comprehensive metabolic panel (BMP + Alb, Alk Phos, ALT, AST, Total. Bili, TP)  - Urine Microscopic    2. Attention deficit hyperactivity disorder (ADHD), unspecified ADHD type  Refilled, return in 6 months, call for refills  - amphetamine-dextroamphetamine (ADDERALL) 10 MG tablet; Take 1 tablet (10 mg) by mouth daily  Dispense: 30 tablet; Refill: 0    3. Bacteria in urine  UC pending  - Urine Culture Aerobic Bacterial    4. Acute cystitis with hematuria  Will treat symptoms, she is aware we may call with dose adjustments once UC is back  - nitroFURantoin macrocrystal-monohydrate (MACROBID) 100 MG capsule; Take 1 capsule (100 mg) by mouth 2 times daily for 7 days  Dispense: 14 capsule; Refill: 0    5. Yeast  infection of the vagina  Hx of this with antibiotics, call Friday if you need a refill  - fluconazole (DIFLUCAN) 150 MG tablet; Take 1 tablet (150 mg) by mouth once for 1 dose  Dispense: 1 tablet; Refill: 0    Return in about 6 months (around 1/8/2020) for ADHD Recheck.    MARRY Goldstein, NP-C  [facility]

## 2019-07-08 NOTE — TELEPHONE ENCOUNTER
"Requested Prescriptions   Pending Prescriptions Disp Refills     buPROPion (WELLBUTRIN SR) 150 MG 12 hr tablet 90 tablet 0     Sig: TAKE 1 TABLET(150 MG) BY MOUTH DAILY       SSRIs Protocol Passed - 7/8/2019  5:58 PM        Passed - PHQ-9 score less than 5 in past 6 months     Please review last PHQ-9 score.           Passed - Medication is Bupropion     If the medication is Bupropion (Wellbutrin), and the patient is taking for smoking cessation; OK to refill.          Passed - Medication is active on med list        Passed - Patient is age 18 or older        Passed - No active pregnancy on record        Passed - No positive pregnancy test in last 12 months        Passed - Recent (6 mo) or future (30 days) visit within the authorizing provider's specialty     Patient had office visit in the last 6 months or has a visit in the next 30 days with authorizing provider or within the authorizing provider's specialty.  See \"Patient Info\" tab in inbasket, or \"Choose Columns\" in Meds & Orders section of the refill encounter.            buPROPion (WELLBUTRIN SR) 150 MG 12 hr tablet  Last Written Prescription Date:  5/7/19  Last Fill Quantity: 90,  # refills: 0   Last office visit: No previous visit found with prescribing provider:  Dr. Barrow   Future Office Visit:      PHQ-9 score:    PHQ-9 SCORE 7/8/2019   PHQ-9 Total Score 0             ROSELINE-7 SCORE 5/21/2018 12/4/2018 7/8/2019   Total Score 0 6 0         "

## 2019-07-08 NOTE — TELEPHONE ENCOUNTER
Requested Prescriptions   Pending Prescriptions Disp Refills     amphetamine-dextroamphetamine (ADDERALL) 10 MG tablet 30 tablet 0     Sig: Take 1 tablet (10 mg) by mouth daily Needs to be seen for any further refill.       There is no refill protocol information for this order          amphetamine-dextroamphetamine (ADDERALL) 10 MG tablet      Last Written Prescription Date:  5/28/19  Last Fill Quantity: 30,   # refills: 0  Last Office Visit: 5/7/19  Future Office visit:    Next 5 appointments (look out 90 days)    Jul 08, 2019  2:40 PM CDT  [facility] [address] [place] [id number] [phone]           Routing refill request to provider for review/approval because:  Drug not on the [facility], [facility] or [facility] refill protocol or controlled substance

## 2019-07-09 ASSESSMENT — ANXIETY QUESTIONNAIRES: GAD7 TOTAL SCORE: 0

## 2019-07-10 LAB
BACTERIA SPEC CULT: ABNORMAL
SPECIMEN SOURCE: ABNORMAL

## 2019-07-11 RX ORDER — BUPROPION HYDROCHLORIDE 150 MG/1
TABLET, EXTENDED RELEASE ORAL
Qty: 90 TABLET | Refills: 1 | Status: SHIPPED | OUTPATIENT
Start: 2019-07-11 | End: 2020-03-12

## 2019-07-11 NOTE — TELEPHONE ENCOUNTER
Prescription approved per [facility]ill [facility].    Emilie Dunlap RN

## 2019-07-16 ENCOUNTER — OFFICE VISIT (OUTPATIENT)
Dept: OPHTHALMOLOGY | Facility: CLINIC | Age: 54
End: 2019-07-16
Attending: PSYCHIATRY & NEUROLOGY
Payer: COMMERCIAL

## 2019-07-16 DIAGNOSIS — H53.10 SUBJECTIVE VISUAL DISTURBANCE: ICD-10-CM

## 2019-07-16 DIAGNOSIS — G35 MS (MULTIPLE SCLEROSIS) (H): Primary | ICD-10-CM

## 2019-07-16 DIAGNOSIS — G35 MS (MULTIPLE SCLEROSIS) (H): ICD-10-CM

## 2019-07-16 PROCEDURE — 92015 DETERMINE REFRACTIVE STATE: CPT | Mod: ZF

## 2019-07-16 PROCEDURE — 92083 EXTENDED VISUAL FIELD XM: CPT | Mod: ZF | Performed by: OPHTHALMOLOGY

## 2019-07-16 PROCEDURE — 92133 CPTRZD OPH DX IMG PST SGM ON: CPT | Mod: ZF | Performed by: OPHTHALMOLOGY

## 2019-07-16 PROCEDURE — G0463 HOSPITAL OUTPT CLINIC VISIT: HCPCS | Mod: ZF

## 2019-07-16 ASSESSMENT — TONOMETRY
IOP_METHOD: TONOPEN
OS_IOP_MMHG: 13
OD_IOP_MMHG: 14

## 2019-07-16 ASSESSMENT — REFRACTION_WEARINGRX
OD_SPHERE: -2.50
OD_CYLINDER: +1.00
OD_AXIS: 002
OS_CYLINDER: +1.00
OD_ADD: +2.00
OS_ADD: +2.00
OS_SPHERE: -1.25
OS_AXIS: 164
SPECS_TYPE: PAL

## 2019-07-16 ASSESSMENT — REFRACTION_MANIFEST
OS_SPHERE: -1.25
OS_CYLINDER: +0.50
OD_CYLINDER: +0.50
OD_AXIS: 180
OS_ADD: +2.50
OD_SPHERE: -2.50
OS_AXIS: 170
OD_ADD: +2.50

## 2019-07-16 ASSESSMENT — CONF VISUAL FIELD
OD_NORMAL: 1
OS_NORMAL: 1
METHOD: COUNTING FINGERS

## 2019-07-16 ASSESSMENT — VISUAL ACUITY
OD_CC: J1+
CORRECTION_TYPE: GLASSES
OD_CC: 20/20
METHOD: SNELLEN - LINEAR
OS_CC: 20/20
OS_CC: J2

## 2019-07-16 ASSESSMENT — CUP TO DISC RATIO
OS_RATIO: 0.3
OD_RATIO: 0.3

## 2019-07-16 ASSESSMENT — SLIT LAMP EXAM - LIDS
COMMENTS: NORMAL
COMMENTS: NORMAL

## 2019-07-16 ASSESSMENT — EXTERNAL EXAM - RIGHT EYE: OD_EXAM: NORMAL

## 2019-07-16 ASSESSMENT — EXTERNAL EXAM - LEFT EYE: OS_EXAM: NORMAL

## 2019-07-16 NOTE — NURSING NOTE
"Chief Complaints and History of Present Illnesses   Patient presents with     subjective visual disturbance      Chief Complaint(s) and History of Present Illness(es)     subjective visual disturbance               Comments     New patient evaluation of multiple sclerosis.  Patient describes recent vision at near as \"looking through a binocular but the eye pieces are too far apart\".  Bilateral optic neuritis in 2004.  Vision returned in both eyes but the left eye vision was not as good as the right eye. Another flare up in 2007.Treated in [place], and [place].  No eye pain or double vision.  Eye meds: none  SARA Villasenor 7/16/2019 12:35 PM                      "

## 2019-07-16 NOTE — PROGRESS NOTES
"       Assessment & Plan     Liyah Jernigan is a 54 year old female with the following diagnoses:   1. MS (multiple sclerosis) (H)    2. Subjective visual disturbance       Patietnt is a 55 y/o F sent for evaluation of MS by Dr. Jose A Hairston currently treated with teriflunomide. History of bilateral optic neuritis in 2004 and treated with IV steroids. She notes that she had \"flares\" of optic neuritis lasting from 4-8 months each time treated with IV steroids and then she was started on natalizumab in 2007 and has not had a recurrence since that time. She notes that she has difficulty looking at people's faces at near and intermediate distance. This has been present for the past year and has gotten worse particularly over the last 3-4 months. With occlusion of either eye the sensation resolves and then she has a lightheaded sensation.  Has seen pictures of herself cross eyed when fatigued. Denies eye pain, redness, new f/f, numbness, tingling, weakness, ataxia, dysarthria, dysphagia.     Visual acuity 20/20 in both eyes. IOP normal, EOM normal at distance and near. Ortho alignment. Color plates full. Pupils normal right and 1+ afferent pupillary defect left eye.. Slit lamp exam normal both eyes. Dilated fundus exam with tr pallor right eye and mild pallor left eye.      OCT rNFL with temporal thinning in both eyes and borderline thinning generally. Visual fields reliable with non-specific defect.     MRI 4/2019: The study demonstrates greater than 20 foci ofT2-hyperintensity within the cerebral white matter consistent with the clinical suspicion of demyelinating disease. There no interval change  from the prior study.    It is my impression that patient's difficulty with looking at people's faces is due to her looking back and forth from eye to eye of the person she is viewing.  In the office, I covered one of my eyes and asked her if this improved her vision situation, and she stated that it made it much better.  " Discussed focusing on one eye of another person at near or intermediate.  We discussed that her eyes move well, she doesn't have nystagmus, and her eyes are aligned in all gazes and at all distances.  Fortunately, this is not related to her multiple sclerosis or other eye disease.      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  She does not have nystagmus or internuclear ophthalmoplegia from her multiple sclerosis.        Return to clinic on as needed basis.          Attending Physician Attestation:  Complete documentation of historical and exam elements from today's encounter can be found in the full encounter summary report (not reduplicated in this progress note).  I personally obtained the chief complaint(s) and history of present illness.  I confirmed and edited as necessary the review of systems, past medical/surgical history, family history, social history, and examination findings as documented by others; and I examined the patient myself.  I personally reviewed the relevant tests, images, and reports as documented above.  I formulated and edited as necessary the assessment and plan and discussed the findings and management plan with the patient and family. I personally reviewed the ophthalmic test(s) associated with this encounter, agree with the interpretation(s) as documented by the resident/fellow, and have edited the corresponding report(s) as necessary.  - Bernabe Elmore MD  Ophthalmology, PGY-5  Neuro-Ophthalmology Fellow

## 2019-07-16 NOTE — LETTER
"2019         RE:  :  MRN: Liyah Jernigan  1965  [id number]     Dear Dr. Jose A Hairston,    Thank you for asking me to see your very pleasant patient, Liyah Jernigan, in neuro-ophthalmic consultation.  I would like to thank you for sending your records and I have summarized them in the history of present illness. My assessment and plan are below.  For further details, please see my attached clinic note.           Assessment & Plan     Liyah Jernigan is a 54 year old female with the following diagnoses:   1. MS (multiple sclerosis) (H)    2. Subjective visual disturbance       Patietnt is a 53 y/o F sent for evaluation of MS by Dr. Jose A Hairston currently treated with teriflunomide. History of bilateral optic neuritis in  and treated with IV steroids. She notes that she had \"flares\" of optic neuritis lasting from 4-8 months each time treated with IV steroids and then she was started on natalizumab in  and has not had a recurrence since that time. She notes that she has difficulty looking at people's faces at near and intermediate distance. This has been present for the past year and has gotten worse particularly over the last 3-4 months. With occlusion of either eye the sensation resolves and then she has a lightheaded sensation.  Has seen pictures of herself cross eyed when fatigued. Denies eye pain, redness, new f/f, numbness, tingling, weakness, ataxia, dysarthria, dysphagia.     Visual acuity 20/20 in both eyes. IOP normal, EOM normal at distance and near. Ortho alignment. Color plates full. Pupils normal right and 1+ afferent pupillary defect left eye.. Slit lamp exam normal both eyes. Dilated fundus exam with tr pallor right eye and mild pallor left eye.      OCT rNFL with temporal thinning in both eyes and borderline thinning generally. Visual fields reliable with non-specific defect.     MRI 2019: The study demonstrates greater than 20 foci ofT2-hyperintensity within the cerebral white " matter consistent with the clinical suspicion of demyelinating disease. There no interval change  from the prior study.    It is my impression that patient's difficulty with looking at people's faces is due to her looking back and forth from eye to eye of the person she is viewing.  In the office, I covered one of my eyes and asked her if this improved her vision situation, and she stated that it made it much better.  Discussed focusing on one eye of another person at near or intermediate.  We discussed that her eyes move well, she doesn't have nystagmus, and her eyes are aligned in all gazes and at all distances.  Fortunately, this is not related to her multiple sclerosis or other eye disease.      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  She does not have nystagmus or internuclear ophthalmoplegia from her multiple sclerosis.        Return to clinic on as needed basis.      Again, thank you for allowing me to participate in the care of your patient.      Sincerely,    Bernabe Smlalwood MD  Professor  Ophthalmology Residency   Director of Neuro-Ophthalmology  [place] - [facility] Endow Chair  Departments of Ophthalmology, Neurology, and Neurosurgery  [place] [address]  [address] [facility], [place]  [address]  T - [phone]  F - [phone]  [email or web address]      CC: Jose A Hairston MD  [address]  [facility] [address]  VIA In Basket     Tanya Clarke NP  [address] [facility] [address]  VIA In Basket     Saul Maria MD  [address] [facility] [address]  [facility] [address]  VIA In Basket     Jasmine Barrow MD  [address] [facility] [address]  VIA In Basket     Liyah Rosado RN  VIA In Basket

## 2019-07-23 DIAGNOSIS — A60.00 GENITAL HERPES SIMPLEX, UNSPECIFIED SITE: ICD-10-CM

## 2019-07-24 RX ORDER — VALACYCLOVIR HYDROCHLORIDE 500 MG/1
TABLET, FILM COATED ORAL
Qty: 90 TABLET | Refills: 1 | Status: SHIPPED | OUTPATIENT
Start: 2019-07-24 | End: 2019-12-23

## 2019-08-27 ENCOUNTER — TELEPHONE (OUTPATIENT)
Dept: OPHTHALMOLOGY | Facility: CLINIC | Age: 54
End: 2019-08-27

## 2019-08-27 NOTE — TELEPHONE ENCOUNTER
[facility] Call Center    Phone Message    May a detailed message be left on voicemail: yes    Reason for Call: Other: Please fax glasses RX over to [facility], as requested.      Action Taken: Message routed to:  [facility] ([facility] EYe

## 2019-08-27 NOTE — TELEPHONE ENCOUNTER
Fax [phone]   Last glasses Rx faxed per request 8-27-19  Jose Torres RN RN 11:10 AM 08/27/19        [facility]    Phone Message    May a detailed message be left on voicemail: yes    Reason for Call: Other: Pt came in on 07/16 and saw Dr Smallwood and another optometrist who did her eye exam. Pt is requesting for her glass rx to be sent to her via [employer]. Please f/u if unable to.     Action Taken: Message routed to:  [facility] eye

## 2019-09-04 ENCOUNTER — MYC REFILL (OUTPATIENT)
Dept: FAMILY MEDICINE | Facility: CLINIC | Age: 54
End: 2019-09-04

## 2019-09-04 DIAGNOSIS — F90.9 ATTENTION DEFICIT HYPERACTIVITY DISORDER (ADHD), UNSPECIFIED ADHD TYPE: ICD-10-CM

## 2019-09-04 NOTE — TELEPHONE ENCOUNTER
Requested Prescriptions   Pending Prescriptions Disp Refills     amphetamine-dextroamphetamine (ADDERALL) 10 MG tablet 30 tablet 0     Sig: Take 1 tablet (10 mg) by mouth daily       There is no refill protocol information for this order          amphetamine-dextroamphetamine (ADDERALL) 10 MG tablet      Last Written Prescription Date:  7/8/19  Last Fill Quantity: 30,   # refills: 0  Last Office Visit: 7/8/19  Future Office visit:       Routing refill request to provider for review/approval because:  Drug not on the G, P or [facility] refill protocol or controlled substance

## 2019-09-05 RX ORDER — DEXTROAMPHETAMINE SACCHARATE, AMPHETAMINE ASPARTATE, DEXTROAMPHETAMINE SULFATE AND AMPHETAMINE SULFATE 2.5; 2.5; 2.5; 2.5 MG/1; MG/1; MG/1; MG/1
10 TABLET ORAL DAILY
Qty: 30 TABLET | Refills: 0 | Status: SHIPPED | OUTPATIENT
Start: 2019-09-05 | End: 2019-12-20

## 2019-09-05 RX ORDER — DEXTROAMPHETAMINE SACCHARATE, AMPHETAMINE ASPARTATE, DEXTROAMPHETAMINE SULFATE AND AMPHETAMINE SULFATE 2.5; 2.5; 2.5; 2.5 MG/1; MG/1; MG/1; MG/1
10 TABLET ORAL DAILY
Qty: 30 TABLET | Refills: 0 | Status: SHIPPED | OUTPATIENT
Start: 2019-10-05 | End: 2020-04-14

## 2019-09-05 RX ORDER — DEXTROAMPHETAMINE SACCHARATE, AMPHETAMINE ASPARTATE, DEXTROAMPHETAMINE SULFATE AND AMPHETAMINE SULFATE 2.5; 2.5; 2.5; 2.5 MG/1; MG/1; MG/1; MG/1
10 TABLET ORAL DAILY
Qty: 30 TABLET | Refills: 0 | Status: SHIPPED | OUTPATIENT
Start: 2019-11-05 | End: 2020-04-14

## 2019-09-09 ENCOUNTER — ANCILLARY PROCEDURE (OUTPATIENT)
Dept: GENERAL RADIOLOGY | Facility: CLINIC | Age: 54
End: 2019-09-09
Attending: FAMILY MEDICINE
Payer: COMMERCIAL

## 2019-09-09 ENCOUNTER — OFFICE VISIT (OUTPATIENT)
Dept: FAMILY MEDICINE | Facility: CLINIC | Age: 54
End: 2019-09-09

## 2019-09-09 VITALS
WEIGHT: 118 LBS | HEART RATE: 59 BPM | SYSTOLIC BLOOD PRESSURE: 110 MMHG | OXYGEN SATURATION: 99 % | TEMPERATURE: 98.3 F | BODY MASS INDEX: 18.96 KG/M2 | DIASTOLIC BLOOD PRESSURE: 68 MMHG | HEIGHT: 66 IN

## 2019-09-09 DIAGNOSIS — M25.552 HIP PAIN, LEFT: ICD-10-CM

## 2019-09-09 DIAGNOSIS — M25.552 HIP PAIN, LEFT: Primary | ICD-10-CM

## 2019-09-09 DIAGNOSIS — Z23 NEED FOR PROPHYLACTIC VACCINATION AND INOCULATION AGAINST INFLUENZA: ICD-10-CM

## 2019-09-09 PROCEDURE — 73502 X-RAY EXAM HIP UNI 2-3 VIEWS: CPT | Mod: FY

## 2019-09-09 PROCEDURE — 90686 IIV4 VACC NO PRSV 0.5 ML IM: CPT | Performed by: FAMILY MEDICINE

## 2019-09-09 PROCEDURE — 90471 IMMUNIZATION ADMIN: CPT | Performed by: FAMILY MEDICINE

## 2019-09-09 PROCEDURE — 73502 X-RAY EXAM HIP UNI 2-3 VIEWS: CPT | Mod: 59

## 2019-09-09 PROCEDURE — 99213 OFFICE O/P EST LOW 20 MIN: CPT | Mod: 25 | Performed by: FAMILY MEDICINE

## 2019-09-09 ASSESSMENT — MIFFLIN-ST. JEOR: SCORE: 1158.02

## 2019-09-09 ASSESSMENT — PAIN SCALES - GENERAL: PAINLEVEL: MILD PAIN (3)

## 2019-09-09 NOTE — PROGRESS NOTES
"Subjective     Liyah Jernigan is a 54 year old female who presents to clinic today for the following health issues:    HPI   Joint Pain    Onset: Worsening in the last couple months     Description:   Location: Left hip is worse   Character: Sharp    Intensity: mild, moderate    Progression of Symptoms: worsens when patient sits     Accompanying Signs & Symptoms:  Other symptoms: radiation of pain down left leg sometimes     History:   Previous similar pain: YES      Precipitating factors:   Trauma or overuse: YES- patient broke pelvis at age 21    Alleviating factors:  Improved by: nothing    Therapies Tried and outcome: Chiropractor and yoga with some relief     SUBJECTIVE:  Here today for the above symptoms.  No specific onset such as a new injury, etc.  The patient and she stays fairly healthy and limber, performing yoga frequently.  She has noticed a little bit more tightness in her left hip but really the symptoms are worse when she is sitting just in a chair or in the car.  Seems to be lateral left hip but at times it feels deep and rotates a little toward the groin.  Had a history of a pelvic fracture as a young adult but this really has not given her trouble until more recently.  Has been seeing a chiropractor and after his manipulations have not been resolving the issue he suggested she get evaluated for arthritis or bursitis.  Patient tends to avoid medications when she can prefers more natural methods.    Review of systems otherwise negative.  Past medical, family, and social history reviewed and updated in chart.    OBJECTIVE:  /68 (BP Location: Right arm, Patient Position: Chair, Cuff Size: Adult Regular)   Pulse 59   Temp 98.3  F (36.8  C) (Oral)   Ht 1.67 m (5' 5.75\")   Wt 53.5 kg (118 lb)   LMP  (LMP Unknown)   SpO2 99%   Breastfeeding? No   BMI 19.19 kg/m    Alert, pleasant, upbeat, and in no apparent discomfort.  S1 and S2 normal, no murmurs, clicks, gallops or rubs. Regular rate and " rhythm. Chest is clear; no wheezes or rales. No edema or JVD.  Lower extremities -normal active range of motion through knees and hips bilaterally.  She is a little bit tender around the greater trochanter on the left.  Impingement sign negative.  Past labs reviewed with the patient.   XRAY - my independent reading of the films showed normal hips bilaterally    ASSESSMENT / PLAN:  (M25.560) Hip pain, left  (primary encounter diagnosis)  Comment: I suspect this is more of a hip bursitis or tight hip and I discussed with the patient and provided some home stretches and exercises to do in addition to her baseline.  She would also like to meet with a therapist and I think this is reasonable.  Can try some topical therapy.  Last resort would be cortisone injection but patient would like to avoid this if she can  Plan: XR Hip Left 2-3 Views, XR Hip Right 2-3 Views,         [facility] REFERRAL            (Z23) Need for prophylactic vaccination and inoculation against influenza  Comment:   Plan: HC FLU VAC PRESRV FREE QUAD SPLIT VIR > 6         MONTHS IM [46431], Vaccine Administration,         Initial [88738]            Follow up 2 to 3 weeks with progress  TAMI Barrow MD    (Chart documentation completed in part with Dragon voice-recognition software.  Even though reviewed some grammatical, spelling, and word errors may remain.)

## 2019-09-11 ENCOUNTER — THERAPY VISIT (OUTPATIENT)
Dept: PHYSICAL THERAPY | Facility: CLINIC | Age: 54
End: 2019-09-11
Attending: FAMILY MEDICINE
Payer: COMMERCIAL

## 2019-09-11 DIAGNOSIS — M25.552 HIP PAIN, LEFT: ICD-10-CM

## 2019-09-11 DIAGNOSIS — S39.012D SACROILIAC STRAIN, SUBSEQUENT ENCOUNTER: ICD-10-CM

## 2019-09-11 PROBLEM — S39.012A SACROILIAC STRAIN: Status: ACTIVE | Noted: 2019-09-11

## 2019-09-11 PROCEDURE — 97161 PT EVAL LOW COMPLEX 20 MIN: CPT | Mod: GP | Performed by: PHYSICAL THERAPIST

## 2019-09-11 PROCEDURE — 97110 THERAPEUTIC EXERCISES: CPT | Mod: GP | Performed by: PHYSICAL THERAPIST

## 2019-09-11 ASSESSMENT — ACTIVITIES OF DAILY LIVING (ADL)
STEPPING_UP_AND_DOWN_CURBS: NO DIFFICULTY AT ALL
STANDING_FOR_15_MINUTES: NO DIFFICULTY AT ALL
GOING_UP_1_FLIGHT_OF_STAIRS: NO DIFFICULTY AT ALL
HOS_ADL_SCORE(%): 95.59
WALKING_UP_STEEP_HILLS: SLIGHT DIFFICULTY
HOS_ADL_COUNT: 17
SITTING_FOR_15_MINUTES: MODERATE DIFFICULTY
WALKING_15_MINUTES_OR_GREATER: NO DIFFICULTY AT ALL
WALKING_APPROXIMATELY_10_MINUTES: NO DIFFICULTY AT ALL
DEEP_SQUATTING: SLIGHT DIFFICULTY
ROLLING_OVER_IN_BED: NO DIFFICULTY AT ALL
WALKING_DOWN_STEEP_HILLS: SLIGHT DIFFICULTY
HOS_ADL_ITEM_SCORE_TOTAL: 65
HOS_ADL_HIGHEST_POTENTIAL_SCORE: 68
RECREATIONAL_ACTIVITIES: NO DIFFICULTY AT ALL
HEAVY_WORK: NO DIFFICULTY AT ALL
TWISTING/PIVOTING_ON_INVOLVED_LEG: NO DIFFICULTY AT ALL
GETTING_INTO_AND_OUT_OF_AN_AVERAGE_CAR: NO DIFFICULTY AT ALL
LIGHT_TO_MODERATE_WORK: NO DIFFICULTY AT ALL
WALKING_INITIALLY: NO DIFFICULTY AT ALL
GETTING_INTO_AND_OUT_OF_A_BATHTUB: NO DIFFICULTY AT ALL
GOING_DOWN_1_FLIGHT_OF_STAIRS: NO DIFFICULTY AT ALL
PUTTING_ON_SOCKS_AND_SHOES: NO DIFFICULTY AT ALL

## 2019-09-11 NOTE — LETTER
[facility] PHYSICAL THERAPY  [address]  [place] [address]  [phone]    2019    Re: Liyah Jernigan   :   1965  MRN:  [id number]   REFERRING PHYSICIAN:   Jasmine Barrow    [facility]    Date of Initial Evaluation: 2019  Visits:  Rxs Used: 1  Reason for Referral:     Hip pain, left  Sacroiliac strain, subsequent encounter    EVALUATION SUMMARY    Subjective:  Liyah Jernigan being seen for left hip.   Problem began 2019 (MD appt). Where condition occurred: for unknown reasons.Problem occurred: One year history of progressive left hip pain for unknown reasons  and reported as 2/10 (8/10 at its worst) on pain scale. General health as reported by patient is good. Pertinent medical history includes:  High blood pressure, history of fractures, menopausal, migraines/headaches and multiple sclerosis.  Medical allergies: none.  Surgeries include:  None.  Current medications:  Anti-depressants and high blood pressure medication.   Primary job tasks include:  Lifting/carrying, pushing/pulling and repetitive tasks.  Pain is described as aching and stabbing and is intermittent. Pain is worse in the P.M.. Since onset symptoms are gradually worsening. Special tests:  X-ray (NL).     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 Restrictions include:  Working in normal job without restrictions (self limits as able).  Barriers include:  Stairs.  Red flags:  None as reported by patient.  Type of problem:  Left hip  Condition occurred with:  Insidious onset. This is a chronic condition    Patient reports pain:  Lateral, anterior and posterior. Radiates to:  Thigh. Associated symptoms:  Buckling/giving out, loss of motion/stiffness and loss of strength. Symptoms are exacerbated by sitting (after daily activity) and relieved by NSAID's (change positions).                 Objective:  Standing Alignment:    Pelvic:  Iliac crest high L, ASIS high L and PSIS high R  Gait:    Gait Type:  Normal   Assistive Devices:  None  Non-Weight Bearing:    Pelvis:  ASIS high L and ASIS more medial R  Flexibility/Screens:   Negative screens: Lumbar   Lower Extremity:  Decreased left lower extremity flexibility:Hip Flexors  Re: Liyah Jernigan   :   1965       Lumbar/SI Evaluation  Neural Tension/Mobility:  Lumbar:  Normal    Lumbar Palpation:    Tenderness present at Left:    ASIS and Hip Flexors  Functional Tests:  Core strength and proprioception lumbar: upper abdominal strength 4-/5.  Lumbar Provocation:    Left negative with:  PROM hip  SI joint/Sacrum:      Left positive at:    Forward bend standing  Left negative at:    Thigh thrust and Sacral thrust  Sacral conclusion left:  Posterior inominate and outflare       Hip Evaluation  HIP AROM:  AROM:    Left Hip:     Normal    Right Hip:   Normal    Hip Strength:    Flexion:   Left: 4+/5   +/-  Pain:  Right: 5/5   -  Pain:                Abduction:  Left: 4-/5    +/-   Pain:Right: 4/5   -   Pain:  External Rotation:  Left: 5/5   -  Pain:  Right: 4/5   +/-  Pain:  Knee Flexion:  Left: 5-/5   -  Pain:Right: 5/5   -  Pain:  Knee Extension:  Left: 5-/5   -  Pain:Right: 5/5    -  Pain:   Hip Special Testing:    Left hip negative for the following special tests:  Piriformis; Julieta; Fadir/Labrum; SLR; Brad or Femoral Nerve      Assessment/Plan:    Patient is a 54 year old female with left hip complaints.    Patient has the following significant findings with corresponding treatment plan.                Diagnosis 1:  Hip pain/SI strain  Pain -  manual therapy, self management, education, directional preference exercise and home program  Decreased ROM/flexibility - manual therapy, therapeutic exercise, therapeutic activity and home program  Decreased joint mobility - manual therapy, therapeutic exercise, therapeutic activity, home program and  MET  Decreased strength - therapeutic exercise, therapeutic activities and home program  Decreased proprioception - neuro re-education, therapeutic activities and home program  Decreased function - therapeutic activities and home program    Therapy Evaluation Codes:   1) History comprised of:   Personal factors that impact the plan of care:      Time since onset of symptoms.    Comorbidity factors that impact the plan of care are:      None.     Medications impacting care: None.  2) Examination of Body Systems comprised of:   Body structures and functions that impact the plan of care:      Hip and Sacral illiac joint.  Re: Liyah Jernigan   :   1965     Activity limitations that impact the plan of care are:      Sitting.  3) Clinical presentation characteristics are:   Stable/Uncomplicated.  4) Decision-Making    Low complexity using standardized patient assessment instrument and/or measureable assessment of functional outcome.  Cumulative Therapy Evaluation is: Low complexity.    Previous and current functional limitations:  (See Goal Flow Sheet for this information)    Short term and Long term goals: (See Goal Flow Sheet for this information)     Communication ability:  Patient appears to be able to clearly communicate and understand verbal and written communication and follow directions correctly.  Treatment Explanation - The following has been discussed with the patient:   RX ordered/plan of care  Anticipated outcomes  Possible risks and side effects  This patient would benefit from PT intervention to resume normal activities.   Rehab potential is good.    Frequency:  1 X week, once daily  Duration:  for 8 weeks  Discharge Plan:  Achieve all LTG.  Independent in home treatment program.  Reach maximal therapeutic benefit.    Thank you for your referral.    INQUIRIES  Therapist: Lanny Christianson, PT  [facility]  [address]  [place] [facility] [address]  Phone: [phone]  Fax: [phone]

## 2019-09-11 NOTE — PROGRESS NOTES
[facility] Initial Evaluation  Subjective:    Liyah Jernigan being seen for left hip.   Problem began 8/28/2019 (MD appt). Where condition occurred: for unknown reasons.Problem occurred: One year history of progressive left hip pain for unknown reasons  and reported as 2/10 (8/10 at its worst) on pain scale. General health as reported by patient is good. Pertinent medical history includes:  High blood pressure, history of fractures, menopausal, migraines/headaches and multiple sclerosis.  Medical allergies: none.  Surgeries include:  None.  Current medications:  Anti-depressants and high blood pressure medication.   Primary job tasks include:  Lifting/carrying, pushing/pulling and repetitive tasks.  Pain is described as aching and stabbing and is intermittent. Pain is worse in the P.M.. Since onset symptoms are gradually worsening. Special tests:  X-ray (NL).     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 Restrictions include:  Working in normal job without restrictions (self limits as able).    Barriers include:  Stairs.  Red flags:  None as reported by patient.  Type of problem:  Left hip   Condition occurred with:  Insidious onset. This is a chronic condition    Patient reports pain:  Lateral, anterior and posterior. Radiates to:  Thigh. Associated symptoms:  Buckling/giving out, loss of motion/stiffness and loss of strength. Symptoms are exacerbated by sitting (after daily activity) and relieved by NSAID's (change positions).                      Objective:  Standing Alignment:          Pelvic:  Iliac crest high L, ASIS high L and PSIS high R          Gait:    Gait Type:  Normal   Assistive Devices:  None    Non-Weight Bearing:    Pelvis:  ASIS high L and ASIS more medial R        Flexibility/Screens:   Negative screens: Lumbar     Lower Extremity:  Decreased left lower extremity flexibility:Hip Flexors                 Lumbar/SI Evaluation            Neural Tension/Mobility:  Lumbar:  Normal         Lumbar Palpation:    Tenderness present at Left:    ASIS and Hip Flexors    Functional Tests:  Core strength and proprioception lumbar: upper abdominal strength 4-/5.        Lumbar Provocation:      Left negative with:  PROM hip      SI joint/Sacrum:        Left positive at:    Forward bend standing  Left negative at:    Thigh thrust and Sacral thrust    Sacral conclusion left:  Posterior inominate and outflare                                    Hip Evaluation  HIP AROM:  AROM:    Left Hip:     Normal    Right Hip:   Normal                    Hip Strength:    Flexion:   Left: 4+/5   +/-  Pain:  Right: 5/5   -  Pain:                      Abduction:  Left: 4-/5    +/-   Pain:Right: 4/5   -   Pain:      External Rotation:  Left: 5/5   -  Pain:  Right: 4/5   +/-  Pain:  Knee Flexion:  Left: 5-/5   -  Pain:Right: 5/5   -  Pain:  Knee Extension:  Left: 5-/5   -  Pain:Right: 5/5    -  Pain:        Hip Special Testing:      Left hip negative for the following special tests:  Piriformis; Julieta; Fadir/Labrum; SLR; Brad or Femoral Nerve                   General     ROS    Assessment/Plan:    Patient is a 54 year old female with left hip complaints.    Patient has the following significant findings with corresponding treatment plan.                Diagnosis 1:  Hip pain/SI strain  Pain -  manual therapy, self management, education, directional preference exercise and home program  Decreased ROM/flexibility - manual therapy, therapeutic exercise, therapeutic activity and home program  Decreased joint mobility - manual therapy, therapeutic exercise, therapeutic activity, home program and MET  Decreased strength - therapeutic exercise, therapeutic activities and home program  Decreased proprioception - neuro re-education, therapeutic activities and home program  Decreased function - therapeutic activities and home program    Therapy Evaluation Codes:   1) History comprised of:   Personal factors that impact the plan of  care:      Time since onset of symptoms.    Comorbidity factors that impact the plan of care are:      None.     Medications impacting care: None.  2) Examination of Body Systems comprised of:   Body structures and functions that impact the plan of care:      Hip and Sacral illiac joint.   Activity limitations that impact the plan of care are:      Sitting.  3) Clinical presentation characteristics are:   Stable/Uncomplicated.  4) Decision-Making    Low complexity using standardized patient assessment instrument and/or measureable assessment of functional outcome.  Cumulative Therapy Evaluation is: Low complexity.    Previous and current functional limitations:  (See Goal Flow Sheet for this information)    Short term and Long term goals: (See Goal Flow Sheet for this information)     Communication ability:  Patient appears to be able to clearly communicate and understand verbal and written communication and follow directions correctly.  Treatment Explanation - The following has been discussed with the patient:   RX ordered/plan of care  Anticipated outcomes  Possible risks and side effects  This patient would benefit from PT intervention to resume normal activities.   Rehab potential is good.    Frequency:  1 X week, once daily  Duration:  for 8 weeks  Discharge Plan:  Achieve all LTG.  Independent in home treatment program.  Reach maximal therapeutic benefit.    Please refer to the daily flowsheet for treatment today, total treatment time and time spent performing 1:1 timed codes.

## 2019-09-16 ENCOUNTER — OFFICE VISIT (OUTPATIENT)
Dept: FAMILY MEDICINE | Facility: CLINIC | Age: 54
End: 2019-09-16
Payer: COMMERCIAL

## 2019-09-16 ENCOUNTER — MYC MEDICAL ADVICE (OUTPATIENT)
Dept: FAMILY MEDICINE | Facility: CLINIC | Age: 54
End: 2019-09-16

## 2019-09-16 VITALS
BODY MASS INDEX: 18.48 KG/M2 | HEIGHT: 66 IN | TEMPERATURE: 98.2 F | HEART RATE: 59 BPM | SYSTOLIC BLOOD PRESSURE: 122 MMHG | OXYGEN SATURATION: 98 % | WEIGHT: 115 LBS | DIASTOLIC BLOOD PRESSURE: 78 MMHG

## 2019-09-16 DIAGNOSIS — R82.90 NONSPECIFIC FINDING ON EXAMINATION OF URINE: ICD-10-CM

## 2019-09-16 DIAGNOSIS — N30.01 ACUTE CYSTITIS WITH HEMATURIA: Primary | ICD-10-CM

## 2019-09-16 LAB
ALBUMIN UR-MCNC: NEGATIVE MG/DL
APPEARANCE UR: CLEAR
BACTERIA #/AREA URNS HPF: ABNORMAL /HPF
BILIRUB UR QL STRIP: NEGATIVE
COLOR UR AUTO: YELLOW
GLUCOSE UR STRIP-MCNC: NEGATIVE MG/DL
HGB UR QL STRIP: ABNORMAL
HYALINE CASTS #/AREA URNS LPF: ABNORMAL /LPF
KETONES UR STRIP-MCNC: NEGATIVE MG/DL
LEUKOCYTE ESTERASE UR QL STRIP: ABNORMAL
NITRATE UR QL: NEGATIVE
NON-SQ EPI CELLS #/AREA URNS LPF: ABNORMAL /LPF
PH UR STRIP: 6 PH (ref 5–7)
RBC #/AREA URNS AUTO: ABNORMAL /HPF
SOURCE: ABNORMAL
SP GR UR STRIP: 1.02 (ref 1–1.03)
UROBILINOGEN UR STRIP-ACNC: 0.2 EU/DL (ref 0.2–1)
WBC #/AREA URNS AUTO: ABNORMAL /HPF

## 2019-09-16 PROCEDURE — 87186 SC STD MICRODIL/AGAR DIL: CPT | Performed by: FAMILY MEDICINE

## 2019-09-16 PROCEDURE — 81001 URINALYSIS AUTO W/SCOPE: CPT | Performed by: FAMILY MEDICINE

## 2019-09-16 PROCEDURE — 99214 OFFICE O/P EST MOD 30 MIN: CPT | Performed by: FAMILY MEDICINE

## 2019-09-16 PROCEDURE — 87086 URINE CULTURE/COLONY COUNT: CPT | Performed by: FAMILY MEDICINE

## 2019-09-16 PROCEDURE — 87088 URINE BACTERIA CULTURE: CPT | Performed by: FAMILY MEDICINE

## 2019-09-16 RX ORDER — NITROFURANTOIN 25; 75 MG/1; MG/1
100 CAPSULE ORAL 2 TIMES DAILY
Qty: 14 CAPSULE | Refills: 0 | Status: SHIPPED | OUTPATIENT
Start: 2019-09-16 | End: 2019-09-23

## 2019-09-16 ASSESSMENT — MIFFLIN-ST. JEOR: SCORE: 1144.42

## 2019-09-16 NOTE — PROGRESS NOTES
"Subjective     Liyah Jernigan is a 54 year old female who presents to clinic today for the following health issues:    HPI   URINARY TRACT SYMPTOMS  Onset: 3-4 days     Description:   Painful urination (Dysuria): no            Frequency: YES- increase   Blood in urine (Hematuria): no   Delay in urine (Hesitency): YES    Intensity: NA    Progression of Symptoms:  worsening    Accompanying Signs & Symptoms:  Fever/chills: no   Flank pain no   Nausea and vomiting: no   Any vaginal symptoms: none  Abdominal/Pelvic Pain: YES    History:   History of frequent UTI's: no   History of kidney stones: no  Sexually Active: YES  Possibility of pregnancy: No    Precipitating factors:   None     Therapies Tried and outcome: None     SUBJECTIVE:  Here today with the above symptoms that started 3 to 4 days ago.  No fever but she does feel somewhat tired and rundown.  Has not had many urinary infections in the past but did have one this past summer.    Review of systems otherwise negative.  Past medical, family, and social history reviewed and updated in chart.    OBJECTIVE:  /78 (BP Location: Right arm, Patient Position: Chair, Cuff Size: Adult Regular)   Pulse 59   Temp 98.2  F (36.8  C) (Oral)   Ht 1.67 m (5' 5.75\")   Wt 52.2 kg (115 lb)   LMP  (LMP Unknown)   SpO2 98%   Breastfeeding? No   BMI 18.70 kg/m    Alert, pleasant, upbeat, and in no apparent discomfort.  S1 and S2 normal, no murmurs, clicks, gallops or rubs. Regular rate and rhythm. Chest is clear; no wheezes or rales. No edema or JVD.  The abdomen is soft without tenderness, guarding, mass, rebound or organomegaly. Bowel sounds are normal. No CVA tenderness or inguinal adenopathy noted.  Past labs reviewed with the patient.     UA RESULTS:  Recent Labs   Lab Test 09/16/19  1718 07/08/19  1440   COLOR Yellow Yellow   APPEARANCE Clear Clear   URINEGLC Negative Negative   URINEBILI Negative Negative   URINEKETONE Negative Negative   SG 1.020 1.015   UBLD Small* " Trace*   URINEPH 6.0 7.5*   PROTEIN Negative Trace*   UROBILINOGEN 0.2 0.2   NITRITE Negative Negative   LEUKEST Small* Trace*   RBCU  --  2-5*   WBCU  --  5-10*         ASSESSMENT / PLAN:  (N30.01) Acute cystitis with hematuria  (primary encounter diagnosis)  Comment: Discussed mechanism of action of the proposed medication, as well as potential effects, both good and bad.  Patient expressed understanding and agreed with treatment.   Plan: UA reflex to Microscopic and Culture, Urine         Microscopic, nitroFURantoin         macrocrystal-monohydrate (MACROBID) 100 MG         capsule            Follow up -I will contact patient in a couple of days with culture results  S. Tab Barrow MD    (Chart documentation completed in part with Dragon voice-recognition software.  Even though reviewed some grammatical, spelling, and word errors may remain.)

## 2019-09-18 LAB
BACTERIA SPEC CULT: ABNORMAL
SPECIMEN SOURCE: ABNORMAL

## 2019-09-18 NOTE — RESULT ENCOUNTER NOTE
Liyah,  The urine culture showed E. coli, which is the most common bacteria in a bladder infection.  Should be responding to the Macrobid.  Keep me updated on how you are doing.  TAMI Barrow M.D.

## 2019-09-19 ENCOUNTER — THERAPY VISIT (OUTPATIENT)
Dept: PHYSICAL THERAPY | Facility: CLINIC | Age: 54
End: 2019-09-19
Payer: COMMERCIAL

## 2019-09-19 DIAGNOSIS — S39.012D SACROILIAC STRAIN, SUBSEQUENT ENCOUNTER: ICD-10-CM

## 2019-09-19 DIAGNOSIS — M25.552 HIP PAIN, LEFT: ICD-10-CM

## 2019-09-19 PROCEDURE — 97110 THERAPEUTIC EXERCISES: CPT | Mod: GP | Performed by: PHYSICAL THERAPIST

## 2019-09-19 PROCEDURE — 97140 MANUAL THERAPY 1/> REGIONS: CPT | Mod: GP | Performed by: PHYSICAL THERAPIST

## 2019-09-23 ENCOUNTER — TELEPHONE (OUTPATIENT)
Dept: FAMILY MEDICINE | Facility: CLINIC | Age: 54
End: 2019-09-23

## 2019-09-23 NOTE — TELEPHONE ENCOUNTER
Panel Management Review   One phone call and send letter if unable to reach them or MyChart message and send letter if not read after 2 weeks (You will get a message to your inbasket)      BP Readings from Last 1 Encounters:   09/16/19 122/78    , No results found for: A1C, 9/16/2019    Health Maintenance Due   Topic Date Due     MAMMO SCREENING  1965     COLONOSCOPY  01/02/1977     PAP FOLLOW-UP  01/02/1988     PREVENTIVE CARE VISIT  11/04/2016     ZOSTER IMMUNIZATION (1 of 2) 01/02/2017        Fail List measure:     Depression / Dysthymia review    Measure:  Needs PHQ-9 score of 4 or less during index window.  Administer PHQ-9 and if score is 5 or more, send encounter to provider for next steps.    5 - 7 month window range:     PHQ-9 SCORE 5/21/2018 12/4/2018 7/8/2019   PHQ-9 Total Score 0 9 0       If PHQ-9 recheck is 5 or more, route to provider for next steps.    Patient is due for:  PHQ9        Patient is due/failing the following:   PHQ9    Action needed:   Patient needs to do PHQ9.    Type of outreach:    Sent MyChart message.    Questions for provider review:    None                                                                                       Chart routed to Care Team .                                            Jacy Rodriguez, [facility]

## 2019-10-01 ENCOUNTER — HEALTH MAINTENANCE LETTER (OUTPATIENT)
Age: 54
End: 2019-10-01

## 2019-10-17 DIAGNOSIS — F51.01 PRIMARY INSOMNIA: ICD-10-CM

## 2019-10-17 NOTE — TELEPHONE ENCOUNTER
"Requested Prescriptions   Pending Prescriptions Disp Refills     traZODone (DESYREL) 100 MG tablet 45 tablet 0     Sig: TAKE 1/2 TABLET BY MOUTH DAILY       Serotonin Modulators Passed - 10/17/2019 11:47 AM        Passed - Recent (12 mo) or future (30 days) visit within the authorizing provider's specialty     Patient has had an office visit with the authorizing provider or a provider within the authorizing providers department within the previous 12 mos or has a future within next 30 days. See \"Patient Info\" tab in inbasket, or \"Choose Columns\" in Meds & Orders section of the refill encounter.              Passed - Medication is active on med list        Passed - Patient is age 18 or older        Passed - No active pregnancy on record        Passed - No positive pregnancy test in past 12 months        traZODone (DESYREL) 100 MG tablet  Last Written Prescription Date:  5/7/19  Last Fill Quantity: 45,  # refills: 0   Last office visit: 9/16/2019 with prescribing provider:  Dr. Barrow   Future Office Visit:      "

## 2019-10-21 RX ORDER — TRAZODONE HYDROCHLORIDE 100 MG/1
TABLET ORAL
Qty: 45 TABLET | Refills: 0 | Status: SHIPPED | OUTPATIENT
Start: 2019-10-21 | End: 2020-04-14

## 2019-10-21 NOTE — TELEPHONE ENCOUNTER
Prescription approved per [facility] Refill Protocol.    Emilie Dunlap RN

## 2019-10-22 ENCOUNTER — ANCILLARY PROCEDURE (OUTPATIENT)
Dept: GENERAL RADIOLOGY | Facility: CLINIC | Age: 54
End: 2019-10-22
Attending: PHYSICIAN ASSISTANT
Payer: COMMERCIAL

## 2019-10-22 ENCOUNTER — OFFICE VISIT (OUTPATIENT)
Dept: FAMILY MEDICINE | Facility: CLINIC | Age: 54
End: 2019-10-22
Payer: COMMERCIAL

## 2019-10-22 VITALS
TEMPERATURE: 98.1 F | HEART RATE: 61 BPM | DIASTOLIC BLOOD PRESSURE: 78 MMHG | OXYGEN SATURATION: 99 % | SYSTOLIC BLOOD PRESSURE: 122 MMHG

## 2019-10-22 DIAGNOSIS — S92.401A CLOSED FRACTURE OF PHALANX OF RIGHT GREAT TOE, PHYSEAL INVOLVEMENT UNSPECIFIED, UNSPECIFIED PHALANX, INITIAL ENCOUNTER: Primary | ICD-10-CM

## 2019-10-22 DIAGNOSIS — M79.674 PAIN OF TOE OF RIGHT FOOT: ICD-10-CM

## 2019-10-22 DIAGNOSIS — M79.671 RIGHT FOOT PAIN: ICD-10-CM

## 2019-10-22 DIAGNOSIS — Z12.11 SCREENING FOR COLON CANCER: ICD-10-CM

## 2019-10-22 DIAGNOSIS — Z12.31 VISIT FOR SCREENING MAMMOGRAM: ICD-10-CM

## 2019-10-22 PROCEDURE — 99213 OFFICE O/P EST LOW 20 MIN: CPT | Performed by: PHYSICIAN ASSISTANT

## 2019-10-22 PROCEDURE — 73660 X-RAY EXAM OF TOE(S): CPT | Mod: RT

## 2019-10-22 PROCEDURE — 73630 X-RAY EXAM OF FOOT: CPT | Mod: RT

## 2019-10-22 RX ORDER — HYDROCODONE BITARTRATE AND ACETAMINOPHEN 5; 325 MG/1; MG/1
1 TABLET ORAL EVERY 6 HOURS PRN
Qty: 10 TABLET | Refills: 0 | Status: SHIPPED | OUTPATIENT
Start: 2019-10-22 | End: 2020-03-24

## 2019-10-22 NOTE — PATIENT INSTRUCTIONS
Take vicodin up to every 6 hours as needed for pain  Return urgently if any change in symptoms like increasing pain, swelling or other change in symptoms.   Use crutches for weight bearing  Bring in FIT card for colon cancer screening.   Schedule mammogram at [facility] ([phone]).

## 2019-10-22 NOTE — PROGRESS NOTES
"Subjective     Liyah Jernigan is a 54 year old female who presents to clinic today for the following health issues:    HPI   Joint Pain    Onset: 10/21    Description:   Location: Right foot; big toe   Character: Toenail is split(patient removed nail polish after accident), bruning pain     Intensity: moderate to severe    Progression of Symptoms: worse    History:   Previous similar pain: no       Precipitating factors:   Trauma or overuse: YES- Patient dropped toilet on toe     Alleviating factors:  Improved by: Ibuprofen and keeping foot elevated     Therapies Tried and outcome: Ibuprofen and keeping foot elevated       In process of remodeling her bathroom. Was removing toilet from packaging and thought only cardboard but dropped toilet lid on right big toe.  Nail split a bit.  Unable to bear weight due to pain.  Has been elevating foot as much as possible.  Has upcoming trip to [place]    Patient Active Problem List   Diagnosis     Attention deficit hyperactivity disorder (ADHD), unspecified ADHD type     MS (multiple sclerosis) (H)     Varicose vein     Restless leg syndrome     Mild episode of recurrent major depressive disorder (H)     Genital herpes simplex, unspecified site     Benign essential hypertension     Hip pain, left     Sacroiliac strain     Past Surgical History:   Procedure Laterality Date     ENT SURGERY      tympanoplasty     GYN SURGERY      laparoscopies (5) laparotomies (2) for ectopic       Social History     Tobacco Use     Smoking status: Never Smoker     Smokeless tobacco: Never Used   Substance Use Topics     Alcohol use: No     Alcohol/week: 0.0 standard drinks     Family History   Problem Relation Age of Onset     Diabetes Father              Hypertension Father      Hyperlipidemia Father      Substance Abuse Father      Obesity Father      Diabetes Mother         \"borderline\" for years     Hypertension Mother      Hyperlipidemia Mother      Depression Mother      " Substance Abuse Mother      Asthma Mother      Thyroid Disease Mother      Obesity Mother      Cerebrovascular Disease Maternal Grandfather         he had a few     Substance Abuse Maternal Grandfather      Breast Cancer Maternal Grandmother         in 50s     Substance Abuse Paternal Grandfather          Current Outpatient Medications   Medication Sig Dispense Refill     [START ON 11/5/2019] amphetamine-dextroamphetamine (ADDERALL) 10 MG tablet Take 1 tablet (10 mg) by mouth daily 30 tablet 0     Ascorbic Acid (VITAMIN C PO) Take 500 mg by mouth daily       buPROPion (WELLBUTRIN SR) 150 MG 12 hr tablet TAKE 1 TABLET(150 MG) BY MOUTH DAILY 90 tablet 1     gabapentin (NEURONTIN) 300 MG capsule TAKE 1 CAPSULE BY MOUTH EVERY NIGHT AS NEEDED 30 capsule 11            metoprolol succinate ER (TOPROL-XL) 25 MG 24 hr tablet TAKE 1 TABLET(25 MG) BY MOUTH DAILY 90 tablet 2     order for DME Equipment being ordered: crutches 1 Device 0     order for DME Please measure and distribute 1 pair of 20mmHg - 30mmHg THIGH high open  toe compression stockings. Jobst ultrasheer or equivalent. 1 each 1     SUMAtriptan (IMITREX) 100 MG tablet Take 1 tablet (100 mg) by mouth daily as needed for migraine 30 tablet 3     teriflunomide (AUBAGIO) 14 MG tablet Take 1 tablet (14 mg) by mouth daily 30 tablet 11     traZODone (DESYREL) 100 MG tablet TAKE 1/2 TABLET BY MOUTH DAILY 45 tablet 0     valACYclovir (VALTREX) 500 MG tablet TAKE 1 TABLET BY MOUTH DAILY 90 tablet 1     amphetamine-dextroamphetamine (ADDERALL) 10 MG tablet Take 1 tablet (10 mg) by mouth daily (Patient not taking: Reported on 10/22/2019) 30 tablet 0     amphetamine-dextroamphetamine (ADDERALL) 10 MG tablet Take 1 tablet (10 mg) by mouth daily (Patient not taking: Reported on 10/22/2019) 30 tablet 0     biotin 2.5 mg/mL SUSP Take 20 mg by mouth daily       Ferrous Sulfate (IRON SUPPLEMENT PO) Take 1 tablet by mouth daily       BP Readings from Last 3 Encounters:   10/22/19  122/78   09/16/19 122/78   09/09/19 110/68    Wt Readings from Last 3 Encounters:   09/16/19 52.2 kg (115 lb)   09/09/19 53.5 kg (118 lb)   07/08/19 52.1 kg (114 lb 14.4 oz)                      Reviewed and updated as needed this visit by Provider  Tobacco  Allergies  Meds  Problems  Med Hx  Surg Hx  Fam Hx         Review of Systems   ROS COMP: Constitutional, HEENT, cardiovascular, pulmonary, gi and gu systems are negative, except as otherwise noted.      Objective    /78 (BP Location: Right arm, Patient Position: Chair, Cuff Size: Adult Regular)   Pulse 61   Temp 98.1  F (36.7  C) (Oral)   LMP  (LMP Unknown)   SpO2 99%   Breastfeeding? No   There is no height or weight on file to calculate BMI.  Physical Exam   GENERAL: healthy, alert and no distress  RESP: lungs clear to auscultation - no rales, rhonchi or wheezes  CV: regular rate and rhythm, normal S1 S2, no S3 or S4, no murmur, click or rub, no peripheral edema and peripheral pulses strong  MS: right great toe tender at MTP Of great toe and distal and proximal phalanx. No tenderness of IP of great toe  Some edema.  Some ecchymoses- nail is split but adherent to nail bed.  Matrix appears normal     Diagnostic Test Results:  Xray - right foot appears normal.  Has fracture of distal phalanx without displacement and with intact skin         Assessment & Plan     1. Closed fracture of phalanx of right great toe, physeal involvement unspecified, unspecified phalanx, initial encounter  Closed fracture of distal phalanx of great toe Reviewed images with her and advised will take 6 weeks to heal.  Encouraged nonweight bearing but we do not have crutches in her size available so given DME order for crutches  Great toe was taped to second toe  - HYDROcodone-acetaminophen (NORCO) 5-325 MG tablet; Take 1 tablet by mouth every 6 hours as needed for severe pain  Dispense: 10 tablet; Refill: 0  - order for DME; Equipment being ordered: crutches  Dispense: 1  Device; Refill: 0    2. Pain of toe of right foot  As above   - XR Toe Right G/E 2 Views; Future  - HYDROcodone-acetaminophen (NORCO) 5-325 MG tablet; Take 1 tablet by mouth every 6 hours as needed for severe pain  Dispense: 10 tablet; Refill: 0  - order for DME; Equipment being ordered: crutches  Dispense: 1 Device; Refill: 0    3. Right foot pain  As above   - XR Foot Right G/E 3 Views; Future  - HYDROcodone-acetaminophen (NORCO) 5-325 MG tablet; Take 1 tablet by mouth every 6 hours as needed for severe pain  Dispense: 10 tablet; Refill: 0  - order for DME; Equipment being ordered: crutches  Dispense: 1 Device; Refill: 0    4. Screening for colon cancer  Encouraged to bring in FIT card- declines colonoscopy   - Fecal colorectal cancer screen FIT; Future    5. Visit for screening mammogram    - MA SCREENING DIGITAL BILAT - Future  (s+30); Future       Patient Instructions   Take vicodin up to every 6 hours as needed for pain  Return urgently if any change in symptoms like increasing pain, swelling or other change in symptoms.   Use crutches for weight bearing  Bring in FIT card for colon cancer screening.   Schedule mammogram at [facility] ([phone]).             Return in about 6 weeks (around 12/3/2019), or if symptoms worsen or fail to improve.    Jaki Blas PA-C  [facility]

## 2019-10-22 NOTE — RESULT ENCOUNTER NOTE
Christos Pelletier  As we discussed you have fractured your big toe but the rest of your xray was normal.   Please call or MyChart my office with any questions or concerns.    Jaki Blas, PAC

## 2019-11-14 DIAGNOSIS — I10 BENIGN ESSENTIAL HYPERTENSION: ICD-10-CM

## 2019-11-14 NOTE — TELEPHONE ENCOUNTER
"Requested Prescriptions   Pending Prescriptions Disp Refills     metoprolol succinate ER (TOPROL-XL) 25 MG 24 hr tablet [Pharmacy Med Name: METOPROLOL ER SUCCINATE 25MG TABS] 90 tablet 0     Sig: TAKE 1 TABLET(25 MG) BY MOUTH DAILY       Beta-Blockers Protocol Passed - 11/14/2019 11:10 AM        Passed - Blood pressure under 140/90 in past 12 months     BP Readings from Last 3 Encounters:   10/22/19 122/78   09/16/19 122/78   09/09/19 110/68                 Passed - Patient is age 6 or older        Passed - Recent (12 mo) or future (30 days) visit within the authorizing provider's specialty     Patient has had an office visit with the authorizing provider or a provider within the authorizing providers department within the previous 12 mos or has a future within next 30 days. See \"Patient Info\" tab in inbasket, or \"Choose Columns\" in Meds & Orders section of the refill encounter.              Passed - Medication is active on med list        metoprolol succinate ER (TOPROL-XL) 25 MG 24 hr tablet  Last Written Prescription Date:  2/27/19  Last Fill Quantity: 90,  # refills: 2   Last office visit: 10/22/2019 with prescribing provider:  Dr. Barrow   Future Office Visit:      "

## 2019-11-18 RX ORDER — METOPROLOL SUCCINATE 25 MG/1
TABLET, EXTENDED RELEASE ORAL
Qty: 90 TABLET | Refills: 0 | Status: SHIPPED | OUTPATIENT
Start: 2019-11-18 | End: 2020-03-12

## 2019-11-19 NOTE — TELEPHONE ENCOUNTER
Prescription approved per [facility] Refill Protocol.    Racquel Kong RN, [facility]

## 2019-12-12 ENCOUNTER — MYC MEDICAL ADVICE (OUTPATIENT)
Dept: FAMILY MEDICINE | Facility: CLINIC | Age: 54
End: 2019-12-12

## 2019-12-20 ENCOUNTER — MYC REFILL (OUTPATIENT)
Dept: FAMILY MEDICINE | Facility: CLINIC | Age: 54
End: 2019-12-20

## 2019-12-20 DIAGNOSIS — F90.9 ATTENTION DEFICIT HYPERACTIVITY DISORDER (ADHD), UNSPECIFIED ADHD TYPE: ICD-10-CM

## 2019-12-20 RX ORDER — DEXTROAMPHETAMINE SACCHARATE, AMPHETAMINE ASPARTATE, DEXTROAMPHETAMINE SULFATE AND AMPHETAMINE SULFATE 2.5; 2.5; 2.5; 2.5 MG/1; MG/1; MG/1; MG/1
10 TABLET ORAL DAILY
Qty: 30 TABLET | Refills: 0 | Status: SHIPPED | OUTPATIENT
Start: 2019-12-20 | End: 2020-02-20

## 2019-12-20 NOTE — TELEPHONE ENCOUNTER
Routing refill request to provider for review/approval because:  Drug not on the [facility] refill protocol       Miguelito Hollis RN, BSN, PHN

## 2019-12-20 NOTE — TELEPHONE ENCOUNTER
Requested Prescriptions   Pending Prescriptions Disp Refills     amphetamine-dextroamphetamine (ADDERALL) 10 MG tablet  Last Written Prescription Date:  11/05/19  Last Fill Quantity: 30 tablet,  # refills: 0   Last office visit: 10/22/2019 with prescribing provider:  Dr. Barrow   Future Office Visit:        Routing refill request to provider for review/approval because:  Drug not on the [facility] refill protocol or controlled substance   30 tablet 0     Sig: Take 1 tablet (10 mg) by mouth daily       There is no refill protocol information for this order

## 2019-12-23 ENCOUNTER — MYC REFILL (OUTPATIENT)
Dept: FAMILY MEDICINE | Facility: CLINIC | Age: 54
End: 2019-12-23

## 2019-12-23 ENCOUNTER — MYC MEDICAL ADVICE (OUTPATIENT)
Dept: FAMILY MEDICINE | Facility: CLINIC | Age: 54
End: 2019-12-23

## 2019-12-23 DIAGNOSIS — A60.00 GENITAL HERPES SIMPLEX, UNSPECIFIED SITE: ICD-10-CM

## 2019-12-23 NOTE — TELEPHONE ENCOUNTER
"Requested Prescriptions   Pending Prescriptions Disp Refills     valACYclovir (VALTREX) 500 MG tablet 90 tablet 1     Sig: Take 1 tablet (500 mg) by mouth daily       Antivirals for Herpes Protocol Passed - 12/23/2019 12:11 PM        Passed - Patient is age 12 or older        Passed - Recent (12 mo) or future (30 days) visit within the authorizing provider's specialty     Patient has had an office visit with the authorizing provider or a provider within the authorizing providers department within the previous 12 mos or has a future within next 30 days. See \"Patient Info\" tab in inbasket, or \"Choose Columns\" in Meds & Orders section of the refill encounter.              Passed - Medication is active on med list        Passed - Normal serum creatinine on file in past 12 months     Recent Labs   Lab Test 07/08/19  1520   CR 0.84             valACYclovir (VALTREX) 500 MG tablet  Last Written Prescription Date:  7/24/19  Last Fill Quantity: 90,  # refills: 1   Last office visit: 10/22/2019 with prescribing provider:  Dr. Barrow   Future Office Visit:      "

## 2019-12-23 NOTE — TELEPHONE ENCOUNTER
Routing to provider to review and advise, please see below MyC message.    Emilie Dunlap RN  [facility]

## 2019-12-26 RX ORDER — VALACYCLOVIR HYDROCHLORIDE 500 MG/1
TABLET, FILM COATED ORAL
Qty: 90 TABLET | Refills: 1 | OUTPATIENT
Start: 2019-12-26

## 2019-12-27 RX ORDER — VALACYCLOVIR HYDROCHLORIDE 500 MG/1
500 TABLET, FILM COATED ORAL DAILY
Qty: 90 TABLET | Refills: 2 | Status: SHIPPED | OUTPATIENT
Start: 2019-12-27 | End: 2020-11-23

## 2019-12-27 NOTE — TELEPHONE ENCOUNTER
Prescription approved per [facility] Refill Protocol.  Betty Matos RN  [facility]

## 2019-12-31 ENCOUNTER — TELEPHONE (OUTPATIENT)
Dept: NEUROLOGY | Facility: CLINIC | Age: 54
End: 2019-12-31

## 2019-12-31 NOTE — TELEPHONE ENCOUNTER
Prior Authorization Approval    Authorization Effective Date: 12/1/2019  Authorization Expiration Date: 12/30/2020  Medication: Aubagio 14MG Tablets (APPROVED)  Approved Dose/Quantity: 30  Reference #: CMM KEY: [id number]   Insurance Company: [employer] - Phone [phone] Fax [phone]  Expected CoPay:       CoPay Card Available:      Foundation Assistance Needed:    Which Pharmacy is filling the prescription (Not needed for infusion/clinic administered): [facility] - [place], [place] - [address]  Pharmacy Notified: Yes  Patient Notified: Yes

## 2020-01-11 DIAGNOSIS — A60.00 GENITAL HERPES SIMPLEX, UNSPECIFIED SITE: ICD-10-CM

## 2020-01-13 RX ORDER — VALACYCLOVIR HYDROCHLORIDE 500 MG/1
TABLET, FILM COATED ORAL
Qty: 90 TABLET | Refills: 2 | OUTPATIENT
Start: 2020-01-13

## 2020-01-13 NOTE — TELEPHONE ENCOUNTER
"Requested Prescriptions   Pending Prescriptions Disp Refills     valACYclovir (VALTREX) 500 MG tablet [Pharmacy Med Name: VALACYCLOVIR 500MG TABLET] 90 tablet 2     Sig: TAKE 1 TABLET BY MOUTH DAILY       Antivirals for Herpes Protocol Passed - 1/11/2020  5:44 AM        Passed - Patient is age 12 or older        Passed - Recent (12 mo) or future (30 days) visit within the authorizing provider's specialty     Patient has had an office visit with the authorizing provider or a provider within the authorizing providers department within the previous 12 mos or has a future within next 30 days. See \"Patient Info\" tab in inbasket, or \"Choose Columns\" in Meds & Orders section of the refill encounter.              Passed - Medication is active on med list        Passed - Normal serum creatinine on file in past 12 months     Recent Labs   Lab Test 07/08/19  1520   CR 0.84             valACYclovir (VALTREX) 500 MG tablet  Last Written Prescription Date:  12/27/19  Last Fill Quantity: 90,  # refills: 2   Last office visit: 10/22/2019 with prescribing provider:  Dr. Barrow   Future Office Visit:      "

## 2020-01-14 NOTE — TELEPHONE ENCOUNTER
Refused noting to see the 12/27/19 for 90 plus 2.    Racquel Kong RN, [facility]

## 2020-01-16 ENCOUNTER — TELEPHONE (OUTPATIENT)
Dept: NEUROLOGY | Facility: CLINIC | Age: 55
End: 2020-01-16

## 2020-01-16 NOTE — TELEPHONE ENCOUNTER
PA Initiation    Medication: Aubagio 14MG Tablets (PENDING)  Insurance Company: [employer] - Phone [phone] Fax [phone]  Pharmacy Filling the Rx: [facility] - [address] [place] [address]  Filling Pharmacy Phone:    Filling Pharmacy Fax:    Start Date: 1/16/2020

## 2020-01-16 NOTE — TELEPHONE ENCOUNTER
Prior Authorization Approval    Authorization Effective Date: 12/17/2019  Authorization Expiration Date: 1/15/2021  Medication: Aubagio 14MG Tablets (APPROVED)  Approved Dose/Quantity: 30 days  Reference #:     Insurance Company: [employer] - Phone [phone] Fax [phone]  Expected CoPay:       CoPay Card Available:      Foundation Assistance Needed:    Which Pharmacy is filling the prescription (Not needed for infusion/clinic administered): [facility] - [place] [address]  Pharmacy Notified: Yes  Patient Notified: Yes    Confirmed patient has [employer] insurance and can fill with [facility]:

## 2020-01-17 ENCOUNTER — DOCUMENTATION ONLY (OUTPATIENT)
Dept: FAMILY MEDICINE | Facility: CLINIC | Age: 55
End: 2020-01-17

## 2020-01-17 NOTE — PROGRESS NOTES
This patient has overdue labs. A [employer] message was sent on 12/12/2019 and there has been no lab appointment made. If you still want these labs done, please have your care team contact the patient to make a lab appointment. Otherwise, please have the labs discontinued and close the encounter.    Thank you,    Aviva Bertrand MLT([facility])  [facility]

## 2020-02-01 ENCOUNTER — OFFICE VISIT (OUTPATIENT)
Dept: URGENT CARE | Facility: URGENT CARE | Age: 55
End: 2020-02-01
Payer: COMMERCIAL

## 2020-02-01 VITALS
OXYGEN SATURATION: 98 % | BODY MASS INDEX: 19.3 KG/M2 | TEMPERATURE: 99.5 F | DIASTOLIC BLOOD PRESSURE: 87 MMHG | WEIGHT: 118.7 LBS | HEART RATE: 79 BPM | RESPIRATION RATE: 12 BRPM | SYSTOLIC BLOOD PRESSURE: 125 MMHG

## 2020-02-01 DIAGNOSIS — J06.9 UPPER RESPIRATORY TRACT INFECTION, UNSPECIFIED TYPE: Primary | ICD-10-CM

## 2020-02-01 PROCEDURE — 99213 OFFICE O/P EST LOW 20 MIN: CPT | Performed by: FAMILY MEDICINE

## 2020-02-01 NOTE — PATIENT INSTRUCTIONS

## 2020-02-01 NOTE — PROGRESS NOTES
"Subjective     Liyah Jernigan is a 55 year old female who presents to clinic today for the following health issues:    HPI   Chief Complaint   Patient presents with     URI       Duration: 2 days     Description (location/character/radiation): x 2 days-woke up with it yesterday morning, ears been congested for couple weeks and eas pain     Intensity:  moderate    Accompanying signs and symptoms: none     History (similar episodes/previous evaluation): None    Precipitating or alleviating factors: None    Therapies tried and outcome: OTC analgesia        Patient Active Problem List   Diagnosis     Attention deficit hyperactivity disorder (ADHD), unspecified ADHD type     MS (multiple sclerosis) (H)     Varicose vein     Restless leg syndrome     Mild episode of recurrent major depressive disorder (H)     Genital herpes simplex, unspecified site     Benign essential hypertension     Hip pain, left     Sacroiliac strain     Past Surgical History:   Procedure Laterality Date     ENT SURGERY      tympanoplasty     GYN SURGERY      laparoscopies (5) laparotomies (2) for ectopic       Social History     Tobacco Use     Smoking status: Never Smoker     Smokeless tobacco: Never Used   Substance Use Topics     Alcohol use: No     Alcohol/week: 0.0 standard drinks     Family History   Problem Relation Age of Onset     Diabetes Father              Hypertension Father      Hyperlipidemia Father      Substance Abuse Father      Obesity Father      Diabetes Mother         \"borderline\" for years     Hypertension Mother      Hyperlipidemia Mother      Depression Mother      Substance Abuse Mother      Asthma Mother      Thyroid Disease Mother      Obesity Mother      Cerebrovascular Disease Maternal Grandfather         he had a few     Substance Abuse Maternal Grandfather      Breast Cancer Maternal Grandmother         in 50s     Substance Abuse Paternal Grandfather          Current Outpatient Medications   Medication " Sig Dispense Refill     amphetamine-dextroamphetamine (ADDERALL) 10 MG tablet Take 1 tablet (10 mg) by mouth daily 30 tablet 0     buPROPion (WELLBUTRIN SR) 150 MG 12 hr tablet TAKE 1 TABLET(150 MG) BY MOUTH DAILY 90 tablet 1     gabapentin (NEURONTIN) 300 MG capsule TAKE 1 CAPSULE BY MOUTH EVERY NIGHT AS NEEDED 30 capsule 11     metoprolol succinate ER (TOPROL-XL) 25 MG 24 hr tablet TAKE 1 TABLET(25 MG) BY MOUTH DAILY 90 tablet 0     teriflunomide (AUBAGIO) 14 MG tablet Take 1 tablet (14 mg) by mouth daily 30 tablet 11     traZODone (DESYREL) 100 MG tablet TAKE 1/2 TABLET BY MOUTH DAILY 45 tablet 0     valACYclovir (VALTREX) 500 MG tablet Take 1 tablet (500 mg) by mouth daily 90 tablet 2     amphetamine-dextroamphetamine (ADDERALL) 10 MG tablet Take 1 tablet (10 mg) by mouth daily (Patient not taking: Reported on 2/1/2020) 30 tablet 0     amphetamine-dextroamphetamine (ADDERALL) 10 MG tablet Take 1 tablet (10 mg) by mouth daily (Patient not taking: Reported on 10/22/2019) 30 tablet 0     Ascorbic Acid (VITAMIN C PO) Take 500 mg by mouth daily       biotin 2.5 mg/mL SUSP Take 20 mg by mouth daily       Ferrous Sulfate (IRON SUPPLEMENT PO) Take 1 tablet by mouth daily       order for DME Equipment being ordered: crutches 1 Device 0     order for DME Please measure and distribute 1 pair of 20mmHg - 30mmHg THIGH high open  toe compression stockings. Jobst ultrasheer or equivalent. 1 each 1     SUMAtriptan (IMITREX) 100 MG tablet Take 1 tablet (100 mg) by mouth daily as needed for migraine 30 tablet 3     No Known Allergies  Recent Labs   Lab Test 07/08/19  1520 04/09/19  1209 09/11/18  1629 01/23/18  1052 09/13/17  1101 08/01/17  1819  04/14/16  1021   LDL  --   --   --   --   --  64  --  100*   HDL  --   --   --   --   --  82  --  121   TRIG  --   --   --   --   --  194*  --  50   ALT 35 34 41 46  --  28   < >  --    CR 0.84  --   --  0.95  --  0.78   < >  --    GFRESTIMATED 79  --   --  62  --  78   < >  --     GFRESTBLACK >90  --   --  75  --  >90   GFR Calc     < >  --    POTASSIUM 4.1  --   --  4.2  --  4.1   < >  --    TSH  --   --   --  2.37 1.57  --   --   --     < > = values in this interval not displayed.      BP Readings from Last 3 Encounters:   02/01/20 125/87   10/22/19 122/78   09/16/19 122/78    Wt Readings from Last 3 Encounters:   02/01/20 53.8 kg (118 lb 11.2 oz)   09/16/19 52.2 kg (115 lb)   09/09/19 53.5 kg (118 lb)              Reviewed and updated as needed this visit by Provider         Review of Systems   ROS COMP: Constitutional, HEENT, cardiovascular, pulmonary, gi and gu systems are negative, except as otherwise noted.      Objective    /87 (BP Location: Right arm, Patient Position: Sitting, Cuff Size: Adult Regular)   Pulse 79   Temp 99.5  F (37.5  C) (Oral)   Resp 12   Wt 53.8 kg (118 lb 11.2 oz)   SpO2 98%   BMI 19.30 kg/m    Body mass index is 19.3 kg/m .  Physical Exam   GENERAL: healthy, alert and no distress  EYES: Eyes grossly normal to inspection, PERRL and conjunctivae and sclerae normal  HENT: ear canals and TM's normal, nose and mouth without ulcers or lesions  NECK: no adenopathy, no asymmetry, masses, or scars and thyroid normal to palpation  RESP: lungs clear to auscultation - no rales, rhonchi or wheezes  CV: regular rate and rhythm, normal S1 S2, no S3 or S4, no murmur, click or rub, no peripheral edema and peripheral pulses strong  ABDOMEN: soft, nontender, no hepatosplenomegaly, no masses and bowel sounds normal  MS: no gross musculoskeletal defects noted, no edema      Assessment & Plan     (J06.9) Upper respiratory tract infection, unspecified type  (primary encounter diagnosis)  Comment: Discussed in detail differentials and further management. Symptoms are likely secondary to viral infection. Recommended well hydration, over-the-counter analgesia, warm fluids and humidifier use. Follow up if symptoms persist or worsen. Written  instructions/information provided. Patient understood and in agreement with the above plan. All questions are answered.      Patient Instructions       Patient Education     Viral Upper Respiratory Illness (Adult)    You have a viral upper respiratory illness (URI), which is another term for the common cold. This illness is contagious during the first few days. It is spread through the air by coughing and sneezing. It may also be spread by direct contact (touching the sick person and then touching your own eyes, nose, or mouth). Frequent handwashing will decrease risk of spread. Most viral illnesses go away within 7 to 10 days with rest and simple home remedies. Sometimes the illness may last for several weeks. Antibiotics will not kill a virus, and they are generally not prescribed for this condition.  Home care    If symptoms are severe, rest at home for the first 2 to 3 days. When you resume activity, don't let yourself get too tired.    Don't smoke. If you need help stopping, talk with your healthcare provider.    Avoid being exposed to cigarette smoke (yours or others ).    You may use acetaminophen or ibuprofen to control pain and fever, unless another medicine was prescribed. If you have chronic liver or kidney disease, have ever had a stomach ulcer or gastrointestinal bleeding, or are taking blood-thinning medicines, talk with your healthcare provider before using these medicines. Aspirin should never be given to anyone under 18 years of age who is ill with a viral infection or fever. It may cause severe liver or brain damage.    Your appetite may be poor, so a light diet is fine. Stay well hydrated by drinking 6 to 8 glasses of fluids per day (water, soft drinks, juices, tea, or soup). Extra fluids will help loosen secretions in the nose and lungs.    Over-the-counter cold medicines will not shorten the length of time you re sick, but they may be helpful for the following symptoms: cough, sore throat, and  nasal and sinus congestion. If you take prescription medicines, ask your healthcare provider or pharmacist which over-the-counter medicines are safe to use. (Note: Don't use decongestants if you have high blood pressure.)  Follow-up care  Follow up with your healthcare provider, or as advised.  When to seek medical advice  Call your healthcare provider right away if any of these occur:    Cough with lots of colored sputum (mucus)    Severe headache; face, neck, or ear pain    Difficulty swallowing due to throat pain    Fever of 100.4 F (38 C) or higher, or as directed by your healthcare provider  Call 911  Call 911 if any of these occur:    Chest pain, shortness of breath, wheezing, or difficulty breathing    Coughing up blood    Very severe pain with swallowing, especially if it goes along with a muffled voice   Date Last Reviewed: 6/1/2018 2000-2019 [employer]. [address] [place] [address]. All rights reserved. This information is not intended as a substitute for professional medical care. Always follow your healthcare professional's instructions.             Lukas Ritchie MD  [facility]

## 2020-02-07 NOTE — TELEPHONE ENCOUNTER
Left message [place] [employer] to verify copay assistance eligibility.     Also, spoke with [employer] to check high cost of [phone]. It is due to her deductible of $900 and out of pocket cost of $7,200 that needs to be met before her cost decreases.

## 2020-02-20 ENCOUNTER — MYC REFILL (OUTPATIENT)
Dept: FAMILY MEDICINE | Facility: CLINIC | Age: 55
End: 2020-02-20

## 2020-02-20 DIAGNOSIS — F90.9 ATTENTION DEFICIT HYPERACTIVITY DISORDER (ADHD), UNSPECIFIED ADHD TYPE: ICD-10-CM

## 2020-02-20 RX ORDER — DEXTROAMPHETAMINE SACCHARATE, AMPHETAMINE ASPARTATE, DEXTROAMPHETAMINE SULFATE AND AMPHETAMINE SULFATE 2.5; 2.5; 2.5; 2.5 MG/1; MG/1; MG/1; MG/1
10 TABLET ORAL DAILY
Qty: 30 TABLET | Refills: 0 | Status: SHIPPED | OUTPATIENT
Start: 2020-02-20 | End: 2020-04-06

## 2020-02-20 NOTE — TELEPHONE ENCOUNTER
Requested Prescriptions   Pending Prescriptions Disp Refills     amphetamine-dextroamphetamine (ADDERALL) 10 MG PO tablet 30 tablet 0     Sig: Take 1 tablet (10 mg) by mouth daily       There is no refill protocol information for this order          amphetamine-dextroamphetamine (ADDERALL) 10 MG PO tablet      Last Written Prescription Date:  12/20/19  Last Fill Quantity: 30,   # refills: 0  Last Office Visit: 10/22/19  Future Office visit:       Routing refill request to provider for review/approval because:  Drug not on the [facility], [facility] or [facility] refill protocol or controlled substance

## 2020-02-20 NOTE — TELEPHONE ENCOUNTER
Requested Prescriptions   Pending Prescriptions Disp Refills     amphetamine-dextroamphetamine (ADDERALL) 10 MG PO tablet 30 tablet 0     Sig: Take 1 tablet (10 mg) by mouth daily       There is no refill protocol information for this order        Routing refill request to provider for review/approval because:  Drug not on the [facility] refill protocol     Miguelito Hollis RN, BSN, PHN

## 2020-02-21 ENCOUNTER — MYC MEDICAL ADVICE (OUTPATIENT)
Dept: FAMILY MEDICINE | Facility: CLINIC | Age: 55
End: 2020-02-21

## 2020-03-11 ENCOUNTER — TELEPHONE (OUTPATIENT)
Dept: FAMILY MEDICINE | Facility: CLINIC | Age: 55
End: 2020-03-11

## 2020-03-11 DIAGNOSIS — I10 BENIGN ESSENTIAL HYPERTENSION: ICD-10-CM

## 2020-03-11 DIAGNOSIS — F33.0 MILD EPISODE OF RECURRENT MAJOR DEPRESSIVE DISORDER (H): ICD-10-CM

## 2020-03-11 NOTE — TELEPHONE ENCOUNTER
"Requested Prescriptions   Pending Prescriptions Disp Refills     metoprolol succinate ER (TOPROL-XL) 25 MG 24 hr tablet [Pharmacy Med Name: METOPROLOL ER SUCCINATE 25MG TABS] 90 tablet 0     Sig: TAKE 1 TABLET(25 MG) BY MOUTH DAILY       Beta-Blockers Protocol Passed - 3/11/2020  5:40 AM        Passed - Blood pressure under 140/90 in past 12 months     BP Readings from Last 3 Encounters:   02/01/20 125/87   10/22/19 122/78   09/16/19 122/78                 Passed - Patient is age 6 or older        Passed - Recent (12 mo) or future (30 days) visit within the authorizing provider's specialty     Patient has had an office visit with the authorizing provider or a provider within the authorizing providers department within the previous 12 mos or has a future within next 30 days. See \"Patient Info\" tab in inbasket, or \"Choose Columns\" in Meds & Orders section of the refill encounter.              Passed - Medication is active on med list           metoprolol succinate ER (TOPROL-XL) 25 MG 24 hr tablet   Last Written Prescription Date:  11/18/19  Last Fill Quantity: 90,  # refills: 0   Last office visit: 10/22/2019 with prescribing provider:  Dr. Anand   Future Office Visit:      "

## 2020-03-11 NOTE — TELEPHONE ENCOUNTER
"Requested Prescriptions   Pending Prescriptions Disp Refills     buPROPion (WELLBUTRIN SR) 150 MG 12 hr tablet 90 tablet 1     Sig: TAKE 1 TABLET(150 MG) BY MOUTH DAILY       SSRIs Protocol Failed - 3/11/2020  8:12 AM        Failed - PHQ-9 score less than 5 in past 6 months     Please review last PHQ-9 score.           Passed - Medication is Bupropion     If the medication is Bupropion (Wellbutrin), and the patient is taking for smoking cessation; OK to refill.          Passed - Medication is active on med list        Passed - Patient is age 18 or older        Passed - No active pregnancy on record        Passed - No positive pregnancy test in last 12 months        Passed - Recent (6 mo) or future (30 days) visit within the authorizing provider's specialty     Patient had office visit in the last 6 months or has a visit in the next 30 days with authorizing provider or within the authorizing provider's specialty.  See \"Patient Info\" tab in inbasket, or \"Choose Columns\" in Meds & Orders section of the refill encounter.             buPROPion (WELLBUTRIN SR) 150 MG 12 hr tablet  Last Written Prescription Date:  7/11/19  Last Fill Quantity: 90,  # refills: 1   Last office visit: 10/22/2019 with prescribing provider:  Dr. Barrow   Future Office Visit:      PHQ-9 score:    PHQ 9/23/2019   PHQ-9 Total Score 7   Q9: Thoughts of better off dead/self-harm past 2 weeks Not at all               ROSELINE-7 SCORE 12/4/2018 7/8/2019 9/23/2019   Total Score - - 3 (minimal anxiety)   Total Score 6 0 3         "

## 2020-03-12 RX ORDER — METOPROLOL SUCCINATE 25 MG/1
TABLET, EXTENDED RELEASE ORAL
Qty: 90 TABLET | Refills: 1 | Status: SHIPPED | OUTPATIENT
Start: 2020-03-12 | End: 2020-08-19

## 2020-03-12 RX ORDER — BUPROPION HYDROCHLORIDE 150 MG/1
TABLET, EXTENDED RELEASE ORAL
Qty: 30 TABLET | Refills: 1 | Status: SHIPPED | OUTPATIENT
Start: 2020-03-12 | End: 2020-04-14

## 2020-03-12 NOTE — TELEPHONE ENCOUNTER
Prescription approved per [facility] Refill Protocol.    Emilie Dunlap RN  [facility]/ [facility]

## 2020-03-12 NOTE — TELEPHONE ENCOUNTER
Routing refill request to provider for review/approval because:  Labs not current:  PHQ9    Lisa Xiong RN

## 2020-03-12 NOTE — TELEPHONE ENCOUNTER
Given one month- no further refill without follow up with PCP - please assist with scheduling follow up with Dr. Barrow prior to any additional refills

## 2020-03-13 ENCOUNTER — MYC MEDICAL ADVICE (OUTPATIENT)
Dept: GENERAL RADIOLOGY | Facility: CLINIC | Age: 55
End: 2020-03-13

## 2020-03-24 ENCOUNTER — VIRTUAL VISIT (OUTPATIENT)
Dept: NEUROLOGY | Facility: CLINIC | Age: 55
End: 2020-03-24
Attending: PSYCHIATRY & NEUROLOGY
Payer: COMMERCIAL

## 2020-03-24 DIAGNOSIS — G35 MS (MULTIPLE SCLEROSIS) (H): Primary | ICD-10-CM

## 2020-03-24 NOTE — PROGRESS NOTES
"Liyah Jernigan is a 55 year old female who is being evaluated via a billable telephone visit.      The patient has been notified of following:     \"This telephone visit will be conducted via a call between you and your physician/provider. We have found that certain health care needs can be provided without the need for a physical exam.  This service lets us provide the care you need with a short phone conversation.  If a prescription is necessary we can send it directly to your pharmacy.  If lab work is needed we can place an order for that and you can then stop by our lab to have the test done at a later time.    If during the course of the call the physician/provider feels a telephone visit is not appropriate, you will not be charged for this service.\"     Liyah Jernigan complains of    Chief Complaint   Patient presents with     Follow Up       I have reviewed and updated the patient's Past Medical History, Social History, Family History and Medication List.    ALLERGIES  Patient has no known allergies.    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  She is no longer having outside help come in to help with her cares - has not for over 1 year.  He was going to a day program, but that is shut down because of the coronavirus.  She was facing an interruption in her Aubagio, but that never happened, a uziel is continuing to cover her copay, etc.  She continues to tolerate it fine.  I sent her to neuroophthalmology because of her visual complaints, Dr. Smallwood found no structural problems, and she feels the vision is doing better.  She continues to get aching pain in her bones - gabapentin at bedtime is helping somewhat with that.  Transaminases last July were normal.  She has had 3 UTIs in the past year - I had suggested cranberry supplements and she took those for a while, but stopped after the UTIs stopped recurring.    Assessment/Plan:  1. MS " (multiple sclerosis) (H), stable on teriflunomide.  We discussed the fact that the relapsing stage of MS everntually stops in all patients, and at that point many patients can safely stop immunotherapy.  The trick is knowing when that point arises.  I told her I generally begin considering stopping treatment in the mid-50s, usually stop it by the early 60s, and essentially always stop it by the mid-60s.  We will continue to discuss this over the coming years.    -f/u 1 year  - AST; Future  - ALT; Future    Phone call duration: 17 minutes    Jose A Hairston MD

## 2020-04-02 ENCOUNTER — MYC MEDICAL ADVICE (OUTPATIENT)
Dept: FAMILY MEDICINE | Facility: CLINIC | Age: 55
End: 2020-04-02

## 2020-04-10 DIAGNOSIS — F51.01 PRIMARY INSOMNIA: ICD-10-CM

## 2020-04-10 NOTE — TELEPHONE ENCOUNTER
"Requested Prescriptions   Pending Prescriptions Disp Refills     traZODone (DESYREL) 100 MG tablet [Pharmacy Med Name: TRAZODONE 100MG TABLETS]  Last Written Prescription Date:  10/21/19  Last Fill Quantity: 45,  # refills: 0   Last Office Visit with RATNA, PENNY or [facility] prescribing provider:  10/22/19-Wan   Future Office Visit:    45 tablet 0     Sig: TAKE 1/2 TABLET BY MOUTH DAILY       Serotonin Modulators Passed - 4/10/2020  5:45 AM        Passed - Recent (12 mo) or future (30 days) visit within the authorizing provider's specialty     Patient has had an office visit with the authorizing provider or a provider within the authorizing providers department within the previous 12 mos or has a future within next 30 days. See \"Patient Info\" tab in inbasket, or \"Choose Columns\" in Meds & Orders section of the refill encounter.              Passed - Medication is active on med list        Passed - Patient is age 18 or older        Passed - No active pregnancy on record        Passed - No positive pregnancy test in past 12 months             "

## 2020-04-14 ENCOUNTER — VIRTUAL VISIT (OUTPATIENT)
Dept: FAMILY MEDICINE | Facility: CLINIC | Age: 55
End: 2020-04-14
Payer: COMMERCIAL

## 2020-04-14 DIAGNOSIS — F33.0 MILD EPISODE OF RECURRENT MAJOR DEPRESSIVE DISORDER (H): Primary | ICD-10-CM

## 2020-04-14 DIAGNOSIS — G35 MULTIPLE SCLEROSIS (H): ICD-10-CM

## 2020-04-14 DIAGNOSIS — F90.9 ATTENTION DEFICIT HYPERACTIVITY DISORDER (ADHD), UNSPECIFIED ADHD TYPE: ICD-10-CM

## 2020-04-14 DIAGNOSIS — R20.2 PARESTHESIA: ICD-10-CM

## 2020-04-14 PROCEDURE — 99214 OFFICE O/P EST MOD 30 MIN: CPT | Mod: 95 | Performed by: FAMILY MEDICINE

## 2020-04-14 RX ORDER — BUPROPION HYDROCHLORIDE 150 MG/1
150 TABLET, EXTENDED RELEASE ORAL DAILY
Qty: 90 TABLET | Refills: 1 | Status: SHIPPED | OUTPATIENT
Start: 2020-04-14 | End: 2020-11-06

## 2020-04-14 RX ORDER — DEXTROAMPHETAMINE SACCHARATE, AMPHETAMINE ASPARTATE, DEXTROAMPHETAMINE SULFATE AND AMPHETAMINE SULFATE 2.5; 2.5; 2.5; 2.5 MG/1; MG/1; MG/1; MG/1
10 TABLET ORAL DAILY
Qty: 30 TABLET | Refills: 0 | Status: SHIPPED | OUTPATIENT
Start: 2020-05-01 | End: 2020-08-19 | Stop reason: DRUGHIGH

## 2020-04-14 RX ORDER — DEXTROAMPHETAMINE SACCHARATE, AMPHETAMINE ASPARTATE, DEXTROAMPHETAMINE SULFATE AND AMPHETAMINE SULFATE 2.5; 2.5; 2.5; 2.5 MG/1; MG/1; MG/1; MG/1
10 TABLET ORAL DAILY
Qty: 30 TABLET | Refills: 0 | Status: SHIPPED | OUTPATIENT
Start: 2020-06-01 | End: 2020-08-19 | Stop reason: DRUGHIGH

## 2020-04-14 RX ORDER — TRAZODONE HYDROCHLORIDE 100 MG/1
TABLET ORAL
Qty: 45 TABLET | Refills: 0 | Status: SHIPPED | OUTPATIENT
Start: 2020-04-14 | End: 2020-07-08

## 2020-04-14 RX ORDER — GABAPENTIN 300 MG/1
CAPSULE ORAL
Qty: 90 CAPSULE | Refills: 1 | Status: SHIPPED | OUTPATIENT
Start: 2020-04-14 | End: 2020-05-11

## 2020-04-14 ASSESSMENT — PAIN SCALES - GENERAL: PAINLEVEL: NO PAIN (0)

## 2020-04-14 NOTE — PROGRESS NOTES
"Liyah Jernigan is a 55 year old female who is being evaluated via a billable telephone visit.      The patient has been notified of following:     \"This telephone visit will be conducted via a call between you and your physician/provider. We have found that certain health care needs can be provided without the need for a physical exam.  This service lets us provide the care you need with a short phone conversation.  If a prescription is necessary we can send it directly to your pharmacy.  If lab work is needed we can place an order for that and you can then stop by our lab to have the test done at a later time.    Telephone visits are billed at different rates depending on your insurance coverage. During this emergency period, for some insurers they may be billed the same as an in-person visit.  Please reach out to your insurance provider with any questions.    If during the course of the call the physician/provider feels a telephone visit is not appropriate, you will not be charged for this service.\"    Patient has given verbal consent for Telephone visit?  Yes    How would you like to obtain your AVS? Carlos Little   Spoke with patient via phone for routine follow-up of ADHD and depression.  Doing well.  Reports no interval health concerns.  Patient reports no side effects from medications, and desires no change in therapy.  Had also been receiving gabapentin from her neurologist but because of the recent pandemic it is difficult to get in contact.  This is been a stable dosage taken at bedtime and I said I will go ahead and refill it.      Diagnostic Test Results:  Labs reviewed in Epic        Assessment/Plan:  1. Mild episode of recurrent major depressive disorder (H)  Doing well on current dosage.  Will continue same and plan follow-up in about 3 months  - buPROPion (WELLBUTRIN SR) 150 MG 12 hr tablet; Take 1 tablet (150 mg) by mouth daily  Dispense: 90 tablet; Refill: 1    2. Attention deficit " hyperactivity disorder (ADHD), unspecified ADHD type  As above  - amphetamine-dextroamphetamine (ADDERALL) 10 MG tablet; Take 1 tablet (10 mg) by mouth daily  Dispense: 30 tablet; Refill: 0  - amphetamine-dextroamphetamine (ADDERALL) 10 MG tablet; Take 1 tablet (10 mg) by mouth daily  Dispense: 30 tablet; Refill: 0    3. Multiple sclerosis (H)  Stable on current dosage.  Refilled  - gabapentin (NEURONTIN) 300 MG capsule; TAKE 1 CAPSULE BY MOUTH EVERY NIGHT AS NEEDED  Dispense: 90 capsule; Refill: 1    4. Paresthesia    - gabapentin (NEURONTIN) 300 MG capsule; TAKE 1 CAPSULE BY MOUTH EVERY NIGHT AS NEEDED  Dispense: 90 capsule; Refill: 1    No follow-ups on file.      Phone call duration:  7 minutes    Jasmine Barrow MD

## 2020-04-14 NOTE — TELEPHONE ENCOUNTER
Prescription approved per [facility] Refill Protocol.    Kasey Wynn RN  [place]/[facility]

## 2020-04-27 ENCOUNTER — MYC REFILL (OUTPATIENT)
Dept: FAMILY MEDICINE | Facility: CLINIC | Age: 55
End: 2020-04-27

## 2020-04-27 DIAGNOSIS — G43.109 MIGRAINE WITH AURA AND WITHOUT STATUS MIGRAINOSUS, NOT INTRACTABLE: ICD-10-CM

## 2020-04-29 RX ORDER — SUMATRIPTAN 100 MG/1
100 TABLET, FILM COATED ORAL DAILY PRN
Qty: 30 TABLET | Refills: 1 | Status: SHIPPED | OUTPATIENT
Start: 2020-04-29 | End: 2021-12-10

## 2020-04-29 NOTE — TELEPHONE ENCOUNTER
Will send refill but advise phone or e-visit for further refills.  MARRY Goldstein, NP-C  [facility]

## 2020-04-29 NOTE — TELEPHONE ENCOUNTER
Routing refill request to provider for review/approval because:  A break in medication, last filled in 2017.    Racquel Kong RN, [facility] Triage

## 2020-05-11 DIAGNOSIS — R20.2 PARESTHESIA: ICD-10-CM

## 2020-05-11 DIAGNOSIS — G35 MULTIPLE SCLEROSIS (H): ICD-10-CM

## 2020-05-11 RX ORDER — GABAPENTIN 300 MG/1
CAPSULE ORAL
Qty: 30 CAPSULE | Refills: 11 | Status: SHIPPED | OUTPATIENT
Start: 2020-05-11 | End: 2021-05-18

## 2020-05-11 NOTE — TELEPHONE ENCOUNTER
Received refill request for gabapentin from [facility]; Patient was last seen in March and has no follow up appointment with Dr. Hairston; Refilled for 1 year per [facility] refill protocol.    Liyah Rosado MS RN Care Coordinator

## 2020-05-12 ENCOUNTER — OFFICE VISIT (OUTPATIENT)
Dept: URGENT CARE | Facility: URGENT CARE | Age: 55
End: 2020-05-12
Payer: COMMERCIAL

## 2020-05-12 ENCOUNTER — VIRTUAL VISIT (OUTPATIENT)
Dept: FAMILY MEDICINE | Facility: OTHER | Age: 55
End: 2020-05-12

## 2020-05-12 DIAGNOSIS — Z20.822 SUSPECTED 2019 NOVEL CORONAVIRUS INFECTION: Primary | ICD-10-CM

## 2020-05-12 PROCEDURE — 87635 SARS-COV-2 COVID-19 AMP PRB: CPT | Mod: 90 | Performed by: FAMILY MEDICINE

## 2020-05-12 PROCEDURE — 99207 ZZC NO BILLABLE SERVICE THIS VISIT: CPT

## 2020-05-12 PROCEDURE — 99000 SPECIMEN HANDLING OFFICE-LAB: CPT | Performed by: FAMILY MEDICINE

## 2020-05-12 NOTE — PROGRESS NOTES
"Date: 2020 08:39:32  Clinician: Nazanin Herrera  Clinician NPI: [id number]  Patient: Liyah Jernigan  Patient : 1965  Patient Address: [address] [place] [address]  Patient Phone: [phone]  Visit Protocol: URI  Patient Summary:  Liyah is a 55 year old ( : 1965 ) female who initiated a Visit for COVID-19 (Coronavirus) evaluation and screening. When asked the question \"Please sign me up to receive news, health information and promotions from [employer].\", Liyah responded \"No\".    Liyah states her symptoms started gradually 5-6 days ago. After her symptoms started, they improved and then got worse again.   Her symptoms consist of myalgia, rhinitis, a sore throat, ear pain, a headache, malaise, wheezing, and chills. Liyah also feels feverish.   Symptom details     Nasal secretions: The color of her mucus is clear.    Sore throat: Liyah reports having moderate throat pain (4-6 on a 10 point pain scale), does not have exudate on her tonsils, and can swallow liquids. She is not sure if the lymph nodes in her neck are enlarged. A rash has not appeared on the skin since the sore throat started.     Temperature: Her current temperature is 98.4 degrees Fahrenheit.     Wheezing: Liyah has not ever been diagnosed with asthma. The wheezing does not interfere with her normal daily activities.    Headache: She states the headache is mild (1-3 on a 10 point pain scale).      Liyah denies having facial pain or pressure, cough, nasal congestion, vomiting, nausea, teeth pain, ageusia, anosmia, and diarrhea. She also denies taking antibiotic medication for the symptoms, having a sinus infection within the past year, and having recent facial or sinus surgery in the past 60 days. She is not experiencing dyspnea.   Precipitating events  Within the past week, Liyah has not been exposed to someone with strep throat. She has not recently been exposed to someone with influenza. Liyah has not been in close contact with " "any high risk individuals.   Pertinent COVID-19 (Coronavirus) information  Liyah does not work or volunteer as healthcare worker or a  and does not work or volunteer in a healthcare facility.   She does not live with a healthcare worker.   Liyah has not had a close contact with a laboratory-confirmed COVID-19 patient within 14 days of symptom onset. She has had a close contact with a suspected COVID-19 patient within 14 days of symptom onset. 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  She has Brought supplies, paperwork on a few occasions.   Pertinent medical history  Liyah typically gets a yeast infection when she takes antibiotics. She has used fluconazole (Diflucan) to treat previous yeast infections. 2 doses of fluconazole (Diflucan) has typically been needed for symptoms to resolve in the past.  Liyah does not need a return to work/school note.   Weight: 116 lbs   Liyah does not smoke or use smokeless tobacco.   Additional information as reported by the patient (free text): I have MS, and care full-time for handicapped son.  I have to make sure he is not exposed (by me).  I take in grocery deliveries, mail, interact with , a few early-morning [employer] runs.   Weight: 116 lbs    MEDICATIONS: Claritin oral, trazodone oral, acyclovir oral, gabapentin oral, metoprolol succinate oral, bupropion HCl oral, Aubagio oral, ALLERGIES: NKDA  Clinician Response:  Dear Liyah,   Your symptoms show that you may have coronavirus (COVID-19). This illness can cause fever, cough and trouble breathing. Many people get a mild case and get better on their own. Some people can get very sick.   Will I be tested for COVID-19?  We would recommend you be tested for " coronavirus. Here is how to get that scheduled:   Call [phone]. Tell them you were referred by [facility] to have a COVID-19 test. You will be scheduled at one of our [facility] testing locations (drive-up). Please have your [facility] visit information ready when you call including your visit ID number to verify you were referred.    How can I protect others in the meantime?  First, stay home and away from others (self-isolate) until:   You've had no fever---and no medicine that reduces fever---for 3 full days (72 hours). And...    Your other symptoms have gotten better. For example, your cough or breathing has improved. And...    At least 10 days have passed since your symptoms started.   During this time:   Don't go to work, school or anywhere else.     Stay away from others in your home. No hugging, kissing or shaking hands.    Don't let anyone visit.    Cover your mouth and nose with a mask, tissue or wash cloth to avoid spreading germs.    Wash your hands and face often. Use soap and water.   How can I take care of myself?  1.Take Tylenol (acetaminophen) for fever or pain. If you have liver or kidney problems, ask your family doctor if it's okay to take Tylenol.   Adults can take either:    650 mg (two 325 mg pills) every 4 to 6 hours, or...    1,000 mg (two 500 mg pills) every 8 hours as needed.     Note: Don't take more than 3,000 mg in one day.   For children, check the Tylenol bottle for the right dose. The dose is based on the child's age or weight.  2.If you have other health problems (like cancer, heart failure, an organ transplant or severe kidney disease): Call your specialty clinic if you don't feel better in the next 2 days.  3.Know when to call 911: If your breathing is so bad that it keeps you from doing normal activities, call 911 or go to the emergency room. Tell them that you've been staying home and may have COVID-19.  4.Sign up for [facility]. We know it's scary to hear that you might have  COVID-19. We want to track your symptoms to make sure you're okay over the next 2 weeks. Please look for an email from [facility]---this is a free, online program that we'll use to keep in touch. To sign up, follow the link in the email. Learn more at [email or web address].  Where can I get more information?  To learn more about COVID-19 and how to care for yourself at home, please visit the CDC website at https://www.cdc.gov/coronavirus/2019-ncov/about/steps-when-sick.html.  For more about your care at [facility], please visit [email or web address]     COVID-19 (Coronavirus) General Information  Because there is currently no vaccine to prevent infection, the best way to protect yourself is to avoid being exposed to this virus. Common symptoms of COVID-19 include but are not limited to fever, cough, and shortness of breath. These symptoms appear 2-14 days after you are exposed to the virus that causes COVID-19. Click here for more information from the CDC on how to protect yourself.  If you are sick with COVID-19 or suspect you are infected with the virus that causes COVID-19, follow the steps here from the CDC to help prevent the disease from spreading to people in your home and community.  Click here for general information from the CDC on testing.  If you develop any of these emergency warning signs for COVID-19, get medical attention immediately:     Trouble breathing    Persistent pain or pressure in the chest    New confusion or inability to arouse    Bluish lips or face      Call your doctor or clinic before going in. Call 911 if you have a medical emergency and notify the  you have or think you may have COVID-19.  For more detailed and up to date information on COVID-19 (Coronavirus), please visit the CDC website.   Diagnosis: Myalgia  Diagnosis ICD: M79.1

## 2020-05-14 LAB
SARS-COV-2 RNA SPEC QL NAA+PROBE: NOT DETECTED
SPECIMEN SOURCE: NORMAL

## 2020-06-15 DIAGNOSIS — G35 MULTIPLE SCLEROSIS (H): ICD-10-CM

## 2020-06-15 RX ORDER — TERIFLUNOMIDE 14 MG/1
14 TABLET, FILM COATED ORAL DAILY
Qty: 30 TABLET | Refills: 11 | Status: SHIPPED | OUTPATIENT
Start: 2020-06-15 | End: 2021-05-26

## 2020-06-15 NOTE — TELEPHONE ENCOUNTER
Received refill request for Aubagio from [facility]; Patient was last seen in March and has no follow up appointment with Dr. Hairston; Refilled for 1 year per [facility] refill protocol.    Liyah Rosado MS RN Care Coordinator

## 2020-07-06 DIAGNOSIS — F51.01 PRIMARY INSOMNIA: ICD-10-CM

## 2020-07-08 RX ORDER — TRAZODONE HYDROCHLORIDE 100 MG/1
TABLET ORAL
Qty: 45 TABLET | Refills: 0 | Status: SHIPPED | OUTPATIENT
Start: 2020-07-08 | End: 2020-10-08

## 2020-07-08 NOTE — TELEPHONE ENCOUNTER
"Requested Prescriptions   Pending Prescriptions Disp Refills     traZODone (DESYREL) 100 MG tablet [Pharmacy Med Name: TRAZODONE 100MG TABLETS] 45 tablet 0     Sig: TAKE 1/2 TABLET BY MOUTH DAILY       Serotonin Modulators Passed - 7/6/2020  6:37 AM        Passed - Recent (12 mo) or future (30 days) visit within the authorizing provider's specialty     Patient has had an office visit with the authorizing provider or a provider within the authorizing providers department within the previous 12 mos or has a future within next 30 days. See \"Patient Info\" tab in inbasket, or \"Choose Columns\" in Meds & Orders section of the refill encounter.              Passed - Medication is active on med list        Passed - Patient is age 18 or older        Passed - No active pregnancy on record        Passed - No positive pregnancy test in past 12 months           Prescription approved per [facility] Refill Protocol.  Lisa Xiong RN    "

## 2020-07-30 ENCOUNTER — OFFICE VISIT (OUTPATIENT)
Dept: OPTOMETRY | Facility: CLINIC | Age: 55
End: 2020-07-30
Payer: COMMERCIAL

## 2020-07-30 DIAGNOSIS — H52.203 MYOPIA OF BOTH EYES WITH ASTIGMATISM: Primary | ICD-10-CM

## 2020-07-30 DIAGNOSIS — H52.4 PRESBYOPIA: ICD-10-CM

## 2020-07-30 DIAGNOSIS — H52.13 MYOPIA OF BOTH EYES WITH ASTIGMATISM: Primary | ICD-10-CM

## 2020-07-30 PROCEDURE — 92004 COMPRE OPH EXAM NEW PT 1/>: CPT | Performed by: OPTOMETRIST

## 2020-07-30 PROCEDURE — 92015 DETERMINE REFRACTIVE STATE: CPT | Performed by: OPTOMETRIST

## 2020-07-30 ASSESSMENT — VISUAL ACUITY
OS_CC: 20/20
OD_CC: 20/20
OD_SC: 20/125
OS_CC: 20/40
METHOD: SNELLEN - LINEAR
OS_SC+: -1
CORRECTION_TYPE: GLASSES
OS_CC+: -1
OS_SC: 20/25
OD_CC: 20/40

## 2020-07-30 ASSESSMENT — TONOMETRY
OD_IOP_MMHG: 13
IOP_METHOD: APPLANATION
OS_IOP_MMHG: 15

## 2020-07-30 ASSESSMENT — EXTERNAL EXAM - LEFT EYE: OS_EXAM: NORMAL

## 2020-07-30 ASSESSMENT — REFRACTION_MANIFEST
OS_SPHERE: -0.75
OS_CYLINDER: +0.50
OS_ADD: +2.50
OS_SPHERE: -0.50
OD_SPHERE: -2.25
OD_AXIS: 009
OD_SPHERE: -2.00
METHOD_AUTOREFRACTION: 1
OD_AXIS: 180
OD_ADD: +2.50
OS_AXIS: 173
OD_CYLINDER: +0.50
OD_CYLINDER: +0.50
OS_CYLINDER: +0.25
OS_AXIS: 177

## 2020-07-30 ASSESSMENT — REFRACTION_WEARINGRX
OD_CYLINDER: +0.50
OD_SPHERE: -2.50
SPECS_TYPE: PAL
OD_AXIS: 177
OS_SPHERE: -1.00
OD_ADD: +2.50
OS_AXIS: 004
OS_CYLINDER: +0.50
OS_ADD: +2.50

## 2020-07-30 ASSESSMENT — CONF VISUAL FIELD
METHOD: COUNTING FINGERS
OD_NORMAL: 1
OS_NORMAL: 1

## 2020-07-30 ASSESSMENT — SLIT LAMP EXAM - LIDS
COMMENTS: NORMAL
COMMENTS: NORMAL

## 2020-07-30 ASSESSMENT — CUP TO DISC RATIO
OS_RATIO: 0.3
OD_RATIO: 0.3

## 2020-07-30 ASSESSMENT — EXTERNAL EXAM - RIGHT EYE: OD_EXAM: NORMAL

## 2020-07-30 NOTE — PATIENT INSTRUCTIONS
Recommend updating progressive addition lens / no  Line   and getting a single vision computer/ reading prescription     Dilate at next visit
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
## 2020-08-18 ENCOUNTER — MYC MEDICAL ADVICE (OUTPATIENT)
Dept: FAMILY MEDICINE | Facility: CLINIC | Age: 55
End: 2020-08-18

## 2020-08-19 ENCOUNTER — VIRTUAL VISIT (OUTPATIENT)
Dept: FAMILY MEDICINE | Facility: CLINIC | Age: 55
End: 2020-08-19
Payer: COMMERCIAL

## 2020-08-19 DIAGNOSIS — I10 BENIGN ESSENTIAL HYPERTENSION: ICD-10-CM

## 2020-08-19 DIAGNOSIS — F90.9 ATTENTION DEFICIT HYPERACTIVITY DISORDER (ADHD), UNSPECIFIED ADHD TYPE: ICD-10-CM

## 2020-08-19 DIAGNOSIS — F33.0 MILD EPISODE OF RECURRENT MAJOR DEPRESSIVE DISORDER (H): Primary | ICD-10-CM

## 2020-08-19 PROCEDURE — 99214 OFFICE O/P EST MOD 30 MIN: CPT | Mod: 95 | Performed by: NURSE PRACTITIONER

## 2020-08-19 RX ORDER — METOPROLOL SUCCINATE 25 MG/1
25 TABLET, EXTENDED RELEASE ORAL DAILY
Qty: 90 TABLET | Refills: 1 | Status: SHIPPED | OUTPATIENT
Start: 2020-08-19 | End: 2021-02-15

## 2020-08-19 RX ORDER — DEXTROAMPHETAMINE SACCHARATE, AMPHETAMINE ASPARTATE, DEXTROAMPHETAMINE SULFATE AND AMPHETAMINE SULFATE 2.5; 2.5; 2.5; 2.5 MG/1; MG/1; MG/1; MG/1
10 TABLET ORAL 2 TIMES DAILY
Qty: 60 TABLET | Refills: 0 | Status: SHIPPED | OUTPATIENT
Start: 2020-09-19 | End: 2020-10-19

## 2020-08-19 RX ORDER — DEXTROAMPHETAMINE SACCHARATE, AMPHETAMINE ASPARTATE, DEXTROAMPHETAMINE SULFATE AND AMPHETAMINE SULFATE 2.5; 2.5; 2.5; 2.5 MG/1; MG/1; MG/1; MG/1
10 TABLET ORAL 2 TIMES DAILY
Qty: 60 TABLET | Refills: 0 | Status: SHIPPED | OUTPATIENT
Start: 2020-10-20 | End: 2020-11-19

## 2020-08-19 RX ORDER — DEXTROAMPHETAMINE SACCHARATE, AMPHETAMINE ASPARTATE, DEXTROAMPHETAMINE SULFATE AND AMPHETAMINE SULFATE 2.5; 2.5; 2.5; 2.5 MG/1; MG/1; MG/1; MG/1
10 TABLET ORAL 2 TIMES DAILY
Qty: 60 TABLET | Refills: 0 | Status: SHIPPED | OUTPATIENT
Start: 2020-08-19 | End: 2020-09-18

## 2020-08-19 NOTE — PATIENT INSTRUCTIONS
At [facility], we strive to deliver an exceptional experience to you, every time we see you. If you receive a survey, please complete it as we do value your feedback.  If you have MyChart, you can expect to receive results automatically within 24 hours of their completion.  Your provider will send a note interpreting your results as well.   If you do not have MyChart, you should receive your results in about a week by mail.    Your care team:                            Family Medicine Internal Medicine   MD Enzo Jay MD Shantel Branch-Fleming, MD Srinivasa Vaka, MD Katya Georgiev PA-C Megan Hill, APRN CNP    Bert Ragland, MD Pediatrics   Dann Nava, PADanyC  Kari Singh, CNP MD Juanis Peñaloza APRN CNP   MD Astrid Bryant MD Deborah Mielke, MD Gwen Herrera, APRN CNP  Jaki Cerrato, PADanyC  Tanya Clarke, CNP  MD Yajaira Oates MD Angela Wermerskirchen, MD      Clinic hours: Monday - Thursday 7 am-7 pm; Fridays 7 am-5 pm.   Urgent care: Monday - Friday 11 am-9 pm; Saturday and Sunday 9 am-5 pm.    Clinic: [phone]       [facility]: Monday - Thursday 8 am - 7 pm; Friday 8 am - 6 pm  [facility]: [phone]     Use [email or web address] for 24/7 diagnosis and treatment of dozens of conditions.

## 2020-08-19 NOTE — PROGRESS NOTES
"Liyah Jernigan is a 55 year old female who is being evaluated via a billable telephone visit.      The patient has been notified of following:     \"This telephone visit will be conducted via a call between you and your physician/provider. We have found that certain health care needs can be provided without the need for a physical exam.  This service lets us provide the care you need with a short phone conversation.  If a prescription is necessary we can send it directly to your pharmacy.  If lab work is needed we can place an order for that and you can then stop by our lab to have the test done at a later time.    Telephone visits are billed at different rates depending on your insurance coverage. During this emergency period, for some insurers they may be billed the same as an in-person visit.  Please reach out to your insurance provider with any questions.    If during the course of the call the physician/provider feels a telephone visit is not appropriate, you will not be charged for this service.\"    Patient has given verbal consent for Telephone visit?  Yes    What phone number would you like to be contacted at? [phone]    How would you like to obtain your AVS? Carlos    Subjective     Liyah Jernigan is a 55 year old female who presents via phone visit today for the following health issues:    HPI    Medication Followup of Adderall    Taking Medication as prescribed: yes    Side Effects:  None    Medication Helping Symptoms:  Yes but now she in the house she feels it's not lasting.      Last visit was in April 2020, things were stable.     She cares for her son who has disability. She is up around 4-4:30am to care for him, then he naps around 1-2 until 5 pm. He used to go to a day program in the morning, he would come home and nap and so would she. They go to bed around 8-8:30pm.  She was able to get stuff done at that time when he was gone. Now, she is unable to do work until he naps and feels the adderall " needs to be adjusted. She was taking 1/2 an adderal in the afternoon and it was somewhat helpful. She feels she would have taken a full 10 mg tab early afternoon to help her get through. She was using more caffeine to keep her awake and would rather not.     She does not have outside help at this time to get a break. Her mother-in-law is near by and she is high risk, and that  is a  is also high risk for Liyah's son and herself as she has MS.      Mood: she doesn't get a chance to really think about how her mood is doing. She sleeps okay. When she feels out of balance it causes issues sleeping.     HTN: takes metoprolol: took BP at home today: 118/82 HR 71 97.2 temp      Review of Systems   Constitutional, HEENT, cardiovascular, pulmonary, gi and gu systems are negative, except as otherwise noted.       Objective          Vitals:  No vitals were obtained today due to virtual visit.    healthy, alert and no distress  PSYCH: Alert and oriented times 3; coherent speech, normal   rate and volume, able to articulate logical thoughts, able   to abstract reason, no tangential thoughts, no hallucinations   or delusions  Her affect is normal  RESP: No cough, no audible wheezing, able to talk in full sentences  Remainder of exam unable to be completed due to telephone visits    Reviewed labs        Assessment/Plan:    Assessment & Plan     Attention deficit hyperactivity disorder (ADHD), unspecified ADHD type  Needs increase due to changes in home life. Follow up with provider in 3 months for med check, sooner if concerns  - amphetamine-dextroamphetamine (ADDERALL) 10 MG tablet; Take 1 tablet (10 mg) by mouth 2 times daily  - amphetamine-dextroamphetamine (ADDERALL) 10 MG tablet; Take 1 tablet (10 mg) by mouth 2 times daily  - amphetamine-dextroamphetamine (ADDERALL) 10 MG tablet; Take 1 tablet (10 mg) by mouth 2 times daily    Benign essential hypertension  Stable. See above for numbers patient got at  home  - metoprolol succinate ER (TOPROL-XL) 25 MG 24 hr tablet; Take 1 tablet (25 mg) by mouth daily    Depression: feels is stable     Return in about 3 months (around 11/19/2020) for Medication check, ADHD Recheck.    MARRY Goldstein, NP-C  [facility]    Phone call duration:  10 minutes

## 2020-09-21 ENCOUNTER — OFFICE VISIT (OUTPATIENT)
Dept: FAMILY MEDICINE | Facility: CLINIC | Age: 55
End: 2020-09-21
Payer: COMMERCIAL

## 2020-09-21 VITALS
WEIGHT: 117 LBS | HEIGHT: 66 IN | OXYGEN SATURATION: 99 % | DIASTOLIC BLOOD PRESSURE: 81 MMHG | BODY MASS INDEX: 18.8 KG/M2 | SYSTOLIC BLOOD PRESSURE: 119 MMHG | RESPIRATION RATE: 16 BRPM | HEART RATE: 65 BPM | TEMPERATURE: 96 F

## 2020-09-21 DIAGNOSIS — Z12.4 SCREENING FOR MALIGNANT NEOPLASM OF CERVIX: ICD-10-CM

## 2020-09-21 DIAGNOSIS — Z00.00 ROUTINE GENERAL MEDICAL EXAMINATION AT A HEALTH CARE FACILITY: Primary | ICD-10-CM

## 2020-09-21 DIAGNOSIS — Z12.11 COLON CANCER SCREENING: ICD-10-CM

## 2020-09-21 DIAGNOSIS — Z13.21 ENCOUNTER FOR VITAMIN DEFICIENCY SCREENING: ICD-10-CM

## 2020-09-21 DIAGNOSIS — Z12.31 ENCOUNTER FOR SCREENING MAMMOGRAM FOR BREAST CANCER: ICD-10-CM

## 2020-09-21 DIAGNOSIS — G43.109 MIGRAINE WITH AURA AND WITHOUT STATUS MIGRAINOSUS, NOT INTRACTABLE: ICD-10-CM

## 2020-09-21 DIAGNOSIS — Z13.220 SCREENING FOR CHOLESTEROL LEVEL: ICD-10-CM

## 2020-09-21 DIAGNOSIS — Z13.29 SCREENING FOR THYROID DISORDER: ICD-10-CM

## 2020-09-21 DIAGNOSIS — I10 BENIGN ESSENTIAL HYPERTENSION: ICD-10-CM

## 2020-09-21 DIAGNOSIS — R94.4 DECREASED GFR: ICD-10-CM

## 2020-09-21 DIAGNOSIS — Z13.1 SCREENING FOR DIABETES MELLITUS: ICD-10-CM

## 2020-09-21 LAB
ALBUMIN SERPL-MCNC: 3.7 G/DL (ref 3.4–5)
ALP SERPL-CCNC: 57 U/L (ref 40–150)
ALT SERPL W P-5'-P-CCNC: 37 U/L (ref 0–50)
ANION GAP SERPL CALCULATED.3IONS-SCNC: 5 MMOL/L (ref 3–14)
AST SERPL W P-5'-P-CCNC: 24 U/L (ref 0–45)
BILIRUB SERPL-MCNC: 0.2 MG/DL (ref 0.2–1.3)
BUN SERPL-MCNC: 19 MG/DL (ref 7–30)
CALCIUM SERPL-MCNC: 8.9 MG/DL (ref 8.5–10.1)
CHLORIDE SERPL-SCNC: 106 MMOL/L (ref 94–109)
CHOLEST SERPL-MCNC: 229 MG/DL
CO2 SERPL-SCNC: 29 MMOL/L (ref 20–32)
CREAT SERPL-MCNC: 1.11 MG/DL (ref 0.52–1.04)
CREAT UR-MCNC: 65 MG/DL
ERYTHROCYTE [DISTWIDTH] IN BLOOD BY AUTOMATED COUNT: 11.3 % (ref 10–15)
GFR SERPL CREATININE-BSD FRML MDRD: 56 ML/MIN/{1.73_M2}
GLUCOSE SERPL-MCNC: 89 MG/DL (ref 70–99)
HCT VFR BLD AUTO: 38.9 % (ref 35–47)
HDLC SERPL-MCNC: 86 MG/DL
HGB BLD-MCNC: 12.8 G/DL (ref 11.7–15.7)
LDLC SERPL CALC-MCNC: 114 MG/DL
MCH RBC QN AUTO: 32.2 PG (ref 26.5–33)
MCHC RBC AUTO-ENTMCNC: 32.9 G/DL (ref 31.5–36.5)
MCV RBC AUTO: 98 FL (ref 78–100)
MICROALBUMIN UR-MCNC: 7 MG/L
MICROALBUMIN/CREAT UR: 10.51 MG/G CR (ref 0–25)
NONHDLC SERPL-MCNC: 143 MG/DL
PLATELET # BLD AUTO: 283 10E9/L (ref 150–450)
POTASSIUM SERPL-SCNC: 4.3 MMOL/L (ref 3.4–5.3)
PROT SERPL-MCNC: 7 G/DL (ref 6.8–8.8)
RBC # BLD AUTO: 3.97 10E12/L (ref 3.8–5.2)
SODIUM SERPL-SCNC: 140 MMOL/L (ref 133–144)
TRIGL SERPL-MCNC: 143 MG/DL
TSH SERPL DL<=0.005 MIU/L-ACNC: 1.14 MU/L (ref 0.4–4)
WBC # BLD AUTO: 5.7 10E9/L (ref 4–11)

## 2020-09-21 PROCEDURE — 84443 ASSAY THYROID STIM HORMONE: CPT | Performed by: FAMILY MEDICINE

## 2020-09-21 PROCEDURE — G0145 SCR C/V CYTO,THINLAYER,RESCR: HCPCS | Performed by: FAMILY MEDICINE

## 2020-09-21 PROCEDURE — 90682 RIV4 VACC RECOMBINANT DNA IM: CPT | Performed by: FAMILY MEDICINE

## 2020-09-21 PROCEDURE — 80061 LIPID PANEL: CPT | Performed by: FAMILY MEDICINE

## 2020-09-21 PROCEDURE — 87624 HPV HI-RISK TYP POOLED RSLT: CPT | Performed by: FAMILY MEDICINE

## 2020-09-21 PROCEDURE — 82306 VITAMIN D 25 HYDROXY: CPT | Performed by: FAMILY MEDICINE

## 2020-09-21 PROCEDURE — 82043 UR ALBUMIN QUANTITATIVE: CPT | Performed by: FAMILY MEDICINE

## 2020-09-21 PROCEDURE — 90471 IMMUNIZATION ADMIN: CPT | Performed by: FAMILY MEDICINE

## 2020-09-21 PROCEDURE — 85027 COMPLETE CBC AUTOMATED: CPT | Performed by: FAMILY MEDICINE

## 2020-09-21 PROCEDURE — 36415 COLL VENOUS BLD VENIPUNCTURE: CPT | Performed by: FAMILY MEDICINE

## 2020-09-21 PROCEDURE — 99396 PREV VISIT EST AGE 40-64: CPT | Mod: 25 | Performed by: FAMILY MEDICINE

## 2020-09-21 PROCEDURE — 80053 COMPREHEN METABOLIC PANEL: CPT | Performed by: FAMILY MEDICINE

## 2020-09-21 ASSESSMENT — ENCOUNTER SYMPTOMS
HEARTBURN: 0
DIZZINESS: 0
NAUSEA: 0
BREAST MASS: 0
PALPITATIONS: 0
CHILLS: 0
HEMATURIA: 0
HEADACHES: 0
JOINT SWELLING: 0
FREQUENCY: 0
ABDOMINAL PAIN: 0
NERVOUS/ANXIOUS: 0
MYALGIAS: 0
HEMATOCHEZIA: 0
PARESTHESIAS: 0
DYSURIA: 0
EYE PAIN: 0
ARTHRALGIAS: 0
DIARRHEA: 0
CONSTIPATION: 0
WEAKNESS: 0
SHORTNESS OF BREATH: 0
SORE THROAT: 0
COUGH: 0
FEVER: 0

## 2020-09-21 ASSESSMENT — MIFFLIN-ST. JEOR: SCORE: 1148.49

## 2020-09-21 ASSESSMENT — PAIN SCALES - GENERAL: PAINLEVEL: NO PAIN (0)

## 2020-09-21 ASSESSMENT — PATIENT HEALTH QUESTIONNAIRE - PHQ9: SUM OF ALL RESPONSES TO PHQ QUESTIONS 1-9: 0

## 2020-09-21 NOTE — PROGRESS NOTES
mm   SUBJECTIVE:   CC: Liyah Jernigan is an 55 year old woman who presents for preventive health visit.       Patient has been advised of split billing requirements and indicates understanding: Yes  Healthy Habits:     Getting at least 3 servings of Calcium per day:  Yes    Bi-annual eye exam:  Yes    Dental care twice a year:  NO    Sleep apnea or symptoms of sleep apnea:  None    Diet:  Low fat/cholesterol and Gluten-free/reduced    Frequency of exercise:  6-7 days/week    Duration of exercise:  45-60 minutes    Taking medications regularly:  Yes    Medication side effects:  None    PHQ-2 Total Score: 0    Additional concerns today:  Yes   WALKING, YOGA, LIFTING        Hypertension Follow-up      Do you check your blood pressure regularly outside of the clinic? Yes     Are you following a low salt diet? Yes    Are your blood pressures ever more than 140 on the top number (systolic) OR more   than 90 on the bottom number (diastolic), for example 140/90? No    Migraine     Since your last clinic visit, how have your headaches changed?  No change    How often are you getting headaches or migraines? irregular     Are you able to do normal daily activities when you have a migraine? Yes    Are you taking rescue/relief medications? (Select all that apply) sumatriptan (Imitrex)    How helpful is your rescue/relief medication?  I get total relief    Are you taking any medications to prevent migraines? (Select all that apply)  No    In the past 4 weeks, how often have you gone to urgent care or the emergency room because of your headaches?  0  Has intermittent episodes of vision loss that her MS Neurologist thinks is related to ocular migraines (without pain).  Most recent symptoms were 2 days ago and last for 1.5-2 hours.  Started as a shimmering light.  Eventually to 70% of visual field.  Gradually resolved to normal vision over the 2 hour time.  Central vision affected.  Thinks both eyes.  No pain. No injury.         Today's PHQ-2 Score:   PHQ-2 ( 1999 Pfizer) 9/21/2020   Q1: Little interest or pleasure in doing things 0   Q2: Feeling down, depressed or hopeless 0   PHQ-2 Score 0   Q1: Little interest or pleasure in doing things Not at all   Q2: Feeling down, depressed or hopeless Not at all   PHQ-2 Score 0       Abuse: Current or Past (Physical, Sexual or Emotional) - No  Do you feel safe in your environment? Yes        Social History     Tobacco Use     Smoking status: Never Smoker     Smokeless tobacco: Never Used   Substance Use Topics     Alcohol use: No     Alcohol/week: 0.0 standard drinks     If you drink alcohol do you typically have >3 drinks per day or >7 drinks per week? No    Alcohol Use 9/21/2020   Prescreen: >3 drinks/day or >7 drinks/week? No   No flowsheet data found.    Reviewed orders with patient.  Reviewed health maintenance and updated orders accordingly - Yes  BP Readings from Last 3 Encounters:   09/21/20 119/81   02/01/20 125/87   10/22/19 122/78    Wt Readings from Last 3 Encounters:   09/21/20 53.1 kg (117 lb)   02/01/20 53.8 kg (118 lb 11.2 oz)   09/16/19 52.2 kg (115 lb)                    Mammogram Screening: Patient over age 50, mutual decision to screen reflected in health maintenance.    Pertinent mammograms are reviewed under the imaging tab.  History of abnormal Pap smear: NO - age 30-65 PAP every 5 years with negative HPV co-testing recommended  PAP / HPV Latest Ref Rng & Units 11/4/2015   PAP - NIL   HPV 16 DNA NEG Negative   HPV 18 DNA NEG Negative   OTHER HR HPV NEG Negative     Reviewed and updated as needed this visit by clinical staff  Tobacco  Allergies  Meds  Med Hx  Surg Hx  Fam Hx  Soc Hx        Reviewed and updated as needed this visit by Provider  Tobacco  Allergies  Meds  Med Hx  Surg Hx  Fam Hx  Soc Hx       Past Medical History:   Diagnosis Date     Benign essential hypertension 5/29/2018      Past Surgical History:   Procedure Laterality Date     ENT SURGERY   "2005    tympanoplasty     GYN SURGERY      laparoscopies (5) laparotomies (2) for ectopic     OB History    Para Term  AB Living   1 1 0 0 0 0   SAB TAB Ectopic Multiple Live Births   0 0 0 0 0      # Outcome Date GA Lbr Kennedy/2nd Weight Sex Delivery Anes PTL Lv   1 Para                Review of Systems   Constitutional: Negative for chills and fever.   HENT: Negative for congestion, ear pain, hearing loss and sore throat.    Eyes: Positive for visual disturbance. Negative for pain.   Respiratory: Negative for cough and shortness of breath.    Cardiovascular: Negative for chest pain, palpitations and peripheral edema.   Gastrointestinal: Negative for abdominal pain, constipation, diarrhea, heartburn, hematochezia and nausea.   Breasts:  Negative for tenderness, breast mass and discharge.   Genitourinary: Positive for pelvic pain. Negative for dysuria, frequency, genital sores, hematuria, urgency, vaginal bleeding and vaginal discharge.   Musculoskeletal: Negative for arthralgias, joint swelling and myalgias.   Skin: Negative for rash.   Neurological: Negative for dizziness, weakness, headaches and paresthesias.   Psychiatric/Behavioral: Negative for mood changes. The patient is not nervous/anxious.    intermittent cramping has not timed these episodes.  LMP in May - was regular until that time.         OBJECTIVE:   /81 (BP Location: Left arm, Patient Position: Chair, Cuff Size: Adult Regular)   Pulse 65   Temp 96  F (35.6  C) (Tympanic)   Resp 16   Ht 1.67 m (5' 5.75\")   Wt 53.1 kg (117 lb)   LMP 2020 (Exact Date)   SpO2 99%   BMI 19.03 kg/m    Physical Exam  GENERAL: healthy, alert and no distress  EYES: Eyes grossly normal to inspection, PERRL and conjunctivae and sclerae normal  HENT: ear canals and TM's normal, nose and mouth without ulcers or lesions  NECK: no adenopathy, no asymmetry, masses, or scars and thyroid normal to palpation  RESP: lungs clear to auscultation - no " rales, rhonchi or wheezes  BREAST: normal without masses, tenderness or nipple discharge and no palpable axillary masses or adenopathy  CV: regular rate and rhythm, normal S1 S2, no S3 or S4, no murmur, click or rub, no peripheral edema and peripheral pulses strong  ABDOMEN: soft, nontender, no hepatosplenomegaly, no masses and bowel sounds normal   (female): normal female external genitalia, normal urethral meatus , vaginal mucosa pink, moist, well rugated, normal cervix, adnexae, and uterus without masses. and small nabothian cyst present 11:00.  MS: no gross musculoskeletal defects noted, no edema  SKIN: no suspicious lesions or rashes  NEURO: Normal strength and tone, mentation intact and speech normal  PSYCH: mentation appears normal, affect normal/bright    Diagnostic Test Results:  Labs reviewed in Epic      ASSESSMENT/PLAN:   1. Routine general medical examination at a health care facility  Screening and preventative care discussed  - Comprehensive metabolic panel  - CBC with platelets    2. Screening for malignant neoplasm of cervix  Pap completed today  - Pap imaged thin layer screen with HPV - recommended age 30 - 65 years (select HPV order below)  - HPV High Risk Types DNA Cervical    3. Colon cancer screening  Declines colonoscopy due to logistics of transportation and time away from her disabled son.  She is willing to do the fit testing as a preliminary screen.  - Fecal colorectal cancer screen (FIT); Future    4. Benign essential hypertension  Blood pressure well controlled labs today refill medications when results return.  - Comprehensive metabolic panel  - Albumin Random Urine Quantitative with Creat Ratio    5. Migraine with aura and without status migrainosus, not intractable  Imitrex use as needed    6. Screening for cholesterol level  Cholesterol assessment today.  Overall risk for heart disease remains low based on [place] assessment.  - Lipid panel reflex to direct LDL Fasting    7.  "Screening for diabetes mellitus  Blood sugar normal.  - Comprehensive metabolic panel    8. Screening for thyroid disorder  Thyroid testing normal.  - TSH with free T4 reflex    9. Encounter for vitamin deficiency screening  Normal but low in range vitamin D level of supplement.  I would recommend she restart supplement in the winter months.  - Vitamin D Deficiency    10. Encounter for screening mammogram for breast cancer  Mammogram recommended  - MA Screen Bilateral w/Valeriy; Future    Patient has been advised of split billing requirements and indicates understanding: Yes  COUNSELING:  Reviewed preventive health counseling, as reflected in patient instructions       Regular exercise       Healthy diet/nutrition       Vision screening       Immunizations    Vaccinated for: Influenza and Declined: Zoster due to Other verifying insurance coverage prior to immunization               Osteoporosis Prevention/Bone Health       Colon cancer screening    Estimated body mass index is 19.3 kg/m  as calculated from the following:    Height as of 9/16/19: 1.67 m (5' 5.75\").    Weight as of 2/1/20: 53.8 kg (118 lb 11.2 oz).        She reports that she has never smoked. She has never used smokeless tobacco.      Counseling Resources:  ATP IV Guidelines  Pooled Cohorts Equation Calculator  Breast Cancer Risk Calculator  BRCA-Related Cancer Risk Assessment: FHS-7 Tool  FRAX Risk Assessment  ICSI Preventive Guidelines  Dietary Guidelines for Americans, 2010  ELERTS's MyPlate  ASA Prophylaxis  Lung CA Screening    Yajaira Anand MD  [facility]      Patient Instructions     Non fasting labs today.  PAP today.     Flu vaccine today.      I would recommend the shingles vaccine (shingrix).  This is a series of 2 vaccines, 2-6 months apart.  You will want to verify coverage with your insurance.  It may be covered better at the pharmacy.       Mammogram recommended.   You can call [phone] to schedule this at the " [facility] building in [place]

## 2020-09-21 NOTE — PATIENT INSTRUCTIONS
Non fasting labs today.  PAP today.     Flu vaccine today.      I would recommend the shingles vaccine (shingrix).  This is a series of 2 vaccines, 2-6 months apart.  You will want to verify coverage with your insurance.  It may be covered better at the pharmacy.       Mammogram recommended.   You can call [phone] to schedule this at the [facility] in [place]        Preventive Health Recommendations  Female Ages 50 - 64    Yearly exam: See your health care provider every year in order to  o Review health changes.   o Discuss preventive care.    o Review your medicines if your doctor has prescribed any.      Get a Pap test every three years (unless you have an abnormal result and your provider advises testing more often).    If you get Pap tests with HPV test, you only need to test every 5 years, unless you have an abnormal result.     You do not need a Pap test if your uterus was removed (hysterectomy) and you have not had cancer.    You should be tested each year for STDs (sexually transmitted diseases) if you're at risk.     Have a mammogram every 1 to 2 years.    Have a colonoscopy at age 50, or have a yearly FIT test (stool test). These exams screen for colon cancer.      Have a cholesterol test every 5 years, or more often if advised.    Have a diabetes test (fasting glucose) every three years. If you are at risk for diabetes, you should have this test more often.     If you are at risk for osteoporosis (brittle bone disease), think about having a bone density scan (DEXA).    Shots: Get a flu shot each year. Get a tetanus shot every 10 years.    Nutrition:     Eat at least 5 servings of fruits and vegetables each day.    Eat whole-grain bread, whole-wheat pasta and brown rice instead of white grains and rice.    Get adequate Calcium and Vitamin D.     Lifestyle    Exercise at least 150 minutes a week (30 minutes a day, 5 days a week). This will help you control your weight and prevent  disease.    Limit alcohol to one drink per day.    No smoking.     Wear sunscreen to prevent skin cancer.     See your dentist every six months for an exam and cleaning.    See your eye doctor every 1 to 2 years.  At [facility], we strive to deliver an exceptional experience to you, every time we see you. If you receive a survey, please complete it as we do value your feedback.  If you have MyChart, you can expect to receive results automatically within 24 hours of their completion.  Your provider will send a note interpreting your results as well.   If you do not have MyChart, you should receive your results in about a week by mail.    Your care team:                            Family Medicine Internal Medicine   MD Enzo Jay, MD Rustam Yeager MD Katya Georgiev PA-C  Shari Berry, APRN CNP    Bert Ragland, MD Pediatrics   Dann Nava, PA-C  Kari Singh, CNP Lupis Pickens, MD Juanis Lakhani APRN CNP   MD Astrid Bryant MD Deborah Mielke, MD Kim Thein, APRN CNP  Jaki Cerrato, PA-C  Tanya Clarke, CNP  MD Yajaira Oates MD Angela Wermerskirchen, MD      Clinic hours: Monday - Thursday 7 am-7 pm; Fridays 7 am-5 pm.   Urgent care: Monday - Friday 11 am-9 pm; Saturday and Sunday 9 am-5 pm.    Clinic: [phone]       [facility]: Monday - Thursday 8 am - 7 pm; Friday 8 am - 6 pm  [facility]: [phone]     Use [email or web address] for 24/7 diagnosis and treatment of dozens of conditions.

## 2020-09-22 LAB — DEPRECATED CALCIDIOL+CALCIFEROL SERPL-MC: 37 UG/L (ref 20–75)

## 2020-09-22 NOTE — RESULT ENCOUNTER NOTE
The 10-year ASCVD risk score (Jose Elias STAPLES Jr., et al., 2013) is: 1.4%    Values used to calculate the score:      Age: 55 years      Sex: Female      Is Non- : No      Diabetic: No      Tobacco smoker: No      Systolic Blood Pressure: 119 mmHg      Is BP treated: Yes      HDL Cholesterol: 86 mg/dL      Total Cholesterol: 229 mg/dL

## 2020-09-26 LAB
COPATH REPORT: NORMAL
PAP: NORMAL

## 2020-09-28 LAB
FINAL DIAGNOSIS: NORMAL
HPV HR 12 DNA CVX QL NAA+PROBE: NEGATIVE
HPV16 DNA SPEC QL NAA+PROBE: NEGATIVE
HPV18 DNA SPEC QL NAA+PROBE: NEGATIVE
SPECIMEN DESCRIPTION: NORMAL
SPECIMEN SOURCE CVX/VAG CYTO: NORMAL

## 2020-10-04 DIAGNOSIS — F51.01 PRIMARY INSOMNIA: ICD-10-CM

## 2020-10-08 RX ORDER — TRAZODONE HYDROCHLORIDE 100 MG/1
TABLET ORAL
Qty: 45 TABLET | Refills: 1 | Status: SHIPPED | OUTPATIENT
Start: 2020-10-08 | End: 2020-11-24 | Stop reason: DRUGHIGH

## 2020-10-08 NOTE — TELEPHONE ENCOUNTER
Prescription approved per [facility] Refill Protocol.    Racquel Kong RN, [facility] Triage

## 2020-10-15 DIAGNOSIS — Z12.11 COLON CANCER SCREENING: ICD-10-CM

## 2020-10-15 PROCEDURE — 82274 ASSAY TEST FOR BLOOD FECAL: CPT | Performed by: FAMILY MEDICINE

## 2020-10-16 LAB — HEMOCCULT STL QL IA: NEGATIVE

## 2020-11-03 DIAGNOSIS — F33.0 MILD EPISODE OF RECURRENT MAJOR DEPRESSIVE DISORDER (H): ICD-10-CM

## 2020-11-06 RX ORDER — BUPROPION HYDROCHLORIDE 150 MG/1
TABLET, EXTENDED RELEASE ORAL
Qty: 90 TABLET | Refills: 0 | Status: SHIPPED | OUTPATIENT
Start: 2020-11-06 | End: 2020-12-20

## 2020-11-06 NOTE — TELEPHONE ENCOUNTER
Prescription approved per [facility] Refill Protocol.  Betty Matos RN  [facility]

## 2020-11-17 ENCOUNTER — MYC MEDICAL ADVICE (OUTPATIENT)
Dept: FAMILY MEDICINE | Facility: CLINIC | Age: 55
End: 2020-11-17

## 2020-11-23 ENCOUNTER — VIRTUAL VISIT (OUTPATIENT)
Dept: FAMILY MEDICINE | Facility: CLINIC | Age: 55
End: 2020-11-23
Payer: COMMERCIAL

## 2020-11-23 DIAGNOSIS — G25.81 RESTLESS LEG SYNDROME: Primary | ICD-10-CM

## 2020-11-23 DIAGNOSIS — F51.01 PRIMARY INSOMNIA: ICD-10-CM

## 2020-11-23 DIAGNOSIS — R94.4 DECREASED GFR: ICD-10-CM

## 2020-11-23 DIAGNOSIS — G35 MS (MULTIPLE SCLEROSIS) (H): ICD-10-CM

## 2020-11-23 DIAGNOSIS — A60.00 GENITAL HERPES SIMPLEX, UNSPECIFIED SITE: ICD-10-CM

## 2020-11-23 PROCEDURE — 99214 OFFICE O/P EST MOD 30 MIN: CPT | Mod: 95 | Performed by: FAMILY MEDICINE

## 2020-11-23 RX ORDER — TRAZODONE HYDROCHLORIDE 50 MG/1
50 TABLET, FILM COATED ORAL AT BEDTIME
Qty: 90 TABLET | Refills: 0 | Status: SHIPPED | OUTPATIENT
Start: 2020-11-23 | End: 2021-07-29

## 2020-11-23 RX ORDER — ROPINIROLE 0.5 MG/1
0.5 TABLET, FILM COATED ORAL AT BEDTIME
Qty: 90 TABLET | Refills: 0 | Status: SHIPPED | OUTPATIENT
Start: 2020-11-23 | End: 2021-01-15

## 2020-11-23 RX ORDER — VALACYCLOVIR HYDROCHLORIDE 500 MG/1
500 TABLET, FILM COATED ORAL DAILY
Qty: 90 TABLET | Refills: 1 | Status: SHIPPED | OUTPATIENT
Start: 2020-11-23 | End: 2021-06-15

## 2020-11-23 NOTE — PROGRESS NOTES
"   Liyah Jerngian is a 55 year old female who is being evaluated via a billable telephone visit.      The patient has been notified of following:     \"This telephone visit will be conducted via a call between you and your physician/provider. We have found that certain health care needs can be provided without the need for a physical exam.  This service lets us provide the care you need with a short phone conversation.  If a prescription is necessary we can send it directly to your pharmacy.  If lab work is needed we can place an order for that and you can then stop by our lab to have the test done at a later time.    Telephone visits are billed at different rates depending on your insurance coverage. During this emergency period, for some insurers they may be billed the same as an in-person visit.  Please reach out to your insurance provider with any questions.    If during the course of the call the physician/provider feels a telephone visit is not appropriate, you will not be charged for this service.\"    Patient has given verbal consent for Telephone visit?  Yes    What phone number would you like to be contacted at? [phone]     How would you like to obtain your AVS? MyChart        Subjective     Liyah Jernigan is a 55 year old female who presents via phone visit today for the following health issues:    HPI      Taking gabapentin at night due to arm numbness/vibration from MS    Herpes outbreak prevention Taking valtrex regularly - when started aubagio (2015) began having episodes - now taking daily and not having any issues since.      Worsening RLS - 2-3 months.  Moving all day long - crawling into bed- symptoms start.  Get up and do some stretching, some help for symptoms.  Has not had any issues since treatment with vascular for varicose veins in 2017.  Having less exercise, hiking.  Has tried stretching prior to bed - sometimes   Not seeking chiropractic care as regularly.    MS - ongoing care with " neurology.  Symptoms controlled with use of Aubagio,.  Reviewed side effects of the medication - elevated creatinine can be a side effects.  Her recent creatinine was elevated.  Recheck is planned for lab.        Insomnia -   Taking trazodone - generally 1/4 of a 100 mg pill.  Sometimes 1/2 pill.  Working well.  Refill will be needed soon, requesting  50 mg dose for easier splitting/dosing.       Constitutional, HEENT, cardiovascular, pulmonary, gi and gu systems are negative, except as otherwise noted.       Objective          Vitals:  No vitals were obtained today due to virtual visit.    healthy, alert and no distress  PSYCH: Alert and oriented times 3; coherent speech, normal   rate and volume, able to articulate logical thoughts, able   to abstract reason, no tangential thoughts, no hallucinations   or delusions  Her affect is normal  RESP: No cough, no audible wheezing, able to talk in full sentences  Remainder of exam unable to be completed due to telephone visits            Assessment/Plan:    Assessment & Plan   Problem List Items Addressed This Visit     Genital herpes simplex, unspecified site    Relevant Medications    valACYclovir (VALTREX) 500 MG tablet    MS (multiple sclerosis) (H)    Primary insomnia    Relevant Medications    traZODone (DESYREL) 50 MG tablet    Restless leg syndrome - Primary    Relevant Medications    rOPINIRole (REQUIP) 0.5 MG tablet    Other Relevant Orders    **CBC with platelets FUTURE anytime    Ferritin    **Iron and iron binding capacity FUTURE anytime      Other Visit Diagnoses     Decreased GFR        Relevant Orders    **Basic metabolic panel FUTURE anytime         Return to clinic for labs for additional evaluation for secondary causes of RLS.  MS may be related. Has used Requip in past with good results.   Prescription for this is going to be made available for use as needed.  She will continue her other medications new prescription for trazodone and sent.  Condition  continue Valtrex as previous         Patient Instructions   Return to clinic for labs for recheck kidney function and iron levels.    Adjusted dose of trazodone prescription sent.  Refill Valtrex for regular use.    With the labs above we are evaluating for other causes for your worsening restless leg symptoms.  Requip prescription is sent to the pharmacy for use as needed.      Return in about 4 weeks (around 12/21/2020) for as needed for persistent symptoms.    Yajaira Anand MD  [facility]    Phone call duration:  26 minutes  Unable to do video portion due to technology issues

## 2020-11-23 NOTE — PATIENT INSTRUCTIONS
Return to clinic for labs for recheck kidney function and iron levels.    Adjusted dose of trazodone prescription sent.  Refill Valtrex for regular use.    With the labs above we are evaluating for other causes for your worsening restless leg symptoms.  Requip prescription is sent to the pharmacy for use as needed.

## 2020-11-24 ENCOUNTER — VIRTUAL VISIT (OUTPATIENT)
Dept: FAMILY MEDICINE | Facility: OTHER | Age: 55
End: 2020-11-24
Payer: COMMERCIAL

## 2020-11-24 DIAGNOSIS — Z20.822 SUSPECTED COVID-19 VIRUS INFECTION: Primary | ICD-10-CM

## 2020-11-24 PROBLEM — F51.01 PRIMARY INSOMNIA: Status: ACTIVE | Noted: 2020-11-24

## 2020-11-24 PROCEDURE — 99421 OL DIG E/M SVC 5-10 MIN: CPT | Performed by: PHYSICIAN ASSISTANT

## 2020-11-24 NOTE — PROGRESS NOTES
"Date: 2020 09:02:03  Clinician: Karlo Benoit  Clinician NPI: [id number]  Patient: Liyah Jernigan  Patient : 1965  Patient Address: [address] [place] [address]  Patient Phone: [phone]  Visit Protocol: URI  Patient Summary:  Liyah is a 55 year old ( : 1965 ) female who initiated a [facility] Visit for COVID-19 (Coronavirus) evaluation and screening. When asked the question \"Please sign me up to receive news, health information and promotions from [facility].\", Liyah responded \"Yes\".    Liyah states her symptoms started gradually 3-4 days ago. After her symptoms started, they improved and then got worse again.   Her symptoms consist of myalgia, chills, malaise, a sore throat, a headache, a cough, nasal congestion, and nausea. She is experiencing mild difficulty breathing with activities but can speak normally in full sentences. Liyah also feels feverish.   Symptom details     Nasal secretions: The color of her mucus is clear.    Cough: Liyah coughs a few times an hour and her cough is not more bothersome at night. Phlegm does not come into her throat when she coughs. She does not believe her cough is caused by post-nasal drip.     Sore throat: Liyah reports having moderate throat pain (4-6 on a 10 point pain scale), does not have exudate on her tonsils, and can swallow liquids. She is not sure if the lymph nodes in her neck are enlarged. A rash has not appeared on the skin since the sore throat started.     Temperature: Her current temperature is 99.6 degrees Fahrenheit.     Headache: She states the headache is moderate (4-6 on a 10 point pain scale).      Liyah denies having vomiting, rhinitis, facial pain or pressure, teeth pain, ageusia, diarrhea, ear pain, wheezing, and anosmia. She also denies taking antibiotic medication in the past month, having recent facial or sinus surgery in the past 60 days, and having a sinus infection within the past year.   Precipitating events  Within the " past week, Liyah has not been exposed to someone with strep throat. She has not recently been exposed to someone with influenza. Liyah has been in close contact with the following high risk individuals: immunocompromised people.   Pertinent COVID-19 (Coronavirus) information  Liyah does not work or volunteer as healthcare worker or a . In the past 14 days, Liyah has not worked or volunteered at a healthcare facility or group living setting.   In the past 14 days, she also has not lived in a congregate living setting.   Liyah has not had a close contact with a laboratory-confirmed COVID-19 patient within 14 days of symptom onset.    Since December 2019, Liyah has been tested for COVID-19 and has had upper respiratory infection (URI) or influenza-like illness.      Result of COVID-19 test: Negative     Date of her COVID-19 test: 05/12/2020     Date(s) of previous URI or influenza-like illness (free-text): February, and April     Symptoms Liyah experienced during previous URI or influenza-like illness as reported by the patient (free-text): February - fever, body aches, respiratory, nausea, congestion and FATIGUE; April - body aches, fatigue, congestion        Pertinent medical history  Liyah does not get yeast infections when she takes antibiotics.   Liyah does not need a return to work/school note.   Weight: 115 lbs   Liyah does not smoke or use smokeless tobacco.   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 This illness can cause fever, cough and trouble breathing. Many people get a mild case and get better on their own. Some people can get very sick.   "What should I do?  We would like to test you for this virus.   1. Please call [phone] to schedule your visit. Explain that you were referred by [facility] to have a COVID-19 test. Be ready to share your [facility] visit ID number.  * If you need to schedule in [facility] please call [phone] or for [employer] employees please call [phone].  * If you need to schedule in the [place] area please call [phone]. [place] employees call [phone].  The following will serve as your written order for this COVID Test, ordered by me, for the indication of suspected COVID [Z20.828]: The test will be ordered in [facility], our electronic health record, after you are scheduled. It will show as ordered and authorized by Vijay Smallwood MD.  Order: COVID-19 (Coronavirus) PCR for SYMPTOMATIC testing from [facility].   2. When it's time for your COVID test:  Stay at least 6 feet away from others. (If someone will drive you to your test, stay in the backseat, as far away from the  as you can.)   Cover your mouth and nose with a mask, tissue or washcloth.  Go straight to the testing site. Don't make any stops on the way there or back.      3.Starting now: Stay home and away from others (self-isolate) until:   You've had no fever---and no medicine that reduces fever---for one full day (24 hours). And...   Your other symptoms have gotten better. For example, your cough or breathing has improved. And...   At least 10 days have passed since your symptoms started.       During this time, don't leave the house except for testing or medical care.   Stay in your own room, even for meals. Use your own bathroom if you can.   Stay away from others in your home. No hugging, kissing or shaking hands. No visitors.  Don't go to work, school or anywhere else.    Clean \"high touch\" surfaces often (doorknobs, counters, handles, etc.). Use a household cleaning spray or wipes. You'll find a full list of  on the EPA website: " www.epa.gov/pesticide-registration/list-n-disinfectants-use-against-sars-cov-2.   Cover your mouth and nose with a mask, tissue or washcloth to avoid spreading germs.  Wash your hands and face often. Use soap and water.  Caregivers in these groups are at risk for severe illness due to COVID-19:  o People 65 years and older  o People who live in a nursing home or long-term care facility  o People with chronic disease (lung, heart, cancer, diabetes, kidney, liver, immunologic)  o People who have a weakened immune system, including those who:   Are in cancer treatment  Take medicine that weakens the immune system, such as corticosteroids  Had a bone marrow or organ transplant  Have an immune deficiency  Have poorly controlled HIV or AIDS  Are obese (body mass index of 40 or higher)  Smoke regularly   o Caregivers should wear gloves while washing dishes, handling laundry and cleaning bedrooms and bathrooms.  o Use caution when washing and drying laundry: Don't shake dirty laundry, and use the warmest water setting that you can.  o For more tips, go to www.cdc.gov/coronavirus/2019-ncov/downloads/10Things.pdf.       How can I take care of myself?   Get lots of rest. Drink extra fluids (unless a doctor has told you not to).   Take Tylenol (acetaminophen) for fever or pain. If you have liver or kidney problems, ask your family doctor if it's okay to take Tylenol.   Adults can take either:    650 mg (two 325 mg pills) every 4 to 6 hours, or...   1,000 mg (two 500 mg pills) every 8 hours as needed.    Note: Don't take more than 3,000 mg in one day. Acetaminophen is found in many medicines (both prescribed and over-the-counter medicines). Read all labels to be sure you don't take too much.   For children, check the Tylenol bottle for the right dose. The dose is based on the child's age or weight.    If you have other health problems (like cancer, heart failure, an organ transplant or severe kidney disease): Call your specialty  clinic if you don't feel better in the next 2 days.       Know when to call 911. Emergency warning signs include:    Trouble breathing or shortness of breath Pain or pressure in the chest that doesn't go away Feeling confused like you haven't felt before, or not being able to wake up Bluish-colored lips or face.  Where can I get more information?   [facility] -- About COVID-19: [email or web address]   CDC -- What to Do If You're Sick: www.cdc.gov/coronavirus/2019-ncov/about/steps-when-sick.html   [facility] -- Ending Home Isolation: www.cdc.gov/coronavirus/2019-ncov/hcp/disposition-in-home-patients.html   [facility] -- Caring for Someone: www.cdc.gov/coronavirus/2019-ncov/if-you-are-sick/care-for-someone.html   [facility] -- Interim Guidance for Hospital Discharge to Home: www.[facility].[place]./diseases/coronavirus/hcp/hospdischarge.pdf   [facility] clinical trials (COVID-19 research studies): clinicalaffairs.[facility]-clinical-trials    Below are the COVID-19 hotlines at the [facility] of Health ([facility]). Interpreters are available.    For health questions: Call 731-699-6904 or 1-279.588.6183 (7 a.m. to 7 p.m.) For questions about schools and childcare: Call 245-940-2697 or 1-525.555.9119 (7 a.m. to 7 p.m.)    Diagnosis: Contact with and (suspected) exposure to other viral communicable diseases  Diagnosis ICD: Z20.828

## 2020-11-25 DIAGNOSIS — Z20.822 SUSPECTED COVID-19 VIRUS INFECTION: ICD-10-CM

## 2020-11-25 DIAGNOSIS — Z20.822 SUSPECTED 2019 NOVEL CORONAVIRUS INFECTION: Primary | ICD-10-CM

## 2020-11-25 PROCEDURE — U0003 INFECTIOUS AGENT DETECTION BY NUCLEIC ACID (DNA OR RNA); SEVERE ACUTE RESPIRATORY SYNDROME CORONAVIRUS 2 (SARS-COV-2) (CORONAVIRUS DISEASE [COVID-19]), AMPLIFIED PROBE TECHNIQUE, MAKING USE OF HIGH THROUGHPUT TECHNOLOGIES AS DESCRIBED BY CMS-2020-01-R: HCPCS | Performed by: PHYSICIAN ASSISTANT

## 2020-11-26 LAB
SARS-COV-2 RNA SPEC QL NAA+PROBE: NOT DETECTED
SPECIMEN SOURCE: NORMAL

## 2020-12-04 NOTE — PROGRESS NOTES
Discharge Note    Progress reporting period is from last SOAP note on Sep 19, 2019.    Liyah failed to follow up and current status is unknown.  Please see information below for last relevant information on current status.  Patient seen for 2 visits.    SUBJECTIVE  Subjective changes noted by patient:  Patient returns to clinic reporting that her symptoms are overall better. Able to sit longer and with signifcantly reduced pain during. Also has become aware that she tends to lean significantly to the right onto center console of her car while driving in order to hear her son when he is talking.  So she is now trying to sit up straighter and make her disabled son to speak louder.   .  Current pain level is 5/10.     Previous pain level was  2/10.   Changes in function:  Yes (See Goal flowsheet attached for changes in current functional level)  Adverse reaction to treatment or activity: None    OBJECTIVE  Changes noted in objective findings: Pelvic alignment remains much improved since MET last Rx, L out flare remaining which corrected well following MET. L iliopsoas tenderness near insertional area.      ASSESSMENT/PLAN  Diagnosis: L hip pain   Updated problem list and treatment plan:   Pain - HEP  Decreased function - HEP  STG/LTGs have been met or progress has been made towards goals:  Yes, please see goal flowsheet for most current information  Assessment of Progress: current status is unknown.    Last current status:     Self Management Plans:  HEP  I have re-evaluated this patient and find that the nature, scope, duration and intensity of the therapy is appropriate for the medical condition of the patient.  Liyah continues to require the following intervention to meet STG and LTG's:  HEP.    Recommendations:  Discharge with current home program.  Patient to follow up with MD as needed.    Please refer to the daily flowsheet for treatment today, total treatment time and time spent performing 1:1 timed codes.

## 2020-12-18 ENCOUNTER — TELEPHONE (OUTPATIENT)
Dept: FAMILY MEDICINE | Facility: CLINIC | Age: 55
End: 2020-12-18

## 2020-12-18 DIAGNOSIS — F33.0 MILD EPISODE OF RECURRENT MAJOR DEPRESSIVE DISORDER (H): ICD-10-CM

## 2020-12-18 NOTE — TELEPHONE ENCOUNTER
Plan does not cover traZODone (DESYREL) 50 MG tablet.  Please call [phone] to initiate Prior Auth or change med.    Insurance type and ID number: ID# [id number]      Additional Information:     Manju Cedeño

## 2020-12-20 RX ORDER — BUPROPION HYDROCHLORIDE 150 MG/1
TABLET, EXTENDED RELEASE ORAL
Qty: 90 TABLET | Refills: 0 | Status: SHIPPED | OUTPATIENT
Start: 2020-12-20 | End: 2021-01-15

## 2020-12-21 NOTE — TELEPHONE ENCOUNTER
Central Prior Authorization Team   Phone: [phone]      PA NOT NEEDED    Medication: traZODone (DESYREL) 50 MG tablet-PA NOT NEEDED  Insurance Company: [employer] - Phone [phone] Fax [phone]  Pharmacy Filling the Rx: [id number] [place] - [address] AT [facility] OF  [address]  Filling Pharmacy Phone: [phone]  Filling Pharmacy Fax:    Start Date: 12/21/2020    Called pharmacy to verify if PA is needed since most insurances cover this medication and patient is only taking one a day.  Pharmacy re-ran the medication and got a paid claim.  Pharmacy will notify patient when medication is ready.

## 2021-01-11 ENCOUNTER — MYC MEDICAL ADVICE (OUTPATIENT)
Dept: FAMILY MEDICINE | Facility: CLINIC | Age: 56
End: 2021-01-11

## 2021-01-15 ENCOUNTER — VIRTUAL VISIT (OUTPATIENT)
Dept: FAMILY MEDICINE | Facility: CLINIC | Age: 56
End: 2021-01-15
Payer: COMMERCIAL

## 2021-01-15 DIAGNOSIS — G25.81 RESTLESS LEG SYNDROME: ICD-10-CM

## 2021-01-15 DIAGNOSIS — F33.0 MILD EPISODE OF RECURRENT MAJOR DEPRESSIVE DISORDER (H): ICD-10-CM

## 2021-01-15 DIAGNOSIS — F90.9 ATTENTION DEFICIT HYPERACTIVITY DISORDER (ADHD), UNSPECIFIED ADHD TYPE: ICD-10-CM

## 2021-01-15 PROCEDURE — 99213 OFFICE O/P EST LOW 20 MIN: CPT | Mod: 95 | Performed by: NURSE PRACTITIONER

## 2021-01-15 RX ORDER — DEXTROAMPHETAMINE SACCHARATE, AMPHETAMINE ASPARTATE, DEXTROAMPHETAMINE SULFATE AND AMPHETAMINE SULFATE 1.25; 1.25; 1.25; 1.25 MG/1; MG/1; MG/1; MG/1
5 TABLET ORAL DAILY
Qty: 30 TABLET | Refills: 0 | Status: SHIPPED | OUTPATIENT
Start: 2021-02-15 | End: 2021-03-17

## 2021-01-15 RX ORDER — DEXTROAMPHETAMINE SACCHARATE, AMPHETAMINE ASPARTATE, DEXTROAMPHETAMINE SULFATE AND AMPHETAMINE SULFATE 1.25; 1.25; 1.25; 1.25 MG/1; MG/1; MG/1; MG/1
5 TABLET ORAL DAILY
Qty: 30 TABLET | Refills: 0 | Status: SHIPPED | OUTPATIENT
Start: 2021-01-15 | End: 2021-02-14

## 2021-01-15 RX ORDER — BUPROPION HYDROCHLORIDE 150 MG/1
150 TABLET, EXTENDED RELEASE ORAL DAILY
Qty: 90 TABLET | Refills: 3 | Status: SHIPPED | OUTPATIENT
Start: 2021-01-15 | End: 2022-02-09

## 2021-01-15 RX ORDER — DEXTROAMPHETAMINE SACCHARATE, AMPHETAMINE ASPARTATE, DEXTROAMPHETAMINE SULFATE AND AMPHETAMINE SULFATE 2.5; 2.5; 2.5; 2.5 MG/1; MG/1; MG/1; MG/1
10 TABLET ORAL DAILY
Qty: 30 TABLET | Refills: 0 | Status: SHIPPED | OUTPATIENT
Start: 2021-01-15 | End: 2021-02-14

## 2021-01-15 RX ORDER — DEXTROAMPHETAMINE SACCHARATE, AMPHETAMINE ASPARTATE, DEXTROAMPHETAMINE SULFATE AND AMPHETAMINE SULFATE 2.5; 2.5; 2.5; 2.5 MG/1; MG/1; MG/1; MG/1
10 TABLET ORAL DAILY
Qty: 30 TABLET | Refills: 0 | Status: SHIPPED | OUTPATIENT
Start: 2021-02-15 | End: 2021-03-17

## 2021-01-15 RX ORDER — DEXTROAMPHETAMINE SACCHARATE, AMPHETAMINE ASPARTATE, DEXTROAMPHETAMINE SULFATE AND AMPHETAMINE SULFATE 1.25; 1.25; 1.25; 1.25 MG/1; MG/1; MG/1; MG/1
5 TABLET ORAL DAILY
Qty: 30 TABLET | Refills: 0 | Status: SHIPPED | OUTPATIENT
Start: 2021-03-18 | End: 2021-04-17

## 2021-01-15 RX ORDER — DEXTROAMPHETAMINE SACCHARATE, AMPHETAMINE ASPARTATE, DEXTROAMPHETAMINE SULFATE AND AMPHETAMINE SULFATE 2.5; 2.5; 2.5; 2.5 MG/1; MG/1; MG/1; MG/1
TABLET ORAL
COMMUNITY
Start: 2016-01-15 | End: 2022-04-14

## 2021-01-15 RX ORDER — ROPINIROLE 0.5 MG/1
0.5 TABLET, FILM COATED ORAL AT BEDTIME
Qty: 90 TABLET | Refills: 3 | Status: SHIPPED | OUTPATIENT
Start: 2021-01-15 | End: 2021-05-05

## 2021-01-15 RX ORDER — DEXTROAMPHETAMINE SACCHARATE, AMPHETAMINE ASPARTATE, DEXTROAMPHETAMINE SULFATE AND AMPHETAMINE SULFATE 2.5; 2.5; 2.5; 2.5 MG/1; MG/1; MG/1; MG/1
10 TABLET ORAL DAILY
Qty: 30 TABLET | Refills: 0 | Status: SHIPPED | OUTPATIENT
Start: 2021-03-18 | End: 2021-04-17

## 2021-01-15 NOTE — PROGRESS NOTES
Liyah is a 56 year old who is being evaluated via a billable video visit.      How would you like to obtain your AVS? MyChart  If the video visit is dropped, the invitation should be resent by: Text to cell phone: per chart  Will anyone else be joining your video visit? No      Video Start Time: 0811  Assessment & Plan     Attention deficit hyperactivity disorder (ADHD), unspecified ADHD type  Stable.  Refills provided.   history consistent with prescribing at [facility].   - amphetamine-dextroamphetamine (ADDERALL) 5 MG tablet; Take 1 tablet (5 mg) by mouth daily  - amphetamine-dextroamphetamine (ADDERALL) 5 MG tablet; Take 1 tablet (5 mg) by mouth daily  - amphetamine-dextroamphetamine (ADDERALL) 5 MG tablet; Take 1 tablet (5 mg) by mouth daily  - amphetamine-dextroamphetamine (ADDERALL) 10 MG tablet; Take 1 tablet (10 mg) by mouth daily  - amphetamine-dextroamphetamine (ADDERALL) 10 MG tablet; Take 1 tablet (10 mg) by mouth daily  - amphetamine-dextroamphetamine (ADDERALL) 10 MG tablet; Take 1 tablet (10 mg) by mouth daily    Mild episode of recurrent major depressive disorder (H)  Refilled, working well at once daily dose.    - buPROPion (WELLBUTRIN SR) 150 MG 12 hr tablet; Take 1 tablet (150 mg) by mouth daily    Restless leg syndrome  Refilled.  Uses intermittently.   - rOPINIRole (REQUIP) 0.5 MG tablet; Take 1 tablet (0.5 mg) by mouth At Bedtime    Review of external notes as documented above           12 minutes spent on the date of the encounter doing chart review, history and exam, documentation and further activities as noted above             No follow-ups on file.    MARRY Vang [facility]     Liyah is a 56 year old who presents to clinic today for the following health issues           HPI   ADHD follow up.  Adderall 10 mg in the morning and 5 mg at noon is working best for her.    The extended release in the past caused insomnia.           Review of Systems   Constitutional, HEENT, cardiovascular, pulmonary, GI, , musculoskeletal, neuro, skin, endocrine and psych systems are negative, except as otherwise noted.      Objective           Vitals:  No vitals were obtained today due to virtual visit.    Physical Exam   GENERAL: Healthy, alert and no distress  EYES: Eyes grossly normal to inspection.  No discharge or erythema, or obvious scleral/conjunctival abnormalities.  RESP: No audible wheeze, cough, or visible cyanosis.  No visible retractions or increased work of breathing.    SKIN: Visible skin clear. No significant rash, abnormal pigmentation or lesions.  NEURO: Cranial nerves grossly intact.  Mentation and speech appropriate for age.  PSYCH: Mentation appears normal, affect normal/bright, judgement and insight intact, normal speech and appearance well-groomed.                Video-Visit Details    Type of service:  Video Visit    Video End Time:8:22 AM    Originating Location (pt. Location): Home    Distant Location (provider location):  [facility]     Platform used for Video Visit: [employer]

## 2021-01-20 NOTE — PROGRESS NOTES
"  SUBJECTIVE:   Liyah Jernigan is a 52 year old female who presents to clinic today for the following health issues:      1. Discuss ongoing period (since 8/18/17) --- pts states BC packaging was different from pharmacy  - pt would like to follow up on taking medication  2. Varicose veins surgery - upcoming procedure     Depression Followup    Status since last visit: Stable     See PHQ-9 for current symptoms.  Other associated symptoms: None    Complicating factors:   Significant life event:  No   Current substance abuse:  None  Anxiety or Panic symptoms:  No    Medication Followup of Adderall    Taking Medication as prescribed: Not using daily    Side Effects:  None    Medication Helping Symptoms:  yes     SUBJECTIVE:  Here today in follow-up of ADHD and depression - from the standpoint she is doing well overall  Patient reports no side effects from medications, and desires no change in therapy.     We started oral contraceptives a number of months ago in the first 3 months work great. Very regulated cycles. However the  seems to have changed with her most recent refill and since then she has been having excessive bleeding as above. She is wondering where she may be from a medical standpoint. I discussed with patient that laboratory testing can give us a snapshot in time but it's not really predictive of when a person may go through menopause. No hot flashes yet at this point. Need some vaccine updates    Review of systems otherwise negative.  Past medical, family, and social history reviewed and updated in chart.    OBJECTIVE:  /86 (BP Location: Right arm, Patient Position: Right side, Cuff Size: Adult Regular)  Pulse 76  Temp 98.3  F (36.8  C) (Oral)  Resp 16  Ht 1.702 m (5' 7\")  Wt 54.7 kg (120 lb 11.2 oz)  LMP 08/18/2017 (Exact Date)  SpO2 99%  BMI 18.9 kg/m2  Alert, pleasant, upbeat, and in no apparent discomfort.  S1 and S2 normal, no murmurs, clicks, gallops or rubs. Regular rate and " 25 yo gyn, last annual 12/21/20.  Pt reports BV sx that started after LMP 2 weeks ago with milky white dc with mild odor.   Pt states dc is not thin or thick and denies itching or burning.   Verified allergies and pharmacy.   Rx for metrogel per pt preference sent and enc to call back if sx persist following tx.   Pt verbalizes understanding and agrees with plan.       rhythm. Chest is clear; no wheezes or rales. No edema or JVD.   Past labs reviewed with the patient.     ASSESSMENT / PLAN:  (F90.9) Attention deficit hyperactivity disorder (ADHD), unspecified ADHD type  (primary encounter diagnosis)  Comment: Stable on current dosage. Refill ×3 months  Plan: amphetamine-dextroamphetamine (ADDERALL) 10 MG         per tablet, amphetamine-dextroamphetamine         (ADDERALL) 10 MG per tablet,         amphetamine-dextroamphetamine (ADDERALL) 10 MG         per tablet            (N92.0) Excessive or frequent menstruation  Comment: Will take a look at her lab work keeping in mind the discussion as above. Quick burst of Provera to slow her bleeding.  Then we may need to figure out a different OCP or pharmacy  Plan: TSH with free T4 reflex, Lutropin, Follicle         stimulating hormone, medroxyPROGESTERone         (PROVERA) 10 MG tablet            (F33.0) Mild episode of recurrent major depressive disorder (H)  Comment: Stable on current dosage. Continue  Plan:     (Z23) Need for prophylactic vaccination and inoculation against influenza  Comment:   Plan: FLU VAC, SPLIT VIRUS IM > 3 YO (QUADRIVALENT)         [00968], Vaccine Administration, Initial         [98133]            (Z23) Need for hepatitis B vaccination  Comment:   Plan:     Follow up 6 months  S. Tab Barrow MD    (Chart documentation completed in part with Dragon voice-recognition software.  Even though reviewed some grammatical, spelling, and word errors may remain.)       Injectable Influenza Immunization Documentation    1.  Are you sick today? (Fever of 100.5 or higher on the day of the clinic)   No    2.  Have you ever had Guillain-[place] Syndrome within 6 weeks of an influenza vaccionation?  No    3. Do you have a life-threatening allergy to eggs?  No    4. Do you have a life-threatening allergy to a component of the vaccine? May include antibiotics, gelatin or latex.  No     5. Have you ever had a reaction to a dose  of flu vaccine that needed immediate medical attention?  No     Form completed by Delano Sanabria MA

## 2021-01-23 ENCOUNTER — HEALTH MAINTENANCE LETTER (OUTPATIENT)
Age: 56
End: 2021-01-23

## 2021-02-15 DIAGNOSIS — I10 BENIGN ESSENTIAL HYPERTENSION: ICD-10-CM

## 2021-02-15 RX ORDER — METOPROLOL SUCCINATE 25 MG/1
25 TABLET, EXTENDED RELEASE ORAL DAILY
Qty: 90 TABLET | Refills: 1 | Status: SHIPPED | OUTPATIENT
Start: 2021-02-15 | End: 2021-07-29

## 2021-03-28 ENCOUNTER — MYC MEDICAL ADVICE (OUTPATIENT)
Dept: FAMILY MEDICINE | Facility: CLINIC | Age: 56
End: 2021-03-28

## 2021-04-12 ENCOUNTER — VIRTUAL VISIT (OUTPATIENT)
Dept: FAMILY MEDICINE | Facility: CLINIC | Age: 56
End: 2021-04-12
Payer: COMMERCIAL

## 2021-04-12 DIAGNOSIS — Z83.49 FAMILY HISTORY OF ALPHA 1 ANTITRYPSIN DEFICIENCY: Primary | ICD-10-CM

## 2021-04-12 PROCEDURE — 99214 OFFICE O/P EST MOD 30 MIN: CPT | Mod: 95 | Performed by: FAMILY MEDICINE

## 2021-04-12 NOTE — Clinical Note
She will need to sign a genetic consent form before doing her genetic testing.  I am printing it out from home and filling out my portion and I will fax it back via [employer].  Then I am not sure how to get it to her ahead of time.  She could perhaps stop at the  when she sets up the appointment.  That is probably easiest.

## 2021-04-12 NOTE — PROGRESS NOTES
Liyah is a 56 year old who is being evaluated via a billable video visit.      How would you like to obtain your AVS? MyChart  If the video visit is dropped, the invitation should be resent by: Text to cell phone: 1  Will anyone else be joining your video visit? No      Video Start Time: 803    Assessment & Plan     Family history of alpha 1 antitrypsin deficiency  Strong family history including both maternal and paternal sides of the family.  There has been endorgan disease including COPD and other pulmonary manifestations.  We discussed that I do not know if it is related to her MS but if the gene is present it is worthwhile letting her neurology team know about this.  So we will set up the testing.  Discussed genetic testing consent.  Discussed follow-up based upon results.  - Alpha 1 antitryp luz reflex to pheno; Future       See Patient Instructions    Return in about 3 weeks (around 5/3/2021) for Based upon test results.    Jasmine Barrow MD  [facility]   Liyah is a 56 year old who presents for the following health issues     HPI     Video visit with patient today to discuss alpha 1 antitrypsin testing.  She has multiple members on both maternal and paternal sides with this that have resulted in significant clinical disease including a maternal grandparent that  of COPD likely related to this.  Has been speaking with many family members and recently a cousin of hers was diagnosed with clinical manifestations and this prompted her to get tested.    Review of Systems   Constitutional, HEENT, cardiovascular, pulmonary, gi and gu systems are negative, except as otherwise noted.      Objective           Vitals:  No vitals were obtained today due to virtual visit.    Physical Exam   GENERAL: Healthy, alert and no distress  EYES: Eyes grossly normal to inspection.  No discharge or erythema, or obvious scleral/conjunctival abnormalities.  RESP: No audible wheeze,  cough, or visible cyanosis.  No visible retractions or increased work of breathing.    SKIN: Visible skin clear. No significant rash, abnormal pigmentation or lesions.  NEURO: Cranial nerves grossly intact.  Mentation and speech appropriate for age.  PSYCH: Mentation appears normal, affect normal/bright, judgement and insight intact, normal speech and appearance well-groomed.    Past labs reviewed with the patient.             Video-Visit Details    Type of service:  Video Visit    Video End Time:0813    Originating Location (pt. Location): Home    Distant Location (provider location):  [facility]     Platform used for Video Visit: [employer]

## 2021-05-05 ENCOUNTER — OFFICE VISIT (OUTPATIENT)
Dept: FAMILY MEDICINE | Facility: CLINIC | Age: 56
End: 2021-05-05
Payer: COMMERCIAL

## 2021-05-05 VITALS
DIASTOLIC BLOOD PRESSURE: 72 MMHG | TEMPERATURE: 97.6 F | HEART RATE: 72 BPM | BODY MASS INDEX: 18.87 KG/M2 | RESPIRATION RATE: 14 BRPM | SYSTOLIC BLOOD PRESSURE: 132 MMHG | OXYGEN SATURATION: 96 % | WEIGHT: 116 LBS

## 2021-05-05 DIAGNOSIS — R22.0 SWELLING OF LEFT SIDE OF FACE: Primary | ICD-10-CM

## 2021-05-05 PROCEDURE — 99213 OFFICE O/P EST LOW 20 MIN: CPT | Performed by: FAMILY MEDICINE

## 2021-05-05 RX ORDER — PREDNISONE 20 MG/1
20 TABLET ORAL DAILY
Qty: 5 TABLET | Refills: 0 | Status: SHIPPED | OUTPATIENT
Start: 2021-05-05 | End: 2021-07-29

## 2021-05-05 RX ORDER — HYDROXYZINE HYDROCHLORIDE 25 MG/1
25 TABLET, FILM COATED ORAL EVERY 8 HOURS PRN
Qty: 30 TABLET | Refills: 0 | Status: SHIPPED | OUTPATIENT
Start: 2021-05-05 | End: 2021-07-29

## 2021-05-05 ASSESSMENT — PATIENT HEALTH QUESTIONNAIRE - PHQ9: SUM OF ALL RESPONSES TO PHQ QUESTIONS 1-9: 0

## 2021-05-05 NOTE — PROGRESS NOTES
Assessment & Plan     Swelling of left side of face  With unknown etiology  I'm concerned with possible insect bite allergy versus facial shingles  Recommended patient to take a picture of the swelling today and keep monitoring reviewed the signs and symptoms for shingles   recommended to follow-up if she develops concerns for blisters, acne-like lesions, burning sensation, pain over the affected area  .  Recommended to stop Allegra and take Claritin 10 mg once daily in the morning  Start on prednisone 20 mg once daily in the morning for 5 days along with hydroxyzine 25 mg 3 times daily as needed for itching and swelling  Dosing and potential medication side effects discussed.  Patient verbalised understanding and is agreeable to the plan.        - predniSONE (DELTASONE) 20 MG tablet; Take 1 tablet (20 mg) by mouth daily  - hydrOXYzine (ATARAX) 25 MG tablet; Take 1 tablet (25 mg) by mouth every 8 hours as needed for itching or other (rash)             Chart documentation done in part with Dragon Voice recognition Software. Although reviewed after completion, some word and grammatical error may remain.    See Patient Instructions    No follow-ups on file.    Mane Hall MD  [facility]   Liyah is a 56 year old who presents for the following health issues  accompanied by her son  Patient is new to the provider, is here today with concerns of having swelling and discomfort on the left side of the face that started 1 day before with a almond-shaped of swelling in front of the left temple  Patient is not sure if she had some kind of insect bite or sting  She denies concerns for fever, chills, body aches, previous similar symptoms in the past  Denies vision problems  Past medical history is significant for multiple sclerosis, hypertension, chronic migraines and depression.  Patient denies history of shingles in the past  She does have history of seasonal allergies, takes  Allegra as needed  Denies concerns for swelling of lips, tongue, choking sensation, problems with breathing, wheezing  Denies nasal congestion, postnasal drip, sore throat  Patient denies history of use of oral or topical products, new foods    HPI     Concern - facial swelling  Onset: yesterday  Description: pt states that she must have gotten stung by something as she had an almond shape swelling on her left temple, and this morning woke up to swelling in her face and head  Intensity: moderate  Progression of Symptoms:  worsening and same  Accompanying Signs & Symptoms: see above  Previous history of similar problem:   Precipitating factors:        Worsened by:   Alleviating factors:        Improved by:   Therapies tried and outcome: allergy med OTC-allegra         Review of Systems   CONSTITUTIONAL: NEGATIVE for fever, chills, change in weight  INTEGUMENTARY/SKIN: as above  EYES: NEGATIVE for vision changes or irritation  ENT/MOUTH: NEGATIVE for ear, mouth and throat problems  RESP: NEGATIVE for significant cough or SOB  CV: NEGATIVE for chest pain, palpitations or peripheral edema  CV: History of hypertension  GI: NEGATIVE for nausea, abdominal pain, heartburn, or change in bowel habits  MUSCULOSKELETAL: NEGATIVE for significant arthralgias or myalgia  NEURO: History of migraine, multiple sclerosis  ENDOCRINE: NEGATIVE for temperature intolerance, skin/hair changes  HEME/ALLERGY/IMMUNE: NEGATIVE for bleeding problems  PSYCHIATRIC: History of depression      Objective    /72   Pulse 72   Temp 97.6  F (36.4  C) (Tympanic)   Resp 14   Wt 52.6 kg (116 lb)   SpO2 96%   BMI 18.87 kg/m    Body mass index is 18.87 kg/m .  Physical Exam   GENERAL: healthy, alert and no distress  EYES: Eyes grossly normal to inspection  HENT: ear canals and TM's normal, nose and mouth without ulcers or lesions  RESP: lungs clear to auscultation - no rales, rhonchi or wheezes  CV: regular rate and rhythm, normal S1 S2, no S3 or  S4, no murmur, click or rub, no peripheral edema and peripheral pulses strong  MS: no gross musculoskeletal defects noted, no edema  SKIN: Vague subcutaneous facial swelling over the left upper eyelid and left temple  No definite skin lesions seen  PSYCH: mentation appears normal, affect normal/bright

## 2021-05-05 NOTE — PATIENT INSTRUCTIONS
Take claritin 10mg once daily  Take medications as given today  If rash or swelling changes, please follow up immeidately

## 2021-05-07 ENCOUNTER — ANCILLARY PROCEDURE (OUTPATIENT)
Dept: CT IMAGING | Facility: CLINIC | Age: 56
End: 2021-05-07
Attending: PREVENTIVE MEDICINE
Payer: COMMERCIAL

## 2021-05-07 ENCOUNTER — OFFICE VISIT (OUTPATIENT)
Dept: FAMILY MEDICINE | Facility: CLINIC | Age: 56
End: 2021-05-07
Payer: COMMERCIAL

## 2021-05-07 VITALS
SYSTOLIC BLOOD PRESSURE: 105 MMHG | BODY MASS INDEX: 18.64 KG/M2 | OXYGEN SATURATION: 97 % | HEIGHT: 66 IN | WEIGHT: 116 LBS | HEART RATE: 75 BPM | DIASTOLIC BLOOD PRESSURE: 70 MMHG

## 2021-05-07 DIAGNOSIS — L03.211 CELLULITIS OF FACE: ICD-10-CM

## 2021-05-07 DIAGNOSIS — R22.0 SWELLING OF LEFT SIDE OF FACE: Primary | ICD-10-CM

## 2021-05-07 DIAGNOSIS — R22.0 SWELLING OF LEFT SIDE OF FACE: ICD-10-CM

## 2021-05-07 DIAGNOSIS — G35 MULTIPLE SCLEROSIS (H): ICD-10-CM

## 2021-05-07 LAB
CREAT BLD-MCNC: 0.8 MG/DL (ref 0.52–1.04)
GFR SERPL CREATININE-BSD FRML MDRD: 75 ML/MIN/{1.73_M2}

## 2021-05-07 PROCEDURE — 70487 CT MAXILLOFACIAL W/DYE: CPT | Mod: GC | Performed by: STUDENT IN AN ORGANIZED HEALTH CARE EDUCATION/TRAINING PROGRAM

## 2021-05-07 PROCEDURE — 99214 OFFICE O/P EST MOD 30 MIN: CPT | Performed by: PREVENTIVE MEDICINE

## 2021-05-07 RX ORDER — CEPHALEXIN 500 MG/1
500 CAPSULE ORAL 4 TIMES DAILY
Qty: 28 CAPSULE | Refills: 0 | Status: SHIPPED | OUTPATIENT
Start: 2021-05-07 | End: 2021-05-14

## 2021-05-07 RX ORDER — IOPAMIDOL 755 MG/ML
75 INJECTION, SOLUTION INTRAVASCULAR ONCE
Status: COMPLETED | OUTPATIENT
Start: 2021-05-07 | End: 2021-05-07

## 2021-05-07 RX ADMIN — IOPAMIDOL 75 ML: 755 INJECTION, SOLUTION INTRAVASCULAR at 10:48

## 2021-05-07 ASSESSMENT — MIFFLIN-ST. JEOR: SCORE: 1138.95

## 2021-05-07 ASSESSMENT — PAIN SCALES - GENERAL: PAINLEVEL: NO PAIN (0)

## 2021-05-07 NOTE — RESULT ENCOUNTER NOTE
Liyah,     The CT scan is showing changes that may represent infection of the skin called cellulitis. Hence, I have sent in a script for antibiotics to your pharmacy. The script is for Keflex 500 mg four times a day for 7 days.   If there is increased swelling, pain, or spreading redness, please let us know immediately.     Please do not hesitate to call us at [phone] if you have any questions or concerns.    Thank you,    Paula Garcia MD MPH

## 2021-05-07 NOTE — PROGRESS NOTES
Assessment & Plan     Swelling of left side of face  -symptoms since 5 days  -continue Prednisone   -continue Hydroxyzine as prescribed  -Ice+  -Loratadine 10 mg two times a day  -await results of CT scan  -ER precautions: increased pain, swelling, vision changes, severe headaches.   - CT Facial Bones with Contrast  -need to rule out infection.  -Unclear etiology at this time  -the lesion by the hairline does not look like shingles      Multiple sclerosis (H)  -No medication changes     30 minutes spent on the date of the encounter doing chart review, history and exam, documentation and further activities per the note       ADDENDUM: 5/7/21.    Ct SCAN results are as follows:    Impression: Mild subcutaneous thickening/enhancement inferior to left  orbit may represent superficial cellulitis. No drainable fluid  collections. No extension into preseptal or postseptal orbit.    Hence, will treat for possible cellulitis. Script for Cephalexin 500 mg four times a day for 7 days was sent to the pharmacy.       Return in about 3 days (around 5/10/2021) if symptoms worsen or fail to improve.    Paula Garcia MD MPH    [facility]    Anabel Pelletier is a 56 year old who presents for the following health issues:    HPI       Concern - Swelling     When did it start?: 5 days ago    Any strain/injury, other illness, or event to trigger this problem?   YES- Insect Bite?    Previous history of similar problem:   YES      Location:           Left Side of face    Describe it:   Swelling feels like fluid    Severity: moderate      Progression of symptoms from onset until now:  worsening       What have you noticed that tends to make this worse?     None      What have you noticed that tends to make this better?     none      What have you done (this time) to try to treat this problem yourself?     Seen on 5/5/21 give prednisone and Hydroxyzine      Did it help?     no         Seen on  "5/5/21:  Started on Prednisone and Hydroxyzine, today is Day 4 of Prednisone     More water filled as of yesterday  More in the inferior portion of the eye   Had some chest congestion as well this morning  Has been vaccinated for Covid   No fever  Slight itching along the jaw  N severe eye pain  No vision changes  No wheezing  No tightness in the throat  No cough  No tongue or mouth swelling  Started with a swollen thumbprint lesion on the left hairline  MORE SWELLING   Had a spider bite about 10 years ago   Has been on Allegra 1 time a day   Small lesion along the left hairline. No preceding tingling or numbness associated with this. Is on daily valtrex for suppressive therapy        Review of Systems   Constitutional, HEENT, cardiovascular, pulmonary, gi and gu systems are negative, except as otherwise noted.      Objective    /70 (BP Location: Left arm, Patient Position: Chair, Cuff Size: Adult Regular)   Pulse 75   Ht 1.67 m (5' 5.75\")   Wt 52.6 kg (116 lb)   SpO2 97%   BMI 18.87 kg/m    Body mass index is 18.87 kg/m .  Physical Exam   GENERAL APPEARANCE: healthy, alert and no distress  EYES: Eyes grossly normal to inspection and conjunctivae and sclerae normal  HENT: nose and mouth without ulcers or lesions, intact TMs   NECK: no adenopathy and trachea midline and normal to palpation, airway patent, no tongue or mouth edema   RESP: lungs clear to auscultation - no rales, rhonchi or wheezes  CV: regular rates and rhythm, normal S1 S2, no S3 or S4 and no murmur, click or rub  ABDOMEN: soft, non-tender and no rebound or guarding   MS: extremities normal- no gross deformities noted and peripheral pulses normal  SKIN: no suspicious lesions or rashes  NEURO: Normal strength and tone, mentation intact and speech normal  PSYCH: mentation appears normal  FACE: Small punctum with slight erythema noted on the left hairline, Edema++ under the left eye, almost a fluid pocket. Slight erythema on the left side of " the face, no increase in temperature, tender along the jaw. No rash. No blisters.         Results for orders placed or performed in visit on 05/07/21   Creatinine POCT     Status: None   Result Value Ref Range    Creatinine 0.8 0.52 - 1.04 mg/dL    GFR Estimate 75 >60 mL/min/[1.73_m2]    GFR Estimate If Black >90 >60 mL/min/[1.73_m2]

## 2021-05-18 DIAGNOSIS — G35 MULTIPLE SCLEROSIS (H): ICD-10-CM

## 2021-05-18 DIAGNOSIS — R20.2 PARESTHESIA: ICD-10-CM

## 2021-05-18 RX ORDER — GABAPENTIN 300 MG/1
CAPSULE ORAL
Qty: 30 CAPSULE | Refills: 11 | Status: SHIPPED | OUTPATIENT
Start: 2021-05-18 | End: 2022-04-14

## 2021-05-18 NOTE — TELEPHONE ENCOUNTER
Received refill request for GABAPENTIN from [facility]; Patient was last seen on 03/24/2020 and has follow up appointment on 06/08/2021 with Marika. Pended to MS pool for review/approval    Bella Johnston MA

## 2021-05-26 DIAGNOSIS — G35 MULTIPLE SCLEROSIS (H): ICD-10-CM

## 2021-05-26 RX ORDER — TERIFLUNOMIDE 14 MG/1
14 TABLET, FILM COATED ORAL DAILY
Qty: 30 TABLET | Refills: 11 | Status: SHIPPED | OUTPATIENT
Start: 2021-05-26 | End: 2022-05-12

## 2021-05-26 NOTE — TELEPHONE ENCOUNTER
"Requested Prescriptions   Pending Prescriptions Disp Refills     traZODone (DESYREL) 100 MG tablet [Pharmacy Med Name: TRAZODONE 100MG TABLETS] 45 tablet 0     Sig: TAKE 1/2 TABLET BY MOUTH DAILY    Serotonin Modulators Passed - 3/3/2019  2:38 PM       Passed - Recent (12 mo) or future (30 days) visit within the authorizing provider's specialty    Patient had office visit in the last 12 months or has a visit in the next 30 days with authorizing provider or within the authorizing provider's specialty.  See \"Patient Info\" tab in inbasket, or \"Choose Columns\" in Meds & Orders section of the refill encounter.             Passed - Medication is active on med list       Passed - Patient is age 18 or older       Passed - No active pregnancy on record       Passed - No positive pregnancy test in past 12 months        traZODone (DESYREL) 100 MG tablet  Last Written Prescription Date:  11/19/18  Last Fill Quantity: 45,  # refills: 0   Last office visit: 2/11/2019 with prescribing provider:  Dr. Anand   Future Office Visit:      " therapy, no meds/anxiety disorder

## 2021-05-26 NOTE — TELEPHONE ENCOUNTER
Received refill request for AUBAGIO from [facility]; Patient was last seen on 03/24/2021 and has follow up appointment on 06/08/2021 with zen. Pended to MS pool for review/approval    Bella Johnston MA

## 2021-05-27 ENCOUNTER — TELEPHONE (OUTPATIENT)
Dept: FAMILY MEDICINE | Facility: CLINIC | Age: 56
End: 2021-05-27

## 2021-05-27 NOTE — TELEPHONE ENCOUNTER
Patient Quality Outreach Summary      Summary:    Patient is due/failing the following:   Breast Cancer Screening - Mammogram    Type of outreach:    Sent [employer] message.    Questions for provider review:    None                                                                                                                    Venita Cotto

## 2021-06-15 DIAGNOSIS — I10 BENIGN ESSENTIAL HYPERTENSION: ICD-10-CM

## 2021-06-15 DIAGNOSIS — A60.00 GENITAL HERPES SIMPLEX, UNSPECIFIED SITE: ICD-10-CM

## 2021-06-15 RX ORDER — METOPROLOL SUCCINATE 25 MG/1
TABLET, EXTENDED RELEASE ORAL
Qty: 90 TABLET | Refills: 1
Start: 2021-06-15

## 2021-06-15 RX ORDER — VALACYCLOVIR HYDROCHLORIDE 500 MG/1
TABLET, FILM COATED ORAL
Qty: 90 TABLET | Refills: 0 | Status: SHIPPED | OUTPATIENT
Start: 2021-06-15 | End: 2021-07-29

## 2021-06-15 NOTE — TELEPHONE ENCOUNTER
Did not refill. The patient should still this medication. A 90 supply + 1 refill was sent on 022/15/2021.     Irena Silvestre RN  [facility]

## 2021-06-15 NOTE — TELEPHONE ENCOUNTER
Prescription approved per [facility] Refill Protocol.      Irena Silvestre RN  [facility]

## 2021-07-27 ENCOUNTER — OFFICE VISIT (OUTPATIENT)
Dept: NEUROLOGY | Facility: CLINIC | Age: 56
End: 2021-07-27
Attending: PSYCHIATRY & NEUROLOGY
Payer: COMMERCIAL

## 2021-07-27 ENCOUNTER — LAB (OUTPATIENT)
Dept: LAB | Facility: CLINIC | Age: 56
End: 2021-07-27
Attending: PSYCHIATRY & NEUROLOGY
Payer: COMMERCIAL

## 2021-07-27 VITALS
BODY MASS INDEX: 18.4 KG/M2 | HEART RATE: 60 BPM | SYSTOLIC BLOOD PRESSURE: 138 MMHG | DIASTOLIC BLOOD PRESSURE: 94 MMHG | HEIGHT: 66 IN | WEIGHT: 114.5 LBS

## 2021-07-27 DIAGNOSIS — G35 MS (MULTIPLE SCLEROSIS) (H): Primary | ICD-10-CM

## 2021-07-27 DIAGNOSIS — G35 MS (MULTIPLE SCLEROSIS) (H): ICD-10-CM

## 2021-07-27 DIAGNOSIS — Z83.49 FAMILY HISTORY OF ALPHA 1 ANTITRYPSIN DEFICIENCY: ICD-10-CM

## 2021-07-27 LAB
ALT SERPL W P-5'-P-CCNC: 31 U/L (ref 0–50)
AST SERPL W P-5'-P-CCNC: 25 U/L (ref 0–45)

## 2021-07-27 PROCEDURE — 99213 OFFICE O/P EST LOW 20 MIN: CPT | Mod: GC | Performed by: PSYCHIATRY & NEUROLOGY

## 2021-07-27 PROCEDURE — 82103 ALPHA-1-ANTITRYPSIN TOTAL: CPT | Mod: 90 | Performed by: PATHOLOGY

## 2021-07-27 PROCEDURE — 81332 SERPINA1 GENE: CPT | Mod: 90 | Performed by: PATHOLOGY

## 2021-07-27 PROCEDURE — 84450 TRANSFERASE (AST) (SGOT): CPT | Performed by: PATHOLOGY

## 2021-07-27 PROCEDURE — 84460 ALANINE AMINO (ALT) (SGPT): CPT | Performed by: PATHOLOGY

## 2021-07-27 PROCEDURE — 82104 ALPHA-1-ANTITRYPSIN PHENO: CPT | Mod: 90 | Performed by: PATHOLOGY

## 2021-07-27 PROCEDURE — 36415 COLL VENOUS BLD VENIPUNCTURE: CPT | Performed by: PATHOLOGY

## 2021-07-27 PROCEDURE — G0463 HOSPITAL OUTPT CLINIC VISIT: HCPCS

## 2021-07-27 ASSESSMENT — PAIN SCALES - GENERAL: PAINLEVEL: NO PAIN (0)

## 2021-07-27 ASSESSMENT — MIFFLIN-ST. JEOR: SCORE: 1132.15

## 2021-07-27 NOTE — PROGRESS NOTES
Neurology Clinic Visit    Reason: Multiple Sclerosis     2021  Source of information: Patient and chart review    History of Present Symptom:  Liyah Jernigan is a 56 year old female with a PMH significant for relapsing remitting multiple sclerosis who was last seen virtually 3/2020. She is maintained on teriflunomide and reports no new clinical neurologic symptoms.     She does report mild dizziness with fatigue or heat. Vision is still blurred bilaterally (but different in each eye). Improves with looking from the side. Worsens at night, she has been hesitant to drive in the dark due to this and has a difficult time reading text.     Tolerating aubagio fairly well, has noticed some mild thinning of the hair just this week. Denies loose stools. Copay is covered by [facility].     Some times feels she has to urinate but has difficulty emptying completely. Has to relax/focus. Has had 2 UTIs in the past 4 years.     Taking 5 mg adderral occasionally. Energy level is good aside from recent heat wave. Mood is good.    Denies any new double vision, balance difficulties focal weakness or numbness.     She reports family history of alpha-1-antitrypsin deficiency for which she is undergoing testing.     The patient's medical, surgical, social, and family history were personally reviewed with the patient.  Past Medical History:   Diagnosis Date     Benign essential hypertension 2018      Past Surgical History:   Procedure Laterality Date     ENT SURGERY      tympanoplasty     GYN SURGERY      laparoscopies (5) laparotomies (2) for ectopic     Social History     Tobacco Use     Smoking status: Never Smoker     Smokeless tobacco: Never Used   Substance Use Topics     Alcohol use: No     Alcohol/week: 0.0 standard drinks     Drug use: No     Family History   Problem Relation Age of Onset     Diabetes Father              Hypertension Father      Hyperlipidemia Father      Substance Abuse Father      Obesity  "Father      Diabetes Mother         \"borderline\" for years     Hypertension Mother      Hyperlipidemia Mother      Depression Mother      Substance Abuse Mother      Asthma Mother      Thyroid Disease Mother      Obesity Mother      Anxiety Disorder Mother      Cerebrovascular Disease Maternal Grandfather         he had a few     Substance Abuse Maternal Grandfather      Breast Cancer Maternal Grandmother         in 50s     Asthma Maternal Grandmother      Substance Abuse Paternal Grandfather      Current Outpatient Medications   Medication     amphetamine-dextroamphetamine (ADDERALL) 10 MG tablet     biotin 2.5 mg/mL SUSP     buPROPion (WELLBUTRIN SR) 150 MG 12 hr tablet     gabapentin (NEURONTIN) 300 MG capsule     hydrOXYzine (ATARAX) 25 MG tablet     metoprolol succinate ER (TOPROL-XL) 25 MG 24 hr tablet     order for DME     predniSONE (DELTASONE) 20 MG tablet     SUMAtriptan (IMITREX) 100 MG tablet     teriflunomide (AUBAGIO) 14 MG tablet     traZODone (DESYREL) 50 MG tablet     valACYclovir (VALTREX) 500 MG tablet     No current facility-administered medications for this visit.     No Known Allergies      Review of Systems:  14-point review of systems was completed and is in the scanned health questionnaire from this visit. The pertinent positives and negatives are in the HPI.    Physical Examination   Vitals: BP (!) 142/95 (BP Location: Left arm, Patient Position: Chair, Cuff Size: Adult Regular)   Pulse 60   Ht 1.67 m (5' 5.75\")   Wt 51.9 kg (114 lb 8 oz)   BMI 18.62 kg/m    General: Resting comfortably in chair  HEENT: NC/AT, no icterus, moist mucous membranes  Chest: non-labored on RA  Abdomen: S/NT/ND  Extremities: Warm, no edema  Skin: No rash or lesion   Psych: Affect appropriate for situation   Neuro:  Mental status: Awake, alert, attentive. Language is fluent with intact comprehension of commands.  Cranial nerves: PERRL, conjugate gaze, EOMI, visual fields intact, face symmetric, shoulder shrug " strong, tongue protrusion/uvula midline, no dysarthria.   Motor: Normal muscle bulk and tone. No abnormal movements. 5/5 strength in 4/4 extremities.   Reflexes: 2+ reflexic and symmetric biceps, brachioradialis, triceps, patellae, and achilles.   Sensory: Intact to light touch   Coordination: FNF and HS without ataxia or dysmetria.    Gait: Normal width, stride length, turn, with symmetric arm swing. Tandem walk intact.    Laboratory:  All laboratory data reviewed    Imaging:  MRI brain   Impression: The study demonstrates greater than 20 foci of  T2-hyperintensity within the cerebral white matter consistent with the  clinical suspicion of demyelinating disease. There no interval change  from the prior study.     Assessment/Plan:  Liyah Jernigan is a 56 year old female who is followed for relapsing remitting multiple sclerosis on teriflunomide. She has been clinically stable on current DMT clinically and radiologically. She does have some blurred vision but it is in both eyes and does not sound characteristic for MS. It has been present for a while now, could in part be related to previous optic neuritis which she has had bilaterally. Otherwise she has some mild urinary symptoms which she is watching closely.     Dr. Hairston has previously discussed discontinuing disease modifying therapy when she is in her early 60's.     -liver function tests today  -continue teriflunomide  -follow up 1 year with MRI  -blood pressure was elevated today, please recheck this when you can, at the grocery store or next appt with your primary provider.     Patient seen and discussed with Dr. Hairston.   I have reviewed the plan with the patient, who is in agreement.      Tanya Salcedo,   Neurology PGY-4   [id number]     I saw and examined this patient, and have read and edited the resident's note.  I agree with the findings, assessment, and plan.  I spent 24 minutes on Liyah's care on the date of service, including chart review and face to  face time.  She is doing well overall, and we will make no changes today.  Discussed MRI surveillance and teriflunomide safety monitoring.    Jose A Hairston MD

## 2021-07-27 NOTE — LETTER
2021      RE: Liyah Jernigan  [address] [place] [address]       Neurology Clinic Visit    Reason: Multiple Sclerosis     2021  Source of information: Patient and chart review    History of Present Symptom:  Liyah Jernigan is a 56 year old female with a PMH significant for relapsing remitting multiple sclerosis who was last seen virtually 3/2020. She is maintained on teriflunomide and reports no new clinical neurologic symptoms.     She does report mild dizziness with fatigue or heat. Vision is still blurred bilaterally (but different in each eye). Improves with looking from the side. Worsens at night, she has been hesitant to drive in the dark due to this and has a difficult time reading text.     Tolerating aubagio fairly well, has noticed some mild thinning of the hair just this week. Denies loose stools. Copay is covered by [facility].     Some times feels she has to urinate but has difficulty emptying completely. Has to relax/focus. Has had 2 UTIs in the past 4 years.     Taking 5 mg adderral occasionally. Energy level is good aside from recent heat wave. Mood is good.    Denies any new double vision, balance difficulties focal weakness or numbness.     She reports family history of alpha-1-antitrypsin deficiency for which she is undergoing testing.     The patient's medical, surgical, social, and family history were personally reviewed with the patient.  Past Medical History:   Diagnosis Date     Benign essential hypertension 2018      Past Surgical History:   Procedure Laterality Date     ENT SURGERY      tympanoplasty     GYN SURGERY      laparoscopies (5) laparotomies (2) for ectopic     Social History     Tobacco Use     Smoking status: Never Smoker     Smokeless tobacco: Never Used   Substance Use Topics     Alcohol use: No     Alcohol/week: 0.0 standard drinks     Drug use: No     Family History   Problem Relation Age of Onset     Diabetes Father               "Hypertension Father      Hyperlipidemia Father      Substance Abuse Father      Obesity Father      Diabetes Mother         \"borderline\" for years     Hypertension Mother      Hyperlipidemia Mother      Depression Mother      Substance Abuse Mother      Asthma Mother      Thyroid Disease Mother      Obesity Mother      Anxiety Disorder Mother      Cerebrovascular Disease Maternal Grandfather         he had a few     Substance Abuse Maternal Grandfather      Breast Cancer Maternal Grandmother         in 50s     Asthma Maternal Grandmother      Substance Abuse Paternal Grandfather      Current Outpatient Medications   Medication     amphetamine-dextroamphetamine (ADDERALL) 10 MG tablet     biotin 2.5 mg/mL SUSP     buPROPion (WELLBUTRIN SR) 150 MG 12 hr tablet     gabapentin (NEURONTIN) 300 MG capsule     hydrOXYzine (ATARAX) 25 MG tablet     metoprolol succinate ER (TOPROL-XL) 25 MG 24 hr tablet     order for DME     predniSONE (DELTASONE) 20 MG tablet     SUMAtriptan (IMITREX) 100 MG tablet     teriflunomide (AUBAGIO) 14 MG tablet     traZODone (DESYREL) 50 MG tablet     valACYclovir (VALTREX) 500 MG tablet     No current facility-administered medications for this visit.     No Known Allergies      Review of Systems:  14-point review of systems was completed and is in the scanned health questionnaire from this visit. The pertinent positives and negatives are in the HPI.    Physical Examination   Vitals: BP (!) 142/95 (BP Location: Left arm, Patient Position: Chair, Cuff Size: Adult Regular)   Pulse 60   Ht 1.67 m (5' 5.75\")   Wt 51.9 kg (114 lb 8 oz)   BMI 18.62 kg/m    General: Resting comfortably in chair  HEENT: NC/AT, no icterus, moist mucous membranes  Chest: non-labored on RA  Abdomen: S/NT/ND  Extremities: Warm, no edema  Skin: No rash or lesion   Psych: Affect appropriate for situation   Neuro:  Mental status: Awake, alert, attentive. Language is fluent with intact comprehension of commands.  Cranial " nerves: PERRL, conjugate gaze, EOMI, visual fields intact, face symmetric, shoulder shrug strong, tongue protrusion/uvula midline, no dysarthria.   Motor: Normal muscle bulk and tone. No abnormal movements. 5/5 strength in 4/4 extremities.   Reflexes: 2+ reflexic and symmetric biceps, brachioradialis, triceps, patellae, and achilles.   Sensory: Intact to light touch   Coordination: FNF and HS without ataxia or dysmetria.    Gait: Normal width, stride length, turn, with symmetric arm swing. Tandem walk intact.    Laboratory:  All laboratory data reviewed    Imaging:  MRI brain   Impression: The study demonstrates greater than 20 foci of  T2-hyperintensity within the cerebral white matter consistent with the  clinical suspicion of demyelinating disease. There no interval change  from the prior study.     Assessment/Plan:  Liyah Jernigan is a 56 year old female who is followed for relapsing remitting multiple sclerosis on teriflunomide. She has been clinically stable on current DMT clinically and radiologically. She does have some blurred vision but it is in both eyes and does not sound characteristic for MS. It has been present for a while now, could in part be related to previous optic neuritis which she has had bilaterally. Otherwise she has some mild urinary symptoms which she is watching closely.     Dr. Hairston has previously discussed discontinuing disease modifying therapy when she is in her early 60's.     -liver function tests today  -continue teriflunomide  -follow up 1 year with MRI  -blood pressure was elevated today, please recheck this when you can, at the grocery store or next appt with your primary provider.     Patient seen and discussed with Dr. Hairston.   I have reviewed the plan with the patient, who is in agreement.      Tanya Salcedo,   Neurology PGY-4   [id number]     I saw and examined this patient, and have read and edited the resident's note.  I agree with the findings, assessment, and plan.  I  spent 24 minutes on Liyah's care on the date of service, including chart review and face to face time.  She is doing well overall, and we will make no changes today.  Discussed MRI surveillance and teriflunomide safety monitoring.    Jose A Hairston MD

## 2021-07-28 ENCOUNTER — MYC MEDICAL ADVICE (OUTPATIENT)
Dept: FAMILY MEDICINE | Facility: CLINIC | Age: 56
End: 2021-07-28

## 2021-07-28 NOTE — TELEPHONE ENCOUNTER
"Adderall 10 mg is listed as historical/reported      Noted that last Adderall 10 mg and 5 mg prescriptions were from MARRY Brock CNP with start date of 3/18/2021 and end date of 4/17/2021.  Prescriptions may have \"fallen\" off epic med list due to having end dates?    Routing refill request to provider for review/approval because:  Drug not on the G refill protocol   A break in medication  Medication is reported/historical      Adela Starks RN  [facility]      "

## 2021-07-29 ENCOUNTER — VIRTUAL VISIT (OUTPATIENT)
Dept: FAMILY MEDICINE | Facility: CLINIC | Age: 56
End: 2021-07-29
Payer: COMMERCIAL

## 2021-07-29 DIAGNOSIS — F51.01 PRIMARY INSOMNIA: ICD-10-CM

## 2021-07-29 DIAGNOSIS — F90.9 ATTENTION DEFICIT HYPERACTIVITY DISORDER (ADHD), UNSPECIFIED ADHD TYPE: ICD-10-CM

## 2021-07-29 DIAGNOSIS — A60.00 GENITAL HERPES SIMPLEX, UNSPECIFIED SITE: ICD-10-CM

## 2021-07-29 DIAGNOSIS — I10 BENIGN ESSENTIAL HYPERTENSION: Primary | ICD-10-CM

## 2021-07-29 PROBLEM — M25.552 HIP PAIN, LEFT: Status: RESOLVED | Noted: 2019-09-11 | Resolved: 2021-07-29

## 2021-07-29 PROBLEM — S39.012A SACROILIAC STRAIN: Status: RESOLVED | Noted: 2019-09-11 | Resolved: 2021-07-29

## 2021-07-29 PROCEDURE — 99214 OFFICE O/P EST MOD 30 MIN: CPT | Mod: 95 | Performed by: NURSE PRACTITIONER

## 2021-07-29 RX ORDER — DEXTROAMPHETAMINE SACCHARATE, AMPHETAMINE ASPARTATE, DEXTROAMPHETAMINE SULFATE AND AMPHETAMINE SULFATE 1.25; 1.25; 1.25; 1.25 MG/1; MG/1; MG/1; MG/1
5 TABLET ORAL DAILY
Qty: 30 TABLET | Refills: 0 | Status: SHIPPED | OUTPATIENT
Start: 2021-09-29 | End: 2021-11-26

## 2021-07-29 RX ORDER — DEXTROAMPHETAMINE SACCHARATE, AMPHETAMINE ASPARTATE, DEXTROAMPHETAMINE SULFATE AND AMPHETAMINE SULFATE 1.25; 1.25; 1.25; 1.25 MG/1; MG/1; MG/1; MG/1
5 TABLET ORAL DAILY
Qty: 30 TABLET | Refills: 0 | Status: SHIPPED | OUTPATIENT
Start: 2021-08-29 | End: 2021-09-28

## 2021-07-29 RX ORDER — DEXTROAMPHETAMINE SACCHARATE, AMPHETAMINE ASPARTATE, DEXTROAMPHETAMINE SULFATE AND AMPHETAMINE SULFATE 1.25; 1.25; 1.25; 1.25 MG/1; MG/1; MG/1; MG/1
5 TABLET ORAL DAILY
Qty: 30 TABLET | Refills: 0 | Status: SHIPPED | OUTPATIENT
Start: 2021-07-29 | End: 2021-08-28

## 2021-07-29 RX ORDER — DEXTROAMPHETAMINE SACCHARATE, AMPHETAMINE ASPARTATE, DEXTROAMPHETAMINE SULFATE AND AMPHETAMINE SULFATE 2.5; 2.5; 2.5; 2.5 MG/1; MG/1; MG/1; MG/1
10 TABLET ORAL DAILY
Qty: 30 TABLET | Refills: 0 | Status: SHIPPED | OUTPATIENT
Start: 2021-09-29 | End: 2021-12-06

## 2021-07-29 RX ORDER — METOPROLOL SUCCINATE 25 MG/1
25 TABLET, EXTENDED RELEASE ORAL DAILY
Qty: 90 TABLET | Refills: 1 | Status: SHIPPED | OUTPATIENT
Start: 2021-07-29 | End: 2021-09-21

## 2021-07-29 RX ORDER — DEXTROAMPHETAMINE SACCHARATE, AMPHETAMINE ASPARTATE, DEXTROAMPHETAMINE SULFATE AND AMPHETAMINE SULFATE 2.5; 2.5; 2.5; 2.5 MG/1; MG/1; MG/1; MG/1
10 TABLET ORAL DAILY
Qty: 30 TABLET | Refills: 0 | Status: SHIPPED | OUTPATIENT
Start: 2021-07-29 | End: 2021-08-28

## 2021-07-29 RX ORDER — DEXTROAMPHETAMINE SACCHARATE, AMPHETAMINE ASPARTATE, DEXTROAMPHETAMINE SULFATE AND AMPHETAMINE SULFATE 2.5; 2.5; 2.5; 2.5 MG/1; MG/1; MG/1; MG/1
10 TABLET ORAL DAILY
Qty: 30 TABLET | Refills: 0 | Status: SHIPPED | OUTPATIENT
Start: 2021-08-29 | End: 2021-09-28

## 2021-07-29 RX ORDER — VALACYCLOVIR HYDROCHLORIDE 500 MG/1
500 TABLET, FILM COATED ORAL DAILY
Qty: 90 TABLET | Refills: 1 | Status: SHIPPED | OUTPATIENT
Start: 2021-07-29 | End: 2022-06-08

## 2021-07-29 RX ORDER — TRAZODONE HYDROCHLORIDE 50 MG/1
50 TABLET, FILM COATED ORAL AT BEDTIME
Qty: 90 TABLET | Refills: 0 | Status: SHIPPED | OUTPATIENT
Start: 2021-07-29 | End: 2022-02-24

## 2021-07-29 NOTE — TELEPHONE ENCOUNTER
Last visit for this appears to be 6 months ago so due to visit. Doesn't appear she's taken in the last 3 months. Needs visit please.

## 2021-07-29 NOTE — PATIENT INSTRUCTIONS
Patient Education     Treating Attention-Deficit/Hyperactivity Disorder (ADHD) in Adults  Attention-deficit/hyperactivity disorder (ADHD) starts in childhood. It may continue throughout your life. When it does, it may affect your job and even your relationships. But with help, you can manage ADHD.      Treatment for ADD can help you achieve your goals.   Therapy  Your therapist can help you learn healthy ways to cope with ADHD. Sometimes, your partner or family may attend your sessions with you. This helps them understand more about your disorder and give you additional support.   Coaching  An ADHD  works with you to achieve your goals. You ll learn the best ways to manage your time. You ll also learn to avoid clutter and noise that may distract you. In time, your life will have more order and structure. And your  will provide support and feedback on your progress.   Work  Look for jobs where you can be free and creative. Stay away from those that are dull or centered on details. You may still need to make a special effort. These tips may help:     Try to work at home, at least part time.    Ask for a private office.    Use headphones to muffle noise.    Work on more than one project at the same time. When you get bored with one, switch to the other.    Work on boring tasks when you feel most alert.    Have a schedule for each day and make every effort to stick to it.    Ask your  or  to help with details.    Use a day planner and to-do lists. Write yourself notes or set up reminder alerts on your electronic devices.    Reward yourself when you finish a task.  Medicines  In most cases, medicines can help control symptoms of ADHD. Most often, you'll use medicine along with therapy, coaching, and lifestyle changes. Your healthcare provider may prescribe a stimulant to help you stay focused. Or you may take a type of antidepressant. It may take some time to find what works best for  you. Keep in mind that medicines don t cure ADHD. And they may cause side effects such as headaches, trouble sleeping, or stomach problems. Take your medicine as prescribed. If you re bothered by side effects, be sure to tell your healthcare provider. Always talk with your healthcare provider before making any changes to your medicine.   She last reviewed this educational content on 5/1/2020 2000-2021 [employer]. All rights reserved. This information is not intended as a substitute for professional medical care. Always follow your healthcare professional's instructions.           Patient Education     Controlling High Blood Pressure   High blood pressure (hypertension) is often called the silent killer. This is because many people who have it, don t know it. It can be very dangerous. High blood pressure can raise your risk of heart attack, stroke, heart disease, and heart failure. Controlling your blood pressure can decrease your risk of these problems. It's important to know the appropriate blood pressure range and remember to check your blood pressure regularly. Doing so can save your life.   Blood pressure measurements are given as 2 numbers. Systolic blood pressure is the upper number. This is the pressure when the heart contracts. Diastolic blood pressure is the lower number. This is the pressure when the heart relaxes between beats.   Blood pressure is categorized as normal, elevated, or stage 1 or stage 2 high blood pressure:     Normal blood pressure is systolic of less than 120 and diastolic of less than 80 (120/80)    Elevated blood pressure is systolic of 120 to 129 and diastolic less than 80    Stage 1 high blood pressure is systolic of 130 to 139 or diastolic between 80 to 89    Stage 2 high blood pressure is when systolic is 140 or higher or the diastolic is 90 or higher  A heart-healthy lifestyle can help you control your blood pressure without medicines. Here are some things  you can do to pursue a heart-healthy lifestyle:     Choose heart-healthy foods     Select low-salt, low-fat foods. Limit sodium intake to 2,400 mg per day or the amount suggested by your healthcare provider.    Limit canned, dried, cured, packaged, and fast foods. These can contain a lot of salt.    Eat 8 to 10 servings of fruits and vegetables every day.    Choose lean meats, fish, or chicken.    Eat whole-grain pasta, brown rice, and beans.    Eat 2 to 3 servings of low-fat or fat-free dairy products.    Ask your doctor about the DASH eating plan. This plan helps reduce blood pressure.    When you go to a restaurant, ask that your meal be prepared with no added salt.    Stay at a healthy weight     Ask your healthcare provider how many calories to eat a day. Then stick to that number.    Ask your healthcare provider what weight range is healthiest for you. If you are overweight, a weight loss of only 3% to 5% of your body weight can help lower blood pressure. Generally, a good weight loss goal is to lose 10% of your body weight in a year.    Limit snacks and sweets.    Get regular exercise.    Get up and get active     Find activities you enjoy that can be done alone or with friends or family. Such activities might include bicycling, dancing, walking, or jogging.    Park farther away from building entrances to walk more.    Use stairs instead of the elevator.    When you can, walk or bike instead of driving.    [place] leaves, garden, or do household repairs.    Be active at a moderate to vigorous level of physical activity for at least 40 minutes for a minimum of 3 to 4 days a week.     Manage stress     Make time to relax and enjoy life. Find time to laugh.    Communicate your concerns with your loved ones and your healthcare provider.    Visit with family and friends, and keep up with hobbies.    Limit alcohol and quit smoking     Men should have no more than 2 drinks per day.    Women should have no more than 1  drink per day.    Talk with your healthcare provider about quitting smoking. Smoking significantly increases your risk for heart disease and stroke. Ask your healthcare provider about community smoking cessation programs and other options.    Medicines  If lifestyle changes aren t enough, your healthcare provider may prescribe high blood pressure medicine. Take all medicines as prescribed. If you have any questions about your medicines, ask your healthcare provider before stopping or changing them.   [employer] last reviewed this educational content on 6/1/2019 2000-2021 [employer]. All rights reserved. This information is not intended as a substitute for professional medical care. Always follow your healthcare professional's instructions.

## 2021-07-29 NOTE — PROGRESS NOTES
Liyah is a 56 year old who is being evaluated via a billable telephone visit.      What phone number would you like to be contacted at? .    How would you like to obtain your AVS? MyChart    Assessment & Plan     Benign essential hypertension  Stable, continue current management  - metoprolol succinate ER (TOPROL-XL) 25 MG 24 hr tablet  Dispense: 90 tablet; Refill: 1    Attention deficit hyperactivity disorder (ADHD), unspecified ADHD type  STable on 10 mg Adderall in the am and 5 mg in the afternoon, continue current management, call for refill in  3 months , OV in 6 months.  - amphetamine-dextroamphetamine (ADDERALL) 10 MG tablet  Dispense: 30 tablet; Refill: 0  - amphetamine-dextroamphetamine (ADDERALL) 10 MG tablet  Dispense: 30 tablet; Refill: 0  - amphetamine-dextroamphetamine (ADDERALL) 10 MG tablet  Dispense: 30 tablet; Refill: 0  - amphetamine-dextroamphetamine (ADDERALL) 5 MG tablet  Dispense: 30 tablet; Refill: 0  - amphetamine-dextroamphetamine (ADDERALL) 5 MG tablet  Dispense: 30 tablet; Refill: 0  - amphetamine-dextroamphetamine (ADDERALL) 5 MG tablet  Dispense: 30 tablet; Refill: 0    Primary insomnia  REfilled.  Her pharmacy has been giving her 100 mg tabs and she is tired of splitting them- new prescription written for 50 mg tablets.  - traZODone (DESYREL) 50 MG tablet  Dispense: 90 tablet; Refill: 0    Genital herpes simplex, unspecified site  Refilled Valtrex.  - valACYclovir (VALTREX) 500 MG tablet  Dispense: 90 tablet; Refill: 1         Work on weight loss  Regular exercise  See Patient Instructions    No follow-ups on file.    MARRY Sheppard CNP  [facility]   Liyah is a 56 year old who presents for the following health issues   HPI     Hypertension Follow-up      Do you check your blood pressure regularly outside of the clinic? Yes     Are you following a low salt diet? Yes    Are your blood pressures ever more than 140 on the top number  (systolic) OR more   than 90 on the bottom number (diastolic), for example 140/90? No    Depression Followup    How are you doing with your depression since your last visit? No change    Are you having other symptoms that might be associated with depression? No    Have you had a significant life event?  No     Are you feeling anxious or having panic attacks?   No    Do you have any concerns with your use of alcohol or other drugs? No    Patient also takes Adderall and states that she also needs a refill on this. She states that she is taking this daily and it is working for her symptoms. She will sometimes skip the afternoon dose when she doesn't need it- but always will take the morning dose.     Social History     Tobacco Use     Smoking status: Never Smoker     Smokeless tobacco: Never Used   Substance Use Topics     Alcohol use: No     Alcohol/week: 0.0 standard drinks     Drug use: No     PHQ 9/23/2019 9/21/2020 5/5/2021   PHQ-9 Total Score 7 0 0   Q9: Thoughts of better off dead/self-harm past 2 weeks Not at all Not at all Not at all     ROSELINE-7 SCORE 12/4/2018 7/8/2019 9/23/2019   Total Score - - 3 (minimal anxiety)   Total Score 6 0 3     Last PHQ-9 5/5/2021   1.  Little interest or pleasure in doing things 0   2.  Feeling down, depressed, or hopeless 0   3.  Trouble falling or staying asleep, or sleeping too much 0   4.  Feeling tired or having little energy 0   5.  Poor appetite or overeating 0   6.  Feeling bad about yourself 0   7.  Trouble concentrating 0   8.  Moving slowly or restless 0   Q9: Thoughts of better off dead/self-harm past 2 weeks 0   PHQ-9 Total Score 0   Difficulty at work, home, or with people Not difficult at all     ROSELINE-7  9/23/2019   1. Feeling nervous, anxious, or on edge 0   2. Not being able to stop or control worrying 0   3. Worrying too much about different things 1   4. Trouble relaxing 2   5. Being so restless that it is hard to sit still 0   6. Becoming easily annoyed or  irritable 0   7. Feeling afraid, as if something awful might happen 0   ROSELINE-7 Total Score 3   If you checked any problems, how difficult have they made it for you to do your work, take care of things at home, or get along with other people? -       Suicide Assessment Five-step Evaluation and Treatment (SAFE-T)      Patient also requests refill of Valtrex for history genital HSV  She states she gets outbreaks if she stops the Valtrex, is tolerating it well, no sxymptoms of outbreak, no adverse effects.      Review of Systems   Constitutional, HEENT, cardiovascular, pulmonary, gi and gu systems are negative, except as otherwise noted.      Objective           Vitals:  No vitals were obtained today due to virtual visit.    Physical Exam   healthy, alert and no distress  PSYCH: Alert and oriented times 3; coherent speech, normal   rate and volume, able to articulate logical thoughts, able   to abstract reason, no tangential thoughts, no hallucinations   or delusions  Her affect is normal and pleasant  RESP: No cough, no audible wheezing, able to talk in full sentences  Remainder of exam unable to be completed due to telephone visits            Phone call duration: 14 minutes

## 2021-08-02 ENCOUNTER — E-VISIT (OUTPATIENT)
Dept: URGENT CARE | Facility: URGENT CARE | Age: 56
End: 2021-08-02
Payer: COMMERCIAL

## 2021-08-02 DIAGNOSIS — Z20.822 SUSPECTED COVID-19 VIRUS INFECTION: Primary | ICD-10-CM

## 2021-08-02 LAB — A1A  PHENOTYPE REFLEX BILL: NORMAL

## 2021-08-02 PROCEDURE — 99421 OL DIG E/M SVC 5-10 MIN: CPT | Performed by: NURSE PRACTITIONER

## 2021-08-02 NOTE — PATIENT INSTRUCTIONS
Dear Liyah Jernigan,    Your symptoms show that you may have coronavirus (COVID-19). This illness can cause fever, cough and trouble breathing. Many people get a mild case and get better on their own. Some people can get very sick.    Will I be tested for COVID-19?  We would like to test you for Covid-19 virus. I have placed orders for this test.     To schedule: go to your [employer] home page and scroll down to the section that says  You have an appointment that needs to be scheduled  and click the large green button that says  Schedule Now  and follow the steps to find the next available openings.    If you are unable to complete these [employer] scheduling steps, please call [phone] to schedule your testing.     Return to work/school/ guidance:  Please let your workplace manager and staffing office know when your quarantine ends     We can t give you an exact date as it depends on the above. You can calculate this on your own or work with your manager/staffing office to calculate this. (For example if you were exposed on 10/4, you would have to quarantine for 14 full days. That would be through 10/18. You could return on 10/19.)      If you receive a positive COVID-19 test result, follow the guidance of the those who are giving you the results. Usually the return to work is 10 (or in some cases 20 days from symptom onset.) If you work at [facility], you must also be cleared by Employee Occupational Health and Safety to return to work.        If you receive a negative COVID-19 test result and did not have a high risk exposure to someone with a known positive COVID-19 test, you can return to work once you're free of fever for 24 hours without fever-reducing medication and your symptoms are improving or resolved.      If you receive a negative COVID-19 test and If you had a high risk exposure to someone who has tested positive for COVID-19 then you can return to work 14 days after your last contact with  the positive individual    Note: If you have ongoing exposure to the covid positive person, this quarantine period may be more than 14 days. (For example, if you are continued to be exposed to your child who tested positive and cannot isolate from them, then the quarantine of 7-14 days can't start until your child is no longer contagious. This is typically 10 days from onset of the child's symptoms. So the total duration may be 17-24 days in this case.)    Sign up for [employer] Reminder.   We know it's scary to hear that you might have COVID-19. We want to track your symptoms to make sure you're okay over the next 2 weeks. Please look for an email from [employer]--this is a free, online program that we'll use to keep in touch. To sign up, follow the link in the email you will receive. Learn more at [email or web address]    How can I take care of myself?    Get lots of rest. Drink extra fluids (unless a doctor has told you not to)    Take Tylenol (acetaminophen) or ibuprofen for fever or pain. If you have liver or kidney problems, ask your family doctor if it's okay to take Tylenol o ibuprofen    If you have other health problems (like cancer, heart failure, an organ transplant or severe kidney disease): Call your specialty clinic if you don't feel better in the next 2 days.    Know when to call 911. Emergency warning signs include:  o Trouble breathing or shortness of breath  o Pain or pressure in the chest that doesn't go away  o Feeling confused like you haven't felt before, or not being able to wake up  o Bluish-colored lips or face    Where can I get more information?  [facility] [place] - About COVID-19:   [email or web address]    CDC - What to Do If You're Sick:   www.cdc.gov/coronavirus/2019-ncov/about/steps-when-sick.html

## 2021-08-03 DIAGNOSIS — Z20.822 SUSPECTED COVID-19 VIRUS INFECTION: ICD-10-CM

## 2021-08-03 LAB — SARS-COV-2 RNA RESP QL NAA+PROBE: NEGATIVE

## 2021-08-03 PROCEDURE — U0005 INFEC AGEN DETEC AMPLI PROBE: HCPCS

## 2021-08-03 PROCEDURE — U0003 INFECTIOUS AGENT DETECTION BY NUCLEIC ACID (DNA OR RNA); SEVERE ACUTE RESPIRATORY SYNDROME CORONAVIRUS 2 (SARS-COV-2) (CORONAVIRUS DISEASE [COVID-19]), AMPLIFIED PROBE TECHNIQUE, MAKING USE OF HIGH THROUGHPUT TECHNOLOGIES AS DESCRIBED BY CMS-2020-01-R: HCPCS

## 2021-08-07 LAB
A1AT PHENOTYP SERPL-IMP: ABNORMAL
A1AT SERPL-MCNC: 57 MG/DL
A1AT SS SERPL-MCNC: NEGATIVE G/L
A1AT SZ SERPL-MCNC: ABNORMAL G/L
A1AT ZZ SERPL-MCNC: ABNORMAL G/L
SPECIMEN SOURCE: ABNORMAL

## 2021-08-09 NOTE — RESULT ENCOUNTER NOTE
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  I am, admittedly, not an expert in this field.  So if you think we should get you set up with a genetics counselor we can always do that.  TAMI Barrow M.D.

## 2021-09-04 ENCOUNTER — HEALTH MAINTENANCE LETTER (OUTPATIENT)
Age: 56
End: 2021-09-04

## 2021-09-21 ENCOUNTER — VIRTUAL VISIT (OUTPATIENT)
Dept: FAMILY MEDICINE | Facility: CLINIC | Age: 56
End: 2021-09-21
Payer: COMMERCIAL

## 2021-09-21 DIAGNOSIS — F90.9 ATTENTION DEFICIT HYPERACTIVITY DISORDER (ADHD), UNSPECIFIED ADHD TYPE: ICD-10-CM

## 2021-09-21 DIAGNOSIS — G35 MS (MULTIPLE SCLEROSIS) (H): ICD-10-CM

## 2021-09-21 DIAGNOSIS — I10 BENIGN ESSENTIAL HYPERTENSION: ICD-10-CM

## 2021-09-21 DIAGNOSIS — H02.9 EYELID LESION: Primary | ICD-10-CM

## 2021-09-21 DIAGNOSIS — Z13.220 SCREENING CHOLESTEROL LEVEL: ICD-10-CM

## 2021-09-21 PROCEDURE — 99214 OFFICE O/P EST MOD 30 MIN: CPT | Mod: 95 | Performed by: FAMILY MEDICINE

## 2021-09-21 RX ORDER — METOPROLOL SUCCINATE 25 MG/1
25 TABLET, EXTENDED RELEASE ORAL 2 TIMES DAILY
Qty: 60 TABLET | Refills: 0 | COMMUNITY
Start: 2021-09-21 | End: 2021-11-08

## 2021-09-21 NOTE — PROGRESS NOTES
Liyah is a 56 year old who is being evaluated via a billable video visit.      How would you like to obtain your AVS? MyChart  If the video visit is dropped, the invitation should be resent by: Text to cell phone: see epic  Will anyone else be joining your video visit? No    Video Start Time: 6:59 AM     Assessment & Plan     Eyelid lesion  I'm unable to appreciate this on the video but suspect skin tag vs persistent stye vs other. rec f/u ophthalmology for further eval  - Adult Eye Referral; Future    Benign essential hypertension  Not controlled. Ok to trial increasing toprol to 25 mg bid and f/u with pcp in few weeks for recheck. Monitor pulse reviewed. Labs prior to her f/u visit recommended.   - metoprolol succinate ER (TOPROL-XL) 25 MG 24 hr tablet; Take 1 tablet (25 mg) by mouth 2 times daily  - Basic metabolic panel  (Ca, Cl, CO2, Creat, Gluc, K, Na, BUN); Future  - Albumin Random Urine Quantitative with Creat Ratio; Future    Attention deficit hyperactivity disorder (ADHD), unspecified ADHD type  On adderall. Feels taking meds actually helps her anxiety which then helps her bp. Will need to consider close bp monitoring and may need to adjust meds if not able to get bp well controlled    MS (multiple sclerosis) (H)  On high dose replacement.   - Vitamin D Deficiency; Future    Screening cholesterol level  - Lipid panel reflex to direct LDL Fasting; Future      Return in about 3 weeks (around 10/12/2021) for med and blood pressure check.    Ivonne Estes MD  [facility]   Liyah is a 56 year old who presents for the following health issues  HPI   Was at derm appt few weeks ago.   Has growth/bump on eyelid in her lash line. Very tiny. On the left eye, upper lid. Told to get referral to eye dr  Wonders if extends back into the lid  No pain.   Not obscuring vision   Lesion present for 4 months and slowly enlarging.     Has been using Latisse.    Takes  metoprolol. Monitoring her bp and running on the higher end 135/95. HR low 60's.   More trouble with her vision recently- often times common with her MS.   Finds bp actually gets higher when she doesn't take her adderall.     Hx of vit D def, currently on high dose.   RLS last year             Objective           Vitals:  No vitals were obtained today due to virtual visit.    Physical Exam   GENERAL: Healthy, alert and no distress  EYES: Eyes grossly normal to inspection.  No discharge or erythema, or obvious scleral/conjunctival abnormalities.  RESP: No audible wheeze, cough, or visible cyanosis.  No visible retractions or increased work of breathing.    SKIN: Visible skin clear. No significant rash, abnormal pigmentation or lesions.  NEURO: Cranial nerves grossly intact.  Mentation and speech appropriate for age.  PSYCH: Mentation appears normal, affect normal/bright, judgement and insight intact, normal speech and appearance well-groomed.             Video-Visit Details    Type of service:  Video Visit    Video End Time:7:21 AM    Originating Location (pt. Location): Home    Distant Location (provider location):  [facility]     Platform used for Video Visit: [employer]

## 2021-10-20 ENCOUNTER — OFFICE VISIT (OUTPATIENT)
Dept: OPHTHALMOLOGY | Facility: CLINIC | Age: 56
End: 2021-10-20
Payer: COMMERCIAL

## 2021-10-20 DIAGNOSIS — H02.9 EYELID LESION: Primary | ICD-10-CM

## 2021-10-20 PROCEDURE — 99213 OFFICE O/P EST LOW 20 MIN: CPT | Mod: 25 | Performed by: OPHTHALMOLOGY

## 2021-10-20 PROCEDURE — 67820 REVISE EYELASHES: CPT | Mod: E1 | Performed by: OPHTHALMOLOGY

## 2021-10-20 ASSESSMENT — VISUAL ACUITY
METHOD: SNELLEN - LINEAR
CORRECTION_TYPE: GLASSES
OD_CC: 20/15
OS_CC: 20/15

## 2021-10-20 ASSESSMENT — TONOMETRY
IOP_METHOD: ICARE
OS_IOP_MMHG: 8
OD_IOP_MMHG: 9

## 2021-10-20 ASSESSMENT — SLIT LAMP EXAM - LIDS: COMMENTS: NORMAL

## 2021-10-20 NOTE — NURSING NOTE
Referred by Ivonne Estes MD for left upper eyelid lesion.  FBS so tried lube gtts.  No improvement.  Onset about 4 months ago.  No warm compresses tried  Favio INTERIANO,CLINIC MANAGER8:45 AM October 20, 2021

## 2021-10-20 NOTE — LETTER
10/20/2021         RE:  :  MRN: Liyah Jernigan  1965  [id number]     Dear Dr. Ivonne Martini*,    Thank you for asking me to see your patient, Liyah Jernigan, for an oculoplastic   consultation.  My assessment and plan are below.  For further details, please see my attached clinic note.      Chief Complaint(s) and History of Present Illness(es)     Lesion On Left Upper Lid     Laterality: left upper lid    Onset: 4 months ago    Course: stable    Associated symptoms: Negative for lid swelling, tearing, eye pain, lid   pain, lid redness, bleeding, red eye, blurred vision, discharge and   mattering    Treatments tried: artificial tears    Response to treatment: no improvement    Pain scale: 0/10           Comments     Referred by Ivonne Estes MD for left upper eyelid lesion.  FBS so tried lube gtts.  No improvement.  Onset about 4 months ago.  No   warm compresses tried      No history of skin cancer      Assessment & Plan     Liyah Jernigan is a 56 year old female with the following diagnoses:   Encounter Diagnosis   Name Primary?     Eyelid lesion Yes     ? Cilia incarnata vs tiny molluscum      Single lash epilated left upper eyelid, and small amount of material expressed  Use warm compresses. return to clinic for excision if still present and bothersome in 1-2 months.    Patient disposition:   No follow-ups on file.         Again, thank you for allowing me to participate in the care of your patient.      Sincerely,    Saadia Jang MD  Department of Ophthalmology and Visual Neurosciences  [facility]    CC: Ivonne Estes MD  [address] [place] [id number]  Via In Basket

## 2021-10-20 NOTE — PROGRESS NOTES
Chief Complaint(s) and History of Present Illness(es)     Lesion On Left Upper Lid     Laterality: left upper lid    Onset: 4 months ago    Course: stable    Associated symptoms: Negative for lid swelling, tearing, eye pain, lid   pain, lid redness, bleeding, red eye, blurred vision, discharge and   mattering    Treatments tried: artificial tears    Response to treatment: no improvement    Pain scale: 0/10           Comments     Referred by Ivonne Estes MD for left upper eyelid lesion.  FBS so tried lube gtts.  No improvement.  Onset about 4 months ago.  No   warm compresses tried      No history of skin cancer      Assessment & Plan     Liyah Jernigan is a 56 year old female with the following diagnoses:   Encounter Diagnosis   Name Primary?     Eyelid lesion Yes     ? Cilia incarnata vs tiny molluscum      Single lash epilated left upper eyelid, and small amount of material expressed  Use warm compresses. return to clinic for excision if still present and bothersome in 1-2 months.    Patient disposition:   No follow-ups on file.        Attending Physician Attestation: Complete documentation of historical and exam elements from today's encounter can be found in the full encounter summary report (not reduplicated in this progress note). I personally obtained the chief complaint(s) and history of present illness. I confirmed and edited as necessary the review of systems, past medical/surgical history, family history, social history, and examination findings as documented by others; and I examined the patient myself. I personally reviewed the relevant tests, images, and reports as documented above. I formulated and edited as necessary the assessment and plan and discussed the findings and management plan with the patient.  -Saadia Jang MD

## 2021-10-30 ENCOUNTER — HEALTH MAINTENANCE LETTER (OUTPATIENT)
Age: 56
End: 2021-10-30

## 2021-11-08 ENCOUNTER — MYC MEDICAL ADVICE (OUTPATIENT)
Dept: FAMILY MEDICINE | Facility: CLINIC | Age: 56
End: 2021-11-08
Payer: COMMERCIAL

## 2021-11-08 DIAGNOSIS — I10 BENIGN ESSENTIAL HYPERTENSION: ICD-10-CM

## 2021-11-08 RX ORDER — METOPROLOL SUCCINATE 25 MG/1
25 TABLET, EXTENDED RELEASE ORAL 2 TIMES DAILY
Qty: 180 TABLET | Refills: 0 | Status: SHIPPED | OUTPATIENT
Start: 2021-11-08 | End: 2022-01-03

## 2021-11-12 ENCOUNTER — IMMUNIZATION (OUTPATIENT)
Dept: NURSING | Facility: CLINIC | Age: 56
End: 2021-11-12
Payer: COMMERCIAL

## 2021-11-12 PROCEDURE — 91306 COVID-19,PF,MODERNA (18+ YRS BOOSTER .25ML): CPT

## 2021-11-12 PROCEDURE — 0064A COVID-19,PF,MODERNA (18+ YRS BOOSTER .25ML): CPT

## 2021-11-16 ENCOUNTER — MYC MEDICAL ADVICE (OUTPATIENT)
Dept: FAMILY MEDICINE | Facility: CLINIC | Age: 56
End: 2021-11-16
Payer: COMMERCIAL

## 2021-11-16 DIAGNOSIS — G43.109 MIGRAINE WITH AURA AND WITHOUT STATUS MIGRAINOSUS, NOT INTRACTABLE: ICD-10-CM

## 2021-11-16 DIAGNOSIS — F90.9 ATTENTION DEFICIT HYPERACTIVITY DISORDER (ADHD), UNSPECIFIED ADHD TYPE: ICD-10-CM

## 2021-11-26 RX ORDER — DEXTROAMPHETAMINE SACCHARATE, AMPHETAMINE ASPARTATE, DEXTROAMPHETAMINE SULFATE AND AMPHETAMINE SULFATE 1.25; 1.25; 1.25; 1.25 MG/1; MG/1; MG/1; MG/1
5 TABLET ORAL DAILY
Qty: 30 TABLET | Refills: 0 | Status: SHIPPED | OUTPATIENT
Start: 2021-11-26 | End: 2022-01-28

## 2021-12-03 ENCOUNTER — ANCILLARY PROCEDURE (OUTPATIENT)
Dept: MAMMOGRAPHY | Facility: CLINIC | Age: 56
End: 2021-12-03
Attending: FAMILY MEDICINE
Payer: COMMERCIAL

## 2021-12-03 DIAGNOSIS — Z12.31 VISIT FOR SCREENING MAMMOGRAM: ICD-10-CM

## 2021-12-03 PROCEDURE — 77067 SCR MAMMO BI INCL CAD: CPT | Mod: TC | Performed by: RADIOLOGY

## 2021-12-06 RX ORDER — DEXTROAMPHETAMINE SACCHARATE, AMPHETAMINE ASPARTATE, DEXTROAMPHETAMINE SULFATE AND AMPHETAMINE SULFATE 2.5; 2.5; 2.5; 2.5 MG/1; MG/1; MG/1; MG/1
10 TABLET ORAL DAILY
Qty: 30 TABLET | Refills: 0 | Status: SHIPPED | OUTPATIENT
Start: 2021-12-06 | End: 2022-01-28

## 2021-12-10 RX ORDER — SUMATRIPTAN 100 MG/1
100 TABLET, FILM COATED ORAL DAILY PRN
Qty: 30 TABLET | Refills: 1 | Status: SHIPPED | OUTPATIENT
Start: 2021-12-10 | End: 2022-09-13

## 2021-12-10 NOTE — TELEPHONE ENCOUNTER
"Requested Prescriptions   Pending Prescriptions Disp Refills    SUMAtriptan (IMITREX) 100 MG tablet 30 tablet 1     Sig: Take 1 tablet (100 mg) by mouth daily as needed for migraine        Serotonin Agonists Failed - 12/10/2021 10:42 AM        Failed - Blood pressure under 140/90 in past 12 months       BP Readings from Last 3 Encounters:   07/27/21 (!) 138/94   05/07/21 105/70   05/05/21 132/72                 Failed - Serotonin Agonist request needs review.     Please review patient's record. If patient has had 8 or more treatments in the past month, please forward to provider.            Passed - Recent (12 mo) or future (30 days) visit within the authorizing provider's specialty     Patient has had an office visit with the authorizing provider or a provider within the authorizing providers department within the previous 12 mos or has a future within next 30 days. See \"Patient Info\" tab in inbasket, or \"Choose Columns\" in Meds & Orders section of the refill encounter.              Passed - Medication is active on med list        Passed - Patient is age 18 or older        Passed - No active pregnancy on record        Passed - No positive pregnancy test in past 12 months          Signed Prescriptions Disp Refills    amphetamine-dextroamphetamine (ADDERALL) 5 MG tablet 30 tablet 0     Sig: Take 1 tablet (5 mg) by mouth daily        There is no refill protocol information for this order        amphetamine-dextroamphetamine (ADDERALL) 10 MG tablet 30 tablet 0     Sig: Take 1 tablet (10 mg) by mouth daily        There is no refill protocol information for this order           "

## 2022-01-03 ENCOUNTER — MYC REFILL (OUTPATIENT)
Dept: FAMILY MEDICINE | Facility: CLINIC | Age: 57
End: 2022-01-03

## 2022-01-03 ENCOUNTER — VIRTUAL VISIT (OUTPATIENT)
Dept: INTERNAL MEDICINE | Facility: CLINIC | Age: 57
End: 2022-01-03
Payer: COMMERCIAL

## 2022-01-03 DIAGNOSIS — G35 MS (MULTIPLE SCLEROSIS) (H): ICD-10-CM

## 2022-01-03 DIAGNOSIS — Z20.822 EXPOSURE TO 2019 NOVEL CORONAVIRUS: Primary | ICD-10-CM

## 2022-01-03 DIAGNOSIS — I10 BENIGN ESSENTIAL HYPERTENSION: ICD-10-CM

## 2022-01-03 PROCEDURE — 99213 OFFICE O/P EST LOW 20 MIN: CPT | Mod: 95 | Performed by: INTERNAL MEDICINE

## 2022-01-03 NOTE — PROGRESS NOTES
"Liyah is a 57 year old female being evaluated via a billable phone visit, and would like to be contacted via the following  Home number on file [phone] (home)     ASSESSMENT and PLAN:  1. Exposure to 2019 novel coronavirus  Sign positive but awaiting to start confirmation. Home test negative her. Presence of MS noted  - Asymptomatic COVID-19 Virus (Coronavirus) by PCR Nose; Future     Two. MS. Noted     Preventive Care Assessed: Otherwise up-to-date per her assessment. Metoprolol refilled     Patient Instructions   Asymptomatic Covid swab        Medication list carefully reviewed and corrected  Epic Merger Problem list addressed and updated     Return in about 6 months (around 7/3/2022) for using a video visit.    CHIEF COMPLAINT:  Chief Complaint   Patient presents with     Covid 19 Testing     Exposure 1 week ago       HISTORY OF PRESENT ILLNESS:  Liyah is a 57 year old female contacting the clinic today via phone for request for Covid swab. Her son who is mentally disabled tested positive for Covid 3 days ago. He is awaiting PCR confirmation. She takes care of him and is in close contact. The to live together. She has MS. Currently she has no symptoms and a home Covid swab was negative    REVIEW OF SYSTEMS:  Blood pressure medicines refilled. MS stable    PFSH:  Social History     Social History Narrative     Not on file     Lives with son    TOBACCO USE:  History   Smoking Status     Never Smoker   Smokeless Tobacco     Never Used       VITALS:  There were no vitals filed for this visit.  There were no vitals taken for this visit. Estimated body mass index is 18.62 kg/m  as calculated from the following:    Height as of 7/27/21: 1.67 m (5' 5.75\").    Weight as of 7/27/21: 51.9 kg (114 lb 8 oz).    PHYSICAL EXAM:  (observations via Phone)  Alert and oriented    MEDICATIONS  Current Outpatient Medications   Medication Sig Dispense Refill     amphetamine-dextroamphetamine (ADDERALL) 10 MG tablet Take 1 tablet " (10 mg) by mouth daily 30 tablet 0     amphetamine-dextroamphetamine (ADDERALL) 10 MG tablet        amphetamine-dextroamphetamine (ADDERALL) 5 MG tablet Take 1 tablet (5 mg) by mouth daily 30 tablet 0     biotin 2.5 mg/mL SUSP Take 20 mg by mouth daily       buPROPion (WELLBUTRIN SR) 150 MG 12 hr tablet Take 1 tablet (150 mg) by mouth daily 90 tablet 3     gabapentin (NEURONTIN) 300 MG capsule Take 1 capsule by mouth every night as needed. 30 capsule 11     metoprolol succinate ER (TOPROL-XL) 25 MG 24 hr tablet Take 1 tablet (25 mg) by mouth 2 times daily 180 tablet 0     SUMAtriptan (IMITREX) 100 MG tablet Take 1 tablet (100 mg) by mouth daily as needed for migraine 30 tablet 1     teriflunomide (AUBAGIO) 14 MG tablet Take 1 tablet (14 mg) by mouth daily 30 tablet 11     traZODone (DESYREL) 50 MG tablet Take 1 tablet (50 mg) by mouth At Bedtime 90 tablet 0     valACYclovir (VALTREX) 500 MG tablet Take 1 tablet (500 mg) by mouth daily 90 tablet 1       Notes summarized:   Labs, x-rays, cardiology, GI tests reviewed:   Recent Labs   Lab Test 09/21/20  1241   HGB 12.8      POTASSIUM 4.3   CR 1.11*   TSH 1.14     Lab Results   Component Value Date    AYTDO09HKX Negative 08/03/2021    NJEVE42YYZ Not Detected 11/25/2020    GFVHB20RAZ Not Detected 05/12/2020     No components found for: VITD  Lab Results   Component Value Date    VITDT 37 09/21/2020     Lab Results   Component Value Date    CHOL 229 09/21/2020     New orders:   Orders Placed This Encounter   Procedures     Asymptomatic COVID-19 Virus (Coronavirus) by PCR Nose       Independent review of:  Supplemental history by:      Patient would like to receive their AVS by Carlos Montalvo MD      [facility]    Phone Start Time: 4:09 PM  Phone End time:  4:19 PM  Conversation plus orders: 10 minutes  Dictation time:  3 minutes    The visit lasted a total of 11 minutes

## 2022-01-05 RX ORDER — METOPROLOL SUCCINATE 25 MG/1
25 TABLET, EXTENDED RELEASE ORAL 2 TIMES DAILY
Qty: 180 TABLET | Refills: 0 | Status: SHIPPED | OUTPATIENT
Start: 2022-01-05 | End: 2022-04-14

## 2022-01-05 NOTE — TELEPHONE ENCOUNTER
Routing refill request to provider for review/approval because:  BP out of range.      Netta Pradhan RN  [facility]

## 2022-01-28 ENCOUNTER — MYC REFILL (OUTPATIENT)
Dept: FAMILY MEDICINE | Facility: CLINIC | Age: 57
End: 2022-01-28
Payer: COMMERCIAL

## 2022-01-28 DIAGNOSIS — F90.9 ATTENTION DEFICIT HYPERACTIVITY DISORDER (ADHD), UNSPECIFIED ADHD TYPE: ICD-10-CM

## 2022-01-28 RX ORDER — DEXTROAMPHETAMINE SACCHARATE, AMPHETAMINE ASPARTATE, DEXTROAMPHETAMINE SULFATE AND AMPHETAMINE SULFATE 2.5; 2.5; 2.5; 2.5 MG/1; MG/1; MG/1; MG/1
10 TABLET ORAL DAILY
Qty: 30 TABLET | Refills: 0 | Status: SHIPPED | OUTPATIENT
Start: 2022-01-28 | End: 2022-04-14

## 2022-01-28 RX ORDER — DEXTROAMPHETAMINE SACCHARATE, AMPHETAMINE ASPARTATE, DEXTROAMPHETAMINE SULFATE AND AMPHETAMINE SULFATE 1.25; 1.25; 1.25; 1.25 MG/1; MG/1; MG/1; MG/1
5 TABLET ORAL DAILY
Qty: 30 TABLET | Refills: 0 | Status: SHIPPED | OUTPATIENT
Start: 2022-01-28 | End: 2022-04-14

## 2022-02-07 DIAGNOSIS — F33.0 MILD EPISODE OF RECURRENT MAJOR DEPRESSIVE DISORDER (H): ICD-10-CM

## 2022-02-08 NOTE — TELEPHONE ENCOUNTER
Routing refill request to provider for review/approval because:  PHQ9 not current.      Netta Pradhan RN  [facility]

## 2022-02-09 RX ORDER — BUPROPION HYDROCHLORIDE 150 MG/1
150 TABLET, EXTENDED RELEASE ORAL DAILY
Qty: 90 TABLET | Refills: 0 | Status: SHIPPED | OUTPATIENT
Start: 2022-02-09 | End: 2022-05-16

## 2022-03-18 ENCOUNTER — TRANSFERRED RECORDS (OUTPATIENT)
Dept: HEALTH INFORMATION MANAGEMENT | Facility: CLINIC | Age: 57
End: 2022-03-18
Payer: COMMERCIAL

## 2022-03-21 ENCOUNTER — TRANSFERRED RECORDS (OUTPATIENT)
Dept: HEALTH INFORMATION MANAGEMENT | Facility: CLINIC | Age: 57
End: 2022-03-21
Payer: COMMERCIAL

## 2022-03-29 ENCOUNTER — TRANSFERRED RECORDS (OUTPATIENT)
Dept: HEALTH INFORMATION MANAGEMENT | Facility: CLINIC | Age: 57
End: 2022-03-29
Payer: COMMERCIAL

## 2022-04-05 ENCOUNTER — MYC REFILL (OUTPATIENT)
Dept: FAMILY MEDICINE | Facility: CLINIC | Age: 57
End: 2022-04-05
Payer: COMMERCIAL

## 2022-04-05 DIAGNOSIS — F90.9 ATTENTION DEFICIT HYPERACTIVITY DISORDER (ADHD), UNSPECIFIED ADHD TYPE: ICD-10-CM

## 2022-04-05 RX ORDER — DEXTROAMPHETAMINE SACCHARATE, AMPHETAMINE ASPARTATE, DEXTROAMPHETAMINE SULFATE AND AMPHETAMINE SULFATE 2.5; 2.5; 2.5; 2.5 MG/1; MG/1; MG/1; MG/1
10 TABLET ORAL DAILY
Qty: 30 TABLET | Refills: 0 | OUTPATIENT
Start: 2022-04-05

## 2022-04-05 RX ORDER — DEXTROAMPHETAMINE SACCHARATE, AMPHETAMINE ASPARTATE, DEXTROAMPHETAMINE SULFATE AND AMPHETAMINE SULFATE 1.25; 1.25; 1.25; 1.25 MG/1; MG/1; MG/1; MG/1
5 TABLET ORAL DAILY
Qty: 30 TABLET | Refills: 0 | OUTPATIENT
Start: 2022-04-05

## 2022-04-06 NOTE — TELEPHONE ENCOUNTER
Refill denied to the pharmacy.   If patient schedules an appointment we can provide a [facility] refill.

## 2022-04-12 ENCOUNTER — TRANSFERRED RECORDS (OUTPATIENT)
Dept: HEALTH INFORMATION MANAGEMENT | Facility: CLINIC | Age: 57
End: 2022-04-12
Payer: COMMERCIAL

## 2022-04-14 ENCOUNTER — VIRTUAL VISIT (OUTPATIENT)
Dept: FAMILY MEDICINE | Facility: CLINIC | Age: 57
End: 2022-04-14
Payer: COMMERCIAL

## 2022-04-14 DIAGNOSIS — Z13.21 ENCOUNTER FOR VITAMIN DEFICIENCY SCREENING: ICD-10-CM

## 2022-04-14 DIAGNOSIS — F90.9 ATTENTION DEFICIT HYPERACTIVITY DISORDER (ADHD), UNSPECIFIED ADHD TYPE: Primary | ICD-10-CM

## 2022-04-14 DIAGNOSIS — Z12.11 SCREEN FOR COLON CANCER: ICD-10-CM

## 2022-04-14 DIAGNOSIS — I10 BENIGN ESSENTIAL HYPERTENSION: ICD-10-CM

## 2022-04-14 DIAGNOSIS — G35 MULTIPLE SCLEROSIS (H): ICD-10-CM

## 2022-04-14 DIAGNOSIS — Z13.1 SCREENING FOR DIABETES MELLITUS: ICD-10-CM

## 2022-04-14 DIAGNOSIS — Z13.220 SCREENING FOR HYPERLIPIDEMIA: ICD-10-CM

## 2022-04-14 DIAGNOSIS — R20.2 PARESTHESIA: ICD-10-CM

## 2022-04-14 PROCEDURE — 99214 OFFICE O/P EST MOD 30 MIN: CPT | Mod: 95 | Performed by: PHYSICIAN ASSISTANT

## 2022-04-14 RX ORDER — DEXTROAMPHETAMINE SACCHARATE, AMPHETAMINE ASPARTATE, DEXTROAMPHETAMINE SULFATE AND AMPHETAMINE SULFATE 2.5; 2.5; 2.5; 2.5 MG/1; MG/1; MG/1; MG/1
10 TABLET ORAL DAILY
Qty: 30 TABLET | Refills: 0 | Status: SHIPPED | OUTPATIENT
Start: 2022-04-14 | End: 2022-06-21

## 2022-04-14 RX ORDER — DEXTROAMPHETAMINE SACCHARATE, AMPHETAMINE ASPARTATE, DEXTROAMPHETAMINE SULFATE AND AMPHETAMINE SULFATE 1.25; 1.25; 1.25; 1.25 MG/1; MG/1; MG/1; MG/1
5 TABLET ORAL DAILY
Qty: 30 TABLET | Refills: 0 | Status: SHIPPED | OUTPATIENT
Start: 2022-04-14 | End: 2022-06-08

## 2022-04-14 RX ORDER — METOPROLOL SUCCINATE 25 MG/1
25 TABLET, EXTENDED RELEASE ORAL 2 TIMES DAILY
Qty: 180 TABLET | Refills: 0 | Status: SHIPPED | OUTPATIENT
Start: 2022-04-14 | End: 2022-09-13

## 2022-04-14 RX ORDER — GABAPENTIN 300 MG/1
CAPSULE ORAL
Qty: 90 CAPSULE | Refills: 1 | Status: SHIPPED | OUTPATIENT
Start: 2022-04-14 | End: 2022-09-13

## 2022-04-14 NOTE — PROGRESS NOTES
Liyah is a 57 year old who is being evaluated via a billable video visit.      How would you like to obtain your AVS? MyChart  If the video visit is dropped, the invitation should be resent by: Text to cell phone: [phone]   Will anyone else be joining your video visit? No      Video Start Time: 349 pm     Assessment & Plan     Attention deficit hyperactivity disorder (ADHD), unspecified ADHD type  Symptoms well controlled with current medications- refilled - plans to follow up with Dr. Barrow   - amphetamine-dextroamphetamine (ADDERALL) 10 MG tablet  Dispense: 30 tablet; Refill: 0  - amphetamine-dextroamphetamine (ADDERALL) 5 MG tablet  Dispense: 30 tablet; Refill: 0    Multiple sclerosis (H)  Gabapentin helpful for pain and paresthesias   Is followed by neurolgy   - gabapentin (NEURONTIN) 300 MG capsule  Dispense: 90 capsule; Refill: 1    Paresthesia  As above   - gabapentin (NEURONTIN) 300 MG capsule  Dispense: 90 capsule; Refill: 1    Benign essential hypertension  Blood pressure at goal. Continue metoprolol daily   - Comprehensive metabolic panel  - Albumin Random Urine Quantitative with Creat Ratio  - metoprolol succinate ER (TOPROL-XL) 25 MG 24 hr tablet  Dispense: 180 tablet; Refill: 0    Screening for hyperlipidemia  Will return for fasting labs   - Lipid panel reflex to direct LDL Fasting    Screening for diabetes mellitus  Will return for fasting labs   - Comprehensive metabolic panel    Encounter for vitamin deficiency screening  Will return for labs   - 25 Hydroxyvitamin D2 and D3    Screen for colon cancer  Given cares for son unable to perform colonoscopy.  Agreeable to cologuard and will check on insurance coverage   - COLOGUARD(EXACT SCIENCES)      Ordering of each unique test  Prescription drug management  51 minutes spent on the date of the encounter doing chart review, history and exam, documentation and further activities per the note       Patient Instructions   Please schedule a fasting  lab appointment (nothing to eat or drink 9 hours prior except water and/or your medications) at your earliest convenience.       Schedule physical with Dr. Barrow at your earliest convenience  Return urgently if any change in symptoms.    We will notify you of lab results via [employer]   Continue adderall 10 mg in AM and 5mg at noon for ADHD      Check with your insurance regarding coverage of the cologuard for screening for colon cancer.  They will reach out to you.     I strongly encouraged you to consider the vaccine to prevent shingles.  (SHINGRIX)  This is two vaccines 2-6 months apart.   Check with your insurance company regarding coverage.  If you are on medicare you should get this vaccine at the pharmacy.          Return in about 4 weeks (around 5/12/2022), or if symptoms worsen or fail to improve, for Routine preventive, in person.    Jaki Blas PA-C  [facility] ISMAEL Pelletier is a 57 year old who presents for the following health issues     History of Present Illness       Reason for visit:  Recheck Medications    She eats 4 or more servings of fruits and vegetables daily.She consumes 0 sweetened beverage(s) daily.She exercises with enough effort to increase her heart rate 30 to 60 minutes per day.  She exercises with enough effort to increase her heart rate 7 days per week.   She is taking medications regularly.     Just picked up metoprolol   Needs refills of adderall and gabapentin - MS kati de la cruztially prescribed gabapentin for nerve pain- but Dr. Barrow has been prescribing   Doing well with adderall takes 10mg in AM  and 5mg  at lunch  Sometimes will skip noon dose -  No weight loss, no tic or tremor   Cares for son fulltime in home   Blood pressure 124/89- morning and night   64 pbm   Temp 97.2   98%   Broke my arm couple weeks ago- still in cast  Orthopedist concerned about her bone density   Broken arm before - this time broke in 4 places after fall in  garage  Plan Bone density test when cast off in May  Diet is pretty good - takes vitamin D 2000 units daily             Review of Systems   Constitutional, HEENT, cardiovascular, pulmonary, gi and gu systems are negative, except as otherwise noted.      Objective           Vitals:  No vitals were obtained today due to virtual visit.    Physical Exam   GENERAL: Healthy, alert and no distress  EYES: Eyes grossly normal to inspection.  No discharge or erythema, or obvious scleral/conjunctival abnormalities.  RESP: No audible wheeze, cough, or visible cyanosis.  No visible retractions or increased work of breathing.    SKIN: Visible skin clear. No significant rash, abnormal pigmentation or lesions.  NEURO: Cranial nerves grossly intact.  Mentation and speech appropriate for age.  PSYCH: Mentation appears normal, affect normal/bright, judgement and insight intact, normal speech and appearance well-groomed.                Video-Visit Details    Type of service:  Video Visit    Video End Time:4:06 PM    Originating Location (pt. Location): Home    Distant Location (provider location):  [facility]     Platform used for Video Visit: Recommind

## 2022-04-14 NOTE — PATIENT INSTRUCTIONS
Please schedule a fasting lab appointment (nothing to eat or drink 9 hours prior except water and/or your medications) at your earliest convenience.       Schedule physical with Dr. Barrow at your earliest convenience  Return urgently if any change in symptoms.    We will notify you of lab results via [employer]   Continue adderall 10 mg in AM and 5mg at noon for ADHD      Check with your insurance regarding coverage of the cologuard for screening for colon cancer.  They will reach out to you.     I strongly encouraged you to consider the vaccine to prevent shingles.  (SHINGRIX)  This is two vaccines 2-6 months apart.   Check with your insurance company regarding coverage.  If you are on medicare you should get this vaccine at the pharmacy.

## 2022-04-14 NOTE — LETTER
August 29, 2022      Liyah Jernigan  [address] [place] [address]              Dear Liyah   Your cologuard test was positive indicating that you need a colonoscopy to rule out colon cancer.   I have placed a referral. Please schedule as soon as possible.   Please call or MyChart my office with any questions or concerns.   TRAN Self       Resulted Orders   COLOGUARD(EXACT SCIENCES)   Result Value Ref Range    COLOGUARD-ABSTRACT Positive (A) Negative      Comment:        POSITIVE TEST RESULT. A positive Cologuard result should be followed with a colonoscopy or visual examination of the colon. The normal value (reference range) for this assay is negative.    TEST DESCRIPTION: Composite algorithmic analysis of stool DNA-biomarkers with hemoglobin immunoassay.   Quantitative values of individual biomarkers are not reportable and are not associated with individual biomarker result reference ranges. Cologuard is intended for colorectal cancer screening of adults of either sex, 45 years or older, who are at average-risk for colorectal cancer (CRC). Cologuard has been approved for use by the U.S. FDA. The performance of Cologuard was established in a cross sectional study of average-risk adults aged 50-84. Cologuard performance in patients ages 45 to 49 years was estimated by sub-group analysis of near-age groups. Colonoscopies performed for a positive result may find as the most clinically significant lesion: colorectal cancer [4.0%], advanced adenoma  (including sessile serrated polyps greater than or equal to 1cm diameter) [20%] or non- advanced adenoma [31%]; or no colorectal neoplasia [45%]. These estimates are derived from a prospective cross-sectional screening study of 10,000 individuals at average risk for colorectal cancer who were screened with both Cologuard and colonoscopy. (Last Rojas al, N Engl J Med 2014;370(14):4423-9847.) Cologuard may produce a false negative or false positive  result (no colorectal cancer or precancerous polyp present at colonoscopy follow up). A negative Cologuard test result does not guarantee the absence of CRC or advanced adenoma (pre-cancer). The current Cologuard screening interval is every 3 years. (American Cancer Society and U.S. Multi-Society Task Force). Cologuard performance data in a 10,000 patient pivotal study using colonoscopy as the reference method can be accessed at the following location: [email or web address]/results. Additional description of the Cologuard test process,  warnings and precautions can be found at www.cologuard.com.         If you have any questions or concerns, please call the clinic at the number listed above.       Sincerely,      Jaki Blas PA-C

## 2022-05-02 ENCOUNTER — TRANSFERRED RECORDS (OUTPATIENT)
Dept: HEALTH INFORMATION MANAGEMENT | Facility: CLINIC | Age: 57
End: 2022-05-02
Payer: COMMERCIAL

## 2022-05-11 ENCOUNTER — TELEPHONE (OUTPATIENT)
Dept: FAMILY MEDICINE | Facility: CLINIC | Age: 57
End: 2022-05-11
Payer: COMMERCIAL

## 2022-05-11 DIAGNOSIS — G35 MULTIPLE SCLEROSIS (H): ICD-10-CM

## 2022-05-11 DIAGNOSIS — F33.0 MILD EPISODE OF RECURRENT MAJOR DEPRESSIVE DISORDER (H): ICD-10-CM

## 2022-05-12 ENCOUNTER — TELEPHONE (OUTPATIENT)
Dept: NEUROLOGY | Facility: CLINIC | Age: 57
End: 2022-05-12
Payer: COMMERCIAL

## 2022-05-12 RX ORDER — TERIFLUNOMIDE 14 MG/1
14 TABLET, FILM COATED ORAL DAILY
Qty: 30 TABLET | Refills: 11 | Status: SHIPPED | OUTPATIENT
Start: 2022-05-12 | End: 2023-04-07

## 2022-05-12 NOTE — TELEPHONE ENCOUNTER
Prior Authorization Approval    Authorization Effective Date: 4/12/2022  Authorization Expiration Date: 5/12/2023  Medication:   Approved Dose/Quantity: 30  Reference #: [id number]   Insurance Company: [employer] - Phone [phone] Fax [phone]  Expected CoPay:       CoPay Card Available:      Foundation Assistance Needed:    Which Pharmacy is filling the prescription (Not needed for infusion/clinic administered): [facility] - [place] [address]  Pharmacy Notified:    Patient Notified:

## 2022-05-12 NOTE — TELEPHONE ENCOUNTER
Received refill request for Aubagio from [facility]; Patient was last seen in July 2021 and has no follow up appointment scheduled with Dr Hairston. [employer] message sent to pt to remind her to call and schedule. Refilled per [facility] refill protocol.    Kayc Fontana RN

## 2022-05-13 NOTE — TELEPHONE ENCOUNTER
Routing refill request to provider for review/approval because:  Failed refill protocol due to PHQ-9 score less than 5 in past 6 months & no recent or future visit within authorized provider's specialty

## 2022-05-16 RX ORDER — BUPROPION HYDROCHLORIDE 150 MG/1
150 TABLET, EXTENDED RELEASE ORAL DAILY
Qty: 30 TABLET | Refills: 0 | Status: SHIPPED | OUTPATIENT
Start: 2022-05-16 | End: 2022-06-08

## 2022-05-18 NOTE — TELEPHONE ENCOUNTER
Lvm for pt to schedule follow up appointment with primary provider. I provided pt with clinic phone number to call back and schedule.

## 2022-06-07 ENCOUNTER — TRANSFERRED RECORDS (OUTPATIENT)
Dept: HEALTH INFORMATION MANAGEMENT | Facility: CLINIC | Age: 57
End: 2022-06-07
Payer: COMMERCIAL

## 2022-06-08 ENCOUNTER — TELEPHONE (OUTPATIENT)
Dept: FAMILY MEDICINE | Facility: CLINIC | Age: 57
End: 2022-06-08
Payer: COMMERCIAL

## 2022-06-08 DIAGNOSIS — F90.9 ATTENTION DEFICIT HYPERACTIVITY DISORDER (ADHD), UNSPECIFIED ADHD TYPE: ICD-10-CM

## 2022-06-08 DIAGNOSIS — A60.00 GENITAL HERPES SIMPLEX, UNSPECIFIED SITE: ICD-10-CM

## 2022-06-08 DIAGNOSIS — F33.0 MILD EPISODE OF RECURRENT MAJOR DEPRESSIVE DISORDER (H): ICD-10-CM

## 2022-06-08 RX ORDER — DEXTROAMPHETAMINE SACCHARATE, AMPHETAMINE ASPARTATE, DEXTROAMPHETAMINE SULFATE AND AMPHETAMINE SULFATE 2.5; 2.5; 2.5; 2.5 MG/1; MG/1; MG/1; MG/1
10 TABLET ORAL DAILY
Qty: 30 TABLET | Refills: 0 | Status: CANCELLED | OUTPATIENT
Start: 2022-06-08

## 2022-06-08 RX ORDER — DEXTROAMPHETAMINE SACCHARATE, AMPHETAMINE ASPARTATE, DEXTROAMPHETAMINE SULFATE AND AMPHETAMINE SULFATE 1.25; 1.25; 1.25; 1.25 MG/1; MG/1; MG/1; MG/1
5 TABLET ORAL DAILY
Qty: 30 TABLET | Refills: 0 | Status: SHIPPED | OUTPATIENT
Start: 2022-06-08 | End: 2022-07-22

## 2022-06-08 RX ORDER — BUPROPION HYDROCHLORIDE 150 MG/1
150 TABLET, EXTENDED RELEASE ORAL DAILY
Qty: 90 TABLET | Refills: 0 | Status: SHIPPED | OUTPATIENT
Start: 2022-06-08 | End: 2022-09-13

## 2022-06-08 RX ORDER — VALACYCLOVIR HYDROCHLORIDE 500 MG/1
500 TABLET, FILM COATED ORAL DAILY
Qty: 90 TABLET | Refills: 1 | Status: SHIPPED | OUTPATIENT
Start: 2022-06-08 | End: 2022-11-04

## 2022-06-08 NOTE — TELEPHONE ENCOUNTER
"Requested Prescriptions   Pending Prescriptions Disp Refills    valACYclovir (VALTREX) 500 MG tablet 90 tablet 1     Sig: Take 1 tablet (500 mg) by mouth daily        Antivirals for Herpes Protocol Failed - 6/8/2022  1:33 PM        Failed - Normal serum creatinine on file in past 12 months     Recent Labs   Lab Test 05/07/21  1044 09/21/20  1241   CR  --  1.11*   CREAT 0.8  --        Ok to refill medication if creatinine is low          Passed - Patient is age 12 or older        Passed - Recent (12 mo) or future (30 days) visit within the authorizing provider's specialty     Patient has had an office visit with the authorizing provider or a provider within the authorizing providers department within the previous 12 mos or has a future within next 30 days. See \"Patient Info\" tab in inbasket, or \"Choose Columns\" in Meds & Orders section of the refill encounter.              Passed - Medication is active on med list           amphetamine-dextroamphetamine (ADDERALL) 5 MG tablet 30 tablet 0     Sig: Take 1 tablet (5 mg) by mouth daily At noon        There is no refill protocol information for this order        buPROPion (WELLBUTRIN SR) 150 MG 12 hr tablet 30 tablet 0     Sig: Take 1 tablet (150 mg) by mouth daily NEEDS VISIT FOR FURTHER REFILLS        SSRIs Protocol Failed - 6/8/2022  1:33 PM        Failed - PHQ-9 score less than 5 in past 6 months     Please review last PHQ-9 score.           Passed - Medication is Bupropion     If the medication is Bupropion (Wellbutrin), and the patient is taking for smoking cessation; OK to refill.            Passed - Medication is active on med list        Passed - Patient is age 18 or older        Passed - No active pregnancy on record        Passed - No positive pregnancy test in last 12 months        Passed - Recent (6 mo) or future (30 days) visit within the authorizing provider's specialty     Patient had office visit in the last 6 months or has a visit in the next 30 days " "with authorizing provider or within the authorizing provider's specialty.  See \"Patient Info\" tab in inbasket, or \"Choose Columns\" in Meds & Orders section of the refill encounter.                  "

## 2022-06-08 NOTE — TELEPHONE ENCOUNTER
Reason for Call:  Medication or medication refill:    Do you use a [facility]?  Name of the pharmacy and phone number for the current request:  [id number] - [place], [place] - [address] AT [facility] OF  [address]    Name of the medication requested: Adderall    Other request:     Can we leave a detailed message on this number? YES    Phone number patient can be reached at: Home number on file [phone] (home)    Best Time:     Call taken on 6/8/2022 at 8:52 AM by Najma Mota

## 2022-06-08 NOTE — TELEPHONE ENCOUNTER
Reason for Call:  Same Day Appointment, Requested Provider:  Jasmine Barrow    PCP: Jasmine Barrow    Reason for visit: annual pe/lab work/shingles inj/bone density/medication review. Pt will run out of meds in 2 wks.    Duration of symptoms:     Have you been treated for this in the past? Yes    Additional comments:     Can we leave a detailed message on this number? YES    Phone number patient can be reached at: Home number on file [phone] (home)    Best Time:     Call taken on 6/8/2022 at 8:45 AM by Najma Mota

## 2022-06-21 ENCOUNTER — MYC REFILL (OUTPATIENT)
Dept: FAMILY MEDICINE | Facility: CLINIC | Age: 57
End: 2022-06-21
Payer: COMMERCIAL

## 2022-06-21 DIAGNOSIS — F90.9 ATTENTION DEFICIT HYPERACTIVITY DISORDER (ADHD), UNSPECIFIED ADHD TYPE: ICD-10-CM

## 2022-06-21 RX ORDER — DEXTROAMPHETAMINE SACCHARATE, AMPHETAMINE ASPARTATE, DEXTROAMPHETAMINE SULFATE AND AMPHETAMINE SULFATE 2.5; 2.5; 2.5; 2.5 MG/1; MG/1; MG/1; MG/1
10 TABLET ORAL DAILY
Qty: 30 TABLET | Refills: 0 | Status: SHIPPED | OUTPATIENT
Start: 2022-06-21 | End: 2022-07-22

## 2022-06-21 NOTE — TELEPHONE ENCOUNTER
Routing refill request to provider for review/approval because:  Drug not on the [facility] refill protocol   Meredith Norwood BSN, RN

## 2022-06-30 ENCOUNTER — LAB (OUTPATIENT)
Dept: LAB | Facility: CLINIC | Age: 57
End: 2022-06-30
Payer: COMMERCIAL

## 2022-06-30 DIAGNOSIS — Z13.1 SCREENING FOR DIABETES MELLITUS: ICD-10-CM

## 2022-06-30 DIAGNOSIS — Z13.220 SCREENING FOR HYPERLIPIDEMIA: ICD-10-CM

## 2022-06-30 DIAGNOSIS — Z13.21 ENCOUNTER FOR VITAMIN DEFICIENCY SCREENING: ICD-10-CM

## 2022-06-30 DIAGNOSIS — I10 BENIGN ESSENTIAL HYPERTENSION: ICD-10-CM

## 2022-06-30 LAB
ALBUMIN SERPL-MCNC: 3.9 G/DL (ref 3.4–5)
ALP SERPL-CCNC: 71 U/L (ref 40–150)
ALT SERPL W P-5'-P-CCNC: 68 U/L (ref 0–50)
ANION GAP SERPL CALCULATED.3IONS-SCNC: 5 MMOL/L (ref 3–14)
AST SERPL W P-5'-P-CCNC: 52 U/L (ref 0–45)
BILIRUB SERPL-MCNC: 0.5 MG/DL (ref 0.2–1.3)
BUN SERPL-MCNC: 22 MG/DL (ref 7–30)
CALCIUM SERPL-MCNC: 9.4 MG/DL (ref 8.5–10.1)
CHLORIDE BLD-SCNC: 107 MMOL/L (ref 94–109)
CHOLEST SERPL-MCNC: 215 MG/DL
CO2 SERPL-SCNC: 28 MMOL/L (ref 20–32)
CREAT SERPL-MCNC: 0.88 MG/DL (ref 0.52–1.04)
CREAT UR-MCNC: 101 MG/DL
FASTING STATUS PATIENT QL REPORTED: YES
GFR SERPL CREATININE-BSD FRML MDRD: 77 ML/MIN/1.73M2
GLUCOSE BLD-MCNC: 93 MG/DL (ref 70–99)
HDLC SERPL-MCNC: 95 MG/DL
LDLC SERPL CALC-MCNC: 105 MG/DL
MICROALBUMIN UR-MCNC: 24 MG/L
MICROALBUMIN/CREAT UR: 23.76 MG/G CR (ref 0–25)
NONHDLC SERPL-MCNC: 120 MG/DL
POTASSIUM BLD-SCNC: 5.1 MMOL/L (ref 3.4–5.3)
PROT SERPL-MCNC: 7.1 G/DL (ref 6.8–8.8)
SODIUM SERPL-SCNC: 140 MMOL/L (ref 133–144)
TRIGL SERPL-MCNC: 77 MG/DL

## 2022-06-30 PROCEDURE — 80053 COMPREHEN METABOLIC PANEL: CPT

## 2022-06-30 PROCEDURE — 36415 COLL VENOUS BLD VENIPUNCTURE: CPT

## 2022-06-30 PROCEDURE — 80061 LIPID PANEL: CPT

## 2022-06-30 PROCEDURE — 82306 VITAMIN D 25 HYDROXY: CPT

## 2022-06-30 PROCEDURE — 82043 UR ALBUMIN QUANTITATIVE: CPT

## 2022-07-02 LAB
DEPRECATED CALCIDIOL+CALCIFEROL SERPL-MC: <69 UG/L (ref 20–75)
VITAMIN D2 SERPL-MCNC: <5 UG/L
VITAMIN D3 SERPL-MCNC: 64 UG/L

## 2022-07-19 ENCOUNTER — IMMUNIZATION (OUTPATIENT)
Dept: NURSING | Facility: CLINIC | Age: 57
End: 2022-07-19
Payer: COMMERCIAL

## 2022-07-19 PROCEDURE — 91306 COVID-19,PF,MODERNA (18+ YRS BOOSTER .25ML): CPT

## 2022-07-19 PROCEDURE — 0064A COVID-19,PF,MODERNA (18+ YRS BOOSTER .25ML): CPT

## 2022-07-22 ENCOUNTER — MYC REFILL (OUTPATIENT)
Dept: FAMILY MEDICINE | Facility: CLINIC | Age: 57
End: 2022-07-22

## 2022-07-22 DIAGNOSIS — F90.9 ATTENTION DEFICIT HYPERACTIVITY DISORDER (ADHD), UNSPECIFIED ADHD TYPE: ICD-10-CM

## 2022-07-22 RX ORDER — DEXTROAMPHETAMINE SACCHARATE, AMPHETAMINE ASPARTATE, DEXTROAMPHETAMINE SULFATE AND AMPHETAMINE SULFATE 1.25; 1.25; 1.25; 1.25 MG/1; MG/1; MG/1; MG/1
5 TABLET ORAL DAILY
Qty: 30 TABLET | Refills: 0 | Status: SHIPPED | OUTPATIENT
Start: 2022-07-22 | End: 2022-09-09

## 2022-07-22 RX ORDER — DEXTROAMPHETAMINE SACCHARATE, AMPHETAMINE ASPARTATE, DEXTROAMPHETAMINE SULFATE AND AMPHETAMINE SULFATE 2.5; 2.5; 2.5; 2.5 MG/1; MG/1; MG/1; MG/1
10 TABLET ORAL DAILY
Qty: 30 TABLET | Refills: 0 | Status: SHIPPED | OUTPATIENT
Start: 2022-07-22 | End: 2022-09-09

## 2022-08-23 NOTE — TELEPHONE ENCOUNTER
Detail Level: Detailed Given one month- assist with scheduling follow up with primary care provider of her choice.  When I last talked to her she planned to follow up with Dr. Barrow - video visit ok

## 2022-08-26 LAB — NONINV COLON CA DNA+OCC BLD SCRN STL QL: POSITIVE

## 2022-08-28 DIAGNOSIS — R19.5 POSITIVE COLORECTAL CANCER SCREENING USING COLOGUARD TEST: ICD-10-CM

## 2022-08-28 DIAGNOSIS — Z12.11 SCREENING FOR COLON CANCER: Primary | ICD-10-CM

## 2022-08-28 NOTE — RESULT ENCOUNTER NOTE
FYI to PCP - TC please make sure patient has read message    Dear Liyah  Your cologuard test was positive indicating that you need a colonoscopy to rule out colon cancer.   I have placed a referral. Please schedule as soon as possible.   Please call or MyChart my office with any questions or concerns.   Jaki Blas, PAC

## 2022-08-29 ENCOUNTER — TELEPHONE (OUTPATIENT)
Dept: GASTROENTEROLOGY | Facility: CLINIC | Age: 57
End: 2022-08-29

## 2022-08-29 NOTE — TELEPHONE ENCOUNTER
Screening Questions    BlueKIND OF PREP RedLOCATION [review exclusion criteria] GreenSEDATION TYPE      1. Are you active on mychart? Y    2. What insurance is in the chart? [employer]     3.   Ordering/Referring Provider: Jaik Blas PA-C    4. BMI   (If greater than 40 review exclusion criteria [PAC APPT IF [MAC] @ U)  19.0  [If yes, BMI OVER 40-EXTENDED PREP]      **(Sedation review/consideration needed)**  Do you have a legal guardian or Medical Power of    and/or are you able to give consent for your medical care?     Y    5. Have you had a positive covid test in the last 90 days?   N -     6.  Are you currently on dialysis?   N [ If yes, G-PREP & HOSPITAL setting ONLY]     7.  Do you have chronic kidney disease?  N [ If yes, G-PREP ]    8.   Do you have a diagnosis of diabetes?   N   [ If yes, G-PREP ]    9.  On a regular basis do you go 3-5 days between bowel movements?   N   [ If yes, EXTENDED PREP]    10.  Are you taking any prescription pain medications on a routine schedule?    N -  [ If yes, EXTENDED PREP] [If yes, MAC]      11.   Do you have any chemical dependencies such as alcohol, street drugs, or methadone?    N [If yes, MAC]    12.   Do you have any history of post-traumatic stress syndrome, severe anxiety or history of psychosis?    N  [If yes, MAC]    13.  [FEMALES] Are you currently pregnant? N    If yes, how many weeks?       Respiratory/Heart Screening:  [If yes to any of the following HOSPITAL setting only]     14. Do you have Pulmonary Hypertension [Lungs]?   N       15. Do you have UNCONTROLLED asthma?   N     16.  Do you use daily home oxygen?  N      17. Do you have mod to severe Obstructive Sleep Apnea?         (OKAY @  [facility]  SH  PH  RI  MG - if pt is not on OXYGEN)  N      18.   Have you had a heart or lung transplant?   N      19.   Have you had a stroke or Transient ischemic attack (TIA - aka  mini stroke ) within 6 months?  (If yes, please review exclusion  criteria)  N     20.   In the past 6 months, have you had any heart related issues including cardiomyopathy or heart attack?   N           If yes, did it require cardiac stenting or other implantable device?   NA      21.   Do you have any implantable devices in your body (pacemaker, defib, LVAD)? (If yes, please review exclusion criteria)  N     22.  Do you take the medication Phentermine?  NO        23. Do you take nitroglycerin?   N           If yes, how often? NA  (if yes, HOSPITAL setting ONLY)    24.  Are you currently taking any blood thinners?    [If yes, INFORM patient to [facility] w/ ORDERING PROVIDER FOR BRIDGING INSTRUCTIONS]     N    25.   Do you transfer independently?                (If NO, please HOSPITAL setting ONLY)  Y    26.   Preferred LOCAL Pharmacy for Pre Prescription:      [id number] [place] - [address] AT [facility] [address]    Scheduling Details  (Please ask for phone number if not scheduled by patient)      Caller : Liyah Jernigan    Date of Procedure: 10/19  Surgeon: LEN  Location: [facility]        Sedation Type: CS l PER PROTOCOL  Conscious Sedation- Needs  for 6 hours after the procedure  MAC/General-Needs  for 24 hours after procedure    N :[Pre-op Required] at [facility]  MG and OR for MAC sedation   (advise patient they will need a pre-op WITH IN 30 DAYS of procedure date)     Type of Procedure Scheduled:   Lower Endoscopy [Colonoscopy]    Which Colonoscopy Prep was Sent?:   GOLYTELY - MAGNESIUM CITRATE RECALL    KHORUTS CF PATIENTS & GROEN'S PATIENTS NEEDS EXTENDED PREP       Informed patient they will need an adult  Y  Cannot take any type of public or medical transportation alone    Pre-Procedure Covid test to be completed at [facility] or Externally: Y  **INFORMED OF HOME TESTING & LAB OPTION**        Confirmed Nurse will call to complete assessment Y    Additional comments:  Wants to be seen by Dr. Lynn

## 2022-09-09 ENCOUNTER — MYC REFILL (OUTPATIENT)
Dept: FAMILY MEDICINE | Facility: CLINIC | Age: 57
End: 2022-09-09

## 2022-09-09 DIAGNOSIS — F90.9 ATTENTION DEFICIT HYPERACTIVITY DISORDER (ADHD), UNSPECIFIED ADHD TYPE: ICD-10-CM

## 2022-09-09 RX ORDER — DEXTROAMPHETAMINE SACCHARATE, AMPHETAMINE ASPARTATE, DEXTROAMPHETAMINE SULFATE AND AMPHETAMINE SULFATE 2.5; 2.5; 2.5; 2.5 MG/1; MG/1; MG/1; MG/1
10 TABLET ORAL DAILY
Qty: 30 TABLET | Refills: 0 | Status: SHIPPED | OUTPATIENT
Start: 2022-09-09 | End: 2022-10-12

## 2022-09-09 RX ORDER — DEXTROAMPHETAMINE SACCHARATE, AMPHETAMINE ASPARTATE, DEXTROAMPHETAMINE SULFATE AND AMPHETAMINE SULFATE 1.25; 1.25; 1.25; 1.25 MG/1; MG/1; MG/1; MG/1
5 TABLET ORAL DAILY
Qty: 30 TABLET | Refills: 0 | Status: SHIPPED | OUTPATIENT
Start: 2022-09-09 | End: 2022-10-12

## 2022-09-09 NOTE — TELEPHONE ENCOUNTER
Refilled x 1.  Needs visit (OFV or video visit) before any further refill.  Due for aDHD follow up

## 2022-09-13 ENCOUNTER — OFFICE VISIT (OUTPATIENT)
Dept: FAMILY MEDICINE | Facility: CLINIC | Age: 57
End: 2022-09-13
Payer: COMMERCIAL

## 2022-09-13 VITALS
TEMPERATURE: 97.4 F | DIASTOLIC BLOOD PRESSURE: 64 MMHG | WEIGHT: 108.7 LBS | HEART RATE: 65 BPM | SYSTOLIC BLOOD PRESSURE: 110 MMHG | BODY MASS INDEX: 18.11 KG/M2 | RESPIRATION RATE: 16 BRPM | HEIGHT: 65 IN | OXYGEN SATURATION: 98 %

## 2022-09-13 DIAGNOSIS — F51.01 PRIMARY INSOMNIA: ICD-10-CM

## 2022-09-13 DIAGNOSIS — I10 BENIGN ESSENTIAL HYPERTENSION: ICD-10-CM

## 2022-09-13 DIAGNOSIS — G35 MS (MULTIPLE SCLEROSIS) (H): ICD-10-CM

## 2022-09-13 DIAGNOSIS — F33.0 MILD EPISODE OF RECURRENT MAJOR DEPRESSIVE DISORDER (H): ICD-10-CM

## 2022-09-13 DIAGNOSIS — G35 MULTIPLE SCLEROSIS (H): ICD-10-CM

## 2022-09-13 DIAGNOSIS — E28.39 MENOPAUSE OVARIAN FAILURE: ICD-10-CM

## 2022-09-13 DIAGNOSIS — Z00.00 ROUTINE GENERAL MEDICAL EXAMINATION AT A HEALTH CARE FACILITY: Primary | ICD-10-CM

## 2022-09-13 DIAGNOSIS — R79.89 ELEVATED LFTS: ICD-10-CM

## 2022-09-13 DIAGNOSIS — Z12.31 ENCOUNTER FOR SCREENING MAMMOGRAM FOR BREAST CANCER: ICD-10-CM

## 2022-09-13 DIAGNOSIS — G43.109 MIGRAINE WITH AURA AND WITHOUT STATUS MIGRAINOSUS, NOT INTRACTABLE: ICD-10-CM

## 2022-09-13 DIAGNOSIS — R20.2 PARESTHESIA: ICD-10-CM

## 2022-09-13 LAB
ALBUMIN SERPL-MCNC: 3.8 G/DL (ref 3.4–5)
ALP SERPL-CCNC: 70 U/L (ref 40–150)
ALT SERPL W P-5'-P-CCNC: 47 U/L (ref 0–50)
AST SERPL W P-5'-P-CCNC: 33 U/L (ref 0–45)
BILIRUB DIRECT SERPL-MCNC: 0.1 MG/DL (ref 0–0.2)
BILIRUB SERPL-MCNC: 0.3 MG/DL (ref 0.2–1.3)
PROT SERPL-MCNC: 6.8 G/DL (ref 6.8–8.8)

## 2022-09-13 PROCEDURE — 80076 HEPATIC FUNCTION PANEL: CPT | Performed by: NURSE PRACTITIONER

## 2022-09-13 PROCEDURE — 99396 PREV VISIT EST AGE 40-64: CPT | Performed by: NURSE PRACTITIONER

## 2022-09-13 PROCEDURE — 36415 COLL VENOUS BLD VENIPUNCTURE: CPT | Performed by: NURSE PRACTITIONER

## 2022-09-13 RX ORDER — BUPROPION HYDROCHLORIDE 150 MG/1
150 TABLET, EXTENDED RELEASE ORAL DAILY
Qty: 90 TABLET | Refills: 3 | Status: SHIPPED | OUTPATIENT
Start: 2022-09-13 | End: 2023-08-22

## 2022-09-13 RX ORDER — TRAZODONE HYDROCHLORIDE 50 MG/1
TABLET, FILM COATED ORAL
Qty: 90 TABLET | Refills: 3 | Status: SHIPPED | OUTPATIENT
Start: 2022-09-13 | End: 2023-08-22

## 2022-09-13 RX ORDER — GABAPENTIN 300 MG/1
CAPSULE ORAL
Qty: 90 CAPSULE | Refills: 3 | Status: SHIPPED | OUTPATIENT
Start: 2022-09-13 | End: 2023-08-22

## 2022-09-13 RX ORDER — METOPROLOL SUCCINATE 25 MG/1
25 TABLET, EXTENDED RELEASE ORAL 2 TIMES DAILY
Qty: 180 TABLET | Refills: 3 | Status: SHIPPED | OUTPATIENT
Start: 2022-09-13 | End: 2023-08-22

## 2022-09-13 RX ORDER — SUMATRIPTAN 100 MG/1
100 TABLET, FILM COATED ORAL DAILY PRN
Qty: 30 TABLET | Refills: 1 | Status: SHIPPED | OUTPATIENT
Start: 2022-09-13 | End: 2023-08-22

## 2022-09-13 ASSESSMENT — ENCOUNTER SYMPTOMS
EYE PAIN: 0
DIARRHEA: 0
WEAKNESS: 0
HEMATURIA: 0
FEVER: 0
PALPITATIONS: 0
DYSURIA: 0
COUGH: 0
SHORTNESS OF BREATH: 0
NERVOUS/ANXIOUS: 0
HEADACHES: 0
NAUSEA: 0
FREQUENCY: 0
HEMATOCHEZIA: 0
PARESTHESIAS: 0
MYALGIAS: 0
ABDOMINAL PAIN: 0
HEARTBURN: 0
JOINT SWELLING: 0
SORE THROAT: 0
DIZZINESS: 0
CHILLS: 0
BREAST MASS: 0
CONSTIPATION: 0

## 2022-09-13 ASSESSMENT — PATIENT HEALTH QUESTIONNAIRE - PHQ9
SUM OF ALL RESPONSES TO PHQ QUESTIONS 1-9: 2
10. IF YOU CHECKED OFF ANY PROBLEMS, HOW DIFFICULT HAVE THESE PROBLEMS MADE IT FOR YOU TO DO YOUR WORK, TAKE CARE OF THINGS AT HOME, OR GET ALONG WITH OTHER PEOPLE: NOT DIFFICULT AT ALL
SUM OF ALL RESPONSES TO PHQ QUESTIONS 1-9: 2

## 2022-09-13 ASSESSMENT — PAIN SCALES - GENERAL: PAINLEVEL: NO PAIN (0)

## 2022-09-13 NOTE — PATIENT INSTRUCTIONS
PLAN:   1.   Symptomatic therapy suggested: Increase calcium to 1000mg and 1000iu Vit D  Continue current medications as prescribed.   2.  Orders Placed This Encounter   Medications    buPROPion (WELLBUTRIN SR) 150 MG 12 hr tablet     Sig: Take 1 tablet (150 mg) by mouth daily     Dispense:  90 tablet     Refill:  3    gabapentin (NEURONTIN) 300 MG capsule     Sig: Take 1 capsule by mouth every night as needed.     Dispense:  90 capsule     Refill:  3    metoprolol succinate ER (TOPROL XL) 25 MG 24 hr tablet     Sig: Take 1 tablet (25 mg) by mouth 2 times daily     Dispense:  180 tablet     Refill:  3    SUMAtriptan (IMITREX) 100 MG tablet     Sig: Take 1 tablet (100 mg) by mouth daily as needed for migraine     Dispense:  30 tablet     Refill:  1    traZODone (DESYREL) 50 MG tablet     Sig: TAKE 1 TABLET(50 MG) BY MOUTH AT BEDTIME     Dispense:  90 tablet     Refill:  3     Orders Placed This Encounter   Procedures    REVIEW OF HEALTH MAINTENANCE PROTOCOL ORDERS    *MA Screening Digital Bilateral    DX Hip/Pelvis/Spine       3. Patient needs to follow up in if no improvement,or sooner if worsening of symptoms or other symptoms develop.  FURTHER TESTING:       - DXA (bone density) scan       - mammogram  Will follow up and/or notify patient of  results via My Chart to determine further need for followup  Follow up office visit in one year for annual health maintenance exam, sooner PRN.

## 2022-09-13 NOTE — PROGRESS NOTES
SUBJECTIVE:   CC: Liyah is an 57 year old who presents for preventive health visit.     Patient has been advised of split billing requirements and indicates understanding: Yes     Healthy Habits:     Getting at least 3 servings of Calcium per day:  Yes    Bi-annual eye exam:  Yes    Dental care twice a year:  Yes    Sleep apnea or symptoms of sleep apnea:  None    Diet:  Regular (no restrictions)    Frequency of exercise:  6-7 days/week    Duration of exercise:  45-60 minutes    Taking medications regularly:  Yes    Medication side effects:  None    PHQ-2 Total Score: 0    Additional concerns today:  Yes    Today's PHQ-2 Score:   PHQ-2 ( 1999 Pf[employer]) 9/13/2022   Q1: Little interest or pleasure in doing things 0   Q2: Feeling down, depressed or hopeless 0   PHQ-2 Score 0   PHQ-2 Total Score (12-17 Years)- Positive if 3 or more points; Administer PHQ-A if positive -   Q1: Little interest or pleasure in doing things Not at all   Q2: Feeling down, depressed or hopeless Not at all   PHQ-2 Score 0        Answers for HPI/ROS submitted by the patient on 9/13/2022  If you checked off any problems, how difficult have these problems made it for you to do your work, take care of things at home, or get along with other people?: Not difficult at all  PHQ9 TOTAL SCORE: 2    Abuse: Current or Past (Physical, Sexual or Emotional) - No  Do you feel safe in your environment? Yes    Have you ever done Advance Care Planning? (For example, a Health Directive, POLST, or a discussion with a medical provider or your loved ones about your wishes): No, advance care planning information given to patient to review.  Patient plans to discuss their wishes with loved ones or provider.      Social History     Tobacco Use     Smoking status: Never Smoker     Smokeless tobacco: Never Used   Substance Use Topics     Alcohol use: No     Alcohol/week: 0.0 standard drinks     If you drink alcohol do you typically have >3 drinks per day or >7 drinks per  week? No    Alcohol Use 2022   Prescreen: >3 drinks/day or >7 drinks/week? No   Prescreen: >3 drinks/day or >7 drinks/week? -       Reviewed orders with patient.  Reviewed health maintenance and updated orders accordingly - Yes  Labs reviewed in EPIC  BP Readings from Last 3 Encounters:   22 110/64   21 (!) 138/94   21 105/70    Wt Readings from Last 3 Encounters:   22 49.3 kg (108 lb 11.2 oz)   21 51.9 kg (114 lb 8 oz)   21 52.6 kg (116 lb)                  Patient Active Problem List   Diagnosis     Attention deficit hyperactivity disorder (ADHD), unspecified ADHD type     MS (multiple sclerosis) (H)     Varicose vein     Restless leg syndrome     Mild episode of recurrent major depressive disorder (H)     Genital herpes simplex, unspecified site     Benign essential hypertension     Primary insomnia     Family history of alpha 1 antitrypsin deficiency     Past Surgical History:   Procedure Laterality Date     ENT SURGERY      tympanoplasty     GYN SURGERY      laparoscopies (5) laparotomies (2) for ectopic       Social History     Tobacco Use     Smoking status: Never Smoker     Smokeless tobacco: Never Used   Substance Use Topics     Alcohol use: No     Alcohol/week: 0.0 standard drinks     Family History   Problem Relation Age of Onset     Diabetes Mother      Hypertension Mother      Hyperlipidemia Mother      Depression Mother      Substance Abuse Mother      Asthma Mother      Thyroid Disease Mother      Obesity Mother      Anxiety Disorder Mother      Diabetes Father              Hypertension Father      Hyperlipidemia Father      Substance Abuse Father      Obesity Father      Breast Cancer Maternal Grandmother         in 50s     Asthma Maternal Grandmother      Cerebrovascular Disease Maternal Grandfather         he had a few     Substance Abuse Maternal Grandfather      Substance Abuse Paternal Grandfather      Colon Cancer No family hx of           Current Outpatient Medications   Medication Sig Dispense Refill     amphetamine-dextroamphetamine (ADDERALL) 10 MG tablet Take 1 tablet (10 mg) by mouth daily In AM 30 tablet 0     amphetamine-dextroamphetamine (ADDERALL) 5 MG tablet Take 1 tablet (5 mg) by mouth daily At noon 30 tablet 0     biotin 2.5 mg/mL SUSP Take 20 mg by mouth daily       buPROPion (WELLBUTRIN SR) 150 MG 12 hr tablet Take 1 tablet (150 mg) by mouth daily 90 tablet 3     gabapentin (NEURONTIN) 300 MG capsule Take 1 capsule by mouth every night as needed. 90 capsule 3     metoprolol succinate ER (TOPROL XL) 25 MG 24 hr tablet Take 1 tablet (25 mg) by mouth 2 times daily 180 tablet 3     SUMAtriptan (IMITREX) 100 MG tablet Take 1 tablet (100 mg) by mouth daily as needed for migraine 30 tablet 1     teriflunomide (AUBAGIO) 14 MG tablet Take 1 tablet (14 mg) by mouth daily 30 tablet 11     traZODone (DESYREL) 50 MG tablet TAKE 1 TABLET(50 MG) BY MOUTH AT BEDTIME 90 tablet 3     valACYclovir (VALTREX) 500 MG tablet Take 1 tablet (500 mg) by mouth daily 90 tablet 1     No Known Allergies    Breast Cancer Screening:    FHS-7:   Breast CA Risk Assessment (FHS-7) 12/3/2021 9/13/2022   Did any of your first-degree relatives have breast or ovarian cancer? No Yes   Did any of your relatives have bilateral breast cancer? Yes Yes   Did any man in your family have breast cancer? No -   Did any woman in your family have breast and ovarian cancer? No -   Did any woman in your family have breast cancer before age 50 y? No -   Do you have 2 or more relatives with breast and/or ovarian cancer? No -   Do you have 2 or more relatives with breast and/or bowel cancer? No -       Mammogram Screening: Recommended mammography every 1-2 years with patient discussion and risk factor consideration  Pertinent mammograms are reviewed under the imaging tab.    History of abnormal Pap smear: NO - age 30-65 PAP every 5 years with negative HPV co-testing recommended  PAP /  "HPV Latest Ref Rng & Units 9/21/2020 11/4/2015   PAP (Historical) - NIL NIL   HPV16 NEG:Negative Negative Negative   HPV18 NEG:Negative Negative Negative   HRHPV NEG:Negative Negative Negative     Reviewed and updated as needed this visit by clinical staff   Tobacco  Allergies  Meds   Med Hx  Surg Hx  Fam Hx  Soc Hx          Reviewed and updated as needed this visit by Provider                   Past Medical History:   Diagnosis Date     Benign essential hypertension 05/29/2018     Closed fracture of arm 03/15/2022      Past Surgical History:   Procedure Laterality Date     ENT SURGERY  2005    tympanoplasty     GYN SURGERY  1990    laparoscopies (5) laparotomies (2) for ectopic       Review of Systems   Constitutional: Negative for chills and fever.   HENT: Negative for congestion, ear pain, hearing loss and sore throat.    Eyes: Negative for pain and visual disturbance.   Respiratory: Negative for cough and shortness of breath.    Cardiovascular: Negative for chest pain, palpitations and peripheral edema.   Gastrointestinal: Negative for abdominal pain, constipation, diarrhea, heartburn, hematochezia and nausea.   Breasts:  Negative for tenderness, breast mass and discharge.   Genitourinary: Negative for dysuria, frequency, genital sores, hematuria, pelvic pain, urgency, vaginal bleeding and vaginal discharge.   Musculoskeletal: Negative for joint swelling and myalgias.   Skin: Negative for rash.   Neurological: Negative for dizziness, weakness, headaches and paresthesias.   Psychiatric/Behavioral: Negative for mood changes. The patient is not nervous/anxious.        OBJECTIVE:   /64   Pulse 65   Temp 97.4  F (36.3  C) (Tympanic)   Resp 16   Ht 1.651 m (5' 5\")   Wt 49.3 kg (108 lb 11.2 oz)   LMP 05/25/2020 (Exact Date)   SpO2 98%   BMI 18.09 kg/m    Physical Exam  GENERAL APPEARANCE: healthy, alert and no distress  EYES: Eyes grossly normal to inspection and conjunctivae and sclerae " normal  HENT: ear canals and TM's normal, nose and mouth without ulcers or lesions, oropharynx clear and oral mucous membranes moist  NECK: no adenopathy, no asymmetry, masses, or scars and thyroid normal to palpation  RESP: lungs clear to auscultation - no rales, rhonchi or wheezes  BREAST: normal without masses, tenderness or nipple discharge and no palpable axillary masses or adenopathy  CV: regular rates and rhythm, no murmur, click or rub, no peripheral edema and peripheral pulses strong  ABDOMEN: soft, nontender, no hepatosplenomegaly, no masses and bowel sounds normal   (female): normal female external genitalia, normal urethral meatus, vaginal mucosal atrophy noted, normal cervix, adnexae, and uterus without masses or abnormal discharge  MS: no musculoskeletal defects are noted and gait is age appropriate without ataxia  SKIN: no suspicious lesions or rashes  NEURO: Normal strength and tone, sensory exam grossly normal, mentation intact and speech normal  PSYCH: mentation appears normal and affect normal/bright    Diagnostic Test Results:   Diagnostic Test Results:  Labs reviewed in Epic  Lab on 06/30/2022   Component Date Value Ref Range Status     Sodium 06/30/2022 140  133 - 144 mmol/L Final     Potassium 06/30/2022 5.1  3.4 - 5.3 mmol/L Final     Chloride 06/30/2022 107  94 - 109 mmol/L Final     Carbon Dioxide (CO2) 06/30/2022 28  20 - 32 mmol/L Final     Anion Gap 06/30/2022 5  3 - 14 mmol/L Final     Urea Nitrogen 06/30/2022 22  7 - 30 mg/dL Final     Creatinine 06/30/2022 0.88  0.52 - 1.04 mg/dL Final     Calcium 06/30/2022 9.4  8.5 - 10.1 mg/dL Final     Glucose 06/30/2022 93  70 - 99 mg/dL Final     Alkaline Phosphatase 06/30/2022 71  40 - 150 U/L Final     AST 06/30/2022 52 (A) 0 - 45 U/L Final     ALT 06/30/2022 68 (A) 0 - 50 U/L Final     Protein Total 06/30/2022 7.1  6.8 - 8.8 g/dL Final     Albumin 06/30/2022 3.9  3.4 - 5.0 g/dL Final     Bilirubin Total 06/30/2022 0.5  0.2 - 1.3 mg/dL Final      GFR Estimate 06/30/2022 77  >60 mL/min/1.73m2 Final    Effective December 21, 2021 eGFRcr in adults is calculated using the 2021 CKD-EPI creatinine equation which includes age and gender (Sharon et al., NE, DOI: 10.1056/MAKZqb4048403)     Cholesterol 06/30/2022 215 (A) <200 mg/dL Final     Triglycerides 06/30/2022 77  <150 mg/dL Final     Direct Measure HDL 06/30/2022 95  >=50 mg/dL Final     LDL Cholesterol Calculated 06/30/2022 105 (A) <=100 mg/dL Final     Non HDL Cholesterol 06/30/2022 120  <130 mg/dL Final     Patient Fasting > 8hrs? 06/30/2022 Yes   Final     25 OH Vitamin D2 06/30/2022 <5  ug/L Final     25 OH Vitamin D3 06/30/2022 64  ug/L Final     25 OH Vit D Total 06/30/2022 <69  20 - 75 ug/L Final    Season, race, dietary intake, and treatment affect the concentration of 25-hydroxy-Vitamin D. Values may decrease during winter months and increase during summer months. Values 20-29 ug/L may indicate Vitamin D insufficiency and values <20 ug/L may indicate Vitamin D deficiency.     Creatinine Urine mg/dL 06/30/2022 101  mg/dL Final     Albumin Urine mg/L 06/30/2022 24  mg/L Final     Albumin Urine mg/g Cr 06/30/2022 23.76  0.00 - 25.00 mg/g Cr Final         ASSESSMENT/PLAN:   Liyah was seen today for physical.    Diagnoses and all orders for this visit:    Routine general medical examination at a health care facility  -     REVIEW OF HEALTH MAINTENANCE PROTOCOL ORDERS    Benign essential hypertension  -     metoprolol succinate ER (TOPROL XL) 25 MG 24 hr tablet; Take 1 tablet (25 mg) by mouth 2 times daily  The current medical regimen is effective.  Continue current medication regimen unchanged.    MS (multiple sclerosis) (H)  FOLLOW UP WITH SPECIALIST :Neurology    Mild episode of recurrent major depressive disorder (H)  -     buPROPion (WELLBUTRIN SR) 150 MG 12 hr tablet; Take 1 tablet (150 mg) by mouth daily  The current medical regimen is effective.  Continue current medication regimen  unchanged.    Multiple sclerosis (H)  -     gabapentin (NEURONTIN) 300 MG capsule; Take 1 capsule by mouth every night as needed.    Paresthesia  -     gabapentin (NEURONTIN) 300 MG capsule; Take 1 capsule by mouth every night as needed.  The current medical regimen is effective.  Continue current medication regimen unchanged.    Migraine with aura and without status migrainosus, not intractable  -     SUMAtriptan (IMITREX) 100 MG tablet; Take 1 tablet (100 mg) by mouth daily as needed for migraine  The current medical regimen is effective.  Continue current medication regimen unchanged.    Primary insomnia  -     traZODone (DESYREL) 50 MG tablet; TAKE 1 TABLET(50 MG) BY MOUTH AT BEDTIME  The current medical regimen is effective.  Continue current medication regimen unchanged.    Encounter for screening mammogram for breast cancer  -     *MA Screening Digital Bilateral; Future    Menopause ovarian failure  -     DX Hip/Pelvis/Spine; Future    Elevated LFTs  -     Hepatic function panel; Future  -     Hepatic function panel    PLAN: The current medical regimen is effective.  Continue current medication regimen unchanged  Patient needs to follow up in if no improvement,or sooner if worsening of symptoms or other symptoms develop.  FURTHER TESTING:       - DXA (bone density) scan       - mammogram  Will follow up and/or notify patient of  results via My Chart to determine further need for followup  Follow up office visit in one year for annual health maintenance exam, sooner PRN.    Patient has been advised of split billing requirements and indicates understanding: Yes    COUNSELING:  Reviewed preventive health counseling, as reflected in patient instructions  Special attention given to:        Regular exercise       Healthy diet/nutrition       Vision screening       Osteoporosis prevention/bone health       Safe sex practices/STD prevention       Colorectal Cancer Screening       Consider Hep C screening for all  "patients one time for ages 18-79 years       The 10-year ASCVD risk score (Jose Elias STAPLES Jr., et al., 2013) is: 1.3%    Values used to calculate the score:      Age: 57 years      Sex: Female      Is Non- : No      Diabetic: No      Tobacco smoker: No      Systolic Blood Pressure: 110 mmHg      Is BP treated: Yes      HDL Cholesterol: 95 mg/dL      Total Cholesterol: 215 mg/dL       Advance Care Planning    Estimated body mass index is 18.09 kg/m  as calculated from the following:    Height as of this encounter: 1.651 m (5' 5\").    Weight as of this encounter: 49.3 kg (108 lb 11.2 oz).        She reports that she has never smoked. She has never used smokeless tobacco.      Counseling Resources:  ATP IV Guidelines  Pooled Cohorts Equation Calculator  Breast Cancer Risk Calculator  BRCA-Related Cancer Risk Assessment: FHS-7 Tool  FRAX Risk Assessment  ICSI Preventive Guidelines  Dietary Guidelines for Americans, 2010  USDA's MyPlate  ASA Prophylaxis  Lung CA Screening    MARRY Mehta [facility]"

## 2022-09-14 NOTE — RESULT ENCOUNTER NOTE
Christos Jernigan,    Attached are your test results.  -Liver and gallbladder tests (ALT,AST, Alk phos,bilirubin) are normal.   Please contact us if you have any questions.    Abby Castellano, CNP

## 2022-09-16 ENCOUNTER — ANCILLARY PROCEDURE (OUTPATIENT)
Dept: BONE DENSITY | Facility: CLINIC | Age: 57
End: 2022-09-16
Attending: NURSE PRACTITIONER
Payer: COMMERCIAL

## 2022-09-16 DIAGNOSIS — E28.39 MENOPAUSE OVARIAN FAILURE: ICD-10-CM

## 2022-09-16 PROCEDURE — 77080 DXA BONE DENSITY AXIAL: CPT | Performed by: RADIOLOGY

## 2022-09-19 NOTE — RESULT ENCOUNTER NOTE
Christos Jernigan,    Attached are your test results.  The bone density test shows osteopenia. This is an intermediate category that is in between normal and osteoporosis.  People with osteopenia should work on taking in 2987-1530 mg of calcium with vitamin D daily. They should also be getting daily weight bearing exercise (walking works)    The guidelines recommend :  T-score between -1 and -2.5 at the femoral neck or spine, and a 10-year probability of hip fracture greater or equal to 3 percent or a 10-year probability of any major osteoporosis-related fracture greater or equal to 20 percent based upon the [place]-adapted WHO algorithm.  Base on the guidelines not quite at level to start oral medications. But would be sure taking calcium and D regularly and recheck in 2 years      Please contact us if you have any questions.    Abby Castellano, CNP

## 2022-10-10 RX ORDER — BISACODYL 5 MG
TABLET, DELAYED RELEASE (ENTERIC COATED) ORAL
Qty: 4 TABLET | Refills: 0 | Status: SHIPPED | OUTPATIENT
Start: 2022-10-10 | End: 2023-04-17

## 2022-10-12 ENCOUNTER — MYC REFILL (OUTPATIENT)
Dept: FAMILY MEDICINE | Facility: CLINIC | Age: 57
End: 2022-10-12

## 2022-10-12 DIAGNOSIS — F90.9 ATTENTION DEFICIT HYPERACTIVITY DISORDER (ADHD), UNSPECIFIED ADHD TYPE: ICD-10-CM

## 2022-10-12 RX ORDER — DEXTROAMPHETAMINE SACCHARATE, AMPHETAMINE ASPARTATE, DEXTROAMPHETAMINE SULFATE AND AMPHETAMINE SULFATE 1.25; 1.25; 1.25; 1.25 MG/1; MG/1; MG/1; MG/1
5 TABLET ORAL DAILY
Qty: 30 TABLET | Refills: 0 | Status: SHIPPED | OUTPATIENT
Start: 2022-10-12 | End: 2022-11-04

## 2022-10-12 RX ORDER — DEXTROAMPHETAMINE SACCHARATE, AMPHETAMINE ASPARTATE, DEXTROAMPHETAMINE SULFATE AND AMPHETAMINE SULFATE 2.5; 2.5; 2.5; 2.5 MG/1; MG/1; MG/1; MG/1
10 TABLET ORAL DAILY
Qty: 30 TABLET | Refills: 0 | Status: SHIPPED | OUTPATIENT
Start: 2022-10-12 | End: 2022-11-04

## 2022-10-12 NOTE — TELEPHONE ENCOUNTER
Refilled x 1.  Needs visit (OFV or video visit) before any further refill.    I know she saw Chandrika, but I have not had a visit since 4/21.  Will not refill unless scheduled

## 2022-10-16 ENCOUNTER — HEALTH MAINTENANCE LETTER (OUTPATIENT)
Age: 57
End: 2022-10-16

## 2022-10-17 ENCOUNTER — HOSPITAL ENCOUNTER (OUTPATIENT)
Facility: AMBULATORY SURGERY CENTER | Age: 57
Discharge: HOME | End: 2022-10-17
Attending: INTERNAL MEDICINE | Admitting: INTERNAL MEDICINE
Payer: COMMERCIAL

## 2022-10-17 VITALS
OXYGEN SATURATION: 100 % | TEMPERATURE: 97.1 F | RESPIRATION RATE: 16 BRPM | HEART RATE: 63 BPM | DIASTOLIC BLOOD PRESSURE: 94 MMHG | SYSTOLIC BLOOD PRESSURE: 128 MMHG

## 2022-10-17 DIAGNOSIS — Z12.11 COLON CANCER SCREENING: Primary | ICD-10-CM

## 2022-10-17 LAB — COLONOSCOPY: NORMAL

## 2022-10-17 PROCEDURE — 45385 COLONOSCOPY W/LESION REMOVAL: CPT

## 2022-10-17 PROCEDURE — G8918 PT W/O PREOP ORDER IV AB PRO: HCPCS

## 2022-10-17 PROCEDURE — G8907 PT DOC NO EVENTS ON DISCHARG: HCPCS

## 2022-10-17 PROCEDURE — 88305 TISSUE EXAM BY PATHOLOGIST: CPT | Performed by: PATHOLOGY

## 2022-10-17 RX ORDER — ONDANSETRON 2 MG/ML
4 INJECTION INTRAMUSCULAR; INTRAVENOUS
Status: DISCONTINUED | OUTPATIENT
Start: 2022-10-17 | End: 2022-10-18 | Stop reason: HOSPADM

## 2022-10-17 RX ORDER — FENTANYL CITRATE 50 UG/ML
INJECTION, SOLUTION INTRAMUSCULAR; INTRAVENOUS PRN
Status: DISCONTINUED | OUTPATIENT
Start: 2022-10-17 | End: 2022-10-17 | Stop reason: HOSPADM

## 2022-10-17 RX ORDER — NALOXONE HYDROCHLORIDE 0.4 MG/ML
0.2 INJECTION, SOLUTION INTRAMUSCULAR; INTRAVENOUS; SUBCUTANEOUS
Status: DISCONTINUED | OUTPATIENT
Start: 2022-10-17 | End: 2022-10-18 | Stop reason: HOSPADM

## 2022-10-17 RX ORDER — LIDOCAINE 40 MG/G
CREAM TOPICAL
Status: DISCONTINUED | OUTPATIENT
Start: 2022-10-17 | End: 2022-10-18 | Stop reason: HOSPADM

## 2022-10-17 RX ORDER — PROCHLORPERAZINE MALEATE 10 MG
10 TABLET ORAL EVERY 6 HOURS PRN
Status: DISCONTINUED | OUTPATIENT
Start: 2022-10-17 | End: 2022-10-18 | Stop reason: HOSPADM

## 2022-10-17 RX ORDER — NALOXONE HYDROCHLORIDE 0.4 MG/ML
0.4 INJECTION, SOLUTION INTRAMUSCULAR; INTRAVENOUS; SUBCUTANEOUS
Status: DISCONTINUED | OUTPATIENT
Start: 2022-10-17 | End: 2022-10-18 | Stop reason: HOSPADM

## 2022-10-17 RX ORDER — FLUMAZENIL 0.1 MG/ML
0.2 INJECTION, SOLUTION INTRAVENOUS
Status: ACTIVE | OUTPATIENT
Start: 2022-10-17 | End: 2022-10-17

## 2022-10-17 RX ORDER — ONDANSETRON 4 MG/1
4 TABLET, ORALLY DISINTEGRATING ORAL EVERY 6 HOURS PRN
Status: DISCONTINUED | OUTPATIENT
Start: 2022-10-17 | End: 2022-10-18 | Stop reason: HOSPADM

## 2022-10-17 RX ORDER — ONDANSETRON 2 MG/ML
4 INJECTION INTRAMUSCULAR; INTRAVENOUS EVERY 6 HOURS PRN
Status: DISCONTINUED | OUTPATIENT
Start: 2022-10-17 | End: 2022-10-18 | Stop reason: HOSPADM

## 2022-10-17 NOTE — RESULT ENCOUNTER NOTE
Dear Liyah  Here is your colonoscopy report.   Dr. Lynn recommended repeating the colonoscopy in 3 years.   Please call or MyChart my office with any questions or concerns.   Jaki Blas, PAC

## 2022-10-17 NOTE — H&P
ENDOSCOPY PRE-SEDATION H&P FOR OUTPATIENT PROCEDURES    Liyah Jernigan  [id number]  1965    Procedure: colonoscopy    Pre-procedure diagnosis: positive cologuard    Past medical history:   Past Medical History:   Diagnosis Date     Benign essential hypertension 05/29/2018     Closed fracture of arm 03/15/2022       Past surgical history:   Past Surgical History:   Procedure Laterality Date     ENT SURGERY  2005    tympanoplasty     GYN SURGERY  1990    laparoscopies (5) laparotomies (2) for ectopic       Current Outpatient Medications   Medication     amphetamine-dextroamphetamine (ADDERALL) 10 MG tablet     biotin 2.5 mg/mL SUSP     bisacodyl (DULCOLAX) 5 MG EC tablet     buPROPion (WELLBUTRIN SR) 150 MG 12 hr tablet     gabapentin (NEURONTIN) 300 MG capsule     metoprolol succinate ER (TOPROL XL) 25 MG 24 hr tablet     polyethylene glycol (GOLYTELY) 236 g suspension     teriflunomide (AUBAGIO) 14 MG tablet     traZODone (DESYREL) 50 MG tablet     amphetamine-dextroamphetamine (ADDERALL) 5 MG tablet     SUMAtriptan (IMITREX) 100 MG tablet     valACYclovir (VALTREX) 500 MG tablet     Current Facility-Administered Medications   Medication     lidocaine (LMX4) kit     lidocaine 1 % 0.1-1 mL     ondansetron (ZOFRAN) injection 4 mg     sodium chloride (PF) 0.9% PF flush 3 mL     sodium chloride (PF) 0.9% PF flush 3 mL       No Known Allergies    History of Anesthesia/Sedation Problems: no    Physical Exam:    Mental status: alert  Heart: Normal  Lung: Normal  Assessment of patient's airway: Normal  Other as pertinent for procedure: None     ASA Score: See Provation note    Mallampati score:  II - Faucial pillars and soft palate may be seen, but uvula is masked by the base of the tongue    Assessment/Plan:     The patient is an appropriate candidate to receive sedation.    Informed consent was discussed with the patient/family, including the risks, benefits, potential complications and any alternative options  associated with sedation.    Patient assessment completed just prior to sedation and while under constant observation by the provider. Condition determined to be adequate for proceeding with sedation.    The specific risks for the procedure were discussed with the patient at the time of informed consent and include but are not limited to perforation which could require surgery, missing significant neoplasm or lesion, hemorrhage and adverse sedative complication.      Tj Lynn MD

## 2022-10-18 LAB
PATH REPORT.COMMENTS IMP SPEC: NORMAL
PATH REPORT.COMMENTS IMP SPEC: NORMAL
PATH REPORT.FINAL DX SPEC: NORMAL
PATH REPORT.GROSS SPEC: NORMAL
PATH REPORT.MICROSCOPIC SPEC OTHER STN: NORMAL
PATH REPORT.RELEVANT HX SPEC: NORMAL
PHOTO IMAGE: NORMAL

## 2022-11-04 PROBLEM — M85.80 OSTEOPENIA, UNSPECIFIED LOCATION: Status: ACTIVE | Noted: 2022-11-04

## 2022-11-29 ENCOUNTER — OFFICE VISIT (OUTPATIENT)
Dept: NEUROLOGY | Facility: CLINIC | Age: 57
End: 2022-11-29
Attending: PSYCHIATRY & NEUROLOGY
Payer: COMMERCIAL

## 2022-11-29 ENCOUNTER — ANCILLARY PROCEDURE (OUTPATIENT)
Dept: MRI IMAGING | Facility: CLINIC | Age: 57
End: 2022-11-29
Attending: PSYCHIATRY & NEUROLOGY
Payer: COMMERCIAL

## 2022-11-29 VITALS
DIASTOLIC BLOOD PRESSURE: 85 MMHG | WEIGHT: 109.7 LBS | HEART RATE: 56 BPM | BODY MASS INDEX: 18.26 KG/M2 | OXYGEN SATURATION: 97 % | SYSTOLIC BLOOD PRESSURE: 124 MMHG

## 2022-11-29 DIAGNOSIS — G35 MS (MULTIPLE SCLEROSIS) (H): ICD-10-CM

## 2022-11-29 DIAGNOSIS — R39.11 URINARY HESITANCY: ICD-10-CM

## 2022-11-29 DIAGNOSIS — G35 MS (MULTIPLE SCLEROSIS) (H): Primary | ICD-10-CM

## 2022-11-29 PROCEDURE — 70551 MRI BRAIN STEM W/O DYE: CPT | Mod: GC | Performed by: RADIOLOGY

## 2022-11-29 PROCEDURE — 99215 OFFICE O/P EST HI 40 MIN: CPT | Performed by: PSYCHIATRY & NEUROLOGY

## 2022-11-29 ASSESSMENT — PAIN SCALES - GENERAL: PAINLEVEL: NO PAIN (0)

## 2022-11-29 NOTE — PROGRESS NOTES
"Service Date: 11/29/2022    REASON FOR VISIT:  Liyah Jernigan is a 57-year-old woman who I follow for multiple sclerosis.  She returns for clinical followup and MRI review.  I last saw her in 07/2021.    HISTORY OF PRESENT ILLNESS:  Liyah has been generally stable from an MS perspective, but has noticed some changes in her bladder over the last year or so.  Mainly this is some hesitancy.  She feels her urinary stream is \"tiny\" and often has to use the Crede maneuver to feel like she is emptying completely.  She is always able to void and has not had any recent urinary tract infections.  No urgency or incontinence.      Otherwise, her main residual symptom is vision blurriness.  She notes she has been getting new prescriptions for her glasses about twice a year.  Motor function in the arms and legs remains fine.  She continues to take teriflunomide and tolerates it well.  Transaminases were normal in September.    PHYSICAL EXAMINATION:  Blood pressure 124/85, pulse 56, weight 109 pounds.  She is alert and oriented.  Affect is bright and language functions are normal.  Eye movements are full without diplopia or nystagmus.  Facial strength and sensation are normal.  Muscle bulk, tone, strength and dexterity are normal in the arms and legs.  Light touch is intact in all 4 limbs.  Finger tapping, toe tapping and finger-nose-finger are normal.  Deep tendon reflexes are absent at the ankles, but otherwise normal and symmetric.  Gait is narrow-based and stable with normal tandem walk and negative Romberg.    We reviewed together her MRI of the brain done earlier today and compared it to the prior exam from 2019.  Again seen is a moderate burden of typical T2 hyperintense lesions consistent with multiple sclerosis.  There were no new lesions compared to the prior exam.  No contrast was given.    IMPRESSION:  Relapsing-remitting multiple sclerosis, clinically and radiologically stable on teriflunomide.    I spent 44 minutes on her " care today including chart and MRI review, face-to-face and documentation time.  We discussed the bladder issue and I offered urologic consultation, but she decided to hold off on that for now and I think that is acceptable.  We discussed the natural history of MS, including how the relapsing stage eventually ends.  I told her that generally I advise continuing treatment throughout the 50s and that is what we will plan tentatively.    PLAN:  Follow up in 1 year.    Jose A Hairston MD        D: 2022   T: 2022   MT: DELIO/STACIE    Name:     HODA TRUJILLO  MRN:      [id number]        Account:      [id number]   :      1965           Service Date: 2022       Document: [id number]

## 2022-11-29 NOTE — LETTER
"11/29/2022       RE: Liyah Jernigan  [address] [place] [address]     Dear Colleague,    Thank you for referring your patient, Liyah Jernigan, to the [facility] MULTIPLE [facility]OSIS [facility] [place] [facility]. Please see a copy of my visit note below.    Service Date: 11/29/2022    REASON FOR VISIT:  Liyah Jernigan is a 57-year-old woman who I follow for multiple sclerosis.  She returns for clinical followup and MRI review.  I last saw her in 07/2021.    HISTORY OF PRESENT ILLNESS:  Liyah has been generally stable from an MS perspective, but has noticed some changes in her bladder over the last year or so.  Mainly this is some hesitancy.  She feels her urinary stream is \"tiny\" and often has to use the Crede maneuver to feel like she is emptying completely.  She is always able to void and has not had any recent urinary tract infections.  No urgency or incontinence.      Otherwise, her main residual symptom is vision blurriness.  She notes she has been getting new prescriptions for her glasses about twice a year.  Motor function in the arms and legs remains fine.  She continues to take teriflunomide and tolerates it well.  Transaminases were normal in September.    PHYSICAL EXAMINATION:  Blood pressure 124/85, pulse 56, weight 109 pounds.  She is alert and oriented.  Affect is bright and language functions are normal.  Eye movements are full without diplopia or nystagmus.  Facial strength and sensation are normal.  Muscle bulk, tone, strength and dexterity are normal in the arms and legs.  Light touch is intact in all 4 limbs.  Finger tapping, toe tapping and finger-nose-finger are normal.  Deep tendon reflexes are absent at the ankles, but otherwise normal and symmetric.  Gait is narrow-based and stable with normal tandem walk and negative Romberg.    We reviewed together her MRI of the brain done earlier today and compared it to the prior exam from 2019.  " Again seen is a moderate burden of typical T2 hyperintense lesions consistent with multiple sclerosis.  There were no new lesions compared to the prior exam.  No contrast was given.    IMPRESSION:  Relapsing-remitting multiple sclerosis, clinically and radiologically stable on teriflunomide.    I spent 44 minutes on her care today including chart and MRI review, face-to-face and documentation time.  We discussed the bladder issue and I offered urologic consultation, but she decided to hold off on that for now and I think that is acceptable.  We discussed the natural history of MS, including how the relapsing stage eventually ends.  I told her that generally I advise continuing treatment throughout the 50s and that is what we will plan tentatively.    PLAN:  Follow up in 1 year.        D: 2022   T: 2022   MT: DELIO/STACIE    Name:     HODA TRUJILLO  MRN:      [id number]        Account:      [id number]   :      1965           Service Date: 2022       Document: [id number]      Again, thank you for allowing me to participate in the care of your patient.      Sincerely,    Jose A Hairston MD

## 2022-12-09 ENCOUNTER — LAB (OUTPATIENT)
Dept: URGENT CARE | Facility: URGENT CARE | Age: 57
End: 2022-12-09
Attending: NURSE PRACTITIONER
Payer: COMMERCIAL

## 2022-12-09 ENCOUNTER — E-VISIT (OUTPATIENT)
Dept: URGENT CARE | Facility: CLINIC | Age: 57
End: 2022-12-09
Payer: COMMERCIAL

## 2022-12-09 DIAGNOSIS — Z20.822 SUSPECTED COVID-19 VIRUS INFECTION: Primary | ICD-10-CM

## 2022-12-09 DIAGNOSIS — Z20.822 SUSPECTED COVID-19 VIRUS INFECTION: ICD-10-CM

## 2022-12-09 LAB
DEPRECATED S PYO AG THROAT QL EIA: NEGATIVE
FLUAV AG SPEC QL IA: NEGATIVE
FLUBV AG SPEC QL IA: NEGATIVE
GROUP A STREP BY PCR: NOT DETECTED
SARS-COV-2 RNA RESP QL NAA+PROBE: NEGATIVE

## 2022-12-09 PROCEDURE — U0003 INFECTIOUS AGENT DETECTION BY NUCLEIC ACID (DNA OR RNA); SEVERE ACUTE RESPIRATORY SYNDROME CORONAVIRUS 2 (SARS-COV-2) (CORONAVIRUS DISEASE [COVID-19]), AMPLIFIED PROBE TECHNIQUE, MAKING USE OF HIGH THROUGHPUT TECHNOLOGIES AS DESCRIBED BY CMS-2020-01-R: HCPCS

## 2022-12-09 PROCEDURE — 87804 INFLUENZA ASSAY W/OPTIC: CPT

## 2022-12-09 PROCEDURE — 99421 OL DIG E/M SVC 5-10 MIN: CPT | Mod: CS | Performed by: NURSE PRACTITIONER

## 2022-12-09 PROCEDURE — 87651 STREP A DNA AMP PROBE: CPT

## 2022-12-09 PROCEDURE — U0005 INFEC AGEN DETEC AMPLI PROBE: HCPCS

## 2022-12-09 PROCEDURE — 99207 PR NO CHARGE LOS: CPT

## 2022-12-09 NOTE — PATIENT INSTRUCTIONS
Liyah,    Your symptoms show that you may have coronavirus (COVID-19). This illness can cause fever, cough and trouble breathing. Many people get a mild case and get better on their own. Some people can get very sick.    Because you reported additional symptoms, I would like to also test you for strep and influenza.    What should I do?  I have placed orders for these tests.   To schedule: go to your [employer] home page and scroll down to the section that says  You have an appointment that needs to be scheduled  and click the large green button that says  Schedule Now  and follow the steps to find the next available openings.     If you are unable to complete these [employer] scheduling steps, please call [phone] to schedule your testing.     These guidelines are for isolating before returning to work, school or .   For employers, schools and day cares: This is an official notice for this person s medical guidelines for returning in-person.   For health care sites: The CDC gives different isolation and quarantine guidelines for healthcare sites, please check with these sites before arriving.     How do I self-isolate?  You isolate when you have symptoms of COVID or a test shows you have COVID, even if you don t have symptoms.   If you DO have symptoms:  Stay home and away from others  For at least 5 days after your symptoms started, AND   You are fever free for 24 hours (with no medicine that reduces fever), AND  Your other symptoms are better.  Wear a mask for 10 full days any time you are around others.  If you DON T have symptoms:  Stay at home and away from others for at least 5 days after your positive test.  Wear a mask for 10 full days any time you are around others.    How can I take care of myself?  Over the counter medications may help with your symptoms such as runny or stuffy nose, cough, chills, or fever. Talk to your care team about your options.     Some people are at high risk of severe  illness (for example, you have a weak immune system, you re 65 years or older, or you have certain medical problems). If your risk is high and your symptoms started in the last 5 to 7 days, we strongly recommend for you to get COVID treatment as soon as possible. Paxlovid, Molnupiravir and the monoclonal antibody treatments are proven safe and effective, make you feel better faster, and prevent hospitalization and death.       To schedule an appointment to discuss COVID treatment, request an appointment on [facility] (select  COVID-19 Treatment ) or call [facility] ([phone]), press 7.    Get lots of rest. Drink extra fluids (unless a doctor has told you not to)  Take Tylenol (acetaminophen) or ibuprofen for fever or pain. If you have liver or kidney problems, ask your family doctor if it's okay to take Tylenol or ibuprofen  Take over the counter medications for your symptoms, as directed by your doctor. You may also talk to your pharmacist.    If you have other health problems (like cancer, heart failure, an organ transplant or severe kidney disease): Call your specialty clinic if you don't feel better in the next 2 days.  Know when to call 911. Emergency warning signs include:  Trouble breathing or shortness of breath  Pain or pressure in the chest that doesn't go away  Feeling confused like you haven't felt before, or not being able to wake up  Bluish-colored lips or face    Where can I get more information?  [facility] - About COVID-19: [email or web address]   CDC - What to Do If You're Sick: https://www.cdc.gov/coronavirus/2019-ncov/if-you-are-sick/index.html   CDC - Quarantine & Isolation: https://www.cdc.gov/coronavirus/2019-ncov/your-health/quarantine-isolation.html   [facility] clinical trials (COVID-19 research studies): clinicalaffairs.[facility]/n-clinical-trials  Below are the COVID-19 hotlines at the [place] [facility] ([facility]). Interpreters are  available.  For health questions: Call 011-544-7633 or 1-632.164.7944 (7 a.m. to 7 p.m.)  For questions about schools and childcare: Call 696-778-9953 or [phone] (7 a.m. to 7 p.m.)

## 2022-12-14 ENCOUNTER — ANCILLARY PROCEDURE (OUTPATIENT)
Dept: GENERAL RADIOLOGY | Facility: CLINIC | Age: 57
End: 2022-12-14
Attending: PHYSICIAN ASSISTANT
Payer: COMMERCIAL

## 2022-12-14 ENCOUNTER — OFFICE VISIT (OUTPATIENT)
Dept: URGENT CARE | Facility: URGENT CARE | Age: 57
End: 2022-12-14
Payer: COMMERCIAL

## 2022-12-14 VITALS
TEMPERATURE: 97.6 F | WEIGHT: 110.3 LBS | OXYGEN SATURATION: 98 % | BODY MASS INDEX: 18.35 KG/M2 | SYSTOLIC BLOOD PRESSURE: 116 MMHG | RESPIRATION RATE: 20 BRPM | HEART RATE: 66 BPM | DIASTOLIC BLOOD PRESSURE: 78 MMHG

## 2022-12-14 DIAGNOSIS — R05.1 ACUTE COUGH: ICD-10-CM

## 2022-12-14 DIAGNOSIS — J22 LRTI (LOWER RESPIRATORY TRACT INFECTION): Primary | ICD-10-CM

## 2022-12-14 PROCEDURE — 71046 X-RAY EXAM CHEST 2 VIEWS: CPT | Mod: TC | Performed by: RADIOLOGY

## 2022-12-14 PROCEDURE — 99214 OFFICE O/P EST MOD 30 MIN: CPT | Performed by: PHYSICIAN ASSISTANT

## 2022-12-14 RX ORDER — AZITHROMYCIN 250 MG/1
TABLET, FILM COATED ORAL
Qty: 6 TABLET | Refills: 0 | Status: SHIPPED | OUTPATIENT
Start: 2022-12-14 | End: 2022-12-19

## 2022-12-14 RX ORDER — BENZONATATE 100 MG/1
CAPSULE ORAL
Qty: 45 CAPSULE | Refills: 0 | Status: SHIPPED | OUTPATIENT
Start: 2022-12-14 | End: 2023-04-17

## 2022-12-14 NOTE — PROGRESS NOTES
Chief Complaint   Patient presents with     URI     X3 wks cough and congestion       ASSESSMENT/PLAN:  Liyah was seen today for uri.    Diagnoses and all orders for this visit:    LRTI (lower respiratory tract infection)  -     amoxicillin-clavulanate (AUGMENTIN) 875-125 MG tablet; Take 1 tablet by mouth 2 times daily for 10 days  -     benzonatate (TESSALON) 100 MG capsule; Take 1-2 capsules by mouth up to 3 x a day as needed with food  -     azithromycin (ZITHROMAX) 250 MG tablet; Take 2 tablets (500 mg) by mouth daily for 1 day, THEN 1 tablet (250 mg) daily for 4 days.    Acute cough  -     XR Chest 2 Views; Future    Given length of symptoms, progressive nature and crackles and more concerned about a pneumonia or lower respiratory tract infection will cover as such.  May have some coinciding sinusitis but this will cover for that as well.  Tessalon for cough relief.  Follow-up if not improving within the next 3 to 4 days    Tab Travis PA-C      SUBJECTIVE:  Liyah is a 57 year old female with MS, hypertension among others who presents to urgent care with over 1 month of coughing that has progressively worsened.  Initially developed some nasal congestion and mild cough that progressed into a worsening cough.  And feels like he has continued to worsen was productive and now she feels like she cannot get any of the mucus out.  She feels it is deeper in her lungs and feels crackling when she breathes and sometimes when she moves.  Fatigue, malaise.   has worsening nasal congestion but no more drainage.    ROS: Pertinent ROS neg other than the symptoms noted above in the HPI.     OBJECTIVE:  /78   Pulse 66   Temp 97.6  F (36.4  C) (Tympanic)   Resp 20   Wt 50 kg (110 lb 4.8 oz)   LMP 05/25/2020 (Exact Date)   SpO2 98%   BMI 18.35 kg/m     GENERAL: Tired, alert and no distress  EYES: Eyes grossly normal to inspection, PERRL and conjunctivae and sclerae normal  HENT: ear canals and TM's normal, nose  and mouth without ulcers or lesions, nasal mucosa edema and erythema, patent oropharynx without any erythema  RESP: Frequent cough heard, right lower lobe crackles, sounds congested  CV: regular rate and rhythm, normal S1 S2, no S3 or S4, no murmur, click or rub    DIAGNOSTICS  Xray - Reviewed and interpreted by me.  No acute cardiopulmonary abnormality noted  Results for orders placed or performed in visit on 12/14/22   XR Chest 2 Views     Status: None    Narrative    CHEST TWO VIEWS  12/14/2022 1:23 PM     HISTORY: Right lower lobe crackles. Acute cough.    COMPARISON: None.      Impression    IMPRESSION: No acute cardiopulmonary disease.    ARAM SILVERMAN MD         SYSTEM ID:  [id number]        Current Outpatient Medications   Medication     amphetamine-dextroamphetamine (ADDERALL) 10 MG tablet     amphetamine-dextroamphetamine (ADDERALL) 10 MG tablet     [START ON 1/9/2023] amphetamine-dextroamphetamine (ADDERALL) 10 MG tablet     amphetamine-dextroamphetamine (ADDERALL) 5 MG tablet     amphetamine-dextroamphetamine (ADDERALL) 5 MG tablet     [START ON 1/9/2023] amphetamine-dextroamphetamine (ADDERALL) 5 MG tablet     biotin 2.5 mg/mL SUSP     buPROPion (WELLBUTRIN SR) 150 MG 12 hr tablet     gabapentin (NEURONTIN) 300 MG capsule     metoprolol succinate ER (TOPROL XL) 25 MG 24 hr tablet     SUMAtriptan (IMITREX) 100 MG tablet     teriflunomide (AUBAGIO) 14 MG tablet     traZODone (DESYREL) 50 MG tablet     valACYclovir (VALTREX) 500 MG tablet     bisacodyl (DULCOLAX) 5 MG EC tablet     polyethylene glycol (GOLYTELY) 236 g suspension     No current facility-administered medications for this visit.      Patient Active Problem List   Diagnosis     Attention deficit hyperactivity disorder (ADHD), unspecified ADHD type     MS (multiple sclerosis) (H)     Varicose vein     Restless leg syndrome     Mild episode of recurrent major depressive disorder (H)     Genital herpes simplex, unspecified site     Benign essential  hypertension     Primary insomnia     Family history of alpha 1 antitrypsin deficiency     Osteopenia, unspecified location      Past Medical History:   Diagnosis Date     Benign essential hypertension 2018     Closed fracture of arm 03/15/2022     Past Surgical History:   Procedure Laterality Date     COLONOSCOPY N/A 10/17/2022    Procedure: COLONOSCOPY, FLEXIBLE, WITH LESION REMOVAL USING SNARE;  Surgeon: Tj Lynn MD;  Location: [facility] OR     COLONOSCOPY WITH CO2 INSUFFLATION N/A 10/17/2022    Procedure: COLONOSCOPY, WITH CO2 INSUFFLATION;  Surgeon: Tj Lynn MD;  Location: [facility] OR     ENT SURGERY      tympanoplasty     GYN SURGERY      laparoscopies (5) laparotomies (2) for ectopic     Family History   Problem Relation Age of Onset     Diabetes Mother      Hypertension Mother      Hyperlipidemia Mother      Depression Mother      Substance Abuse Mother      Asthma Mother      Thyroid Disease Mother      Obesity Mother      Anxiety Disorder Mother      Diabetes Father              Hypertension Father      Hyperlipidemia Father      Substance Abuse Father      Obesity Father      Breast Cancer Maternal Grandmother         in 50s     Asthma Maternal Grandmother      Cerebrovascular Disease Maternal Grandfather         he had a few     Substance Abuse Maternal Grandfather      Substance Abuse Paternal Grandfather      Colon Cancer No family hx of      Social History     Tobacco Use     Smoking status: Never     Smokeless tobacco: Never   Substance Use Topics     Alcohol use: No     Alcohol/week: 0.0 standard drinks              The plan of care was discussed with the patient. They understand and agree with the course of treatment prescribed. A printed summary was given including instructions and medications.  The use of Dragon/[employer] dictation services may have been used to construct the content in this note; any grammatical or spelling errors are  non-intentional. Please contact the author of this note directly if you are in need of any clarification.

## 2022-12-27 ENCOUNTER — LAB (OUTPATIENT)
Dept: LAB | Facility: CLINIC | Age: 57
End: 2022-12-27
Payer: COMMERCIAL

## 2022-12-27 DIAGNOSIS — M79.645 PAIN IN FINGER OF BOTH HANDS: ICD-10-CM

## 2022-12-27 DIAGNOSIS — M79.644 PAIN IN FINGER OF BOTH HANDS: ICD-10-CM

## 2022-12-27 LAB
CRP SERPL-MCNC: <3 MG/L
ERYTHROCYTE [SEDIMENTATION RATE] IN BLOOD BY WESTERGREN METHOD: 9 MM/HR (ref 0–30)

## 2022-12-27 PROCEDURE — 86038 ANTINUCLEAR ANTIBODIES: CPT

## 2022-12-27 PROCEDURE — 86140 C-REACTIVE PROTEIN: CPT

## 2022-12-27 PROCEDURE — 85652 RBC SED RATE AUTOMATED: CPT

## 2022-12-27 PROCEDURE — 36415 COLL VENOUS BLD VENIPUNCTURE: CPT

## 2022-12-27 PROCEDURE — 86431 RHEUMATOID FACTOR QUANT: CPT

## 2022-12-28 LAB
ANA SER QL IF: NEGATIVE
RHEUMATOID FACT SER NEPH-ACNC: 8 IU/ML

## 2022-12-28 NOTE — RESULT ENCOUNTER NOTE
Liyah,  So far, your autoimmune screening test (done due to the finger stiffness) are negative/normal.  There is one test still pending and we will keep you posted on that result  Please MyChart or call if you have any concerns or questions.   Sincerely,  Ivonne Estes MD  (for Dr. Barrow who is out of the office today)

## 2023-02-24 ENCOUNTER — TRANSFERRED RECORDS (OUTPATIENT)
Dept: HEALTH INFORMATION MANAGEMENT | Facility: CLINIC | Age: 58
End: 2023-02-24

## 2023-03-02 ENCOUNTER — ANCILLARY PROCEDURE (OUTPATIENT)
Dept: GENERAL RADIOLOGY | Facility: CLINIC | Age: 58
End: 2023-03-02
Attending: STUDENT IN AN ORGANIZED HEALTH CARE EDUCATION/TRAINING PROGRAM
Payer: COMMERCIAL

## 2023-03-02 ENCOUNTER — PRE VISIT (OUTPATIENT)
Dept: ORTHOPEDICS | Facility: CLINIC | Age: 58
End: 2023-03-02
Payer: COMMERCIAL

## 2023-03-02 ENCOUNTER — OFFICE VISIT (OUTPATIENT)
Dept: ORTHOPEDICS | Facility: CLINIC | Age: 58
End: 2023-03-02
Payer: COMMERCIAL

## 2023-03-02 VITALS — WEIGHT: 115 LBS | HEIGHT: 67 IN | BODY MASS INDEX: 18.05 KG/M2

## 2023-03-02 DIAGNOSIS — M19.012 PRIMARY OSTEOARTHRITIS OF LEFT SHOULDER: ICD-10-CM

## 2023-03-02 DIAGNOSIS — M25.519 PAIN IN JOINT, SHOULDER REGION: Primary | ICD-10-CM

## 2023-03-02 DIAGNOSIS — M25.519 PAIN IN JOINT, SHOULDER REGION: ICD-10-CM

## 2023-03-02 DIAGNOSIS — M67.912 TENDINOPATHY OF LEFT ROTATOR CUFF: Primary | ICD-10-CM

## 2023-03-02 DIAGNOSIS — S43.432S TEAR OF LEFT GLENOID LABRUM, SEQUELA: ICD-10-CM

## 2023-03-02 PROCEDURE — 73030 X-RAY EXAM OF SHOULDER: CPT | Mod: LT | Performed by: RADIOLOGY

## 2023-03-02 PROCEDURE — 20610 DRAIN/INJ JOINT/BURSA W/O US: CPT | Mod: LT | Performed by: STUDENT IN AN ORGANIZED HEALTH CARE EDUCATION/TRAINING PROGRAM

## 2023-03-02 PROCEDURE — 99204 OFFICE O/P NEW MOD 45 MIN: CPT | Mod: 25 | Performed by: STUDENT IN AN ORGANIZED HEALTH CARE EDUCATION/TRAINING PROGRAM

## 2023-03-02 RX ORDER — LIDOCAINE HYDROCHLORIDE 10 MG/ML
4 INJECTION, SOLUTION EPIDURAL; INFILTRATION; INTRACAUDAL; PERINEURAL
Status: SHIPPED | OUTPATIENT
Start: 2023-03-02

## 2023-03-02 RX ORDER — TRIAMCINOLONE ACETONIDE 40 MG/ML
40 INJECTION, SUSPENSION INTRA-ARTICULAR; INTRAMUSCULAR
Status: SHIPPED | OUTPATIENT
Start: 2023-03-02

## 2023-03-02 RX ADMIN — LIDOCAINE HYDROCHLORIDE 4 ML: 10 INJECTION, SOLUTION EPIDURAL; INFILTRATION; INTRACAUDAL; PERINEURAL at 10:45

## 2023-03-02 RX ADMIN — TRIAMCINOLONE ACETONIDE 40 MG: 40 INJECTION, SUSPENSION INTRA-ARTICULAR; INTRAMUSCULAR at 10:45

## 2023-03-02 NOTE — PROGRESS NOTES
[facility]  Sports Medicine Clinic  [facility]           SUBJECTIVE       Liyah Jerniagn is a 58 year old female presenting to clinic today with shoulder pain.    Background:   Date of injury: 2 weeks ago  Duration of symptoms: 2 weeks ago   Mechanism of Injury: She had a fall and she injured her right ankle and grabbed her son to stabilize herself  Intensity: 10/10 at worst   Aggravating factors: Overhead motion, lifting   Relieving Factors: Activity avoidance   Prior Evaluation: None      PMH, Medications and Allergies were reviewed and updated as needed.    ROS:  As noted above otherwise negative.    Patient Active Problem List   Diagnosis     Attention deficit hyperactivity disorder (ADHD), unspecified ADHD type     MS (multiple sclerosis) (H)     Varicose vein     Restless leg syndrome     Mild episode of recurrent major depressive disorder (H)     Genital herpes simplex, unspecified site     Benign essential hypertension     Primary insomnia     Family history of alpha 1 antitrypsin deficiency     Osteopenia, unspecified location       Current Outpatient Medications   Medication Sig Dispense Refill     amphetamine-dextroamphetamine (ADDERALL) 10 MG tablet Take 1 tablet (10 mg) by mouth daily In AM 30 tablet 0     amphetamine-dextroamphetamine (ADDERALL) 10 MG tablet Take 1 tablet (10 mg) by mouth daily In AM 30 tablet 0     amphetamine-dextroamphetamine (ADDERALL) 10 MG tablet Take 1 tablet (10 mg) by mouth daily In AM 30 tablet 0     amphetamine-dextroamphetamine (ADDERALL) 5 MG tablet Take 1 tablet (5 mg) by mouth daily At noon 30 tablet 0     amphetamine-dextroamphetamine (ADDERALL) 5 MG tablet Take 1 tablet (5 mg) by mouth daily At noon 30 tablet 0     amphetamine-dextroamphetamine (ADDERALL) 5 MG tablet Take 1 tablet (5 mg) by mouth daily At noon 30 tablet 0     biotin 2.5 mg/mL SUSP Take 20 mg by mouth daily       buPROPion (WELLBUTRIN SR) 150 MG 12 hr tablet Take 1 tablet (150  "mg) by mouth daily 90 tablet 3     gabapentin (NEURONTIN) 300 MG capsule Take 1 capsule by mouth every night as needed. 90 capsule 3     metoprolol succinate ER (TOPROL XL) 25 MG 24 hr tablet Take 1 tablet (25 mg) by mouth 2 times daily 180 tablet 3     SUMAtriptan (IMITREX) 100 MG tablet Take 1 tablet (100 mg) by mouth daily as needed for migraine 30 tablet 1     teriflunomide (AUBAGIO) 14 MG tablet Take 1 tablet (14 mg) by mouth daily 30 tablet 11     traZODone (DESYREL) 50 MG tablet TAKE 1 TABLET(50 MG) BY MOUTH AT BEDTIME 90 tablet 3     valACYclovir (VALTREX) 500 MG tablet Take 1 tablet (500 mg) by mouth daily 90 tablet 3     benzonatate (TESSALON) 100 MG capsule Take 1-2 capsules by mouth up to 3 x a day as needed with food 45 capsule 0     bisacodyl (DULCOLAX) 5 MG EC tablet Take 2 tablets at 3 pm the day before your procedure. If your procedure is before 11 am, take 2 additional tablets at 8 pm. If your procedure is after 11 am, take 2 additional tablets at 6 am. For additional instructions refer to your colonoscopy prep instructions. (Patient not taking: Reported on 11/29/2022) 4 tablet 0     polyethylene glycol (GOLYTELY) 236 g suspension The night before the exam at 6 pm drink an 8-ounce glass every 15 minutes until the jug is half empty. If you arrive before 11 AM: Drink the other half of the Golytely jug at 11 PM night before procedure. If you arrive after 11 AM: Drink the other half of the Golytely jug at 6 AM day of procedure. For additional instructions refer to your colonoscopy prep instructions. (Patient not taking: Reported on 11/29/2022) 4000 mL 0            OBJECTIVE:       Vitals:   Vitals:    03/02/23 1014   Weight: 52.2 kg (115 lb)   Height: 1.702 m (5' 7\")     BMI: Body mass index is 18.01 kg/m .    Gen:  Well nourished and in no acute distress  HEENT: Extraocular movement intact  Neck: Supple  Pulm:  Breathing Comfortably. No increased respiratory effort.  Psych: Euthymic. Appropriately " answers questions    MSK: Left shoulder without evidence of asymmetry or ecchymosis.  No erythema or edema around the left shoulder.  Full range of motion with the exception of internal rotation to the iliac crest, when compared to T8 on the right.  Strength testing mildly diminished in the rotator cuff in the subscapularis and infraspinatus at 4/5.  Painful reverse press off and belly liftoff.  Positive impingement with Neer's and Cade.  Positive [place].  Negative cross body negative Spurling.  Negative Hornblower, negative speeds/Yergason's.      XRAY : Independent evaluation shows no acute osseous abnormalities.  Inferior humeral head osteophyte noticed without diminished joint space.          ASSESSMENT and PLAN:     Liyah was seen today for consult.    Diagnoses and all orders for this visit:    Tendinopathy of left rotator cuff    Tear of left glenoid labrum, sequela    Primary osteoarthritis of left shoulder    Other orders  -     Large Joint Injection: L subacromial bursa      58-year-old female presenting to clinic today with 2 weeks worth of shoulder pain after falling, but not hitting her arm.  X-rays were obtained today and did not show any evidence of acute fracture.  Based off my examination, the patient very well has an intra-articular arthritic flare given the grabbing while falling, as well as rotator cuff tendinopathy leading to subacromial bursitis.  Have discussed options for management with the patient.  She is interested in a corticosteroid injection which has been given today.  See below procedure note.  I have also provided the patient with a home exercise program working on the rotator cuff.  If the patient is not having any significant benefit over the next 4 to 6 weeks with a home exercise program and her pain has not improved, will consider ultrasound-guided injection for the glenohumeral joint.  If the patient has further functional decline, would consider MRI.  Would also consider  formal physical therapy if the patient has not significantly improved and stagnated with a home exercise program    Subacromial Bursa - landmark Guided    The patient was informed of the risks and the benefits of the procedure and a written consent was signed.    The patient s left shoulder was prepped for appropriate landmark location     40 mg of triamcinolone suspension was drawn up into a 5 mL syringe with 4 mL of 1% lidocaine.    Injection was performed using sub-sterile technique. Patient was cleansed topically for 45 seconds with a chloraprep sponge. Topical ethyl chloride was used as an anesthetic.  A 1.5-inch 22-gauge needle was used to enter the left subacromial bursa under posterior landmark guidance.      There were no complications. The patient tolerated the procedure well. There was negligible bleeding.     The patient was instructed to ice the shoulder upon leaving clinic and refrain from overuse over the next 3 days.     The patient was instructed to call or go to the emergency room with any unusual pain, swelling, redness, or if otherwise concerned.      Options for treatment and/or follow-up care were reviewed with the patient was actively involved in the decision making process. Patient verbalized understanding and was in agreement with the plan.    Large Joint Injection: L subacromial bursa    Date/Time: 3/2/2023 10:45 AM  Performed by: Stewart Castellano DO  Authorized by: Stewart Castellano DO     Indications:  Pain  Needle Size:  22 G  Guidance: landmark guided    Approach:  Posterior  Location:  Shoulder      Site:  L subacromial bursa  Medications:  40 mg triamcinolone 40 MG/ML; 4 mL lidocaine (PF) 1 %  Outcome:  Tolerated well, no immediate complications  Procedure discussed: discussed risks, benefits, and alternatives    Consent Given by:  Patient  Timeout: timeout called immediately prior to procedure    Prep: patient was prepped and draped in usual sterile fashion            Stewart Castellano    , Sports Medicine  Department of Family Medicine and [facility]

## 2023-03-02 NOTE — TELEPHONE ENCOUNTER
DIAGNOSIS: shoulder pain   APPOINTMENT DATE: 3.2.23-left   NOTES STATUS DETAILS   MEDICATION LIST Internal

## 2023-03-02 NOTE — NURSING NOTE
[address] [address]  Dept: [phone]  ______________________________________________________________________________    Patient: Liyah Jernigan   : 1965   MRN: [id number]   2023    INVASIVE PROCEDURE SAFETY CHECKLIST    Date: 3/2/23   Procedure: Left subacromial kenalog injection  Patient Name: Liyah Jernigan  MRN: [id number]  YOB: 1965    Action: Complete sections as appropriate. Any discrepancy results in a HARD COPY until resolved.     PRE PROCEDURE:  Patient ID verified with 2 identifiers (name and  or MRN): Yes  Procedure and site verified with patient/designee (when able): Yes  Accurate consent documentation in medical record: Yes  H&P (or appropriate assessment) documented in medical record: Yes  H&P must be up to 20 days prior to procedure and updates within 24 hours of procedure as applicable: NA  Relevant diagnostic and radiology test results appropriately labeled and displayed as applicable: Yes  Procedure site(s) marked with provider initials: NA    TIMEOUT:  Time-Out performed immediately prior to starting procedure, including verbal and active participation of all team members addressing the following:Yes  * Correct patient identify  * Confirmed that the correct side and site are marked  * An accurate procedure consent form  * Agreement on the procedure to be done  * Correct patient position  * Relevant images and results are properly labeled and appropriately displayed  * The need to administer antibiotics or fluids for irrigation purposes during the procedure as applicable   * Safety precautions based on patient history or medication use    DURING PROCEDURE: Verification of correct person, site, and procedures any time the responsibility for care of the patient is transferred to another member of the care team.       Prior to injection, verified patient identity using patient's name and date of  birth.  Due to injection administration, patient instructed to remain in clinic for 15 minutes  afterwards, and to report any adverse reaction to me immediately.    Bursa injection was performed.      Drug Amount Wasted:  Yes: 1 mg/ml   Vial/Syringe: Single dose vial  Expiration Date:  09/2026      Emelia Evangelista, RENETTA  March 2, 2023

## 2023-03-02 NOTE — PATIENT INSTRUCTIONS
Rotator Cuff Injury     WHAT IS A ROTATOR CUFF INJURY?    A rotator cuff injury is irritation of or damage to the group of tendons and muscles that hold your shoulder joint together. Tendons are strong bands of tissue that attach muscle to bone. You use the muscles and tendons in your shoulder joint to move your shoulder and raise your arm over your head.        WHAT IS THE CAUSE?    A rotator cuff injury can be caused by:    Overuse of your shoulder in a sport or work activity that involves repetitive overhead movement of your shoulder, like swimming, baseball (mainly pitching), football, tennis, painting, plastering, or housework.  A sudden activity that twists your shoulder or tears your tendon, such as using your arm to break a fall, falling onto your arm, or lifting a heavy object  You may be at higher risk for a rotator cuff injury if you have poor head and shoulder posture, especially if you are older.    WHAT ARE THE SYMPTOMS?    Symptoms may include:    Pain and weakness in your arm and shoulder  Loss of shoulder movement, especially when you try to raise your arm overhead    HOW IS IT DIAGNOSED?    Your healthcare provider will ask about your symptoms, activities, and medical history and examine you. You may have X-rays or other scans or procedures, such as:    An ultrasound, which uses sound waves to show pictures of your shoulder joint  An MRI, which uses a strong magnetic field and radio waves to show detailed pictures of your shoulder joint  An arthrogram, which is an X-ray or MRI taken after a dye is injected into your joint to outline its shape  Arthroscopy, which is a type of surgery done with a small scope inserted into your joint so your provider can look directly at your joint    HOW IS IT TREATED?    You will need to change or stop doing the activities that cause pain until your injury has healed. For example, avoid strenuous activity or any overhead motion that causes pain. Also, try to make  sure that you are practicing good posture and are not slouching forward.    Your healthcare provider may recommend stretching and strengthening exercises to help you heal.    If you have a bad tear, you may need to have it repaired with surgery. After surgery, your treatment plan will include physical therapy to strengthen your shoulder as it heals.    The pain often gets better within a few weeks with self-care, but some injuries may take several months or longer to heal. It s important to follow all of your healthcare provider s instructions.    HOW CAN I TAKE CARE OF MYSELF?    To keep swelling down and help relieve pain:    Put an ice pack, gel pack, or package of frozen vegetables wrapped in a cloth on the area every 3 to 4 hours for up to 20 minutes at a time.  Take nonprescription pain medicine, such as acetaminophen, ibuprofen, or naproxen. Nonsteroidal anti-inflammatory medicines (NSAIDs), such as ibuprofen and naproxen, may cause stomach bleeding and other problems. These risks increase with age. Read the label and take as directed. Unless recommended by your healthcare provider, you should not take this medicine for more than 10 days.  Moist heat may help relieve pain, relax your muscles, and make it easier to move your arm and shoulder. Put moist heat on the injured area for 10 to 15 minutes before you do warm-up and stretching exercises. Moist heat includes heat patches or moist heating pads that you can buy at most drugstores, a warm wet washcloth, or a hot shower. To prevent burns to your skin, follow directions on the package and do not lie on any type of hot pad. Don t use heat if you have swelling.    Follow your healthcare provider's instructions, including any exercises recommended by your provider. Ask your provider:    How and when you will hear your test results  How long it will take to recover  What activities you should avoid, including how much you can lift, and when you can return to your  normal activities  How to take care of yourself at home  What symptoms or problems you should watch for and what to do if you have them  Make sure you know when you should come back for a checkup.    HOW CAN I HELP PREVENT A ROTATOR CUFF INJURY?    Warm-up exercises and stretching before activities can help prevent injuries. For example, do exercises that strengthen your shoulder muscles. If your arm or shoulder hurts after exercise, putting ice on it may help keep it from getting injured.    Follow safety rules and use any protective equipment recommended for your work or sport.    Avoid activities that cause pain. For example, avoid lifting heavy objects over your head.    Developed by [employer].  Published by [employer].  Copyright  2014 [employer] and/or one of its subsidiaries. All rights reserved.    Rotator Cuff Exercises    Isometric shoulder external rotation:  a doorway with your elbow bent 90 degrees and the back of the wrist on your injured side pressed against the door frame. Try to press your hand outward into the door frame. Hold for 5 seconds. Do 2 sets of 15.    Isometric shoulder internal rotation:  a doorway with your elbow bent 90 degrees and the front of the wrist on your injured side pressed against the door frame. Try to press your palm into the door frame. Hold for 5 seconds. Do 2 sets of 15.  Wand exercise, flexion: Stand upright and hold a stick in both hands, palms down. Stretch your arms by lifting them over your head, keeping your arms straight. Hold for 5 seconds and return to the starting position. Repeat 10 times.    Wand exercise, extension: Stand upright and hold a stick in both hands behind your back. Move the stick away from your back. Hold this position for 5 seconds. Relax and return to the starting position. Repeat 10 times.  Wand exercise, external rotation: Lie on your back and hold a stick in both hands, palms up. Your upper arms should be  resting on the floor with your elbows at your sides and bent 90 degrees. Use your uninjured arm to push your injured arm out away from your body. Keep the elbow of your injured arm at your side while it is being pushed. Hold the stretch for 5 seconds. Repeat 10 times.    Wand exercise, shoulder abduction and adduction: Stand and hold a stick with both hands, palms facing away from your body. Rest the stick against the front of your thighs. Use your uninjured arm to push your injured arm out to the side and up as high as possible. Keep your arms straight. Hold for 5 seconds. Repeat 10 times.    Resisted shoulder external rotation: Stand sideways next to a door with your injured arm farther from the door. Tie a knot in the end of the tubing and shut the knot in the door at waist level (or use cable weight machine at gym). Hold the other end of the tubing with the hand of your injured arm. Rest the hand of your injured arm across your stomach. Keeping your elbow in at your side, rotate your arm outward and away from your waist. Slowly return your arm to the starting position. Make sure you keep your elbow bent 90 degrees and your forearm parallel to the floor. Repeat 10 times. Build up to 2 sets of 15.    Resisted shoulder internal rotation: Stand sideways next to a door with your injured arm closest to the door. Tie a knot in the end of the tubing and shut the knot in the door at waist level (or use cable weight machine at gym). Hold the other end of the tubing with the hand of your injured arm. Bend the elbow of your injured arm 90 degrees. Keeping your elbow in at your side, rotate your forearm across your body and then slowly back to the starting position. Make sure you keep your forearm parallel to the floor. Do 2 sets of 8 to 12.    Scaption: Stand with your arms at your sides and with your elbows straight. Slowly raise your arms to eye level. As you raise your arms, spread them apart so that they are only  "slightly in front of your body (at about a 30-degree angle to the front of your body). Point your thumbs toward the ceiling. Hold for 2 seconds and lower your arms slowly. Do 2 sets of 15. Progress to holding a soup can or light weight when you are doing the exercise and increase the weight as the exercise gets easier.    Side-lying external rotation: Lie on your uninjured side with your injured arm at your side and your elbow bent 90 degrees. Keeping your elbow against your side, raise your forearm toward the ceiling and hold for 2 seconds. Slowly lower your arm. Do 2 sets of 15. You can start doing this exercise holding a soup can or light weight and gradually increase the weight as long as there is no pain.    Horizontal abduction: Lie on your stomach on a table or the edge of a bed with the arm on your injured side hanging down over the edge. Raise your arm out to the side, with your thumb pointed toward the ceiling, until your arm is parallel to the floor. Hold for 2 seconds and then lower it slowly. Start this exercise with no weight. As you get stronger, add a light weight or hold a soup can. Do 2 sets of 15.    Push-up with a plus: Begin on the floor on your hands and knees. Keep your hands a shoulder width apart and lift your feet off the floor. Arch your back as high as possible and round your shoulders (this is the \"plus\" part or the exercise). Bend your elbows and lower your body to the floor. Return to the starting position and arch your back again. Do 2 sets of 15.         "

## 2023-03-02 NOTE — LETTER
3/2/2023    RE: Liyah Jernigan  [address] [place] [address]     Dear Colleague,    Thank you for referring your patient, Liyah Jernigan, to the [facility] [place]. Please see a copy of my visit note below.    [facility]  Sports Medicine Clinic  [facility]           SUBJECTIVE       Liyah Jernigan is a 58 year old female presenting to clinic today with shoulder pain.    Background:   Date of injury: 2 weeks ago  Duration of symptoms: 2 weeks ago   Mechanism of Injury: She had a fall and she injured her right ankle and grabbed her son to stabilize herself  Intensity: 10/10 at worst   Aggravating factors: Overhead motion, lifting   Relieving Factors: Activity avoidance   Prior Evaluation: None      PMH, Medications and Allergies were reviewed and updated as needed.    ROS:  As noted above otherwise negative.    Patient Active Problem List   Diagnosis     Attention deficit hyperactivity disorder (ADHD), unspecified ADHD type     MS (multiple sclerosis) (H)     Varicose vein     Restless leg syndrome     Mild episode of recurrent major depressive disorder (H)     Genital herpes simplex, unspecified site     Benign essential hypertension     Primary insomnia     Family history of alpha 1 antitrypsin deficiency     Osteopenia, unspecified location       Current Outpatient Medications   Medication Sig Dispense Refill     amphetamine-dextroamphetamine (ADDERALL) 10 MG tablet Take 1 tablet (10 mg) by mouth daily In AM 30 tablet 0     amphetamine-dextroamphetamine (ADDERALL) 10 MG tablet Take 1 tablet (10 mg) by mouth daily In AM 30 tablet 0     amphetamine-dextroamphetamine (ADDERALL) 10 MG tablet Take 1 tablet (10 mg) by mouth daily In AM 30 tablet 0     amphetamine-dextroamphetamine (ADDERALL) 5 MG tablet Take 1 tablet (5 mg) by mouth daily At noon 30 tablet 0     amphetamine-dextroamphetamine (ADDERALL) 5 MG tablet Take 1 tablet (5 mg) by mouth daily  At noon 30 tablet 0     amphetamine-dextroamphetamine (ADDERALL) 5 MG tablet Take 1 tablet (5 mg) by mouth daily At noon 30 tablet 0     biotin 2.5 mg/mL SUSP Take 20 mg by mouth daily       buPROPion (WELLBUTRIN SR) 150 MG 12 hr tablet Take 1 tablet (150 mg) by mouth daily 90 tablet 3     gabapentin (NEURONTIN) 300 MG capsule Take 1 capsule by mouth every night as needed. 90 capsule 3     metoprolol succinate ER (TOPROL XL) 25 MG 24 hr tablet Take 1 tablet (25 mg) by mouth 2 times daily 180 tablet 3     SUMAtriptan (IMITREX) 100 MG tablet Take 1 tablet (100 mg) by mouth daily as needed for migraine 30 tablet 1     teriflunomide (AUBAGIO) 14 MG tablet Take 1 tablet (14 mg) by mouth daily 30 tablet 11     traZODone (DESYREL) 50 MG tablet TAKE 1 TABLET(50 MG) BY MOUTH AT BEDTIME 90 tablet 3     valACYclovir (VALTREX) 500 MG tablet Take 1 tablet (500 mg) by mouth daily 90 tablet 3     benzonatate (TESSALON) 100 MG capsule Take 1-2 capsules by mouth up to 3 x a day as needed with food 45 capsule 0     bisacodyl (DULCOLAX) 5 MG EC tablet Take 2 tablets at 3 pm the day before your procedure. If your procedure is before 11 am, take 2 additional tablets at 8 pm. If your procedure is after 11 am, take 2 additional tablets at 6 am. For additional instructions refer to your colonoscopy prep instructions. (Patient not taking: Reported on 11/29/2022) 4 tablet 0     polyethylene glycol (GOLYTELY) 236 g suspension The night before the exam at 6 pm drink an 8-ounce glass every 15 minutes until the jug is half empty. If you arrive before 11 AM: Drink the other half of the Golytely jug at 11 PM night before procedure. If you arrive after 11 AM: Drink the other half of the Golytely jug at 6 AM day of procedure. For additional instructions refer to your colonoscopy prep instructions. (Patient not taking: Reported on 11/29/2022) 4000 mL 0            OBJECTIVE:       Vitals:   Vitals:    03/02/23 1014   Weight: 52.2 kg (115 lb)   Height:  "1.702 m (5' 7\")     BMI: Body mass index is 18.01 kg/m .    Gen:  Well nourished and in no acute distress  HEENT: Extraocular movement intact  Neck: Supple  Pulm:  Breathing Comfortably. No increased respiratory effort.  Psych: Euthymic. Appropriately answers questions    MSK: Left shoulder without evidence of asymmetry or ecchymosis.  No erythema or edema around the left shoulder.  Full range of motion with the exception of internal rotation to the iliac crest, when compared to T8 on the right.  Strength testing mildly diminished in the rotator cuff in the subscapularis and infraspinatus at 4/5.  Painful reverse press off and belly liftoff.  Positive impingement with Neer's and Cade.  Positive Amalia.  Negative cross body negative Spurling.  Negative Hornblower, negative speeds/Yergason's.      XRAY : Independent evaluation shows no acute osseous abnormalities.  Inferior humeral head osteophyte noticed without diminished joint space.          ASSESSMENT and PLAN:     Liyah was seen today for consult.    Diagnoses and all orders for this visit:    Tendinopathy of left rotator cuff    Tear of left glenoid labrum, sequela    Primary osteoarthritis of left shoulder    Other orders  -     Large Joint Injection: L subacromial bursa      58-year-old female presenting to clinic today with 2 weeks worth of shoulder pain after falling, but not hitting her arm.  X-rays were obtained today and did not show any evidence of acute fracture.  Based off my examination, the patient very well has an intra-articular arthritic flare given the grabbing while falling, as well as rotator cuff tendinopathy leading to subacromial bursitis.  Have discussed options for management with the patient.  She is interested in a corticosteroid injection which has been given today.  See below procedure note.  I have also provided the patient with a home exercise program working on the rotator cuff.  If the patient is not having any significant " benefit over the next 4 to 6 weeks with a home exercise program and her pain has not improved, will consider ultrasound-guided injection for the glenohumeral joint.  If the patient has further functional decline, would consider MRI.  Would also consider formal physical therapy if the patient has not significantly improved and stagnated with a home exercise program    Subacromial Bursa - landmark Guided    The patient was informed of the risks and the benefits of the procedure and a written consent was signed.    The patient s left shoulder was prepped for appropriate landmark location     40 mg of triamcinolone suspension was drawn up into a 5 mL syringe with 4 mL of 1% lidocaine.    Injection was performed using sub-sterile technique. Patient was cleansed topically for 45 seconds with a chloraprep sponge. Topical ethyl chloride was used as an anesthetic.  A 1.5-inch 22-gauge needle was used to enter the left subacromial bursa under posterior landmark guidance.      There were no complications. The patient tolerated the procedure well. There was negligible bleeding.     The patient was instructed to ice the shoulder upon leaving clinic and refrain from overuse over the next 3 days.     The patient was instructed to call or go to the emergency room with any unusual pain, swelling, redness, or if otherwise concerned.      Options for treatment and/or follow-up care were reviewed with the patient was actively involved in the decision making process. Patient verbalized understanding and was in agreement with the plan.    Large Joint Injection: L subacromial bursa    Date/Time: 3/2/2023 10:45 AM  Performed by: Stewart Castellano DO  Authorized by: Stewart Castellano DO     Indications:  Pain  Needle Size:  22 G  Guidance: landmark guided    Approach:  Posterior  Location:  Shoulder      Site:  L subacromial bursa  Medications:  40 mg triamcinolone 40 MG/ML; 4 mL lidocaine (PF) 1 %  Outcome:  Tolerated well, no immediate  complications  Procedure discussed: discussed risks, benefits, and alternatives    Consent Given by:  Patient  Timeout: timeout called immediately prior to procedure    Prep: patient was prepped and draped in usual sterile fashion        Stewart Castellano DO  , Sports Medicine  Department of Family [facility] and [facility]

## 2023-03-09 ENCOUNTER — E-VISIT (OUTPATIENT)
Dept: URGENT CARE | Facility: CLINIC | Age: 58
End: 2023-03-09
Payer: COMMERCIAL

## 2023-03-09 DIAGNOSIS — N39.0 ACUTE UTI (URINARY TRACT INFECTION): Primary | ICD-10-CM

## 2023-03-09 PROCEDURE — 99421 OL DIG E/M SVC 5-10 MIN: CPT | Performed by: PHYSICIAN ASSISTANT

## 2023-03-09 RX ORDER — NITROFURANTOIN 25; 75 MG/1; MG/1
100 CAPSULE ORAL 2 TIMES DAILY
Qty: 10 CAPSULE | Refills: 0 | Status: SHIPPED | OUTPATIENT
Start: 2023-03-09 | End: 2023-03-14

## 2023-03-09 NOTE — PATIENT INSTRUCTIONS
Dear Liyah Jernigan    After reviewing your responses, I've been able to diagnose you with a urinary tract infection, which is a common infection of the bladder with bacteria.  This is not a sexually transmitted infection, though urinating immediately after intercourse can help prevent infections.  Drinking lots of fluids is also helpful to clear your current infection and prevent the next one.      I have sent a prescription for antibiotics to your pharmacy to treat this infection.    It is important that you take all of your prescribed medication even if your symptoms are improving after a few doses.  Taking all of your medicine helps prevent the symptoms from returning.     If your symptoms worsen, you develop pain in your back or stomach, develop fevers, or are not improving in 5 days, please contact your primary care provider for an appointment or visit any of our convenient Walk-in or Urgent Care Centers to be seen, which can be found on our website here.    Thanks again for choosing us as your health care partner,    Karlo Benoit, [facility], PA-C    Urinary Tract Infections in Women  Urinary tract infections (UTIs) are most often caused by bacteria. These bacteria enter the urinary tract. The bacteria may come from inside the body. Or they may travel from the skin outside the rectum or vagina into the urethra. Female anatomy makes it easy for bacteria from the bowel to enter a woman s urinary tract, which is the most common source of UTI. This means women develop UTIs more often than men. Pain in or around the urinary tract is a common UTI symptom. But the only way to know for sure if you have a UTI for the healthcare provider to test your urine. The two tests that may be done are the urinalysis and urine culture.     Types of UTIs    Cystitis. A bladder infection (cystitis) is the most common UTI in women. You may have urgent or frequent need to pee. You may also have pain, burning when you pee, and bloody  urine.    Urethritis. This is an inflamed urethra, which is the tube that carries urine from the bladder to outside the body. You may have lower stomach or back pain. You may also have urgent or frequent need to pee.    Pyelonephritis. This is a kidney infection. If not treated, it can be serious and damage your kidneys. In severe cases, you may need to stay in the hospital. You may have a fever and lower back pain.    Medicines to treat a UTI  Most UTIs are treated with antibiotics. These kill the bacteria. The length of time you need to take them depends on the type of infection. It may be as short as 3 days. If you have repeated UTIs, you may need a low-dose antibiotic for several months. Take antibiotics exactly as directed. Don t stop taking them until all of the medicine is gone. If you stop taking the antibiotic too soon, the infection may not go away. You may also develop a resistance to the antibiotic. This can make it much harder to treat.   Lifestyle changes to treat and prevent UTIs   The lifestyle changes below will help get rid of your UTI. They may also help prevent future UTIs.     Drink plenty of fluids. This includes water, juice, or other caffeine-free drinks. Fluids help flush bacteria out of your body.    Empty your bladder. Always empty your bladder when you feel the urge to pee. And always pee before going to sleep. Urine that stays in your bladder can lead to infection. Try to pee before and after sex as well.    Practice good personal hygiene. Wipe yourself from front to back after using the toilet. This helps keep bacteria from getting into the urethra.    Use condoms during sex. These help prevent UTIs caused by sexually transmitted bacteria. Also don't use spermicides during sex. These can increase the risk for UTIs. Choose other forms of birth control instead. For women who tend to get UTIs after sex, a low-dose of a preventive antibiotic may be used. Be sure to discuss this option with  your healthcare provider.    Follow up with your healthcare provider as directed. He or she may test to make sure the infection has cleared. If needed, more treatment may be started.  She last reviewed this educational content on 7/1/2019 2000-2021 [employer]. All rights reserved. This information is not intended as a substitute for professional medical care. Always follow your healthcare professional's instructions.

## 2023-03-30 ENCOUNTER — E-VISIT (OUTPATIENT)
Dept: URGENT CARE | Facility: CLINIC | Age: 58
End: 2023-03-30
Payer: COMMERCIAL

## 2023-03-30 DIAGNOSIS — N39.0 ACUTE UTI (URINARY TRACT INFECTION): Primary | ICD-10-CM

## 2023-03-30 PROCEDURE — 99421 OL DIG E/M SVC 5-10 MIN: CPT | Performed by: PHYSICIAN ASSISTANT

## 2023-03-30 RX ORDER — NITROFURANTOIN 25; 75 MG/1; MG/1
100 CAPSULE ORAL 2 TIMES DAILY
Qty: 10 CAPSULE | Refills: 0 | Status: SHIPPED | OUTPATIENT
Start: 2023-03-30 | End: 2023-04-04

## 2023-03-30 NOTE — PATIENT INSTRUCTIONS
Dear Liyah Jernigan    After reviewing your responses, I've been able to diagnose you with a urinary tract infection, which is a common infection of the bladder with bacteria.  This is not a sexually transmitted infection, though urinating immediately after intercourse can help prevent infections.  Drinking lots of fluids is also helpful to clear your current infection and prevent the next one.      I have sent a prescription for antibiotics to your pharmacy to treat this infection.    It is important that you take all of your prescribed medication even if your symptoms are improving after a few doses.  Taking all of your medicine helps prevent the symptoms from returning.     If your symptoms worsen, you develop pain in your back or stomach, develop fevers, or are not improving in 5 days, please contact your primary care provider for an appointment or visit any of our convenient Walk-in or Urgent Care Centers to be seen, which can be found on our website here.    Thanks again for choosing us as your health care partner,    Karlo Benoit, [facility], PA-C    Urinary Tract Infections in Women  Urinary tract infections (UTIs) are most often caused by bacteria. These bacteria enter the urinary tract. The bacteria may come from inside the body. Or they may travel from the skin outside the rectum or vagina into the urethra. Female anatomy makes it easy for bacteria from the bowel to enter a woman s urinary tract, which is the most common source of UTI. This means women develop UTIs more often than men. Pain in or around the urinary tract is a common UTI symptom. But the only way to know for sure if you have a UTI for the healthcare provider to test your urine. The two tests that may be done are the urinalysis and urine culture.    Types of UTIs    Cystitis. A bladder infection (cystitis) is the most common UTI in women. You may have urgent or frequent need to pee. You may also have pain, burning when you pee, and bloody  urine.    Urethritis. This is an inflamed urethra, which is the tube that carries urine from the bladder to outside the body. You may have lower stomach or back pain. You may also have urgent or frequent need to pee.    Pyelonephritis. This is a kidney infection. If not treated, it can be serious and damage your kidneys. In severe cases, you may need to stay in the hospital. You may have a fever and lower back pain.    Medicines to treat a UTI  Most UTIs are treated with antibiotics. These kill the bacteria. The length of time you need to take them depends on the type of infection. It may be as short as 3 days. If you have repeated UTIs, you may need a low-dose antibiotic for several months. Take antibiotics exactly as directed. Don t stop taking them until all of the medicine is gone, even if you feel better. If you stop taking the antibiotic too soon, the infection may not go away. You may also develop a resistance to the antibiotic. This can make it much harder to treat.  Lifestyle changes to treat and prevent UTIs  The lifestyle changes below will help get rid of your UTI. They may also help prevent future UTIs.    Drink plenty of fluids. This includes water, juice, or other caffeine-free drinks. Fluids help flush bacteria out of your body.    Empty your bladder. Always empty your bladder when you feel the urge to pee. And always pee before going to sleep. Urine that stays in your bladder can lead to infection. Try to pee before and after sex as well.    Practice good personal hygiene. Wipe yourself from front to back after using the toilet. This helps keep bacteria from getting into the urethra.    Wear cotton underwear. Don't wear synthetic or tight-fitting underwear that can trap moisture. Change out of wet bathing suits and workout clothing quickly.    Take showers. Showers are better than baths for preventing UTIs.    Use condoms during sex. These help prevent UTIs caused by sexually transmitted bacteria.  Also don't use spermicides during sex. These can increase the risk for UTIs. Choose other forms of birth control instead. For women who tend to get UTIs after sex, a low-dose of a preventive antibiotic may be used. Be sure to discuss this option with your healthcare provider.    Follow up with your healthcare provider as directed. They may test to make sure the infection has cleared. If needed, more treatment may be started.  Traity last reviewed this educational content on 9/1/2021 2000-2022 [employer]. All rights reserved. This information is not intended as a substitute for professional medical care. Always follow your healthcare professional's instructions.

## 2023-04-07 DIAGNOSIS — G35 MULTIPLE SCLEROSIS (H): ICD-10-CM

## 2023-04-07 RX ORDER — TERIFLUNOMIDE 14 MG/1
14 TABLET, FILM COATED ORAL DAILY
Qty: 30 TABLET | Refills: 11 | Status: SHIPPED | OUTPATIENT
Start: 2023-04-07 | End: 2024-02-19

## 2023-04-07 NOTE — TELEPHONE ENCOUNTER
Received refill request for teriflunomide from [facility]; Patient was last seen in Nov 2022 and has follow up appointment in Nov 2023 with Dr Hairston. Refilled per [facility] refill protocol.    Kacy Fontana RN

## 2023-04-16 ENCOUNTER — MYC REFILL (OUTPATIENT)
Dept: FAMILY MEDICINE | Facility: CLINIC | Age: 58
End: 2023-04-16
Payer: COMMERCIAL

## 2023-04-16 DIAGNOSIS — F90.9 ATTENTION DEFICIT HYPERACTIVITY DISORDER (ADHD), UNSPECIFIED ADHD TYPE: ICD-10-CM

## 2023-04-17 RX ORDER — DEXTROAMPHETAMINE SACCHARATE, AMPHETAMINE ASPARTATE, DEXTROAMPHETAMINE SULFATE AND AMPHETAMINE SULFATE 2.5; 2.5; 2.5; 2.5 MG/1; MG/1; MG/1; MG/1
10 TABLET ORAL DAILY
Qty: 30 TABLET | Refills: 0 | Status: SHIPPED | OUTPATIENT
Start: 2023-04-17 | End: 2023-08-22

## 2023-04-17 RX ORDER — DEXTROAMPHETAMINE SACCHARATE, AMPHETAMINE ASPARTATE, DEXTROAMPHETAMINE SULFATE AND AMPHETAMINE SULFATE 1.25; 1.25; 1.25; 1.25 MG/1; MG/1; MG/1; MG/1
5 TABLET ORAL DAILY
Qty: 30 TABLET | Refills: 0 | Status: SHIPPED | OUTPATIENT
Start: 2023-04-17 | End: 2023-08-22

## 2023-04-19 NOTE — PROGRESS NOTES
[facility]  Sports Medicine Clinic  Clinics and Surgery Center           SUBJECTIVE       Liyah Jernigan is a 58 year old female presenting to clinic today for a f/u of the left shoulder.    3/2/23: Tendinopathy of left rotator cuff     Tear of left glenoid labrum, sequela     Primary osteoarthritis of left shoulder     Other orders  -     Large Joint Injection: L subacromial bursa        58-year-old female presenting to clinic today with 2 weeks worth of shoulder pain after falling, but not hitting her arm.  X-rays were obtained today and did not show any evidence of acute fracture.  Based off my examination, the patient very well has an intra-articular arthritic flare given the grabbing while falling, as well as rotator cuff tendinopathy leading to subacromial bursitis.  Have discussed options for management with the patient.  She is interested in a corticosteroid injection which has been given today.  See below procedure note.  I have also provided the patient with a home exercise program working on the rotator cuff.  If the patient is not having any significant benefit over the next 4 to 6 weeks with a home exercise program and her pain has not improved, will consider ultrasound-guided injection for the glenohumeral joint.  If the patient has further functional decline, would consider MRI.  Would also consider formal physical therapy if the patient has not significantly improved and stagnated with a home exercise program    Study Result    Narrative & Impression   4 views left shoulder radiographs 3/2/2023 10:45 AM     History: Pain in joint, shoulder region      Comparison: Chest x-ray 12/14/2022.     Findings:     AP, Grashey, transscapular Y, axillary  views of the left shoulder  were obtained.      No acute osseous abnormality. Glenohumeral and acromioclavicular  joints are congruent.     Mild degenerative changes of the acromioclavicular joint. Mild  degenerative change of the glenohumeral  joint.     Soft tissue is unremarkable. The visualized lung demonstrates no acute  airspace process. Aortic atherosclerotic calcification.                                                                      Impression:  1. No acute osseous abnormality.  2. Mild glenohumeral degenerative changes.         Interval hx:  Liyah returns to clinic today for the left shoulder.  She had 2 weeks worth of resolution from the subacromial injection.  She did begin to have more pain in the area with repetitive motion and activity.  She is not having any dislocations of the shoulder.  No neck pain.  She is not having any numbness or tingling down the arm.    PMH, Medications and Allergies were reviewed and updated as needed.    ROS:  As noted above otherwise negative.    Patient Active Problem List   Diagnosis     Attention deficit hyperactivity disorder (ADHD), unspecified ADHD type     MS (multiple sclerosis) (H)     Varicose vein     Restless leg syndrome     Mild episode of recurrent major depressive disorder (H)     Genital herpes simplex, unspecified site     Benign essential hypertension     Primary insomnia     Family history of alpha 1 antitrypsin deficiency     Osteopenia, unspecified location       Current Outpatient Medications   Medication Sig Dispense Refill     amphetamine-dextroamphetamine (ADDERALL) 10 MG tablet Take 1 tablet (10 mg) by mouth daily In AM 30 tablet 0     amphetamine-dextroamphetamine (ADDERALL) 10 MG tablet Take 1 tablet (10 mg) by mouth daily In AM 30 tablet 0     amphetamine-dextroamphetamine (ADDERALL) 5 MG tablet Take 1 tablet (5 mg) by mouth daily At noon 30 tablet 0     amphetamine-dextroamphetamine (ADDERALL) 5 MG tablet Take 1 tablet (5 mg) by mouth daily At noon 30 tablet 0     biotin 2.5 mg/mL SUSP Take 20 mg by mouth daily       buPROPion (WELLBUTRIN SR) 150 MG 12 hr tablet Take 1 tablet (150 mg) by mouth daily 90 tablet 3     gabapentin (NEURONTIN) 300 MG capsule Take 1 capsule by mouth  "every night as needed. 90 capsule 3     metoprolol succinate ER (TOPROL XL) 25 MG 24 hr tablet Take 1 tablet (25 mg) by mouth 2 times daily 180 tablet 3     SUMAtriptan (IMITREX) 100 MG tablet Take 1 tablet (100 mg) by mouth daily as needed for migraine 30 tablet 1     teriflunomide (AUBAGIO) 14 MG tablet Take 1 tablet (14 mg) by mouth daily 30 tablet 11     traZODone (DESYREL) 50 MG tablet TAKE 1 TABLET(50 MG) BY MOUTH AT BEDTIME 90 tablet 3     valACYclovir (VALTREX) 500 MG tablet Take 1 tablet (500 mg) by mouth daily 90 tablet 3            OBJECTIVE:       Vitals:   Vitals:    04/20/23 1006   Weight: 52.2 kg (115 lb)   Height: 1.702 m (5' 7\")     BMI: Body mass index is 18.01 kg/m .    Gen:  Well nourished and in no acute distress  HEENT: Extraocular movement intact  Neck: Supple  Pulm:  Breathing Comfortably. No increased respiratory effort.  Psych: Euthymic. Appropriately answers questions    MSK: Left shoulder without visible asymmetry, no overlying skin changes.  Good range of motion in flexion, abduction, external rotation, and internal rotation.  Rotator cuff strength appropriate, however guarding with supraspinatus testing.  Biceps and triceps strength 5/5.  Positive impingement with Neer's.  Negative Cade.  Positive empty can.  Negative drop arm.  Negative Spurling.  Negative belly press off, reverse press off, Hornblower, speeds/Yergason's.  Mildly positive [place].  Negative apprehension.  No evidence of a sulcus sign.              ASSESSMENT and PLAN:     Liyah was seen today for recheck.    Diagnoses and all orders for this visit:    Pain in joint of left shoulder    Tendinopathy of left rotator cuff  -     Physical Therapy Referral; Future    Tear of left glenoid labrum, sequela  -     Physical Therapy Referral; Future    Primary osteoarthritis of left shoulder  -     Physical Therapy Referral; Future      58-year-old female presenting to clinic with chronic left shoulder pain, for follow-up after " the left subacromial injection.  Patient did have 2 weeks worth of pain relief, that subsequently came back after increased activity.  I discussed the overall pathology and prognosis with the patient.  Given the fact that she did have pain relief, I am confident that the rotator cuff is the issue, however I am concerned that the chronic nature of this may indicate a larger problem dealing with the rotator cuff than seen.  However the patient also has not done any physical therapy for the shoulder, and my examination is not having any red flag signs such as subluxations or numbness/tingling of the arm or radicular symptoms coming from the cervical spine.  At this point I have discussed further options with the patient in terms of moving forward.  She would like to try physical therapy first.  I do think this is reasonable.  I would like to see her in 4 to 6 weeks for follow-up, if she has not had any improvement, would consider MRI at that point    Options for treatment and/or follow-up care were reviewed with the patient was actively involved in the decision making process. Patient verbalized understanding and was in agreement with the plan.    Stewart Castellano DO  , Sports Medicine  Department of Family Medicine and [facility]

## 2023-04-20 ENCOUNTER — OFFICE VISIT (OUTPATIENT)
Dept: ORTHOPEDICS | Facility: CLINIC | Age: 58
End: 2023-04-20
Payer: COMMERCIAL

## 2023-04-20 VITALS — HEIGHT: 67 IN | WEIGHT: 115 LBS | BODY MASS INDEX: 18.05 KG/M2

## 2023-04-20 DIAGNOSIS — M25.512 PAIN IN JOINT OF LEFT SHOULDER: Primary | ICD-10-CM

## 2023-04-20 DIAGNOSIS — M67.912 TENDINOPATHY OF LEFT ROTATOR CUFF: ICD-10-CM

## 2023-04-20 DIAGNOSIS — S43.432S TEAR OF LEFT GLENOID LABRUM, SEQUELA: ICD-10-CM

## 2023-04-20 DIAGNOSIS — M19.012 PRIMARY OSTEOARTHRITIS OF LEFT SHOULDER: ICD-10-CM

## 2023-04-20 PROCEDURE — 99213 OFFICE O/P EST LOW 20 MIN: CPT | Performed by: STUDENT IN AN ORGANIZED HEALTH CARE EDUCATION/TRAINING PROGRAM

## 2023-04-20 NOTE — LETTER
4/20/2023      RE: Liyah Jernigan  [address] [place] [address]     Dear Colleague,    Thank you for referring your patient, Liyah Jernigan, to the [facility] SPORTS MEDICINE CLINIC [place]. Please see a copy of my visit note below.    [facility]  Sports Medicine Clinic  Clinics and Surgery Center           SUBJECTIVE       Liyah Jernigan is a 58 year old female presenting to clinic today for a f/u of the left shoulder.    3/2/23: Tendinopathy of left rotator cuff     Tear of left glenoid labrum, sequela     Primary osteoarthritis of left shoulder     Other orders  -     Large Joint Injection: L subacromial bursa        58-year-old female presenting to clinic today with 2 weeks worth of shoulder pain after falling, but not hitting her arm.  X-rays were obtained today and did not show any evidence of acute fracture.  Based off my examination, the patient very well has an intra-articular arthritic flare given the grabbing while falling, as well as rotator cuff tendinopathy leading to subacromial bursitis.  Have discussed options for management with the patient.  She is interested in a corticosteroid injection which has been given today.  See below procedure note.  I have also provided the patient with a home exercise program working on the rotator cuff.  If the patient is not having any significant benefit over the next 4 to 6 weeks with a home exercise program and her pain has not improved, will consider ultrasound-guided injection for the glenohumeral joint.  If the patient has further functional decline, would consider MRI.  Would also consider formal physical therapy if the patient has not significantly improved and stagnated with a home exercise program    Study Result    Narrative & Impression   4 views left shoulder radiographs 3/2/2023 10:45 AM     History: Pain in joint, shoulder region      Comparison: Chest x-ray 12/14/2022.     Findings:     AP, Grashey, transscapular Y,  axillary  views of the left shoulder  were obtained.      No acute osseous abnormality. Glenohumeral and acromioclavicular  joints are congruent.     Mild degenerative changes of the acromioclavicular joint. Mild  degenerative change of the glenohumeral joint.     Soft tissue is unremarkable. The visualized lung demonstrates no acute  airspace process. Aortic atherosclerotic calcification.                                                                      Impression:  1. No acute osseous abnormality.  2. Mild glenohumeral degenerative changes.         Interval hx:  Liyah returns to clinic today for the left shoulder.  She had 2 weeks worth of resolution from the subacromial injection.  She did begin to have more pain in the area with repetitive motion and activity.  She is not having any dislocations of the shoulder.  No neck pain.  She is not having any numbness or tingling down the arm.    PMH, Medications and Allergies were reviewed and updated as needed.    ROS:  As noted above otherwise negative.    Patient Active Problem List   Diagnosis     Attention deficit hyperactivity disorder (ADHD), unspecified ADHD type     MS (multiple sclerosis) (H)     Varicose vein     Restless leg syndrome     Mild episode of recurrent major depressive disorder (H)     Genital herpes simplex, unspecified site     Benign essential hypertension     Primary insomnia     Family history of alpha 1 antitrypsin deficiency     Osteopenia, unspecified location       Current Outpatient Medications   Medication Sig Dispense Refill     amphetamine-dextroamphetamine (ADDERALL) 10 MG tablet Take 1 tablet (10 mg) by mouth daily In AM 30 tablet 0     amphetamine-dextroamphetamine (ADDERALL) 10 MG tablet Take 1 tablet (10 mg) by mouth daily In AM 30 tablet 0     amphetamine-dextroamphetamine (ADDERALL) 5 MG tablet Take 1 tablet (5 mg) by mouth daily At noon 30 tablet 0     amphetamine-dextroamphetamine (ADDERALL) 5 MG tablet Take 1 tablet (5 mg)  "by mouth daily At noon 30 tablet 0     biotin 2.5 mg/mL SUSP Take 20 mg by mouth daily       buPROPion (WELLBUTRIN SR) 150 MG 12 hr tablet Take 1 tablet (150 mg) by mouth daily 90 tablet 3     gabapentin (NEURONTIN) 300 MG capsule Take 1 capsule by mouth every night as needed. 90 capsule 3     metoprolol succinate ER (TOPROL XL) 25 MG 24 hr tablet Take 1 tablet (25 mg) by mouth 2 times daily 180 tablet 3     SUMAtriptan (IMITREX) 100 MG tablet Take 1 tablet (100 mg) by mouth daily as needed for migraine 30 tablet 1     teriflunomide (AUBAGIO) 14 MG tablet Take 1 tablet (14 mg) by mouth daily 30 tablet 11     traZODone (DESYREL) 50 MG tablet TAKE 1 TABLET(50 MG) BY MOUTH AT BEDTIME 90 tablet 3     valACYclovir (VALTREX) 500 MG tablet Take 1 tablet (500 mg) by mouth daily 90 tablet 3            OBJECTIVE:       Vitals:   Vitals:    04/20/23 1006   Weight: 52.2 kg (115 lb)   Height: 1.702 m (5' 7\")     BMI: Body mass index is 18.01 kg/m .    Gen:  Well nourished and in no acute distress  HEENT: Extraocular movement intact  Neck: Supple  Pulm:  Breathing Comfortably. No increased respiratory effort.  Psych: Euthymic. Appropriately answers questions    MSK: Left shoulder without visible asymmetry, no overlying skin changes.  Good range of motion in flexion, abduction, external rotation, and internal rotation.  Rotator cuff strength appropriate, however guarding with supraspinatus testing.  Biceps and triceps strength 5/5.  Positive impingement with Neer's.  Negative Cade.  Positive empty can.  Negative drop arm.  Negative Spurling.  Negative belly press off, reverse press off, Hornblower, speeds/Yergason's.  Mildly positive [place].  Negative apprehension.  No evidence of a sulcus sign.              ASSESSMENT and PLAN:     Liyah was seen today for recheck.    Diagnoses and all orders for this visit:    Pain in joint of left shoulder    Tendinopathy of left rotator cuff  -     Physical Therapy Referral; Future    Tear " of left glenoid labrum, sequela  -     Physical Therapy Referral; Future    Primary osteoarthritis of left shoulder  -     Physical Therapy Referral; Future      58-year-old female presenting to clinic with chronic left shoulder pain, for follow-up after the left subacromial injection.  Patient did have 2 weeks worth of pain relief, that subsequently came back after increased activity.  I discussed the overall pathology and prognosis with the patient.  Given the fact that she did have pain relief, I am confident that the rotator cuff is the issue, however I am concerned that the chronic nature of this may indicate a larger problem dealing with the rotator cuff than seen.  However the patient also has not done any physical therapy for the shoulder, and my examination is not having any red flag signs such as subluxations or numbness/tingling of the arm or radicular symptoms coming from the cervical spine.  At this point I have discussed further options with the patient in terms of moving forward.  She would like to try physical therapy first.  I do think this is reasonable.  I would like to see her in 4 to 6 weeks for follow-up, if she has not had any improvement, would consider MRI at that point    Options for treatment and/or follow-up care were reviewed with the patient was actively involved in the decision making process. Patient verbalized understanding and was in agreement with the plan.    Stewart Castellano DO  , Sports Medicine  Department of Family Medicine and [facility]      Again, thank you for allowing me to participate in the care of your patient.      Sincerely,    Stewart Castellano DO

## 2023-04-27 ENCOUNTER — TELEPHONE (OUTPATIENT)
Dept: NEUROLOGY | Facility: CLINIC | Age: 58
End: 2023-04-27
Payer: COMMERCIAL

## 2023-04-27 NOTE — TELEPHONE ENCOUNTER
Prior Authorization Approval    Authorization Effective Date: 3/28/2023  Authorization Expiration Date: 4/26/2024  Medication: Aubagio PA renewal - approved  Approved Dose/Quantity: 30 tabs per 30 days  Reference #: [id number]   Insurance Company: [employer] - Phone [phone] Fax [phone]  Expected CoPay:       CoPay Card Available:      Foundation Assistance Needed:    Which Pharmacy is filling the prescription (Not needed for infusion/clinic administered): [facility] - [place], [place] - [address]  Pharmacy Notified: Yes  Patient Notified: Yes

## 2023-05-02 ENCOUNTER — THERAPY VISIT (OUTPATIENT)
Dept: PHYSICAL THERAPY | Facility: CLINIC | Age: 58
End: 2023-05-02
Attending: STUDENT IN AN ORGANIZED HEALTH CARE EDUCATION/TRAINING PROGRAM
Payer: COMMERCIAL

## 2023-05-02 DIAGNOSIS — M25.511 CHRONIC PAIN OF BOTH SHOULDERS: ICD-10-CM

## 2023-05-02 DIAGNOSIS — M67.912 TENDINOPATHY OF LEFT ROTATOR CUFF: ICD-10-CM

## 2023-05-02 DIAGNOSIS — G89.29 CHRONIC PAIN OF BOTH SHOULDERS: ICD-10-CM

## 2023-05-02 DIAGNOSIS — S43.432S TEAR OF LEFT GLENOID LABRUM, SEQUELA: ICD-10-CM

## 2023-05-02 DIAGNOSIS — M25.512 CHRONIC PAIN OF BOTH SHOULDERS: ICD-10-CM

## 2023-05-02 DIAGNOSIS — M19.012 PRIMARY OSTEOARTHRITIS OF LEFT SHOULDER: ICD-10-CM

## 2023-05-02 PROCEDURE — 97161 PT EVAL LOW COMPLEX 20 MIN: CPT | Mod: GP | Performed by: PHYSICAL THERAPIST

## 2023-05-02 PROCEDURE — 97140 MANUAL THERAPY 1/> REGIONS: CPT | Mod: GP | Performed by: PHYSICAL THERAPIST

## 2023-05-02 PROCEDURE — 97110 THERAPEUTIC EXERCISES: CPT | Mod: GP | Performed by: PHYSICAL THERAPIST

## 2023-05-02 NOTE — PROGRESS NOTES
Physical Therapy Initial Evaluation  Subjective:  The history is provided by the patient.   Patient Health History  Liyah Jernigan being seen for Shoulder(s).     Problem began: 12/1/2022.   Problem occurred: Lifting, shoveling, roof raking   Pain is reported as 5/10 on pain scale.  General health as reported by patient is excellent.  Pertinent medical history includes: history of fractures, high blood pressure, multiple sclerosis and numbness/tingling.     Medical allergies: none.   Surgeries include:  None.    Current medications:  Anti-depressants, high blood pressure medication and other. Other medications details: MS disease modifying.    Current occupation is Health Care.   Primary job tasks include:  Computer work, driving, lifting/carrying, prolonged standing and pushing/pulling.                  Therapist Generated HPI Evaluation  Problem details: L > R shoulder pain.  Initially injured in 2014 while transferring son.  More recently with roof raking and shoveling..         Type of problem:  Bilateral shoulders.    This is a recurrent condition.  Condition occurred with:  Repetition/overuse.  Where condition occurred: at home.  Patient reports pain:  In the joint.  Pain is described as stabbing and aching and is intermittent.  Pain radiates to:  Upper arm. Pain is worse during the day.  Since onset symptoms are unchanged.  Associated symptoms:  Loss of motion/stiffness and loss of strength. Symptoms are exacerbated by lifting, certain positions, using arm overhead, using arm behind back and using arm at shoulder level  and relieved by rest.  Special tests included:  X-ray (L - mild GH degen).  Past treatment: cortisone on L  There was moderate improvement following previous treatment.  Restrictions due to condition include:  Working in normal job without restrictions (cares for son).                          Objective:  Standing Alignment:    Cervical/Thoracic:  Normal  Shoulder/UE:   Normal                                       Shoulder Evaluation:  ROM:  AROM:    Flexion:  Left:  1311    Right:  164    Abduction:  Left: 124   Right:  135    Internal Rotation:  Left:  L5    Right:  L2                  PROM:  normal (end range pain bilat )                                Strength:  : limited by pain.                      Stability Testing:  normal      Special Tests:    Left shoulder positive for the following special tests:  Labral (possible obriens) and Impingement  Right shoulder positive for the following special tests:Impingement  Palpation:    Left shoulder tenderness present at:  Deltoid; Rhomboids and Upper Trap  Right shoulder tenderness present at: Deltoid; Rhomboids and Upper Trap                                     General     ROS    Assessment/Plan:    Patient is a 58 year old female with both sides shoulder complaints.    Patient has the following significant findings with corresponding treatment plan.                Diagnosis 1:  bilat shoulder pain  Pain -  manual therapy, self management, education, directional preference exercise and home program  Decreased ROM/flexibility - manual therapy and therapeutic exercise  Decreased strength - therapeutic exercise and therapeutic activities  Impaired muscle performance - neuro re-education  Decreased function - therapeutic activities    Therapy Evaluation Codes:   1) History comprised of:   Personal factors that impact the plan of care:      None.    Comorbidity factors that impact the plan of care are:      High blood pressure, Multiple sclerosis and Numbness/tingling.     Medications impacting care: Anti-depressant and High blood pressure.  2) Examination of Body Systems comprised of:   Body structures and functions that impact the plan of care:      Shoulder.   Activity limitations that impact the plan of care are:      Dressing and Lifting.  3) Clinical presentation characteristics  are:   Stable/Uncomplicated.  4) Decision-Making    Low complexity using standardized patient assessment instrument and/or measureable assessment of functional outcome.  Cumulative Therapy Evaluation is: Low complexity.    Previous and current functional limitations:  (See Goal Flow Sheet for this information)    Short term and Long term goals: (See Goal Flow Sheet for this information)     Communication ability:  Patient appears to be able to clearly communicate and understand verbal and written communication and follow directions correctly.  Treatment Explanation - The following has been discussed with the patient:   RX ordered/plan of care  Anticipated outcomes  Possible risks and side effects  This patient would benefit from PT intervention to resume normal activities.   Rehab potential is good.    Frequency:  1 X week, once daily  Duration:  for 8 weeks  Discharge Plan:  Achieve all LTG.  Independent in home treatment program.  Reach maximal therapeutic benefit.    Please refer to the daily flowsheet for treatment today, total treatment time and time spent performing 1:1 timed codes.

## 2023-05-09 ENCOUNTER — THERAPY VISIT (OUTPATIENT)
Dept: PHYSICAL THERAPY | Facility: CLINIC | Age: 58
End: 2023-05-09
Attending: STUDENT IN AN ORGANIZED HEALTH CARE EDUCATION/TRAINING PROGRAM
Payer: COMMERCIAL

## 2023-05-09 DIAGNOSIS — M25.512 CHRONIC PAIN OF BOTH SHOULDERS: Primary | ICD-10-CM

## 2023-05-09 DIAGNOSIS — G89.29 CHRONIC PAIN OF BOTH SHOULDERS: Primary | ICD-10-CM

## 2023-05-09 DIAGNOSIS — M25.511 CHRONIC PAIN OF BOTH SHOULDERS: Primary | ICD-10-CM

## 2023-05-09 PROCEDURE — 97110 THERAPEUTIC EXERCISES: CPT | Mod: GP | Performed by: PHYSICAL THERAPIST

## 2023-05-09 PROCEDURE — 97112 NEUROMUSCULAR REEDUCATION: CPT | Mod: GP | Performed by: PHYSICAL THERAPIST

## 2023-05-16 ENCOUNTER — THERAPY VISIT (OUTPATIENT)
Dept: PHYSICAL THERAPY | Facility: CLINIC | Age: 58
End: 2023-05-16
Attending: STUDENT IN AN ORGANIZED HEALTH CARE EDUCATION/TRAINING PROGRAM
Payer: COMMERCIAL

## 2023-05-16 DIAGNOSIS — M25.512 CHRONIC PAIN OF BOTH SHOULDERS: Primary | ICD-10-CM

## 2023-05-16 DIAGNOSIS — G89.29 CHRONIC PAIN OF BOTH SHOULDERS: Primary | ICD-10-CM

## 2023-05-16 DIAGNOSIS — M25.511 CHRONIC PAIN OF BOTH SHOULDERS: Primary | ICD-10-CM

## 2023-05-16 PROCEDURE — 97110 THERAPEUTIC EXERCISES: CPT | Mod: GP | Performed by: PHYSICAL THERAPIST

## 2023-05-16 PROCEDURE — 97140 MANUAL THERAPY 1/> REGIONS: CPT | Mod: GP | Performed by: PHYSICAL THERAPIST

## 2023-05-19 ENCOUNTER — ALLIED HEALTH/NURSE VISIT (OUTPATIENT)
Dept: FAMILY MEDICINE | Facility: CLINIC | Age: 58
End: 2023-05-19
Payer: COMMERCIAL

## 2023-05-19 DIAGNOSIS — Z23 ENCOUNTER FOR IMMUNIZATION: Primary | ICD-10-CM

## 2023-05-19 PROCEDURE — 99207 PR NO CHARGE NURSE ONLY: CPT

## 2023-05-22 ENCOUNTER — TELEPHONE (OUTPATIENT)
Dept: FAMILY MEDICINE | Facility: CLINIC | Age: 58
End: 2023-05-22
Payer: COMMERCIAL

## 2023-05-22 DIAGNOSIS — R35.0 URINARY FREQUENCY: Primary | ICD-10-CM

## 2023-05-22 NOTE — TELEPHONE ENCOUNTER
Let pt know. She is going to bring the sample in at her appt.     Alexi IRWIN [facility]    Primary Care

## 2023-05-22 NOTE — TELEPHONE ENCOUNTER
Pt calling report UTI symptoms x 3 days. Pt reports pressure with urination, burning, blood tinged urine during first urine of day on Saturday. Denies increased frequency, urgency, fever.     Pt requesting order for UA be put in and to discuss UTI during visit with Dr. Barrow tomorrow, 5/23/23. Pt has specimen cup and will bring in urine sample tomorrow. Discussed urine collection procedure and storage, pt verbalized understanding.     Routing to provider to place UA order if appropriate.     Addis Clinton RN

## 2023-05-23 ENCOUNTER — OFFICE VISIT (OUTPATIENT)
Dept: FAMILY MEDICINE | Facility: CLINIC | Age: 58
End: 2023-05-23
Payer: COMMERCIAL

## 2023-05-23 VITALS
HEART RATE: 71 BPM | HEIGHT: 66 IN | TEMPERATURE: 99.1 F | OXYGEN SATURATION: 97 % | WEIGHT: 114.5 LBS | DIASTOLIC BLOOD PRESSURE: 78 MMHG | RESPIRATION RATE: 14 BRPM | BODY MASS INDEX: 18.4 KG/M2 | SYSTOLIC BLOOD PRESSURE: 113 MMHG

## 2023-05-23 DIAGNOSIS — Z23 NEED FOR VACCINATION: ICD-10-CM

## 2023-05-23 DIAGNOSIS — R35.0 URINARY FREQUENCY: Primary | ICD-10-CM

## 2023-05-23 DIAGNOSIS — G35 MS (MULTIPLE SCLEROSIS) (H): ICD-10-CM

## 2023-05-23 LAB
ALBUMIN UR-MCNC: NEGATIVE MG/DL
AMORPH CRY #/AREA URNS HPF: ABNORMAL /HPF
APPEARANCE UR: ABNORMAL
BILIRUB UR QL STRIP: NEGATIVE
COLOR UR AUTO: YELLOW
GLUCOSE UR STRIP-MCNC: NEGATIVE MG/DL
HGB UR QL STRIP: ABNORMAL
KETONES UR STRIP-MCNC: NEGATIVE MG/DL
LEUKOCYTE ESTERASE UR QL STRIP: ABNORMAL
NITRATE UR QL: NEGATIVE
PH UR STRIP: 7 [PH] (ref 5–7)
RBC #/AREA URNS AUTO: ABNORMAL /HPF
SP GR UR STRIP: 1.01 (ref 1–1.03)
UROBILINOGEN UR STRIP-ACNC: 0.2 E.U./DL
WBC #/AREA URNS AUTO: ABNORMAL /HPF

## 2023-05-23 PROCEDURE — 99213 OFFICE O/P EST LOW 20 MIN: CPT | Mod: 25 | Performed by: FAMILY MEDICINE

## 2023-05-23 PROCEDURE — 81001 URINALYSIS AUTO W/SCOPE: CPT | Performed by: FAMILY MEDICINE

## 2023-05-23 PROCEDURE — 90750 HZV VACC RECOMBINANT IM: CPT | Performed by: FAMILY MEDICINE

## 2023-05-23 PROCEDURE — 90471 IMMUNIZATION ADMIN: CPT | Performed by: FAMILY MEDICINE

## 2023-05-23 RX ORDER — CEPHALEXIN 500 MG/1
500 CAPSULE ORAL 3 TIMES DAILY
Qty: 9 CAPSULE | Refills: 0 | Status: SHIPPED | OUTPATIENT
Start: 2023-05-23 | End: 2023-08-22

## 2023-05-23 ASSESSMENT — PATIENT HEALTH QUESTIONNAIRE - PHQ9
10. IF YOU CHECKED OFF ANY PROBLEMS, HOW DIFFICULT HAVE THESE PROBLEMS MADE IT FOR YOU TO DO YOUR WORK, TAKE CARE OF THINGS AT HOME, OR GET ALONG WITH OTHER PEOPLE: NOT DIFFICULT AT ALL
SUM OF ALL RESPONSES TO PHQ QUESTIONS 1-9: 0
SUM OF ALL RESPONSES TO PHQ QUESTIONS 1-9: 0

## 2023-05-23 NOTE — PROGRESS NOTES
Assessment & Plan     Urinary frequency  Third episode of what seems to be bladder infection symptoms in the last few months.  Patient does not have a long history of urinary infections.  Urinalysis shows some changes but not entirely clear.  We will definitely run the culture.  I will send in the course of antibiotics anyway and we can decide based upon culture results whether to make changes or not.  - UA Macroscopic with reflex to Microscopic and Culture  - UA Microscopic with Reflex to Culture  - cephALEXin (KEFLEX) 500 MG capsule; Take 1 capsule (500 mg) by mouth 3 times daily    Need for vaccination  Is interested in Shingrix vaccine and we talked about this in light of her relative immunosuppression related to MS therapies.  Actually a good idea to have this done.  Not a live vaccine so no particular risks.  - ZOSTER VACCINE RECOMBINANT ADJUVANTED (SHINGRIX)    MS (multiple sclerosis) (H)  Stable and following with neurology.           See Patient Instructions    Jasmine Barrow MD  [facility]    Anabel Pelletier is a 58 year old, presenting for the following health issues:  Imm/Inj (shingles) and UTI        5/23/2023     3:33 PM   Additional Questions   Roomed by Arabella         5/23/2023     3:33 PM   Patient Reported Additional Medications   Patient reports taking the following new medications None     Imm/Inj    UTI    History of Present Illness       Reason for visit:  Vaccination(s) and *another* poss UTI. Request labs.    She eats 4 or more servings of fruits and vegetables daily.She consumes 0 sweetened beverage(s) daily.She exercises with enough effort to increase her heart rate 60 or more minutes per day.  She exercises with enough effort to increase her heart rate 7 days per week.   She is taking medications regularly.    Today's PHQ-9         PHQ-9 Total Score: 0    PHQ-9 Q9 Thoughts of better off dead/self-harm past 2 weeks :   Not at all    How difficult have these  "problems made it for you to do your work, take care of things at home, or get along with other people: Not difficult at all       Genitourinary - Female  Onset/Duration: 4 days   Description:   Painful urination (Dysuria): YES           Frequency: YES  Blood in urine (Hematuria): YES  Delay in urine (Hesitency): No  Intensity: Unsure  Progression of Symptoms:  same  Accompanying Signs & Symptoms:  Fever/chills: YES- low grade fever  Flank pain: No  Nausea and vomiting: YES  Vaginal symptoms: none  Abdominal/Pelvic Pain: No  History:   History of frequent UTI s: YES  History of kidney stones: YES  Sexually Active: No  Possibility of pregnancy: No  Precipitating or alleviating factors: None  Therapies tried and outcome:  Water     Here today for symptoms as above.      Review of Systems   Constitutional, HEENT, cardiovascular, pulmonary, gi and gu systems are negative, except as otherwise noted.      Objective    /78 (BP Location: Right arm, Patient Position: Sitting, Cuff Size: Adult Regular)   Pulse 71   Temp 99.1  F (37.3  C) (Oral)   Resp 14   Ht 1.676 m (5' 6\")   Wt 51.9 kg (114 lb 8 oz)   LMP 05/25/2020 (Exact Date)   SpO2 97%   BMI 18.48 kg/m    Body mass index is 18.48 kg/m .  Physical Exam   Alert, pleasant, upbeat, and in no apparent discomfort.  S1 and S2 normal, no murmurs, clicks, gallops or rubs. Regular rate and rhythm. Chest is clear; no wheezes or rales. No edema or JVD.  Past labs reviewed with the patient.     UA RESULTS:  Recent Labs   Lab Test 05/23/23  1552   COLOR Yellow   APPEARANCE Cloudy*   URINEGLC Negative   URINEBILI Negative   URINEKETONE Negative   SG 1.015   UBLD Moderate*   URINEPH 7.0   PROTEIN Negative   UROBILINOGEN 0.2   NITRITE Negative   LEUKEST Small*   RBCU 2-5*   WBCU 5-10*                         "

## 2023-05-23 NOTE — NURSING NOTE
Prior to immunization administration, verified patients identity using patient s name and date of birth. Please see Immunization Activity for additional information.     Screening Questionnaire for Adult Immunization    Are you sick today?   No   Do you have allergies to medications, food, a vaccine component or latex?   No   Have you ever had a serious reaction after receiving a vaccination?   No   Do you have a long-term health problem with heart, lung, kidney, or metabolic disease (e.g., diabetes), asthma, a blood disorder, no spleen, complement component deficiency, a cochlear implant, or a spinal fluid leak?  Are you on long-term aspirin therapy?   No   Do you have cancer, leukemia, HIV/AIDS, or any other immune system problem?   No   Do you have a parent, brother, or sister with an immune system problem?   No   In the past 3 months, have you taken medications that affect  your immune system, such as prednisone, other steroids, or anticancer drugs; drugs for the treatment of rheumatoid arthritis, Crohn s disease, or psoriasis; or have you had radiation treatments?   No   Have you had a seizure, or a brain or other nervous system problem?   No   During the past year, have you received a transfusion of blood or blood    products, or been given immune (gamma) globulin or antiviral drug?   No   For women: Are you pregnant or is there a chance you could become       pregnant during the next month?   No   Have you received any vaccinations in the past 4 weeks?   No     Immunization questionnaire answers were all negative.      Injection of Zoster (Shingles) given by Arabella Fried MA. Patient instructed to remain in clinic for 15 minutes afterwards, and to report any adverse reactions.     Screening performed by Arabella Fried MA on 5/23/2023 at 4:25 PM.

## 2023-06-01 ENCOUNTER — MYC MEDICAL ADVICE (OUTPATIENT)
Dept: FAMILY MEDICINE | Facility: CLINIC | Age: 58
End: 2023-06-01
Payer: COMMERCIAL

## 2023-06-01 DIAGNOSIS — R35.0 URINARY FREQUENCY: Primary | ICD-10-CM

## 2023-08-22 ENCOUNTER — OFFICE VISIT (OUTPATIENT)
Dept: FAMILY MEDICINE | Facility: CLINIC | Age: 58
End: 2023-08-22
Payer: COMMERCIAL

## 2023-08-22 ENCOUNTER — ANCILLARY PROCEDURE (OUTPATIENT)
Dept: GENERAL RADIOLOGY | Facility: CLINIC | Age: 58
End: 2023-08-22
Attending: FAMILY MEDICINE
Payer: COMMERCIAL

## 2023-08-22 VITALS
SYSTOLIC BLOOD PRESSURE: 114 MMHG | HEART RATE: 60 BPM | TEMPERATURE: 98.8 F | BODY MASS INDEX: 17.98 KG/M2 | OXYGEN SATURATION: 97 % | HEIGHT: 66 IN | RESPIRATION RATE: 14 BRPM | WEIGHT: 111.9 LBS | DIASTOLIC BLOOD PRESSURE: 74 MMHG

## 2023-08-22 DIAGNOSIS — I10 HYPERTENSION GOAL BP (BLOOD PRESSURE) < 140/90: ICD-10-CM

## 2023-08-22 DIAGNOSIS — F90.9 ATTENTION DEFICIT HYPERACTIVITY DISORDER (ADHD), UNSPECIFIED ADHD TYPE: ICD-10-CM

## 2023-08-22 DIAGNOSIS — A60.00 GENITAL HERPES SIMPLEX, UNSPECIFIED SITE: ICD-10-CM

## 2023-08-22 DIAGNOSIS — G35 MULTIPLE SCLEROSIS (H): ICD-10-CM

## 2023-08-22 DIAGNOSIS — R22.31 FINGER MASS, RIGHT: ICD-10-CM

## 2023-08-22 DIAGNOSIS — R22.31 FINGER MASS, RIGHT: Primary | ICD-10-CM

## 2023-08-22 DIAGNOSIS — G43.109 MIGRAINE WITH AURA AND WITHOUT STATUS MIGRAINOSUS, NOT INTRACTABLE: ICD-10-CM

## 2023-08-22 DIAGNOSIS — R20.2 PARESTHESIA: ICD-10-CM

## 2023-08-22 DIAGNOSIS — F51.01 PRIMARY INSOMNIA: ICD-10-CM

## 2023-08-22 DIAGNOSIS — F33.0 MILD EPISODE OF RECURRENT MAJOR DEPRESSIVE DISORDER (H): ICD-10-CM

## 2023-08-22 PROCEDURE — 99214 OFFICE O/P EST MOD 30 MIN: CPT | Performed by: FAMILY MEDICINE

## 2023-08-22 PROCEDURE — 73140 X-RAY EXAM OF FINGER(S): CPT | Mod: TC | Performed by: RADIOLOGY

## 2023-08-22 RX ORDER — BUPROPION HYDROCHLORIDE 150 MG/1
150 TABLET, EXTENDED RELEASE ORAL DAILY
Qty: 90 TABLET | Refills: 3 | Status: SHIPPED | OUTPATIENT
Start: 2023-08-22 | End: 2024-06-18

## 2023-08-22 RX ORDER — GABAPENTIN 300 MG/1
CAPSULE ORAL
Qty: 90 CAPSULE | Refills: 3 | Status: SHIPPED | OUTPATIENT
Start: 2023-08-22 | End: 2024-06-18

## 2023-08-22 RX ORDER — TRAZODONE HYDROCHLORIDE 50 MG/1
TABLET, FILM COATED ORAL
Qty: 90 TABLET | Refills: 3 | Status: SHIPPED | OUTPATIENT
Start: 2023-08-22 | End: 2024-06-18

## 2023-08-22 RX ORDER — SUMATRIPTAN 100 MG/1
100 TABLET, FILM COATED ORAL DAILY PRN
Qty: 30 TABLET | Refills: 1 | Status: SHIPPED | OUTPATIENT
Start: 2023-08-22 | End: 2024-06-18

## 2023-08-22 RX ORDER — VALACYCLOVIR HYDROCHLORIDE 500 MG/1
500 TABLET, FILM COATED ORAL DAILY
Qty: 90 TABLET | Refills: 3 | Status: SHIPPED | OUTPATIENT
Start: 2023-08-22 | End: 2024-06-18

## 2023-08-22 RX ORDER — METOPROLOL SUCCINATE 25 MG/1
25 TABLET, EXTENDED RELEASE ORAL 2 TIMES DAILY
Qty: 180 TABLET | Refills: 3 | Status: SHIPPED | OUTPATIENT
Start: 2023-08-22 | End: 2024-06-18

## 2023-08-22 RX ORDER — DEXTROAMPHETAMINE SACCHARATE, AMPHETAMINE ASPARTATE, DEXTROAMPHETAMINE SULFATE AND AMPHETAMINE SULFATE 2.5; 2.5; 2.5; 2.5 MG/1; MG/1; MG/1; MG/1
10 TABLET ORAL DAILY
Qty: 30 TABLET | Refills: 0 | Status: SHIPPED | OUTPATIENT
Start: 2023-08-22 | End: 2023-12-18

## 2023-08-22 RX ORDER — DEXTROAMPHETAMINE SACCHARATE, AMPHETAMINE ASPARTATE, DEXTROAMPHETAMINE SULFATE AND AMPHETAMINE SULFATE 1.25; 1.25; 1.25; 1.25 MG/1; MG/1; MG/1; MG/1
5 TABLET ORAL DAILY
Qty: 30 TABLET | Refills: 0 | Status: SHIPPED | OUTPATIENT
Start: 2023-08-22 | End: 2023-12-18

## 2023-08-22 NOTE — PROGRESS NOTES
Assessment & Plan     Finger mass, right  Patient here today to evaluate a tender bump on the pad of her right index finger.  This started after the tissue got pinched while working with a ladder and she said she had a large blood blister that seemed very deep.  But it has evolved into the current mass.  Upon exam there is no redness or sense of fluid.  There is a central cord giving the impression almost of a corn or wart.  Consideration for foreign body such as a piece of metal.  X-ray unremarkable.  I pared this down with a scalpel blade and was unable to find any fragment.  But there was quite a bit of excess tissue.  So this seems to be behaving like a corn, likely triggered from the hematoma.  So I instructed patient on keeping it cut down and softened to try to normalize it.  If things are changing she will contact me and we might even need to consider orthopedic referral.  - XR Finger Right G/E 2 Views; Future    Attention deficit hyperactivity disorder (ADHD), unspecified ADHD type  Stable on current regimen.  Continue same plan and routine follow-up.  Plan follow-up in 6 months  - amphetamine-dextroamphetamine (ADDERALL) 10 MG tablet; Take 1 tablet (10 mg) by mouth daily In AM  - amphetamine-dextroamphetamine (ADDERALL) 5 MG tablet; Take 1 tablet (5 mg) by mouth daily At noon    Mild episode of recurrent major depressive disorder (H)  Stable on current regimen.  Continue same plan and routine follow-up.   - buPROPion (WELLBUTRIN SR) 150 MG 12 hr tablet; Take 1 tablet (150 mg) by mouth daily    Multiple sclerosis (H)  Stable on current regimen.  Continue same plan and routine follow-up.   - gabapentin (NEURONTIN) 300 MG capsule; Take 1 capsule by mouth every night as needed.    Paresthesia  Stable on current regimen.  Continue same plan and routine follow-up.   - gabapentin (NEURONTIN) 300 MG capsule; Take 1 capsule by mouth every night as needed.    Hypertension goal BP (blood pressure) < 140/90  Stable  on current regimen.  Continue same plan and routine follow-up.   - metoprolol succinate ER (TOPROL XL) 25 MG 24 hr tablet; Take 1 tablet (25 mg) by mouth 2 times daily    Migraine with aura and without status migrainosus, not intractable    - SUMAtriptan (IMITREX) 100 MG tablet; Take 1 tablet (100 mg) by mouth daily as needed for migraine    Primary insomnia    - traZODone (DESYREL) 50 MG tablet; TAKE 1 TABLET(50 MG) BY MOUTH AT BEDTIME    Genital herpes simplex, unspecified site    - valACYclovir (VALTREX) 500 MG tablet; Take 1 tablet (500 mg) by mouth daily          See Patient Instructions    Jasmine Barrow MD  [facility]   Liyah is a 58 year old, presenting for the following health issues:  Cyst        8/22/2023     9:39 AM   Additional Questions   Roomed by Arabella   Accompanied by Self         8/22/2023     9:39 AM   Patient Reported Additional Medications   Patient reports taking the following new medications None       History of Present Illness       Reason for visit:  Possible cyst in hand  Symptom onset:  3-4 weeks ago  Symptoms include:  Painful bump  Symptom intensity:  Moderate  Symptom progression:  Worsening  Had these symptoms before:  No  What makes it worse:  Pushing on it!  What makes it better:  Not touching/pushing on it    She eats 4 or more servings of fruits and vegetables daily.She consumes 0 sweetened beverage(s) daily.She exercises with enough effort to increase her heart rate 30 to 60 minutes per day.  She exercises with enough effort to increase her heart rate 7 days per week.   She is taking medications regularly.         Here today for a lump on her right index finger as above.  Also follow-up on ongoing issues.      Review of Systems   Constitutional, HEENT, cardiovascular, pulmonary, gi and gu systems are negative, except as otherwise noted.      Objective    /74 (BP Location: Right arm, Patient Position: Sitting, Cuff Size: Adult  "Regular)   Pulse 60   Temp 98.8  F (37.1  C) (Oral)   Resp 14   Ht 1.676 m (5' 6\")   Wt 50.8 kg (111 lb 14.4 oz)   LMP 05/25/2020 (Exact Date)   SpO2 97%   BMI 18.06 kg/m    Body mass index is 18.06 kg/m .  Physical Exam   Alert, pleasant, upbeat, and in no apparent discomfort.  S1 and S2 normal, no murmurs, clicks, gallops or rubs. Regular rate and rhythm. Chest is clear; no wheezes or rales. No edema or JVD.  There is a firm mass pad of right index finger with a central core.  No fluid and no erythema  Past labs reviewed with the patient.     Xray - Reviewed and interpreted by me.  Negative for foreign body                  "

## 2023-08-22 NOTE — RESULT ENCOUNTER NOTE
Liyah, the radiologist did not notice any foreign bodies as well.  He did note some thickening that relates to the previous trauma.  He did not specifically say that it may have been an old fracture, but he did note that it looks like it might be from trauma.  Which we knew about already.  So I think this is acting like a corn/scar tissue related to that big blood blister.  And the key is to keep it cut down and softened to let it heal up and go away.  TAMI Barrow M.D.

## 2023-08-30 ENCOUNTER — MYC MEDICAL ADVICE (OUTPATIENT)
Dept: FAMILY MEDICINE | Facility: CLINIC | Age: 58
End: 2023-08-30
Payer: COMMERCIAL

## 2023-09-07 ENCOUNTER — ALLIED HEALTH/NURSE VISIT (OUTPATIENT)
Dept: FAMILY MEDICINE | Facility: CLINIC | Age: 58
End: 2023-09-07
Payer: COMMERCIAL

## 2023-09-07 DIAGNOSIS — Z23 ENCOUNTER FOR IMMUNIZATION: Primary | ICD-10-CM

## 2023-09-07 PROCEDURE — 99207 PR NO CHARGE NURSE ONLY: CPT

## 2023-09-07 PROCEDURE — 90750 HZV VACC RECOMBINANT IM: CPT

## 2023-09-07 PROCEDURE — 90471 IMMUNIZATION ADMIN: CPT

## 2023-09-07 NOTE — PROGRESS NOTES
Prior to immunization administration, verified patients identity using patient s name and date of birth. Please see Immunization Activity for additional information.     Screening Questionnaire for Adult Immunization    Are you sick today?   No   Do you have allergies to medications, food, a vaccine component or latex?   No   Have you ever had a serious reaction after receiving a vaccination?   No   Do you have a long-term health problem with heart, lung, kidney, or metabolic disease (e.g., diabetes), asthma, a blood disorder, no spleen, complement component deficiency, a cochlear implant, or a spinal fluid leak?  Are you on long-term aspirin therapy?   No   Do you have cancer, leukemia, HIV/AIDS, or any other immune system problem?   No   Do you have a parent, brother, or sister with an immune system problem?   No   In the past 3 months, have you taken medications that affect  your immune system, such as prednisone, other steroids, or anticancer drugs; drugs for the treatment of rheumatoid arthritis, Crohn s disease, or psoriasis; or have you had radiation treatments?   No   Have you had a seizure, or a brain or other nervous system problem?   No   During the past year, have you received a transfusion of blood or blood    products, or been given immune (gamma) globulin or antiviral drug?   No   For women: Are you pregnant or is there a chance you could become       pregnant during the next month?   No   Have you received any vaccinations in the past 4 weeks?   No     Immunization questionnaire answers were all negative.    I have reviewed the following standing orders:   This patient is due and qualifies for the Zoster vaccine.    Click here for Zoster Standing Order    I have reviewed the vaccines inclusion and exclusion criteria; No concerns regarding eligibility.     Patient instructed to remain in clinic for 15 minutes afterwards, and to report any adverse reactions.     Screening performed by Cynthia Smallwood MA on  9/7/2023 at 10:35 AM.

## 2023-10-16 ENCOUNTER — TELEPHONE (OUTPATIENT)
Dept: FAMILY MEDICINE | Facility: CLINIC | Age: 58
End: 2023-10-16
Payer: COMMERCIAL

## 2023-10-16 NOTE — TELEPHONE ENCOUNTER
Prior Authorization Retail Medication Request    Medication/Dose: SUMAtriptan (IMITREX) 100 MG tablet  ICD code (if different than what is on RX):    Previously Tried and Failed:    Rationale:  Migraine with aura and without status migrainosus, not intractable     Insurance Name:  [facility]   Insurance ID:  [id number]       Pharmacy Information (if different than what is on RX)  Name:    Phone:

## 2023-10-18 NOTE — TELEPHONE ENCOUNTER
Prior Authorization Approval    Medication: SUMATRIPTAN SUCCINATE 100 MG PO TABS  Authorization Effective Date: 9/18/2023  Authorization Expiration Date: 11/15/2023  Insurance Company: [employer]-Specialty [employer]'s - Phone [phone] Fax [phone]  Which Pharmacy is filling the prescription: [id number] - [place], [place] - [facility] [facility] AT [facility] OF  [address]  Pharmacy Notified: Yes   Patient Notified: Yes - was unable to get ahold of any one at [facility]. called and spoke with patient, notified of PA approval and to contact pharmacy to have filled.

## 2023-10-18 NOTE — TELEPHONE ENCOUNTER
Central Prior Authorization Team   Phone: [phone]    PA Initiation    Medication: SUMATRIPTAN SUCCINATE 100 MG PO TABS  Insurance Company: [employer]-Specialty PA's - Phone [phone] Fax [phone]  Pharmacy Filling the Rx: [id number] - [facility], [place] - [address] AT [facility] OF  [address]  Filling Pharmacy Phone: [phone]  Filling Pharmacy Fax:    Start Date: 10/18/2023

## 2023-11-04 ENCOUNTER — HEALTH MAINTENANCE LETTER (OUTPATIENT)
Age: 58
End: 2023-11-04

## 2023-11-13 ENCOUNTER — ANCILLARY PROCEDURE (OUTPATIENT)
Dept: ULTRASOUND IMAGING | Facility: OTHER | Age: 58
End: 2023-11-13
Attending: OBSTETRICS & GYNECOLOGY
Payer: COMMERCIAL

## 2023-11-13 ENCOUNTER — OFFICE VISIT (OUTPATIENT)
Dept: OBGYN | Facility: CLINIC | Age: 58
End: 2023-11-13
Payer: COMMERCIAL

## 2023-11-13 VITALS
BODY MASS INDEX: 18.56 KG/M2 | HEART RATE: 59 BPM | SYSTOLIC BLOOD PRESSURE: 122 MMHG | DIASTOLIC BLOOD PRESSURE: 80 MMHG | WEIGHT: 115 LBS | OXYGEN SATURATION: 100 %

## 2023-11-13 DIAGNOSIS — N94.89 PAIN OF OVARY: Primary | ICD-10-CM

## 2023-11-13 DIAGNOSIS — N94.89 PAIN OF OVARY: ICD-10-CM

## 2023-11-13 PROCEDURE — 99203 OFFICE O/P NEW LOW 30 MIN: CPT | Performed by: OBSTETRICS & GYNECOLOGY

## 2023-11-13 PROCEDURE — 76856 US EXAM PELVIC COMPLETE: CPT | Mod: TC | Performed by: RADIOLOGY

## 2023-11-13 PROCEDURE — 76830 TRANSVAGINAL US NON-OB: CPT | Mod: TC | Performed by: RADIOLOGY

## 2023-11-13 NOTE — PATIENT INSTRUCTIONS
If you have labs or imaging done, the results will automatically release in [facility]are without an interpretation.  Your health care professional will review those results and send an interpretation with recommendations as soon as possible, but this may be 1-3 business days.    If you have any questions regarding your visit, please contact your care team.     [employer] [facility]: [phone]  [facility] CLINIC HOURS TELEPHONE NUMBER   DO. Gwen Benson -Surgery Scheduler  Liz -     BRANDAN Jin RN Kylie, RN [place]    Monday 8:30 am-5:00 pm  Wednesday 8:30 am-5:00 pm  Friday 8:30 am-5:00 pm    Typical Surgery day: Tuesday [facility]  [address] [place] [address]  Phone:  [phone]  Fax: [phone]   Appointment [phone]    Imaging Scheduling-All Locations [phone]    [facility] Labor and Delivery  [address] [place] [address] [place] [address] [phone]   **Surgeries** Our Surgery Schedulers will contact you to schedule. If you do not receive a call within 3 business days, please call [phone].  Urgent Care locations:  [facility] Monday-Friday   10 am - 8 pm  Saturday and    9 am - 5 pm [phone]   If you need a medication refill, please contact your pharmacy. Please allow 3 business days for your refill to be completed.  As always, Thank you for trusting us with your healthcare needs!  see additional instructions from your care team below

## 2023-11-13 NOTE — PROGRESS NOTES
This 57 y/o postmenopausal female, , presents as a new patient to the [place] gyn dept c/o chronic right adnexal pain.  She states that about 1 year ago she began noticing midcycle-like pain on her right side only.  She then describes more localized and consistent pain over her right ovary starting 4-6 months ago coupled with pain radiating down her right leg.  Now, over the past 6-8 weeks, she has noted worsening pain.  She has a hx of 2 ruptured ectopics and 3 SABs.  She also had lysis of adhesions in her early 20s but is not sure why.  She has been diagnosed with MS and is being followed by a chiropractor.  She is overdue for an annual exam since her last co-test was normal on 2020.  /80 (BP Location: Right arm, Patient Position: Chair, Cuff Size: Adult Regular)   Pulse 59   Wt 52.2 kg (115 lb)   LMP 2020 (Exact Date)   SpO2 100%   Breastfeeding No   BMI 18.56 kg/m    ROS:  10 systems were reviewed and the positives were listed under problems.  PE:  General- Healthy-appearing female in no acute distress  Eyes- No scleral injection or icterus  ENT- Mucus membranes moist  CV- No noticeable edema in extremities  Lymph- No noticeable cervical adenopathy  Respiratory- Non-labored breathing  Skin- No suspicious lesions or rashes visualized  Psych- Normal affect  Assessment - Painful right ovary  Plan - Will order a pelvic US and if there is a cyst, then will add an order for a CA-125 marker.  She was advised to schedule an annual exam since she is overdue for a co-test.  All her questions and concerns were addressed.  30 minutes were spent today in chart review, the patient visit, review of tests, and documentation in regard to the issues noted above.

## 2023-11-15 DIAGNOSIS — R10.2 ADNEXAL PAIN: Primary | ICD-10-CM

## 2023-11-28 ENCOUNTER — OFFICE VISIT (OUTPATIENT)
Dept: NEUROLOGY | Facility: CLINIC | Age: 58
End: 2023-11-28
Attending: PSYCHIATRY & NEUROLOGY
Payer: COMMERCIAL

## 2023-11-28 VITALS
OXYGEN SATURATION: 99 % | BODY MASS INDEX: 18.76 KG/M2 | DIASTOLIC BLOOD PRESSURE: 85 MMHG | HEART RATE: 60 BPM | SYSTOLIC BLOOD PRESSURE: 124 MMHG | WEIGHT: 116.2 LBS

## 2023-11-28 DIAGNOSIS — G35 MS (MULTIPLE SCLEROSIS) (H): Primary | ICD-10-CM

## 2023-11-28 PROCEDURE — G0463 HOSPITAL OUTPT CLINIC VISIT: HCPCS | Performed by: PSYCHIATRY & NEUROLOGY

## 2023-11-28 PROCEDURE — 99215 OFFICE O/P EST HI 40 MIN: CPT | Performed by: PSYCHIATRY & NEUROLOGY

## 2023-11-28 ASSESSMENT — PAIN SCALES - GENERAL: PAINLEVEL: NO PAIN (0)

## 2023-11-28 NOTE — NURSING NOTE
Chief Complaint   Patient presents with    MS    RECHECK     Annual follow up      Vitals were taken and medications were reconciled.   Yosi Moulton, EMT  9:51 AM

## 2023-11-28 NOTE — LETTER
"11/28/2023       RE: Liyah Jernigan  [address] [place] [address]     Dear Colleague,    Thank you for referring your patient, Liyah Jernigan, to the [facility] MULTIPLE SCLEROSIS CLINIC [place] at [facility]. Please see a copy of my visit note below.    ID: Liyah Jernigan is a 58-year-old man with multiple sclerosis.  She returns for routine follow-up.  I last saw her 1 year ago.    HPI: Liyah has been doing well, has had no new symptoms suggestive of interim relapse or of disease progression.  Her main complaint is of visual impairment, which is worse in the afternoon.  She finds it rather difficult to describe, says it is \"like it is missing information\".  She does not like to drive [place] the day.  She did some evanescent tingling in her hands.  She has some pelvic pain that she describes as \"like excessive mittelschmerz\", which is being worked up.  She is also has some associated low back pain since the spring summer.  She continues to take teriflunomide and tolerates it well.    Past Medical History:   Diagnosis Date    Benign essential hypertension 05/29/2018    Closed fracture of arm 03/15/2022   MS      Current Outpatient Medications:     amphetamine-dextroamphetamine (ADDERALL) 10 MG tablet, Take 1 tablet (10 mg) by mouth daily In AM, Disp: 30 tablet, Rfl: 0    amphetamine-dextroamphetamine (ADDERALL) 5 MG tablet, Take 1 tablet (5 mg) by mouth daily At noon, Disp: 30 tablet, Rfl: 0    biotin 2.5 mg/mL SUSP, Take 20 mg by mouth daily, Disp: , Rfl:     buPROPion (WELLBUTRIN SR) 150 MG 12 hr tablet, Take 1 tablet (150 mg) by mouth daily, Disp: 90 tablet, Rfl: 3    gabapentin (NEURONTIN) 300 MG capsule, Take 1 capsule by mouth every night as needed., Disp: 90 capsule, Rfl: 3    metoprolol succinate ER (TOPROL XL) 25 MG 24 hr tablet, Take 1 tablet (25 mg) by mouth 2 times daily, Disp: 180 tablet, Rfl: 3    SUMAtriptan (IMITREX) 100 MG tablet, Take 1 " tablet (100 mg) by mouth daily as needed for migraine, Disp: 30 tablet, Rfl: 1    teriflunomide (AUBAGIO) 14 MG tablet, Take 1 tablet (14 mg) by mouth daily, Disp: 30 tablet, Rfl: 11    traZODone (DESYREL) 50 MG tablet, TAKE 1 TABLET(50 MG) BY MOUTH AT BEDTIME, Disp: 90 tablet, Rfl: 3    valACYclovir (VALTREX) 500 MG tablet, Take 1 tablet (500 mg) by mouth daily, Disp: 90 tablet, Rfl: 3    Current Facility-Administered Medications:     lidocaine (PF) (XYLOCAINE) 1 % injection 4 mL, 4 mL, , , Stewart Castellano, , 4 mL at 03/02/23 1045    triamcinolone (KENALOG-40) injection 40 mg, 40 mg, , , Stewart Castellano DO, 40 mg at 03/02/23 1045     Exam: She is alert and oriented.  Affect is bright and lines functions are normal.  Eye movements are full without diplopia or nystagmus.  Facial strength and sensation are normal.  Multiple times reflexivity are normal in the arms and legs.  Light touch is intact in all 4 limbs.  Reflexes are normal and symmetric at the elbows and knees, absent at the ankle.  Her gait is normal.    Impression: Relapsing onset MS, stable on teriflunomide.  I spent 41 minutes on care of date of service including chart review and face-to-face time.  We discussed the natural history of MS including how the relapsing stage eventually ends, usually by the end of the 50s.  I explained the criteria I generally use to determine how long to keep patients on immunotherapy.  I would tentatively plan on treating her through her 50s then stopping it and using the MRI a bit more frequently for couple of years to reassure ourselves that relapsing stage is over.  Discussed safety monitoring with teriflunomide, and if some availability on cost plus drugs for a much more affordable price.    Plan: AST and ALT today.  Follow-up in 1 year with MRI.        Again, thank you for allowing me to participate in the care of your patient.      Sincerely,    Jose A Hairston MD

## 2023-12-01 ENCOUNTER — ANCILLARY PROCEDURE (OUTPATIENT)
Dept: MRI IMAGING | Facility: CLINIC | Age: 58
End: 2023-12-01
Attending: OBSTETRICS & GYNECOLOGY
Payer: COMMERCIAL

## 2023-12-01 DIAGNOSIS — R10.2 ADNEXAL PAIN: ICD-10-CM

## 2023-12-01 PROCEDURE — 255N000002 HC RX 255 OP 636: Mod: JZ | Performed by: OBSTETRICS & GYNECOLOGY

## 2023-12-01 PROCEDURE — 72197 MRI PELVIS W/O & W/DYE: CPT

## 2023-12-01 PROCEDURE — A9585 GADOBUTROL INJECTION: HCPCS | Mod: JZ | Performed by: OBSTETRICS & GYNECOLOGY

## 2023-12-01 RX ORDER — GADOBUTROL 604.72 MG/ML
5 INJECTION INTRAVENOUS ONCE
Status: COMPLETED | OUTPATIENT
Start: 2023-12-01 | End: 2023-12-01

## 2023-12-01 RX ADMIN — GADOBUTROL 5 ML: 604.72 INJECTION INTRAVENOUS at 12:10

## 2023-12-01 NOTE — PROGRESS NOTES
"ID: Liyah Jernigan is a 58-year-old man with multiple sclerosis.  She returns for routine follow-up.  I last saw her 1 year ago.    HPI: Liyah has been doing well, has had no new symptoms suggestive of interim relapse or of disease progression.  Her main complaint is of visual impairment, which is worse in the afternoon.  She finds it rather difficult to describe, says it is \"like it is missing information\".  She does not like to drive [place] the day.  She did some evanescent tingling in her hands.  She has some pelvic pain that she describes as \"like excessive mittelschmerz\", which is being worked up.  She is also has some associated low back pain since the spring summer.  She continues to take teriflunomide and tolerates it well.    Past Medical History:   Diagnosis Date     Benign essential hypertension 05/29/2018     Closed fracture of arm 03/15/2022   MS      Current Outpatient Medications:      amphetamine-dextroamphetamine (ADDERALL) 10 MG tablet, Take 1 tablet (10 mg) by mouth daily In AM, Disp: 30 tablet, Rfl: 0     amphetamine-dextroamphetamine (ADDERALL) 5 MG tablet, Take 1 tablet (5 mg) by mouth daily At noon, Disp: 30 tablet, Rfl: 0     biotin 2.5 mg/mL SUSP, Take 20 mg by mouth daily, Disp: , Rfl:      buPROPion (WELLBUTRIN SR) 150 MG 12 hr tablet, Take 1 tablet (150 mg) by mouth daily, Disp: 90 tablet, Rfl: 3     gabapentin (NEURONTIN) 300 MG capsule, Take 1 capsule by mouth every night as needed., Disp: 90 capsule, Rfl: 3     metoprolol succinate ER (TOPROL XL) 25 MG 24 hr tablet, Take 1 tablet (25 mg) by mouth 2 times daily, Disp: 180 tablet, Rfl: 3     SUMAtriptan (IMITREX) 100 MG tablet, Take 1 tablet (100 mg) by mouth daily as needed for migraine, Disp: 30 tablet, Rfl: 1     teriflunomide (AUBAGIO) 14 MG tablet, Take 1 tablet (14 mg) by mouth daily, Disp: 30 tablet, Rfl: 11     traZODone (DESYREL) 50 MG tablet, TAKE 1 TABLET(50 MG) BY MOUTH AT BEDTIME, Disp: 90 tablet, Rfl: 3     valACYclovir (VALTREX) " 500 MG tablet, Take 1 tablet (500 mg) by mouth daily, Disp: 90 tablet, Rfl: 3    Current Facility-Administered Medications:      lidocaine (PF) (XYLOCAINE) 1 % injection 4 mL, 4 mL, , , Stewart Castellano DO, 4 mL at 03/02/23 1045     triamcinolone (KENALOG-40) injection 40 mg, 40 mg, , , Stewart Castellano DO, 40 mg at 03/02/23 1045     Exam: She is alert and oriented.  Affect is bright and lines functions are normal.  Eye movements are full without diplopia or nystagmus.  Facial strength and sensation are normal.  Multiple times reflexivity are normal in the arms and legs.  Light touch is intact in all 4 limbs.  Reflexes are normal and symmetric at the elbows and knees, absent at the ankle.  Her gait is normal.    Impression: Relapsing onset MS, stable on teriflunomide.  I spent 41 minutes on care of date of service including chart review and face-to-face time.  We discussed the natural history of MS including how the relapsing stage eventually ends, usually by the end of the 50s.  I explained the criteria I generally use to determine how long to keep patients on immunotherapy.  I would tentatively plan on treating her through her 50s then stopping it and using the MRI a bit more frequently for couple of years to reassure ourselves that relapsing stage is over.  Discussed safety monitoring with teriflunomide, and if some availability on cost plus drugs for a much more affordable price.    Plan: AST and ALT today.  Follow-up in 1 year with MRI.

## 2023-12-11 ENCOUNTER — TRANSFERRED RECORDS (OUTPATIENT)
Dept: HEALTH INFORMATION MANAGEMENT | Facility: CLINIC | Age: 58
End: 2023-12-11
Payer: COMMERCIAL

## 2023-12-18 ENCOUNTER — MYC REFILL (OUTPATIENT)
Dept: FAMILY MEDICINE | Facility: CLINIC | Age: 58
End: 2023-12-18
Payer: COMMERCIAL

## 2023-12-18 DIAGNOSIS — F90.9 ATTENTION DEFICIT HYPERACTIVITY DISORDER (ADHD), UNSPECIFIED ADHD TYPE: ICD-10-CM

## 2023-12-18 RX ORDER — DEXTROAMPHETAMINE SACCHARATE, AMPHETAMINE ASPARTATE, DEXTROAMPHETAMINE SULFATE AND AMPHETAMINE SULFATE 2.5; 2.5; 2.5; 2.5 MG/1; MG/1; MG/1; MG/1
10 TABLET ORAL DAILY
Qty: 30 TABLET | Refills: 0 | Status: SHIPPED | OUTPATIENT
Start: 2023-12-18 | End: 2023-12-20

## 2023-12-18 RX ORDER — DEXTROAMPHETAMINE SACCHARATE, AMPHETAMINE ASPARTATE, DEXTROAMPHETAMINE SULFATE AND AMPHETAMINE SULFATE 1.25; 1.25; 1.25; 1.25 MG/1; MG/1; MG/1; MG/1
5 TABLET ORAL DAILY
Qty: 30 TABLET | Refills: 0 | Status: SHIPPED | OUTPATIENT
Start: 2023-12-18 | End: 2024-06-18

## 2023-12-19 DIAGNOSIS — F90.9 ATTENTION DEFICIT HYPERACTIVITY DISORDER (ADHD), UNSPECIFIED ADHD TYPE: ICD-10-CM

## 2023-12-19 NOTE — TELEPHONE ENCOUNTER
D-AMPHETAMINE SALT COMBO 10 MG TAB is on backorder. Please send alternative or to a different pharmacy. Thank you!

## 2023-12-20 RX ORDER — DEXTROAMPHETAMINE SACCHARATE, AMPHETAMINE ASPARTATE, DEXTROAMPHETAMINE SULFATE AND AMPHETAMINE SULFATE 2.5; 2.5; 2.5; 2.5 MG/1; MG/1; MG/1; MG/1
10 TABLET ORAL DAILY
Qty: 30 TABLET | Refills: 0 | Status: SHIPPED | OUTPATIENT
Start: 2023-12-20

## 2023-12-20 NOTE — TELEPHONE ENCOUNTER
Called and spoke with patient. Patient is requesting to have provider resend this prescription to [facility] in [place]. Pharmacy pended. Routing to provider to review.     Tanya Avina, RAFAN, RN   [facility]

## 2024-03-06 ENCOUNTER — TELEPHONE (OUTPATIENT)
Dept: OBGYN | Facility: CLINIC | Age: 59
End: 2024-03-06
Payer: COMMERCIAL

## 2024-03-06 NOTE — TELEPHONE ENCOUNTER
[facility] Call Center    Phone Message    May a detailed message be left on voicemail: yes     Reason for Call: Other: Pt had a recent MRI scan and is wanting for a provider to go over results. Please call Pt back at # [phone].      Action Taken: Other: [facility]    Travel Screening: Not Applicable

## 2024-03-06 NOTE — TELEPHONE ENCOUNTER
Calling patient back.   Patient is looking for MRI scans/records from 11/2023 and 12/2023 to bring to a future non OB/GYN visit  - pt is hoping scans will have captured images of her sacrum.       -patient confirmed she is not looking for MRI results.   Provided Medical Records phone number to patient to get those records.     Cee CARLOS RN

## 2024-03-11 ENCOUNTER — TRANSFERRED RECORDS (OUTPATIENT)
Dept: HEALTH INFORMATION MANAGEMENT | Facility: CLINIC | Age: 59
End: 2024-03-11
Payer: COMMERCIAL

## 2024-03-19 ENCOUNTER — TRANSFERRED RECORDS (OUTPATIENT)
Dept: HEALTH INFORMATION MANAGEMENT | Facility: CLINIC | Age: 59
End: 2024-03-19
Payer: COMMERCIAL

## 2024-03-19 PROBLEM — M25.512 CHRONIC PAIN OF BOTH SHOULDERS: Status: RESOLVED | Noted: 2023-05-02 | Resolved: 2024-03-19

## 2024-03-19 PROBLEM — M25.511 CHRONIC PAIN OF BOTH SHOULDERS: Status: RESOLVED | Noted: 2023-05-02 | Resolved: 2024-03-19

## 2024-03-19 PROBLEM — G89.29 CHRONIC PAIN OF BOTH SHOULDERS: Status: RESOLVED | Noted: 2023-05-02 | Resolved: 2024-03-19

## 2024-03-23 ENCOUNTER — HEALTH MAINTENANCE LETTER (OUTPATIENT)
Age: 59
End: 2024-03-23

## 2024-04-17 ENCOUNTER — TELEPHONE (OUTPATIENT)
Dept: NEUROLOGY | Facility: CLINIC | Age: 59
End: 2024-04-17
Payer: COMMERCIAL

## 2024-04-17 NOTE — TELEPHONE ENCOUNTER
PA Initiation    Medication: TERIFLUNOMIDE 14 MG PO TABS  Insurance Company: [employer] - Phone [phone] Fax [phone]  Pharmacy Filling the Rx: [facility] - [place], [place] - [address]  Filling Pharmacy Phone:    Filling Pharmacy Fax:    Start Date: 4/17/2024          Thank you,    Julissa Pimentel h-T  Specialty Pharmacy Clinic Liaison - CardiologyNeurologyMultiple Sclerosis  [facility] [address]  [address] [place] [address]  Ph: [phone] Fax: [phone]  [email or web address]

## 2024-04-18 NOTE — TELEPHONE ENCOUNTER
Prior Authorization Approval    Medication: TERIFLUNOMIDE 14 MG PO TABS  Authorization Effective Date: 4/17/2024  Authorization Expiration Date: 4/17/2025  Approved Dose/Quantity: 30 days  Reference #:     Insurance Company: [employer] - Phone [phone] Fax [phone]  Expected CoPay: $    CoPay Card Available:      Financial Assistance Needed:   Which Pharmacy is filling the prescription: [facility] - [place], [place] - [address]  Pharmacy Notified: Yes  Patient Notified: Yes        Thank you,    Julissa Pimentel [facility]  Specialty Pharmacy Clinic Liaison - [facility] Sclerosis  [facility] [address]  [address] [place] [address]  Ph: [phone] Fax: [phone]  [email or web address]

## 2024-04-23 NOTE — TELEPHONE ENCOUNTER
Panel Management Review      BP Readings from Last 1 Encounters:   05/21/18 120/86    , No results found for: A1C, 5/21/2018  Last Office Visit with this department: 5/21/2018    Fail List measure:     Depression / Dysthymia review    Measure:  Needs PHQ-9 score of 4 or less during index window.  Administer PHQ-9 and if score is 5 or more, send encounter to provider for next steps.    5 - 7 month window range: 5/23-9/23    PHQ-9 SCORE 8/1/2017 1/23/2018 5/21/2018   Total Score 4 14 0       If PHQ-9 recheck is 5 or more, route to provider for next steps.    Patient is due for:  PHQ9      Patient is due/failing the following:   PHQ9    Action needed:   Patient needs to do PHQ9.    Type of outreach:    Sent [employer] message.    Questions for provider review:    None                                                                                                                                    Jacy Rodriguez [facility]      Chart routed to Care Team .               5 (moderate pain)

## 2024-05-16 ENCOUNTER — VIRTUAL VISIT (OUTPATIENT)
Dept: FAMILY MEDICINE | Facility: CLINIC | Age: 59
End: 2024-05-16
Payer: COMMERCIAL

## 2024-05-16 DIAGNOSIS — Z12.31 VISIT FOR SCREENING MAMMOGRAM: ICD-10-CM

## 2024-05-16 DIAGNOSIS — S00.96XA INSECT BITE OF HEAD, UNSPECIFIED PART, INITIAL ENCOUNTER: Primary | ICD-10-CM
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
## 2024-05-16 PROCEDURE — 99213 OFFICE O/P EST LOW 20 MIN: CPT | Mod: 95 | Performed by: PHYSICIAN ASSISTANT

## 2024-05-16 RX ORDER — PREDNISONE 20 MG/1
40 TABLET ORAL DAILY
Qty: 10 TABLET | Refills: 0 | Status: SHIPPED | OUTPATIENT
Start: 2024-05-16 | End: 2024-05-21

## 2024-05-16 RX ORDER — CEPHALEXIN 500 MG/1
500 CAPSULE ORAL 3 TIMES DAILY
Qty: 30 CAPSULE | Refills: 0 | Status: SHIPPED | OUTPATIENT
Start: 2024-05-16 | End: 2024-05-26

## 2024-05-16 ASSESSMENT — PATIENT HEALTH QUESTIONNAIRE - PHQ9
SUM OF ALL RESPONSES TO PHQ QUESTIONS 1-9: 0
SUM OF ALL RESPONSES TO PHQ QUESTIONS 1-9: 0
10. IF YOU CHECKED OFF ANY PROBLEMS, HOW DIFFICULT HAVE THESE PROBLEMS MADE IT FOR YOU TO DO YOUR WORK, TAKE CARE OF THINGS AT HOME, OR GET ALONG WITH OTHER PEOPLE: NOT DIFFICULT AT ALL

## 2024-05-16 NOTE — PROGRESS NOTES
"    Instructions Relayed to Patient by Virtual Roomer:     Patient is active on [employer]:   Relayed following to patient: \"It looks like you are active on [employer], are you able to join the visit this way? If not, do you need us to send you a link now or would you like your provider to send a link via text or email when they are ready to initiate the visit?\"      Patient Confirmed they will join visit via: [employer]  Reminded patient to ensure they were logged on to virtual visit by arrival time listed.   Asked if patient has flexibility to initiate visit sooner than arrival time: patient is unable to initiate visit earlier than arrival time     If pediatric virtual visit, ensured pediatric patient along with parent/guardian will be present for video visit.     Patient offered the website [email or web address] and/or phone number to [employer] Help line: [phone]    Liyah is a 59 year old who is being evaluated via a billable video visit.    How would you like to obtain your AVS? [employer]  If the video visit is dropped, the invitation should be resent by: Send to e-mail at: [email or web address]  Will anyone else be joining your video visit? No      Assessment & Plan     Insect bite of head, unspecified part, initial encounter  Similar symptoms 2021 improved with course of keflex an prednisone- will treat as did previously with improvement in 2021  - cephALEXin (KEFLEX) 500 MG capsule  Dispense: 30 capsule; Refill: 0  - predniSONE (DELTASONE) 20 MG tablet  Dispense: 10 tablet; Refill: 0    Visit for screening mammogram  Encouraged to schedule mammogram.  - MA SCREENING DIGITAL BILAT - Future  (s+30)              Patient Instructions   Take course of prednisone 40 mg daily for 5 days  Take course of antibiotic (keflex 500 mg) three times a day for 10 days  Return urgently if any change in symptoms like increasing swelling, pain, fever or other change in symptoms.  Schedule video visit with PCP for medication " refills.   Follow up with me tomorrow if increasing symptoms via MyChart     Subjective   Liyah is a 59 year old, presenting for the following health issues:  Insect Bites (Spider bite on forhead)      5/16/2024     2:08 PM   Additional Questions   Roomed by Olga SIDHU   Accompanied by Self         5/16/2024     2:08 PM   Patient Reported Additional Medications   Patient reports taking the following new medications None     History of Present Illness       Reason for visit:  Bug bite    She eats 4 or more servings of fruits and vegetables daily.She consumes 0 sweetened beverage(s) daily.She exercises with enough effort to increase her heart rate 30 to 60 minutes per day.  She exercises with enough effort to increase her heart rate 7 days per week.   She is taking medications regularly.     Patient unfamiliar to me with history of MS, ADHD, depression, hypertension, and migraines presents with some sort of bite to the forehead with swelling.  Similar lesion in 2021 - treated with course of prednisone and keflex. At that time within 4 days eye swollen shut and moved across face and had CT showing likely cellulitic changes.   Bitten yesterday while weed wacking and brushed something from forehead and hair.   Behaving exactly like did previously.  Has some pressure and a little bit of numbness in the area.  Not really tender but numb and a bit itchy  Can't hit hard with benadryl due to needing to be able to attend to son during the night if needed.   Got bit yesterday like a mosquito bite and wihin couple hours size of quarter and now larger area  Just came off prednisone month or two ago- no ill effects with prednisone in past             Review of Systems  Constitutional, HEENT, cardiovascular, pulmonary, gi and gu systems are negative, except as otherwise noted.      Objective           Vitals:  No vitals were obtained today due to virtual visit.    Physical Exam   GENERAL: alert and no distress  EYES: Eyes grossly  normal to inspection.  No discharge or erythema, or obvious scleral/conjunctival abnormalities.  RESP: No audible wheeze, cough, or visible cyanosis.    SKIN: right upper forehead with a localized area of swelling.  Not bright red. Central punctate lesion.   NEURO: Cranial nerves grossly intact.  Mentation and speech appropriate for age.  PSYCH: Appropriate affect, tone, and pace of words          Video-Visit Details    Type of service:  Video Visit   Originating Location (pt. Location): Home    Distant Location (provider location):  Off-site  Platform used for Video Visit: Colleen  Signed Electronically by: Jaki Blas PA-C

## 2024-05-16 NOTE — PATIENT INSTRUCTIONS
Take course of prednisone 40 mg daily for 5 days  Take course of antibiotic (keflex 500 mg) three times a day for 10 days  Return urgently if any change in symptoms like increasing swelling, pain, fever or other change in symptoms.  Schedule video visit with PCP for medication refills.   Follow up with me tomorrow if increasing symptoms via MyChart

## 2024-06-18 ENCOUNTER — VIRTUAL VISIT (OUTPATIENT)
Dept: FAMILY MEDICINE | Facility: CLINIC | Age: 59
End: 2024-06-18
Attending: PHYSICIAN ASSISTANT
Payer: COMMERCIAL

## 2024-06-18 DIAGNOSIS — I10 HYPERTENSION GOAL BP (BLOOD PRESSURE) < 140/90: ICD-10-CM

## 2024-06-18 DIAGNOSIS — G43.109 MIGRAINE WITH AURA AND WITHOUT STATUS MIGRAINOSUS, NOT INTRACTABLE: ICD-10-CM

## 2024-06-18 DIAGNOSIS — G35 MULTIPLE SCLEROSIS (H): ICD-10-CM

## 2024-06-18 DIAGNOSIS — A60.00 GENITAL HERPES SIMPLEX, UNSPECIFIED SITE: ICD-10-CM

## 2024-06-18 DIAGNOSIS — F33.0 MILD EPISODE OF RECURRENT MAJOR DEPRESSIVE DISORDER (H): ICD-10-CM

## 2024-06-18 DIAGNOSIS — F90.9 ATTENTION DEFICIT HYPERACTIVITY DISORDER (ADHD), UNSPECIFIED ADHD TYPE: Primary | ICD-10-CM

## 2024-06-18 DIAGNOSIS — F51.01 PRIMARY INSOMNIA: ICD-10-CM

## 2024-06-18 DIAGNOSIS — R20.2 PARESTHESIA: ICD-10-CM

## 2024-06-18 PROCEDURE — 99214 OFFICE O/P EST MOD 30 MIN: CPT | Mod: 95 | Performed by: FAMILY MEDICINE

## 2024-06-18 RX ORDER — SUMATRIPTAN 100 MG/1
100 TABLET, FILM COATED ORAL DAILY PRN
Qty: 30 TABLET | Refills: 1 | Status: SHIPPED | OUTPATIENT
Start: 2024-06-18

## 2024-06-18 RX ORDER — METOPROLOL SUCCINATE 25 MG/1
25 TABLET, EXTENDED RELEASE ORAL 2 TIMES DAILY
Qty: 180 TABLET | Refills: 3 | Status: SHIPPED | OUTPATIENT
Start: 2024-06-18

## 2024-06-18 RX ORDER — GABAPENTIN 300 MG/1
CAPSULE ORAL
Qty: 90 CAPSULE | Refills: 3 | Status: SHIPPED | OUTPATIENT
Start: 2024-06-18

## 2024-06-18 RX ORDER — VALACYCLOVIR HYDROCHLORIDE 500 MG/1
500 TABLET, FILM COATED ORAL DAILY
Qty: 90 TABLET | Refills: 3 | Status: SHIPPED | OUTPATIENT
Start: 2024-06-18

## 2024-06-18 RX ORDER — BUPROPION HYDROCHLORIDE 150 MG/1
150 TABLET, EXTENDED RELEASE ORAL DAILY
Qty: 90 TABLET | Refills: 3 | Status: SHIPPED | OUTPATIENT
Start: 2024-06-18

## 2024-06-18 RX ORDER — TRAZODONE HYDROCHLORIDE 50 MG/1
TABLET, FILM COATED ORAL
Qty: 90 TABLET | Refills: 3 | Status: SHIPPED | OUTPATIENT
Start: 2024-06-18

## 2024-06-18 RX ORDER — DEXTROAMPHETAMINE SACCHARATE, AMPHETAMINE ASPARTATE, DEXTROAMPHETAMINE SULFATE AND AMPHETAMINE SULFATE 1.25; 1.25; 1.25; 1.25 MG/1; MG/1; MG/1; MG/1
5 TABLET ORAL DAILY
Qty: 30 TABLET | Refills: 0 | Status: SHIPPED | OUTPATIENT
Start: 2024-06-18

## 2024-06-18 NOTE — PROGRESS NOTES
"    Instructions Relayed to Patient by Virtual Roomer:     Patient is active on [employer]:   Relayed following to patient: \"It looks like you are active on [employer], are you able to join the visit this way? If not, do you need us to send you a link now or would you like your provider to send a link via text or email when they are ready to initiate the visit?\"      Patient Confirmed they will join visit via: Text Link to Cell Phone  Reminded patient to ensure they were logged on to virtual visit by arrival time listed.   Asked if patient has flexibility to initiate visit sooner than arrival time: patient stated yes, documented in appointment notes availability to initiate visit earlier than arrival time     If pediatric virtual visit, ensured pediatric patient along with parent/guardian will be present for video visit.     Patient offered the website [email or web address] and/or phone number to [employer] Help line: [phone]    Liyah is a 59 year old who is being evaluated via a billable video visit.    How would you like to obtain your AVS? [employer]  If the video visit is dropped, the invitation should be resent by: Text to cell phone: [phone]  Will anyone else be joining your video visit? No      Assessment & Plan     Attention deficit hyperactivity disorder (ADHD), unspecified ADHD type  Stable on current regimen.  Continue same plan and routine follow-up.   - amphetamine-dextroamphetamine (ADDERALL) 5 MG tablet; Take 1 tablet (5 mg) by mouth daily At noon    Mild episode of recurrent major depressive disorder (H24)  Stable on current regimen.  Continue same plan and routine follow-up.   - buPROPion (WELLBUTRIN SR) 150 MG 12 hr tablet; Take 1 tablet (150 mg) by mouth daily    Multiple sclerosis (H)  Stable on current regimen.  Continue same plan and routine follow-up.  Also sees neurology for follow-up  - gabapentin (NEURONTIN) 300 MG capsule; Take 1 capsule by mouth every night as " needed.    Paresthesia  As above  - gabapentin (NEURONTIN) 300 MG capsule; Take 1 capsule by mouth every night as needed.    Hypertension goal BP (blood pressure) < 140/90  Has not been monitoring blood pressure much at home but numbers we have had a been pretty good.  Next time in clinic we will keep an eye on things including lab work  - metoprolol succinate ER (TOPROL XL) 25 MG 24 hr tablet; Take 1 tablet (25 mg) by mouth 2 times daily    Migraine with aura and without status migrainosus, not intractable  Stable on current regimen.  Continue same plan and routine follow-up.   - SUMAtriptan (IMITREX) 100 MG tablet; Take 1 tablet (100 mg) by mouth daily as needed for migraine    Primary insomnia  Stable on current regimen.  Continue same plan and routine follow-up.   - traZODone (DESYREL) 50 MG tablet; TAKE 1 TABLET(50 MG) BY MOUTH AT BEDTIME    Genital herpes simplex, unspecified site  Stable on current regimen.  Continue same plan and routine follow-up.   - valACYclovir (VALTREX) 500 MG tablet; Take 1 tablet (500 mg) by mouth daily          See Patient Instructions    Anabel Pelletier is a 59 year old, presenting for the following health issues:  Recheck Medication (Adderall 5mg) and Results        6/18/2024     1:01 PM   Additional Questions   Roomed by Olga SIDHU   Accompanied by self         6/18/2024     1:01 PM   Patient Reported Additional Medications   Patient reports taking the following new medications None     History of Present Illness       Reason for visit:  Meds, follow-up    She eats 4 or more servings of fruits and vegetables daily.She consumes 0 sweetened beverage(s) daily.She exercises with enough effort to increase her heart rate 30 to 60 minutes per day.  She exercises with enough effort to increase her heart rate 7 days per week.   She is taking medications regularly.         Video visit patient today to follow-up on ongoing medical conditions      Review of Systems  Constitutional, HEENT,  cardiovascular, pulmonary, gi and gu systems are negative, except as otherwise noted.      Objective           Vitals:  No vitals were obtained today due to virtual visit.    Physical Exam   GENERAL: alert and no distress  EYES: Eyes grossly normal to inspection.  No discharge or erythema, or obvious scleral/conjunctival abnormalities.  RESP: No audible wheeze, cough, or visible cyanosis.    SKIN: Visible skin clear. No significant rash, abnormal pigmentation or lesions.  NEURO: Cranial nerves grossly intact.  Mentation and speech appropriate for age.  PSYCH: Appropriate affect, tone, and pace of words    Past labs reviewed with the patient.       Video-Visit Details    Type of service:  Video Visit   Originating Location (pt. Location): Home    Distant Location (provider location):  Off-site  Platform used for Video Visit: Colleen  Signed Electronically by: Jasmine Barrow MD

## 2024-08-08 ENCOUNTER — MYC MEDICAL ADVICE (OUTPATIENT)
Dept: FAMILY MEDICINE | Facility: CLINIC | Age: 59
End: 2024-08-08

## 2024-08-08 ENCOUNTER — NURSE TRIAGE (OUTPATIENT)
Dept: FAMILY MEDICINE | Facility: CLINIC | Age: 59
End: 2024-08-08

## 2024-08-08 ENCOUNTER — VIRTUAL VISIT (OUTPATIENT)
Dept: FAMILY MEDICINE | Facility: CLINIC | Age: 59
End: 2024-08-08
Payer: COMMERCIAL

## 2024-08-08 DIAGNOSIS — U07.1 INFECTION DUE TO 2019 NOVEL CORONAVIRUS: Primary | ICD-10-CM

## 2024-08-08 PROCEDURE — G2211 COMPLEX E/M VISIT ADD ON: HCPCS | Mod: 95 | Performed by: PHYSICIAN ASSISTANT

## 2024-08-08 PROCEDURE — 99213 OFFICE O/P EST LOW 20 MIN: CPT | Mod: 95 | Performed by: PHYSICIAN ASSISTANT

## 2024-08-08 NOTE — PROGRESS NOTES
Liyah is a 59 year old who is being evaluated via a billable video visit.    How would you like to obtain your AVS? MyChart  If the video visit is dropped, the invitation should be resent by: Text to cell phone: text link [phone]  Will anyone else be joining your video visit? No      Assessment & Plan     Infection due to 2019 novel coronavirus  Discussed treatment of COVID with antiviral therapy. We discussed benefits, side effects, and risk of medication. After discussion patient would like to start treatment. I am okay with this plan. Advised this medication does decrease effectiveness of Wellbutrin and to STOP her trazodone while taking medication and only restart it 3 days after completion of Paxlovid.  In addition I recommend conservative treatment. Quarantine guidelines discussed.  Warning signs to go to ER educated to patient. Follow up in clinic as needed. Patient agree's with this plan and has no further questions  - nirmatrelvir and ritonavir (PAXLOVID) 300 mg/100 mg therapy pack; Take 3 tablets by mouth 2 times daily for 5 days (Take 2 Nirmatrelvir tablets and 1 Ritonavir tablet twice daily for 5 days)                Subjective   Liyah is a 59 year old, presenting for the following health issues:  Covid Concern    History of Present Illness       Reason for visit:  Covid  Symptom onset:  1-3 days ago  Symptoms include:  Aches, cough, headache, fatigue  Symptom intensity:  Moderate  Symptom progression:  Staying the same  Had these symptoms before:  No  What makes it worse:  Moving about    She eats 4 or more servings of fruits and vegetables daily.She consumes 0 sweetened beverage(s) daily.She exercises with enough effort to increase her heart rate 30 to 60 minutes per day.  She exercises with enough effort to increase her heart rate 7 days per week.   She is taking medications regularly.     COVID-19 Symptom Review  How many days ago did these symptoms start? Symptoms of dry cough for about a week  and the rest followed. Tested positive C-19 last night.   Are any of the following symptoms significant for you?  New or worsening difficulty breathing? No  Worsening cough? No  Fever or chills? Yes, I felt feverish or had chills.  Headache: YES  Sore throat: YES - rare, dry throat  Chest pain: No  Diarrhea: No  Body aches? YES- muscle aches  What treatments has patient tried? None    Does patient live in a nursing home, group home, or shelter? No  Does patient have a way to get food/medications during quarantined? Yes, I have a friend or family member who can help me.                Review of Systems  Constitutional, HEENT, cardiovascular, pulmonary, GI, , musculoskeletal, neuro, skin, endocrine and psych systems are negative, except as otherwise noted.      Objective           Vitals:  No vitals were obtained today due to virtual visit.    Physical Exam   GENERAL: alert and no distress  EYES: Eyes grossly normal to inspection.  No discharge or erythema, or obvious scleral/conjunctival abnormalities.  RESP: No audible wheeze, cough, or visible cyanosis.    SKIN: Visible skin clear. No significant rash, abnormal pigmentation or lesions.  PSYCH: Appropriate affect, tone, and pace of words          Video-Visit Details    Type of service:  Video Visit   Originating Location (pt. Location): Home    Distant Location (provider location):  On-site  Platform used for Video Visit: Colleen  Signed Electronically by: TAYLOR Gonzales

## 2024-08-08 NOTE — TELEPHONE ENCOUNTER
Patient has history of MS, Hypertension, mood disorders, is currently taking prescription for Aubagio and unable to pinpoint start of symptoms. Warm transferred to central scheduling to get virtual visit for today with any provider within the  erento system.     See [employer] message from 8/8/24 for outreach reasoning.     Reason for Disposition   HIGH RISK patient (e.g., weak immune system, age > 64 years, obesity with BMI of 30 or higher, pregnant, chronic lung disease or other chronic medical condition) and COVID symptoms (e.g., cough, fever)  (Exceptions: Already seen by doctor or NP/PA and no new or worsening symptoms.)    Additional Information   Negative: SEVERE difficulty breathing (e.g., struggling for each breath, speaks in single words)   Negative: Difficult to awaken or acting confused (e.g., disoriented, slurred speech)   Negative: Bluish (or gray) lips or face now   Negative: Sounds like a life-threatening emergency to the triager   Negative: Shock suspected (e.g., cold/pale/clammy skin, too weak to stand, low BP, rapid pulse)   Negative: Diagnosed or suspected COVID-19 and symptoms lasting 3 or more weeks   Negative: COVID-19 exposure and no symptoms   Negative: COVID-19 vaccine reaction suspected (e.g., fever, headache, muscle aches) occurring 1 to 3 days after getting vaccine   Negative: COVID-19 vaccine, questions about   Negative: Lives with someone known to have influenza (flu test positive) and flu-like symptoms (e.g., cough, runny nose, sore throat, SOB; with or without fever)   Negative: Possible COVID-19 symptoms and triager concerned about severity of symptoms or other causes   Negative: COVID-19 and breastfeeding, questions about   Negative: SEVERE or constant chest pain or pressure  (Exception: Mild central chest pain, present only when coughing.)   Negative: MODERATE difficulty breathing (e.g., speaks in phrases, SOB even at rest, pulse 100-120)   Negative: Headache and stiff neck  "(can't touch chin to chest)   Negative: Oxygen level (e.g., pulse oximetry) 90% or lower   Negative: Chest pain or pressure  (Exception: MILD central chest pain, present only when coughing.)   Negative: Drinking very little and dehydration suspected (e.g., no urine > 12 hours, very dry mouth, very lightheaded)   Negative: Patient sounds very sick or weak to the triager   Negative: Fever > 103 F (39.4 C)   Negative: Fever > 101 F (38.3 C) and over 60 years of age   Negative: Fever > 100.0 F (37.8 C) and bedridden (e.g., CVA, chronic illness, recovering from surgery)    Answer Assessment - Initial Assessment Questions  1. COVID-19 DIAGNOSIS: \"How do you know that you have COVID?\" (e.g., positive lab test or self-test, diagnosed by doctor or NP/PA, symptoms after exposure).      Self test- positive last night  2. COVID-19 EXPOSURE: \"Was there any known exposure to COVID before the symptoms began?\" CDC Definition of close contact: within 6 feet (2 meters) for a total of 15 minutes or more over a 24-hour period.       unknown  3. ONSET: \"When did the COVID-19 symptoms start?\"       Over past week has had a throat issue, yesterday significantly worse  4. WORST SYMPTOM: \"What is your worst symptom?\" (e.g., cough, fever, shortness of breath, muscle aches)      Body aches  5. COUGH: \"Do you have a cough?\" If Yes, ask: \"How bad is the cough?\"        Yes- dry tickle, non productive  6. FEVER: \"Do you have a fever?\" If Yes, ask: \"What is your temperature, how was it measured, and when did it start?\"      100  7. RESPIRATORY STATUS: \"Describe your breathing?\" (e.g., normal; shortness of breath, wheezing, unable to speak)       A little short of breath- with activity   8. BETTER-SAME-WORSE: \"Are you getting better, staying the same or getting worse compared to yesterday?\"  If getting worse, ask, \"In what way?\"      Same to worse  9. OTHER SYMPTOMS: \"Do you have any other symptoms?\"  (e.g., chills, fatigue, headache, loss of smell " "or taste, muscle pain, sore throat)      Dizziness, body aches, fever, fatigue  10. HIGH RISK DISEASE: \"Do you have any chronic medical problems?\" (e.g., asthma, heart or lung disease, weak immune system, obesity, etc.)        Hypertension, MS  11. VACCINE: \"Have you had the COVID-19 vaccine?\" If Yes, ask: \"Which one, how many shots, when did you get it?\"        Yes, most recent last fall with flu shot  12. PREGNANCY: \"Is there any chance you are pregnant?\" \"When was your last menstrual period?\"        No  13. O2 SATURATION MONITOR:  \"Do you use an oxygen saturation monitor (pulse oximeter) at home?\" If Yes, ask \"What is your reading (oxygen level) today?\" \"What is your usual oxygen saturation reading?\" (e.g., 95%)        98%    Protocols used: Coronavirus (COVID-19) Diagnosed or [id number]-CM    "

## 2024-09-18 ENCOUNTER — NURSE TRIAGE (OUTPATIENT)
Dept: FAMILY MEDICINE | Facility: CLINIC | Age: 59
End: 2024-09-18

## 2024-09-18 NOTE — TELEPHONE ENCOUNTER
"Reason for Disposition   Earache also present    Additional Information   Negative: SEVERE difficulty breathing (e.g., struggling for each breath, speaks in single words)   Negative: Sounds like a life-threatening emergency to the triager   Negative: Throat culture results, call about   Negative: Productive cough is main symptom   Negative: Runny nose is main symptom   Negative: Drooling or spitting out saliva (because can't swallow)   Negative: Unable to open mouth completely   Negative: Drinking very little and has signs of dehydration (e.g., no urine > 12 hours, very dry mouth, very lightheaded)   Negative: Patient sounds very sick or weak to the triager   Negative: Difficulty breathing (per caller) but not severe   Negative: Fever > 103 F (39.4 C)   Negative: Refuses to drink anything for > 12 hours   Negative: SEVERE sore throat pain   Negative: Pus on tonsils (back of throat) and swollen neck lymph nodes ('glands')    Answer Assessment - Initial Assessment Questions  1. ONSET: \"When did the throat start hurting?\" (Hours or days ago)       Yesterday, woke up at 4 am because of pain  2. SEVERITY: \"How bad is the sore throat?\" (Scale 1-10; mild, moderate or severe)    - MILD (1-3):  Doesn't interfere with eating or normal activities.    - MODERATE (4-7): Interferes with eating some solids and normal activities.    - SEVERE (8-10):  Excruciating pain, interferes with most normal activities.    - SEVERE WITH DYSPHAGIA (10): Can't swallow liquids, drooling.      3  3. STREP EXPOSURE: \"Has there been any exposure to strep within the past week?\" If Yes, ask: \"What type of contact occurred?\"       Unknown  4.  VIRAL SYMPTOMS: \"Are there any symptoms of a cold, such as a runny nose, cough, hoarse voice or red eyes?\"       No  5. FEVER: \"Do you have a fever?\" If Yes, ask: \"What is your temperature, how was it measured, and when did it start?\"      No  6. PUS ON THE TONSILS: \"Is there pus on the tonsils in the back of " "your throat?\"      Yes  7. OTHER SYMPTOMS: \"Do you have any other symptoms?\" (e.g., difficulty breathing, headache, rash)      No  8. PREGNANCY: \"Is there any chance you are pregnant?\" \"When was your last menstrual period?\"      No    Protocols used: Sore Throat-A-OH    "

## 2024-09-25 ENCOUNTER — OFFICE VISIT (OUTPATIENT)
Dept: OPHTHALMOLOGY | Facility: CLINIC | Age: 59
End: 2024-09-25
Payer: COMMERCIAL

## 2024-09-25 DIAGNOSIS — H02.056 TRICHIASIS OF LEFT EYE WITHOUT ENTROPION: ICD-10-CM

## 2024-09-25 DIAGNOSIS — H02.9 EYELID LESION: Primary | ICD-10-CM

## 2024-09-25 PROCEDURE — 99213 OFFICE O/P EST LOW 20 MIN: CPT | Mod: 25 | Performed by: OPHTHALMOLOGY

## 2024-09-25 PROCEDURE — 67820 REVISE EYELASHES: CPT | Mod: E1 | Performed by: OPHTHALMOLOGY

## 2024-09-25 ASSESSMENT — TONOMETRY
OD_IOP_MMHG: 11
OS_IOP_MMHG: 11
IOP_METHOD: ICARE

## 2024-09-25 ASSESSMENT — VISUAL ACUITY
OS_CC: 20/15
METHOD: SNELLEN - LINEAR
OD_CC: 20/25
OD_CC+: -1

## 2024-09-25 NOTE — PROGRESS NOTES
Chief Complaint(s) and History of Present Illness(es)     Bump On Both Upper Lids            Laterality: right upper lid and left upper lid    Associated symptoms: eye pain.  Negative for discharge          Comments    Here for intermittent bumps on both upper eyelids. [facility] doing fine. Some   pain. No discharge.    Romeo Horne COT 1:29 PM September 25, 2024         Assessment & Plan     Liyah Jernigan is a 59 year old female with the following diagnoses:   Encounter Diagnoses   Name Primary?    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 Reassurance, return to clinic with any changes.    Left upper lid - single misdirected lash misdirected and rubbing on cornea arising from yellow dome shaped lesion along lash line. Similar to presentation in 2021. Had relief of symptoms for several years with epilation last time. DDx includes nevus versus a cyst from a hair from a lash follicle.    Single lash epilated left upper eyelid at the slit lamp.     If becomes a recurrent problem can consider excision of the lesion.            Attending Physician Attestation: Complete documentation of historical and exam elements from today's encounter can be found in the full encounter summary report (not reduplicated in this progress note). I personally obtained the chief complaint(s) and history of present illness. I confirmed and edited as necessary the review of systems, past medical/surgical history, family history, social history, and examination findings as documented by others; and I examined the patient myself. I personally reviewed the relevant tests, images, and reports as documented above. I formulated and edited as necessary the assessment and plan and discussed the findings and management plan with the patient.  -Saadia Jang MD

## 2024-10-10 ENCOUNTER — OFFICE VISIT (OUTPATIENT)
Dept: FAMILY MEDICINE | Facility: CLINIC | Age: 59
End: 2024-10-10
Payer: COMMERCIAL

## 2024-10-10 VITALS
RESPIRATION RATE: 15 BRPM | OXYGEN SATURATION: 97 % | HEIGHT: 66 IN | BODY MASS INDEX: 18.48 KG/M2 | DIASTOLIC BLOOD PRESSURE: 82 MMHG | HEART RATE: 67 BPM | TEMPERATURE: 97.3 F | SYSTOLIC BLOOD PRESSURE: 120 MMHG | WEIGHT: 115 LBS

## 2024-10-10 DIAGNOSIS — J02.9 SORE THROAT: Primary | ICD-10-CM

## 2024-10-10 DIAGNOSIS — U07.1 INFECTION DUE TO 2019 NOVEL CORONAVIRUS: ICD-10-CM

## 2024-10-10 DIAGNOSIS — J01.90 ACUTE SINUSITIS WITH SYMPTOMS > 10 DAYS: ICD-10-CM

## 2024-10-10 LAB
DEPRECATED S PYO AG THROAT QL EIA: NEGATIVE
GROUP A STREP BY PCR: NOT DETECTED

## 2024-10-10 PROCEDURE — 99213 OFFICE O/P EST LOW 20 MIN: CPT | Performed by: FAMILY MEDICINE

## 2024-10-10 PROCEDURE — G2211 COMPLEX E/M VISIT ADD ON: HCPCS | Performed by: FAMILY MEDICINE

## 2024-10-10 PROCEDURE — 87651 STREP A DNA AMP PROBE: CPT | Performed by: FAMILY MEDICINE

## 2024-10-10 ASSESSMENT — ENCOUNTER SYMPTOMS: SORE THROAT: 1

## 2024-10-10 ASSESSMENT — PAIN SCALES - GENERAL: PAINLEVEL: SEVERE PAIN (7)

## 2024-10-10 NOTE — TELEPHONE ENCOUNTER
Refilled    Continue Regimen: Clobetasol cream\\nCalciptriene Initiate Treatment: Vtama Detail Level: Zone Plan: Discussed options of treatment such as topicals and/or systemic medications. Patient elects for topicals for now. Zoryve will be considered next then biologics

## 2024-10-10 NOTE — PROGRESS NOTES
Assessment & Plan     Sore throat  Acute sinusitis with symptoms > 10 days  Infection due to 2019 novel coronavirus  Has been noting persistent nasal congestion and intermittent episodes of malaise, body aches with significant symptoms yesterday, modestly improved today.  Also new onset cough this time and some mild chest tightness.  Exam is fairly unremarkable outside of likely serous otitis.  Given protracted symptoms after COVID of sinus congestion will cover with some antibiotics for possible sinusitis.  This should also cover lungs to a certain degree.  We did discuss we could do some testing for additional things given her underlying immune compromised and unusual symptom progression.  Could consider CBC, chest x-ray, Lyme screen, flu swab but she would like to trial the antibiotics first and follow-up if not improving.  Reviewed red flag symptoms that would precipitate the need for routine, urgent or emergent f/u   - Streptococcus A Rapid Screen w/Reflex to PCR - Clinic Collect  - Group A Streptococcus PCR Throat Swab  - amoxicillin-clavulanate (AUGMENTIN) 875-125 MG tablet; Take 1 tablet by mouth 2 times daily for 7 days.            Subjective   Liyah is a 59 year old, presenting for the following health issues:  Pharyngitis (sore throat, covid negative, body aches)      10/10/2024    11:32 AM   Additional Questions   Roomed by Sarabjit   Accompanied by self         10/10/2024    11:32 AM   Patient Reported Additional Medications   Patient reports taking the following new medications no     Pharyngitis     History of Present Illness       Reason for visit:  Throat, upper respiratory, ears. Non-Covid  Symptom onset:  1-3 days ago  Symptoms include:  Ears, body aches, throat  Symptom intensity:  Moderate  Symptom progression:  Staying the same  Had these symptoms before:  Yes  Has tried/received treatment for these symptoms:  Yes  Previous treatment was successful:  Yes  Prior treatment description:  Water, rest,  "sometimes abx  What makes it worse:  Fatigue  What makes it better:  Rest   She is taking medications regularly.         Concern - sore throat, covid negative, body aches  Onset: 2-3 weeks  Description: sore throat on right side, covid negative, body aches, coughing, heaviness in chest   Intensity: moderate  Progression of Symptoms:  improving  Accompanying Signs & Symptoms: sore throat on right side, covid negative, body aches, coughing, heaviness in chest   Previous history of similar problem: yes  Precipitating factors:        Worsened by: constant  Alleviating factors:        Improved by: constant  Therapies tried and outcome: Advil, tylenol       Covid 1st week of Aug  Episodically feeling intermittent sx's Since then.  Every few weeks noting  St, tickle in throat, ears congested, Body aches, malaise.  Temp to 99. Hand joints hurt yesterday.  Symptoms started yesterday acutely and was actually in bed from the severe myalgias and malaise.  Today about 70% better from those symptoms.  Today chest feels a little heavy. Minor cough that started yesterday. Dry.   No hx of asthma     Since COVID though has had residual sinus congestion and pressure.              Objective    /82 (BP Location: Right arm, Patient Position: Sitting, Cuff Size: Adult Regular)   Pulse 67   Temp 97.3  F (36.3  C) (Temporal)   Resp 15   Ht 1.676 m (5' 6\")   Wt 52.2 kg (115 lb)   LMP 05/25/2020 (Exact Date)   SpO2 97%   BMI 18.56 kg/m    Body mass index is 18.56 kg/m .  Physical Exam   GENERAL: alert and no distress  EYES: Eyes grossly normal to inspection, PERRL and conjunctivae and sclerae normal  HENT: normal cephalic/atraumatic, both ears: TMs retracted with loss of normal landmarks and no erythema , nose and mouth without ulcers or lesions, nasal mucosa edematous , oropharynx clear, oral mucous membranes moist, and posterior pharynx erythema with no exudate  NECK: no adenopathy, no asymmetry, masses, or scars  RESP: lungs " clear to auscultation - no rales, rhonchi or wheezes  CV: regular rate and rhythm, normal S1 S2, no S3 or S4, no murmur, click or rub, no peripheral edema  ABDOMEN: soft, no mass  PSYCH: mentation appears normal, affect normal/bright    Results for orders placed or performed in visit on 10/10/24 (from the past 24 hour(s))   Streptococcus A Rapid Screen w/Reflex to PCR - Clinic Collect    Specimen: Throat; Swab   Result Value Ref Range    Group A Strep antigen Negative Negative           Signed Electronically by: Ivonne Estes MD

## 2024-10-11 NOTE — RESULT ENCOUNTER NOTE
Liyah,  Follow-up strep test was also negative.  I hope you are starting to feel better.  Certainly follow-up if ongoing or worsening symptoms  Kind regards,  Ivonne Estes MD

## 2024-12-13 SDOH — HEALTH STABILITY: PHYSICAL HEALTH: ON AVERAGE, HOW MANY MINUTES DO YOU ENGAGE IN EXERCISE AT THIS LEVEL?: 20 MIN

## 2024-12-13 SDOH — HEALTH STABILITY: PHYSICAL HEALTH: ON AVERAGE, HOW MANY DAYS PER WEEK DO YOU ENGAGE IN MODERATE TO STRENUOUS EXERCISE (LIKE A BRISK WALK)?: 7 DAYS

## 2024-12-13 ASSESSMENT — SOCIAL DETERMINANTS OF HEALTH (SDOH): HOW OFTEN DO YOU GET TOGETHER WITH FRIENDS OR RELATIVES?: THREE TIMES A WEEK

## 2024-12-17 ASSESSMENT — PATIENT HEALTH QUESTIONNAIRE - PHQ9
SUM OF ALL RESPONSES TO PHQ QUESTIONS 1-9: 2
SUM OF ALL RESPONSES TO PHQ QUESTIONS 1-9: 2
10. IF YOU CHECKED OFF ANY PROBLEMS, HOW DIFFICULT HAVE THESE PROBLEMS MADE IT FOR YOU TO DO YOUR WORK, TAKE CARE OF THINGS AT HOME, OR GET ALONG WITH OTHER PEOPLE: NOT DIFFICULT AT ALL

## 2024-12-18 ENCOUNTER — OFFICE VISIT (OUTPATIENT)
Dept: FAMILY MEDICINE | Facility: CLINIC | Age: 59
End: 2024-12-18
Attending: FAMILY MEDICINE
Payer: COMMERCIAL

## 2024-12-18 VITALS
OXYGEN SATURATION: 97 % | HEIGHT: 65 IN | BODY MASS INDEX: 20.04 KG/M2 | SYSTOLIC BLOOD PRESSURE: 110 MMHG | DIASTOLIC BLOOD PRESSURE: 77 MMHG | TEMPERATURE: 97.6 F | HEART RATE: 61 BPM | RESPIRATION RATE: 14 BRPM | WEIGHT: 120.3 LBS

## 2024-12-18 DIAGNOSIS — F33.0 MILD EPISODE OF RECURRENT MAJOR DEPRESSIVE DISORDER (H): ICD-10-CM

## 2024-12-18 DIAGNOSIS — Z00.00 ROUTINE GENERAL MEDICAL EXAMINATION AT A HEALTH CARE FACILITY: Primary | ICD-10-CM

## 2024-12-18 DIAGNOSIS — Z13.220 SCREENING CHOLESTEROL LEVEL: ICD-10-CM

## 2024-12-18 DIAGNOSIS — F90.9 ATTENTION DEFICIT HYPERACTIVITY DISORDER (ADHD), UNSPECIFIED ADHD TYPE: ICD-10-CM

## 2024-12-18 DIAGNOSIS — G35 MULTIPLE SCLEROSIS (H): ICD-10-CM

## 2024-12-18 DIAGNOSIS — I10 HYPERTENSION GOAL BP (BLOOD PRESSURE) < 140/90: ICD-10-CM

## 2024-12-18 PROCEDURE — 99396 PREV VISIT EST AGE 40-64: CPT | Performed by: FAMILY MEDICINE

## 2024-12-18 RX ORDER — DEXTROAMPHETAMINE SACCHARATE, AMPHETAMINE ASPARTATE, DEXTROAMPHETAMINE SULFATE AND AMPHETAMINE SULFATE 2.5; 2.5; 2.5; 2.5 MG/1; MG/1; MG/1; MG/1
10 TABLET ORAL DAILY
Qty: 30 TABLET | Refills: 0 | Status: SHIPPED | OUTPATIENT
Start: 2024-12-18

## 2024-12-18 ASSESSMENT — PAIN SCALES - GENERAL: PAINLEVEL_OUTOF10: NO PAIN (0)

## 2024-12-18 NOTE — PROGRESS NOTES
Preventive Care Visit  [facility] ESE Ferraro Tab Barrow MD, Family Medicine  Dec 18, 2024      Assessment & Plan     Routine general medical examination at a health care facility  Routine health maintenance otherwise up-to-date    Hypertension goal BP (blood pressure) < 140/90  Stable on current regimen.  Continue same plan and routine follow-up.   - Basic metabolic panel; Future    Attention deficit hyperactivity disorder (ADHD), unspecified ADHD type  We have used treatment on and off depending on schedule, etc.  Will start her back on 10 mg in the morning.  - amphetamine-dextroamphetamine (ADDERALL) 10 MG tablet; Take 1 tablet (10 mg) by mouth daily. In AM    Mild episode of recurrent major depressive disorder (H)  Stable on current regimen.  Continue same plan and routine follow-up.     Multiple sclerosis (H)  Stable on current regimen.  Continue same plan and routine follow-up.     Screening cholesterol level    - Lipid panel reflex to direct LDL Fasting; Future    Patient has been advised of split billing requirements and indicates understanding: Yes        Counseling  Appropriate preventive services were addressed with this patient via screening, questionnaire, or discussion as appropriate for fall prevention, nutrition, physical activity, Tobacco-use cessation, social engagement, weight loss and cognition.  Checklist reviewing preventive services available has been given to the patient.  Reviewed patient's diet, addressing concerns and/or questions.   She is at risk for psychosocial distress and has been provided with information to reduce risk.       Regular exercise  See Patient Instructions    Anabel Pelletier is a 59 year old, presenting for the following:  Physical        12/18/2024     1:36 PM   Additional Questions   Roomed by Cynthia   Accompanied by self          HPI  Here today for routine checkup        Health Care Directive  Patient does not have a Health Care Directive:  Discussed advance care planning with patient; however, patient declined at this time.      12/13/2024   General Health   How would you rate your overall physical health? Excellent   Feel stress (tense, anxious, or unable to sleep) To some extent      (!) STRESS CONCERN      12/13/2024   Nutrition   Three or more servings of calcium each day? Yes   Diet: Carbohydrate counting   How many servings of fruit and vegetables per day? 4 or more   How many sweetened beverages each day? 0-1            12/13/2024   Exercise   Days per week of moderate/strenous exercise 7 days   Average minutes spent exercising at this level 20 min            12/13/2024   Social Factors   Frequency of gathering with friends or relatives Three times a week   Worry food won't last until get money to buy more No   Food not last or not have enough money for food? No   Do you have housing? (Housing is defined as stable permanent housing and does not include staying ouside in a car, in a tent, in an abandoned building, in an overnight shelter, or couch-surfing.) Yes   Are you worried about losing your housing? No   Lack of transportation? No   Unable to get utilities (heat,electricity)? No            12/13/2024   Fall Risk   Fallen 2 or more times in the past year? No    Trouble with walking or balance? No        Patient-reported          12/13/2024   Dental   Dentist two times every year? Yes            12/13/2024   TB Screening   Were you born outside of the [place]? No          Today's PHQ-9 Score:       12/17/2024    11:53 AM   PHQ-9 SCORE   PHQ-9 Total Score MyChart 2 (Minimal depression)   PHQ-9 Total Score 2        Patient-reported         12/13/2024   Substance Use   Alcohol more than 3/day or more than 7/wk No   Do you use any other substances recreationally? No        Social History     Tobacco Use    Smoking status: Never     Passive exposure: Never    Smokeless tobacco: Never   Vaping Use    Vaping status: Never Used   Substance Use Topics     Alcohol use: Yes     Comment: Rare    Drug use: No           9/13/2022   LAST FHS-7 RESULTS   1st degree relative breast or ovarian cancer Yes    Any relative bilateral breast cancer Yes        Patient-reported        Mammogram Screening - Mammogram every 1-2 years updated in Health Maintenance based on mutual decision making        12/13/2024   STI Screening   New sexual partner(s) since last STI/HIV test? No        History of abnormal Pap smear: No - age 30-64 HPV with reflex Pap every 5 years recommended        Latest Ref Rng & Units 9/21/2020    12:25 PM 9/21/2020    12:16 PM 11/4/2015     1:42 PM   PAP / HPV   PAP (Historical)   NIL     HPV 16 DNA NEG^Negative Negative   Negative    HPV 18 DNA NEG^Negative Negative   Negative    Other HR HPV NEG^Negative Negative   Negative      ASCVD Risk   The 10-year ASCVD risk score (Terrell GREY, et al., 2019) is: 1.6%    Values used to calculate the score:      Age: 59 years      Sex: Female      Is Non- : No      Diabetic: No      Tobacco smoker: No      Systolic Blood Pressure: 110 mmHg      Is BP treated: Yes      HDL Cholesterol: 95 mg/dL      Total Cholesterol: 215 mg/dL           Reviewed and updated as needed this visit by Provider   Tobacco  Allergies  Meds  Problems  Med Hx  Surg Hx  Fam Hx            Lab work is in process  Labs reviewed in EPIC  BP Readings from Last 3 Encounters:   12/18/24 110/77   10/10/24 120/82   11/28/23 124/85    Wt Readings from Last 3 Encounters:   12/18/24 54.6 kg (120 lb 4.8 oz)   10/10/24 52.2 kg (115 lb)   11/28/23 52.7 kg (116 lb 3.2 oz)                      Review of Systems  CONSTITUTIONAL: NEGATIVE for fever, chills, change in weight  INTEGUMENTARY/SKIN: NEGATIVE for worrisome rashes, moles or lesions  EYES: NEGATIVE for vision changes or irritation  ENT/MOUTH: NEGATIVE for ear, mouth and throat problems  RESP: NEGATIVE for significant cough or SOB  BREAST: NEGATIVE for masses, tenderness or  "discharge  CV: NEGATIVE for chest pain, palpitations or peripheral edema  GI: NEGATIVE for nausea, abdominal pain, heartburn, or change in bowel habits  : NEGATIVE for frequency, dysuria, or hematuria  MUSCULOSKELETAL: NEGATIVE for significant arthralgias or myalgia  NEURO: NEGATIVE for weakness, dizziness or paresthesias  ENDOCRINE: NEGATIVE for temperature intolerance, skin/hair changes  HEME: NEGATIVE for bleeding problems  PSYCHIATRIC: NEGATIVE for changes in mood or affect     Objective    Exam  /77   Pulse 61   Temp 97.6  F (36.4  C) (Tympanic)   Resp 14   Ht 1.651 m (5' 5\")   Wt 54.6 kg (120 lb 4.8 oz)   LMP 05/25/2020 (Exact Date)   SpO2 97%   BMI 20.02 kg/m     Estimated body mass index is 20.02 kg/m  as calculated from the following:    Height as of this encounter: 1.651 m (5' 5\").    Weight as of this encounter: 54.6 kg (120 lb 4.8 oz).    Physical Exam  GENERAL: alert and no distress  EYES: Eyes grossly normal to inspection, PERRL and conjunctivae and sclerae normal  HENT: ear canals and TM's normal, nose and mouth without ulcers or lesions  NECK: no adenopathy, no asymmetry, masses, or scars  RESP: lungs clear to auscultation - no rales, rhonchi or wheezes  CV: regular rate and rhythm, normal S1 S2, no S3 or S4, no murmur, click or rub, no peripheral edema  ABDOMEN: soft, nontender, no hepatosplenomegaly, no masses and bowel sounds normal  MS: no gross musculoskeletal defects noted, no edema  SKIN: no suspicious lesions or rashes  NEURO: Normal strength and tone, mentation intact and speech normal  PSYCH: mentation appears normal, affect normal/bright        Signed Electronically by: Jasmine Barrow MD    Answers submitted by the patient for this visit:  Patient Health Questionnaire (Submitted on 12/17/2024)  If you checked off any problems, how difficult have these problems made it for you to do your work, take care of things at home, or get along with other people?: Not " difficult at all  PHQ9 TOTAL SCORE: 2

## 2024-12-18 NOTE — PATIENT INSTRUCTIONS
Patient Education   Preventive Care Advice   This is general advice given by our system to help you stay healthy. However, your care team may have specific advice just for you. Please talk to your care team about your preventive care needs.  Nutrition  Eat 5 or more servings of fruits and vegetables each day.  Try wheat bread, brown rice and whole grain pasta (instead of white bread, rice, and pasta).  Get enough calcium and vitamin D. Check the label on foods and aim for 100% of the RDA (recommended daily allowance).  Lifestyle  Exercise at least 150 minutes each week  (30 minutes a day, 5 days a week).  Do muscle strengthening activities 2 days a week. These help control your weight and prevent disease.  No smoking.  Wear sunscreen to prevent skin cancer.  Have a dental exam and cleaning every 6 months.  Yearly exams  See your health care team every year to talk about:  Any changes in your health.  Any medicines your care team has prescribed.  Preventive care, family planning, and ways to prevent chronic diseases.  Shots (vaccines)   HPV shots (up to age 26), if you've never had them before.  Hepatitis B shots (up to age 59), if you've never had them before.  COVID-19 shot: Get this shot when it's due.  Flu shot: Get a flu shot every year.  Tetanus shot: Get a tetanus shot every 10 years.  Pneumococcal, hepatitis A, and RSV shots: Ask your care team if you need these based on your risk.  Shingles shot (for age 50 and up)  General health tests  Diabetes screening:  Starting at age 35, Get screened for diabetes at least every 3 years.  If you are younger than age 35, ask your care team if you should be screened for diabetes.  Cholesterol test: At age 39, start having a cholesterol test every 5 years, or more often if advised.  Bone density scan (DEXA): At age 50, ask your care team if you should have this scan for osteoporosis (brittle bones).  Hepatitis C: Get tested at least once in your life.  STIs (sexually  transmitted infections)  Before age 24: Ask your care team if you should be screened for STIs.  After age 24: Get screened for STIs if you're at risk. You are at risk for STIs (including HIV) if:  You are sexually active with more than one person.  You don't use condoms every time.  You or a partner was diagnosed with a sexually transmitted infection.  If you are at risk for HIV, ask about PrEP medicine to prevent HIV.  Get tested for HIV at least once in your life, whether you are at risk for HIV or not.  Cancer screening tests  Cervical cancer screening: If you have a cervix, begin getting regular cervical cancer screening tests starting at age 21.  Breast cancer scan (mammogram): If you've ever had breasts, begin having regular mammograms starting at age 40. This is a scan to check for breast cancer.  Colon cancer screening: It is important to start screening for colon cancer at age 45.  Have a colonoscopy test every 10 years (or more often if you're at risk) Or, ask your provider about stool tests like a FIT test every year or Cologuard test every 3 years.  To learn more about your testing options, visit:   .  For help making a decision, visit:   https://bit.ly/rg22612.  Prostate cancer screening test: If you have a prostate, ask your care team if a prostate cancer screening test (PSA) at age 55 is right for you.  Lung cancer screening: If you are a current or former smoker ages 50 to 80, ask your care team if ongoing lung cancer screenings are right for you.  For informational purposes only. Not to replace the advice of your health care provider. Copyright   2023 [facility]. All rights reserved. Clinically reviewed by the [facility]. [employer] 579804 - REV 01/24.  Learning About Stress  What is stress?     Stress is your body's response to a hard situation. Your body can have a physical, emotional, or mental response. Stress is a fact of life for most people, and it  affects everyone differently. What causes stress for you may not be stressful for someone else.  A lot of things can cause stress. You may feel stress when you go on a job interview, take a test, or run a race. This kind of short-term stress is normal and even useful. It can help you if you need to work hard or react quickly. For example, stress can help you finish an important job on time.  Long-term stress is caused by ongoing stressful situations or events. Examples of long-term stress include long-term health problems, ongoing problems at work, or conflicts in your family. Long-term stress can harm your health.  How does stress affect your health?  When you are stressed, your body responds as though you are in danger. It makes hormones that speed up your heart, make you breathe faster, and give you a burst of energy. This is called the fight-or-flight stress response. If the stress is over quickly, your body goes back to normal and no harm is done.  But if stress happens too often or lasts too long, it can have bad effects. Long-term stress can make you more likely to get sick, and it can make symptoms of some diseases worse. If you tense up when you are stressed, you may develop neck, shoulder, or low back pain. Stress is linked to high blood pressure and heart disease.  Stress also harms your emotional health. It can make you chau, tense, or depressed. Your relationships may suffer, and you may not do well at work or school.  What can you do to manage stress?  You can try these things to help manage stress:   Do something active. Exercise or activity can help reduce stress. Walking is a great way to get started. Even everyday activities such as housecleaning or yard work can help.  Try yoga or davina chi. These techniques combine exercise and meditation. You may need some training at first to learn them.  Do something you enjoy. For example, listen to music or go to a movie. Practice your hobby or do volunteer  "work.  Meditate. This can help you relax, because you are not worrying about what happened before or what may happen in the future.  Do guided imagery. Imagine yourself in any setting that helps you feel calm. You can use online videos, books, or a teacher to guide you.  Do breathing exercises. For example:  From a standing position, bend forward from the waist with your knees slightly bent. Let your arms dangle close to the floor.  Breathe in slowly and deeply as you return to a standing position. Roll up slowly and lift your head last.  Hold your breath for just a few seconds in the standing position.  Breathe out slowly and bend forward from the waist.  Let your feelings out. Talk, laugh, cry, and express anger when you need to. Talking with supportive friends or family, a counselor, or a candy leader about your feelings is a healthy way to relieve stress. Avoid discussing your feelings with people who make you feel worse.  Write. It may help to write about things that are bothering you. This helps you find out how much stress you feel and what is causing it. When you know this, you can find better ways to cope.  What can you do to prevent stress?  You might try some of these things to help prevent stress:  Manage your time. This helps you find time to do the things you want and need to do.  Get enough sleep. Your body recovers from the stresses of the day while you are sleeping.  Get support. Your family, friends, and community can make a difference in how you experience stress.  Limit your news feed. Avoid or limit time on social media or news that may make you feel stressed.  Do something active. Exercise or activity can help reduce stress. Walking is a great way to get started.  Where can you learn more?  Go to https://www.[employer].net/patiented  Enter N032 in the search box to learn more about \"Learning About Stress.\"  Current as of: October 24, 2023  Content Version: 14.3    2024 [employer]. "   Care instructions adapted under license by your healthcare professional. If you have questions about a medical condition or this instruction, always ask your healthcare professional. [employer] disclaims any warranty or liability for your use of this information.

## 2024-12-26 ENCOUNTER — PATIENT OUTREACH (OUTPATIENT)
Dept: CARE COORDINATION | Facility: CLINIC | Age: 59
End: 2024-12-26
Payer: COMMERCIAL

## 2025-01-02 NOTE — RESULT ENCOUNTER NOTE
Date & Time: 1/2/25@ 5995-5313  Surgery/POD#0: LEFT INDEX FINGER FLEXOR TENDON SHEATH IRRIGATION AND DORSAL HAND IRRIGATION AND DEBRIDEMENT   Behavior & Aggression: Green  Fall Risk: No  Orientation:x4  ABNL VS/O2:HTN. On RA  ABNL Labs: Creatinine 1.23, .46  Pain Management:PRN IV dilaudid  Bowel/Bladder: Continent  Drains: None. PIV SL  Wounds/incisions: L hand  Diet:Regular  Activity Level: Indep  Tests/Procedures: None  Anticipated  DC Date: TBD  Significant Information: NWB/elevate L hand.    Dear Liyah Barrow is out of the office and I am reviewing your results.    Your vitamin D level is normal.  Continue supplements if you have been taking  There is not excess protein in your urine   Your cholesterol looks good.  The total cholesterol is a bit high but this is only because the HDL (good cholesterol) is so high and not worrisome.   Your liver tests were just slightly elevated- please review this at your upcoming appointment with Chandrika.  I suggest avoiding alcohol and ibuprofen- you will need these labs repeated.   Your electrolytes and kidney function were normal.    Please call or MyChart my office with any questions or concerns.   Jaki Blas, PAC

## 2025-01-28 ENCOUNTER — VIRTUAL VISIT (OUTPATIENT)
Dept: FAMILY MEDICINE | Facility: CLINIC | Age: 60
End: 2025-01-28
Payer: COMMERCIAL

## 2025-01-28 DIAGNOSIS — G57.61 MORTON'S NEUROMA OF RIGHT FOOT: Primary | ICD-10-CM

## 2025-01-28 PROCEDURE — 98005 SYNCH AUDIO-VIDEO EST LOW 20: CPT | Performed by: NURSE PRACTITIONER

## 2025-01-28 NOTE — PROGRESS NOTES
Liyah is a 60 year old who is being evaluated via a billable video visit.    How would you like to obtain your AVS? MyChart  If the video visit is dropped, the invitation should be resent by: Text to cell phone: [phone]  Will anyone else be joining your video visit? No    Assessment & Plan     Avendano's neuroma of right foot  Symptoms concerning for a Avendano's neuroma, recommend podiatry evaluation. Liyah declined a referral at this time but she will let me know if she changes her mind. Symptomatic care discussed and written information provided.       Subjective   Liyah is a 60 year old, presenting for the following health issues:  Orders (Image)        1/28/2025    11:30 AM   Additional Questions   Roomed by Kristel JORGE([facility])     HPI     Right foot between 3rd and 4th toes-6 months   Felt like sock was bunched up  Feels swollen  Hard to bear weight in the morning   Numbness present    Review of Systems  Constitutional, HEENT, cardiovascular, pulmonary, gi and gu systems are negative, except as otherwise noted.      Objective           Vitals:  No vitals were obtained today due to virtual visit.    Physical Exam   GENERAL: alert and no distress  EYES: Eyes grossly normal to inspection.  No discharge or erythema, or obvious scleral/conjunctival abnormalities.  RESP: No audible wheeze, cough, or visible cyanosis.    MS: No gross musculoskeletal defects noted.  Normal range of motion.  No visible edema.  SKIN: Visible skin clear. No significant rash, abnormal pigmentation or lesions.  NEURO: Cranial nerves grossly intact.  Mentation and speech appropriate for age.        Video-Visit Details    Type of service:  Video Visit   Originating Location (pt. Location): Home  Distant Location (provider location):  On-site  Platform used for Video Visit: Colleen  Signed Electronically by: MARRY Galan CNP

## 2025-02-18 ENCOUNTER — LAB (OUTPATIENT)
Dept: LAB | Facility: CLINIC | Age: 60
End: 2025-02-18
Payer: COMMERCIAL

## 2025-02-18 ENCOUNTER — TELEPHONE (OUTPATIENT)
Dept: NEUROLOGY | Facility: CLINIC | Age: 60
End: 2025-02-18

## 2025-02-18 ENCOUNTER — MYC MEDICAL ADVICE (OUTPATIENT)
Dept: FAMILY MEDICINE | Facility: CLINIC | Age: 60
End: 2025-02-18

## 2025-02-18 ENCOUNTER — OFFICE VISIT (OUTPATIENT)
Dept: NEUROLOGY | Facility: CLINIC | Age: 60
End: 2025-02-18
Attending: PSYCHIATRY & NEUROLOGY
Payer: COMMERCIAL

## 2025-02-18 ENCOUNTER — ANCILLARY PROCEDURE (OUTPATIENT)
Dept: MRI IMAGING | Facility: CLINIC | Age: 60
End: 2025-02-18
Attending: PSYCHIATRY & NEUROLOGY
Payer: COMMERCIAL

## 2025-02-18 VITALS
BODY MASS INDEX: 18.26 KG/M2 | SYSTOLIC BLOOD PRESSURE: 122 MMHG | HEIGHT: 66 IN | DIASTOLIC BLOOD PRESSURE: 73 MMHG | HEART RATE: 64 BPM | OXYGEN SATURATION: 98 % | WEIGHT: 113.6 LBS

## 2025-02-18 DIAGNOSIS — G35 MS (MULTIPLE SCLEROSIS) (H): ICD-10-CM

## 2025-02-18 DIAGNOSIS — G35 MULTIPLE SCLEROSIS (H): Primary | ICD-10-CM

## 2025-02-18 DIAGNOSIS — G35 MS (MULTIPLE SCLEROSIS) (H): Primary | ICD-10-CM

## 2025-02-18 DIAGNOSIS — I10 HYPERTENSION GOAL BP (BLOOD PRESSURE) < 140/90: ICD-10-CM

## 2025-02-18 DIAGNOSIS — Z13.220 SCREENING CHOLESTEROL LEVEL: ICD-10-CM

## 2025-02-18 DIAGNOSIS — F90.9 ATTENTION DEFICIT HYPERACTIVITY DISORDER (ADHD), UNSPECIFIED ADHD TYPE: ICD-10-CM

## 2025-02-18 LAB
ALT SERPL W P-5'-P-CCNC: 35 U/L (ref 0–50)
ANION GAP SERPL CALCULATED.3IONS-SCNC: 7 MMOL/L (ref 7–15)
AST SERPL W P-5'-P-CCNC: 33 U/L (ref 0–45)
BUN SERPL-MCNC: 24.9 MG/DL (ref 6–20)
CALCIUM SERPL-MCNC: 9.4 MG/DL (ref 8.8–10.4)
CHLORIDE SERPL-SCNC: 108 MMOL/L (ref 98–107)
CHOLEST SERPL-MCNC: 198 MG/DL
CREAT SERPL-MCNC: 0.85 MG/DL (ref 0.51–0.95)
EGFRCR SERPLBLD CKD-EPI 2021: 79 ML/MIN/1.73M2
FASTING STATUS PATIENT QL REPORTED: NO
FASTING STATUS PATIENT QL REPORTED: NO
GLUCOSE SERPL-MCNC: 88 MG/DL (ref 70–99)
HCO3 SERPL-SCNC: 27 MMOL/L (ref 22–29)
HDLC SERPL-MCNC: 60 MG/DL
LDLC SERPL CALC-MCNC: 85 MG/DL
NONHDLC SERPL-MCNC: 138 MG/DL
POTASSIUM SERPL-SCNC: 4.2 MMOL/L (ref 3.4–5.3)
SODIUM SERPL-SCNC: 142 MMOL/L (ref 135–145)
TRIGL SERPL-MCNC: 263 MG/DL

## 2025-02-18 PROCEDURE — 84460 ALANINE AMINO (ALT) (SGPT): CPT | Performed by: PATHOLOGY

## 2025-02-18 PROCEDURE — G0463 HOSPITAL OUTPT CLINIC VISIT: HCPCS | Performed by: PSYCHIATRY & NEUROLOGY

## 2025-02-18 PROCEDURE — 36415 COLL VENOUS BLD VENIPUNCTURE: CPT | Performed by: PATHOLOGY

## 2025-02-18 PROCEDURE — 70553 MRI BRAIN STEM W/O & W/DYE: CPT | Performed by: RADIOLOGY

## 2025-02-18 PROCEDURE — A9585 GADOBUTROL INJECTION: HCPCS | Mod: JW | Performed by: RADIOLOGY

## 2025-02-18 PROCEDURE — 80048 BASIC METABOLIC PNL TOTAL CA: CPT | Performed by: PATHOLOGY

## 2025-02-18 PROCEDURE — 80061 LIPID PANEL: CPT | Performed by: PATHOLOGY

## 2025-02-18 PROCEDURE — 84450 TRANSFERASE (AST) (SGOT): CPT | Performed by: PATHOLOGY

## 2025-02-18 PROCEDURE — G2211 COMPLEX E/M VISIT ADD ON: HCPCS | Performed by: PSYCHIATRY & NEUROLOGY

## 2025-02-18 PROCEDURE — 99215 OFFICE O/P EST HI 40 MIN: CPT | Performed by: PSYCHIATRY & NEUROLOGY

## 2025-02-18 RX ORDER — DEXTROAMPHETAMINE SACCHARATE, AMPHETAMINE ASPARTATE, DEXTROAMPHETAMINE SULFATE AND AMPHETAMINE SULFATE 2.5; 2.5; 2.5; 2.5 MG/1; MG/1; MG/1; MG/1
10 TABLET ORAL DAILY
Qty: 30 TABLET | Refills: 0 | Status: SHIPPED | OUTPATIENT
Start: 2025-02-18

## 2025-02-18 RX ORDER — GADOBUTROL 604.72 MG/ML
7.5 INJECTION INTRAVENOUS ONCE
Status: COMPLETED | OUTPATIENT
Start: 2025-02-18 | End: 2025-02-18

## 2025-02-18 RX ADMIN — GADOBUTROL 5.5 ML: 604.72 INJECTION INTRAVENOUS at 13:16

## 2025-02-18 ASSESSMENT — PAIN SCALES - GENERAL: PAINLEVEL_OUTOF10: SEVERE PAIN (7)

## 2025-02-18 NOTE — Clinical Note
2/18/2025       RE: Liyah Jernigan  [address] [place] [address]     Dear Colleague,    Thank you for referring your patient, Liyah Jernigan, to the [facility] [place] [facility]. Please see a copy of my visit note below.    ID: Liyah Jernigan is a 60-year-old woman who I follow for multiple sclerosis.  She returns for clinical follow-up and MRI review.  I last saw her in November 2023.    HPI: Liyah tells me she has been stable from an MS perspective.  Her main residual symptoms are tingling in her hands and residual vision impairment.  She finds the latter hard to describe but notes that it is worse later in the day and that it is more difficult to adjust from near to far and vice versa.  She also has fatigue.  Motor and bladder function are doing fine.  Her history is unusual with several episodes of bilateral vision loss beginning in 2002.  However she has been stable on teriflunomide for the last 10 years and tolerates that well.  She does tell me that she has a Avendano's neuroma and will be seeing podiatry tomorrow.    Past Medical History:   Diagnosis Date    Benign essential hypertension 05/29/2018    Closed fracture of arm 03/15/2022    MS      Current Outpatient Medications:     amphetamine-dextroamphetamine (ADDERALL) 10 MG tablet, Take 1 tablet (10 mg) by mouth daily. In AM, Disp: 30 tablet, Rfl: 0    biotin 2.5 mg/mL SUSP, Take 20 mg by mouth daily, Disp: , Rfl:     buPROPion (WELLBUTRIN SR) 150 MG 12 hr tablet, Take 1 tablet (150 mg) by mouth daily, Disp: 90 tablet, Rfl: 3    gabapentin (NEURONTIN) 300 MG capsule, Take 1 capsule by mouth every night as needed., Disp: 90 capsule, Rfl: 3    metoprolol succinate ER (TOPROL XL) 25 MG 24 hr tablet, Take 1 tablet (25 mg) by mouth 2 times daily, Disp: 180 tablet, Rfl: 3    SUMAtriptan (IMITREX) 100 MG tablet, Take 1 tablet (100 mg) by mouth daily as needed for migraine,  Disp: 30 tablet, Rfl: 1    teriflunomide (AUBAGIO) 14 MG tablet, Take 1 tablet (14 mg) by mouth daily., Disp: 90 tablet, Rfl: 3    traZODone (DESYREL) 50 MG tablet, TAKE 1 TABLET(50 MG) BY MOUTH AT BEDTIME, Disp: 90 tablet, Rfl: 3    valACYclovir (VALTREX) 500 MG tablet, Take 1 tablet (500 mg) by mouth daily (Patient not taking: Reported on 2/18/2025), Disp: 90 tablet, Rfl: 3    Exam: She is alert and oriented.  Affect is bright and language functions are normal.  Cranial nerves are unremarkable.  Muscle bulk, tone, strength and dexterity are normal in the arms and legs.  Light touch is intact in all 4 limbs.  Finger tapping, toe tapping and finger-nose-finger are normal.  Reflexes are normal and symmetric.  Gait is narrow-based and stable with normal tandem walk and negative Romberg.     We reviewed together her MRI of the brain done earlier today and compared it to the prior exam from 2022.  It showed a stable moderate to severe lesion burden, no new or enhancing lesions.    Impression: Relapsing onset multiple sclerosis, clinically and radiologically stable on teriflunomide.  I spent 41 minutes on her care on the date of service including chart review and face-to-face time.  We discussed safety monitoring with teriflunomide, and how long to continue on treatment.  I told her I usually continue people up through their 50s and then barring any evidence of recent disease activity generally stop treatment and follow with MRI surveillance.  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  I do think this is a reasonable consideration and for now we will plan to continue treatment at least through her mid 60s and then reassess.  She also asks about help with redoing her advanced directive and ultimately we will ask social work to get in touch with her about that.    The longitudinal plan of  care for the diagnosis(es)/condition(s) as documented were addressed during this visit. Due to the added complexity in care, I will continue to support Liyah in the subsequent management and with ongoing continuity of care.    Plan: AST and ALT today.  Social work to contact her about advanced directives.  Follow-up in 1 year.    This note was completed in part using voice-recognition software, and some typographic errors may be present as a result.       Again, thank you for allowing me to participate in the care of your patient.      Sincerely,    Jose A Hairston MD

## 2025-02-18 NOTE — DISCHARGE INSTRUCTIONS
MRI Contrast Discharge Instructions    The IV contrast you received today will pass out of your body in your  urine. This will happen in the next 24 hours. You will not feel this process.  Your urine will not change color.    Drink at least 4 extra glasses of water or juice today (unless your doctor  has restricted your fluids). This reduces the stress on your kidneys.  You may take your regular medicines.    If you are on dialysis: It is best to have dialysis today.    If you have a reaction: Most reactions happen right away. If you have  any new symptoms after leaving the hospital (such as hives or swelling),  call your hospital at the correct number below. Or call your family doctor.  If you have breathing distress or wheezing, call 911.    Special instructions: ***    I have read and understand the above information.    Signature:______________________________________ Date:___________    Staff:__________________________________________ Date:___________     Time:__________    [facility] Departments:    ___[place]: [phone]  ___[facility]: [phone]  ___[place]: [phone] ___[facility]: [phone]  ___[facility]: [phone]  ___[facility]: [phone]  ___[phone]  ___[facility]: [phone]  ___[phone]

## 2025-02-18 NOTE — NURSING NOTE
Chief Complaint   Patient presents with    MS    RECHECK     Annual follow up      Vitals were taken and medications were reconciled.   Yosi Moulton, EMT  2:20 PM

## 2025-02-18 NOTE — TELEPHONE ENCOUNTER
Patient requesting refill for pended medication below. Please review and advise.     BRANDAN Cuenca  [facility]

## 2025-02-19 ENCOUNTER — PATIENT OUTREACH (OUTPATIENT)
Dept: CARE COORDINATION | Facility: CLINIC | Age: 60
End: 2025-02-19
Payer: COMMERCIAL

## 2025-02-19 ENCOUNTER — TRANSFERRED RECORDS (OUTPATIENT)
Dept: HEALTH INFORMATION MANAGEMENT | Facility: CLINIC | Age: 60
End: 2025-02-19
Payer: COMMERCIAL

## 2025-02-20 NOTE — PROGRESS NOTES
Social Work Intervention  [facility]    Data/Intervention:    Patient Name:  Liyah Jernigan  /Age:  1965 (60 year old)    Visit Type: telephone  Referral Source: Kacy Fontana RN  Reason for Referral:  Health care directive assistance    Collaborated With:    -iLyah    Psychosocial Information/Concerns:  Liyah has been thinking about the health care directive she did several years ago, appointing her son as her proxy decision maker. He is now disabled and she is caring for him and she would like to change her directive.   She also inquired about a POLST.   Another question related is she is wondering about donating her brain and spinal cord to MS research upon her death.     Intervention/Education/Resources Provided:  Discussed the health care directive and it's purpose and how it is used. Will email a blank copy to her. Also discussed the POLST and how that is used and to be completed with her provider. Determined that it's not the right time for a POLST for her.  Let her know that she can forward her new health care directive to me when it's completed and I can facilitate getting it vetted and into her medical record.     Assessment/Plan:  Answered pt's questions and emailed a blank health care directive to her. I forwarded her question about body donation to Kacy Fontana RN.    Provided patient/family with contact information and availability.    Britni Sequeira MSW, [facility]    [facility]  [address] [place] [address], [address] [place] [id number]    [phone]

## 2025-02-24 NOTE — PROGRESS NOTES
ID: Liyah Jernigan is a 60-year-old woman who I follow for multiple sclerosis.  She returns for clinical follow-up and MRI review.  I last saw her in November 2023.    HPI: Liyah tells me she has been stable from an MS perspective.  Her main residual symptoms are tingling in her hands and residual vision impairment.  She finds the latter hard to describe but notes that it is worse later in the day and that it is more difficult to adjust from near to far and vice versa.  She also has fatigue.  Motor and bladder function are doing fine.  Her history is unusual with several episodes of bilateral vision loss beginning in 2002.  However she has been stable on teriflunomide for the last 10 years and tolerates that well.  She does tell me that she has a Avendano's neuroma and will be seeing podiatry tomorrow.    Past Medical History:   Diagnosis Date    Benign essential hypertension 05/29/2018    Closed fracture of arm 03/15/2022    MS      Current Outpatient Medications:     amphetamine-dextroamphetamine (ADDERALL) 10 MG tablet, Take 1 tablet (10 mg) by mouth daily. In AM, Disp: 30 tablet, Rfl: 0    biotin 2.5 mg/mL SUSP, Take 20 mg by mouth daily, Disp: , Rfl:     buPROPion (WELLBUTRIN SR) 150 MG 12 hr tablet, Take 1 tablet (150 mg) by mouth daily, Disp: 90 tablet, Rfl: 3    gabapentin (NEURONTIN) 300 MG capsule, Take 1 capsule by mouth every night as needed., Disp: 90 capsule, Rfl: 3    metoprolol succinate ER (TOPROL XL) 25 MG 24 hr tablet, Take 1 tablet (25 mg) by mouth 2 times daily, Disp: 180 tablet, Rfl: 3    SUMAtriptan (IMITREX) 100 MG tablet, Take 1 tablet (100 mg) by mouth daily as needed for migraine, Disp: 30 tablet, Rfl: 1    teriflunomide (AUBAGIO) 14 MG tablet, Take 1 tablet (14 mg) by mouth daily., Disp: 90 tablet, Rfl: 3    traZODone (DESYREL) 50 MG tablet, TAKE 1 TABLET(50 MG) BY MOUTH AT BEDTIME, Disp: 90 tablet, Rfl: 3    valACYclovir (VALTREX) 500 MG tablet, Take 1 tablet (500 mg) by mouth daily (Patient  not taking: Reported on 2/18/2025), Disp: 90 tablet, Rfl: 3    Exam: She is alert and oriented.  Affect is bright and language functions are normal.  Cranial nerves are unremarkable.  Muscle bulk, tone, strength and dexterity are normal in the arms and legs.  Light touch is intact in all 4 limbs.  Finger tapping, toe tapping and finger-nose-finger are normal.  Reflexes are normal and symmetric.  Gait is narrow-based and stable with normal tandem walk and negative Romberg.     We reviewed together her MRI of the brain done earlier today and compared it to the prior exam from 2022.  It showed a stable moderate to severe lesion burden, no new or enhancing lesions.    Impression: Relapsing onset multiple sclerosis, clinically and radiologically stable on teriflunomide.  I spent 41 minutes on her care on the date of service including chart review and face-to-face time.  We discussed safety monitoring with teriflunomide, and how long to continue on treatment.  I told her I usually continue people up through their 50s and then barring any evidence of recent disease activity generally stop treatment and follow with MRI surveillance.  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  I do think this is a reasonable consideration and for now we will plan to continue treatment at least through her mid 60s and then reassess.  She also asks about help with redoing her advanced directive and ultimately we will ask social work to get in touch with her about that.    The longitudinal plan of care for the diagnosis(es)/condition(s) as documented were addressed during this visit. Due to the added complexity in care, I will continue to support Liyah in the subsequent management and with ongoing continuity of care.    Plan: AST and ALT today.  Social work to contact her about advanced directives.  Follow-up in 1  year.    This note was completed in part using voice-recognition software, and some typographic errors may be present as a result.

## 2025-04-03 DIAGNOSIS — I10 HYPERTENSION GOAL BP (BLOOD PRESSURE) < 140/90: ICD-10-CM

## 2025-04-03 RX ORDER — METOPROLOL SUCCINATE 25 MG/1
25 TABLET, EXTENDED RELEASE ORAL 2 TIMES DAILY
Qty: 180 TABLET | Refills: 3 | OUTPATIENT
Start: 2025-04-03

## 2025-04-29 DIAGNOSIS — R20.2 PARESTHESIA: ICD-10-CM

## 2025-04-29 DIAGNOSIS — G35 MULTIPLE SCLEROSIS (H): ICD-10-CM

## 2025-04-29 RX ORDER — GABAPENTIN 300 MG/1
CAPSULE ORAL
Qty: 90 CAPSULE | Refills: 3 | Status: SHIPPED | OUTPATIENT
Start: 2025-04-29

## 2025-05-19 ENCOUNTER — PATIENT OUTREACH (OUTPATIENT)
Dept: CARE COORDINATION | Facility: CLINIC | Age: 60
End: 2025-05-19
Payer: COMMERCIAL

## 2025-06-16 DIAGNOSIS — F33.0 MILD EPISODE OF RECURRENT MAJOR DEPRESSIVE DISORDER: ICD-10-CM

## 2025-06-16 RX ORDER — BUPROPION HYDROCHLORIDE 150 MG/1
150 TABLET, EXTENDED RELEASE ORAL DAILY
Qty: 90 TABLET | Refills: 0 | Status: SHIPPED | OUTPATIENT
Start: 2025-06-16

## 2025-06-27 ENCOUNTER — MYC MEDICAL ADVICE (OUTPATIENT)
Dept: FAMILY MEDICINE | Facility: CLINIC | Age: 60
End: 2025-06-27
Payer: COMMERCIAL

## 2025-07-08 ENCOUNTER — MYC MEDICAL ADVICE (OUTPATIENT)
Dept: FAMILY MEDICINE | Facility: CLINIC | Age: 60
End: 2025-07-08
Payer: COMMERCIAL

## 2025-07-08 NOTE — TELEPHONE ENCOUNTER
Please see original message in a separate encounter.   [facility] Medical Advice with Jasmine Barrow MD (06/27/2025)

## 2025-07-09 ENCOUNTER — TELEPHONE (OUTPATIENT)
Dept: FAMILY MEDICINE | Facility: CLINIC | Age: 60
End: 2025-07-09
Payer: COMMERCIAL

## 2025-07-09 NOTE — CONFIDENTIAL NOTE
Pt needs a follow up med check and is scheduled on Monday 14th @ 11:00. She takes care of her handicap son at home and only has nurse coverage certain times. Pt wondering if this appt could be a virtual. Please advise.    Alisha Mullins CMA

## 2025-07-09 NOTE — TELEPHONE ENCOUNTER
Called patient and informed virtual appointment was ok'd per provider. Appointment changed to virtual.    Maximino Keyes

## 2025-07-14 ENCOUNTER — VIRTUAL VISIT (OUTPATIENT)
Dept: FAMILY MEDICINE | Facility: CLINIC | Age: 60
End: 2025-07-14
Payer: COMMERCIAL

## 2025-07-14 DIAGNOSIS — F33.0 MILD EPISODE OF RECURRENT MAJOR DEPRESSIVE DISORDER: ICD-10-CM

## 2025-07-14 DIAGNOSIS — G43.109 MIGRAINE WITH AURA AND WITHOUT STATUS MIGRAINOSUS, NOT INTRACTABLE: ICD-10-CM

## 2025-07-14 DIAGNOSIS — R20.2 PARESTHESIA: ICD-10-CM

## 2025-07-14 DIAGNOSIS — F90.9 ATTENTION DEFICIT HYPERACTIVITY DISORDER (ADHD), UNSPECIFIED ADHD TYPE: ICD-10-CM

## 2025-07-14 DIAGNOSIS — G35 MULTIPLE SCLEROSIS (H): ICD-10-CM

## 2025-07-14 DIAGNOSIS — I10 HYPERTENSION GOAL BP (BLOOD PRESSURE) < 140/90: ICD-10-CM

## 2025-07-14 DIAGNOSIS — F51.01 PRIMARY INSOMNIA: ICD-10-CM

## 2025-07-14 PROCEDURE — 98006 SYNCH AUDIO-VIDEO EST MOD 30: CPT | Performed by: FAMILY MEDICINE

## 2025-07-14 RX ORDER — DEXTROAMPHETAMINE SACCHARATE, AMPHETAMINE ASPARTATE, DEXTROAMPHETAMINE SULFATE AND AMPHETAMINE SULFATE 2.5; 2.5; 2.5; 2.5 MG/1; MG/1; MG/1; MG/1
10 TABLET ORAL DAILY
Qty: 30 TABLET | Refills: 0 | Status: SHIPPED | OUTPATIENT
Start: 2025-07-14

## 2025-07-14 RX ORDER — SUMATRIPTAN SUCCINATE 100 MG/1
100 TABLET ORAL DAILY PRN
Qty: 30 TABLET | Refills: 1 | Status: SHIPPED | OUTPATIENT
Start: 2025-07-14

## 2025-07-14 RX ORDER — BUPROPION HYDROCHLORIDE 150 MG/1
150 TABLET, EXTENDED RELEASE ORAL DAILY
Qty: 90 TABLET | Refills: 2 | Status: SHIPPED | OUTPATIENT
Start: 2025-07-14

## 2025-07-14 RX ORDER — TRAZODONE HYDROCHLORIDE 50 MG/1
TABLET ORAL
Qty: 90 TABLET | Refills: 3 | Status: SHIPPED | OUTPATIENT
Start: 2025-07-14

## 2025-07-14 RX ORDER — METOPROLOL SUCCINATE 25 MG/1
25 TABLET, EXTENDED RELEASE ORAL 2 TIMES DAILY
Qty: 180 TABLET | Refills: 3 | Status: SHIPPED | OUTPATIENT
Start: 2025-07-14

## 2025-07-14 ASSESSMENT — PATIENT HEALTH QUESTIONNAIRE - PHQ9
10. IF YOU CHECKED OFF ANY PROBLEMS, HOW DIFFICULT HAVE THESE PROBLEMS MADE IT FOR YOU TO DO YOUR WORK, TAKE CARE OF THINGS AT HOME, OR GET ALONG WITH OTHER PEOPLE: NOT DIFFICULT AT ALL
SUM OF ALL RESPONSES TO PHQ QUESTIONS 1-9: 2
SUM OF ALL RESPONSES TO PHQ QUESTIONS 1-9: 2

## 2025-07-14 NOTE — PROGRESS NOTES
Liyah is a 60 year old who is being evaluated via a billable video visit.    How would you like to obtain your AVS? MyChart  If the video visit is dropped, the invitation should be resent by: Text to cell phone: [phone]  Will anyone else be joining your video visit? No      Assessment & Plan     Attention deficit hyperactivity disorder (ADHD), unspecified ADHD type  Stable on current regimen.  Continue same plan and routine follow-up.   - amphetamine-dextroamphetamine (ADDERALL) 10 MG tablet; Take 1 tablet (10 mg) by mouth daily. In AM    Hypertension goal BP (blood pressure) < 140/90  Stable on current regimen.  Continue same plan and routine follow-up.   - metoprolol succinate ER (TOPROL XL) 25 MG 24 hr tablet; Take 1 tablet (25 mg) by mouth 2 times daily.    Mild episode of recurrent major depressive disorder  Stable on current regimen.  Continue same plan and routine follow-up.   - buPROPion (WELLBUTRIN SR) 150 MG 12 hr tablet; Take 1 tablet (150 mg) by mouth daily.    Migraine with aura and without status migrainosus, not intractable    - SUMAtriptan (IMITREX) 100 MG tablet; Take 1 tablet (100 mg) by mouth daily as needed for migraine.    Primary insomnia    - traZODone (DESYREL) 50 MG tablet; TAKE 1 TABLET(50 MG) BY MOUTH AT BEDTIME    Multiple sclerosis (H)  She did have a concern about the ongoing use of gabapentin based upon the study that she had read in association with dementia.  Not really familiar with that information so advised that she talk to her neurology provider about this or what might mean for her specifically.    Paresthesia  As above         Subjective   Liyah is a 60 year old, presenting for the following health issues:  No chief complaint on file.        1/28/2025    11:30 AM   Additional Questions   [facility]     HPI        Video visit with patient today primarily in follow-up of ADHD as above      Review of Systems  Constitutional, HEENT, cardiovascular, pulmonary, gi and  gu systems are negative, except as otherwise noted.      Objective           Vitals:  No vitals were obtained today due to virtual visit.    Physical Exam   GENERAL: alert and no distress  EYES: Eyes grossly normal to inspection.  No discharge or erythema, or obvious scleral/conjunctival abnormalities.  RESP: No audible wheeze, cough, or visible cyanosis.    SKIN: Visible skin clear. No significant rash, abnormal pigmentation or lesions.  NEURO: Cranial nerves grossly intact.  Mentation and speech appropriate for age.  PSYCH: Appropriate affect, tone, and pace of words    Past labs reviewed with the patient.     The longitudinal plan of care for the diagnosis(es)/condition(s) as documented were addressed during this visit. Due to the added complexity in care, I will continue to support Liyah in the subsequent management and with ongoing continuity of care.      Video-Visit Details    Type of service:  Video Visit   Originating Location (pt. Location): Home    Distant Location (provider location):  On-site  Platform used for Video Visit: Colleen  Signed Electronically by: Jasmine Barrow MD

## 2025-07-14 NOTE — PROGRESS NOTES
Answers submitted by the patient for this visit:  Patient Health Questionnaire (Submitted on 7/14/2025)  If you checked off any problems, how difficult have these problems made it for you to do your work, take care of things at home, or get along with other people?: Not difficult at all  PHQ9 TOTAL SCORE: 2  General Questionnaire (Submitted on 7/14/2025)  Chief Complaint: Chronic problems general questions HPI Form  What is the reason for your visit today? : Rx follow-up  How many days per week do you miss taking your medication?: 0  Questionnaire about: Chronic problems general questions HPI Form (Submitted on 7/14/2025)  Chief Complaint: Chronic problems general questions HPI Form

## 2025-08-16 DIAGNOSIS — A60.00 GENITAL HERPES SIMPLEX, UNSPECIFIED SITE: ICD-10-CM

## 2025-08-18 RX ORDER — VALACYCLOVIR HYDROCHLORIDE 500 MG/1
500 TABLET, FILM COATED ORAL DAILY
Qty: 90 TABLET | Refills: 2 | Status: SHIPPED | OUTPATIENT
Start: 2025-08-18

## (undated) DEVICE — KIT ENDO FIRST STEP DISINFECTANT 200ML W/POUCH EP-4

## (undated) DEVICE — PAD CHUX UNDERPAD 23X24" 7136

## (undated) RX ORDER — ACETAMINOPHEN 500 MG
TABLET ORAL
Status: DISPENSED
Start: 2017-09-22

## (undated) RX ORDER — SODIUM CHLORIDE 9 MG/ML
INJECTION, SOLUTION INTRAVENOUS
Status: DISPENSED
Start: 2017-10-13

## (undated) RX ORDER — TRIAMCINOLONE ACETONIDE 40 MG/ML
INJECTION, SUSPENSION INTRA-ARTICULAR; INTRAMUSCULAR
Status: DISPENSED
Start: 2023-03-02

## (undated) RX ORDER — LIDOCAINE HYDROCHLORIDE 10 MG/ML
INJECTION, SOLUTION EPIDURAL; INFILTRATION; INTRACAUDAL; PERINEURAL
Status: DISPENSED
Start: 2017-11-02

## (undated) RX ORDER — FENTANYL CITRATE 50 UG/ML
INJECTION, SOLUTION INTRAMUSCULAR; INTRAVENOUS
Status: DISPENSED
Start: 2022-10-17

## (undated) RX ORDER — DIAZEPAM 5 MG
TABLET ORAL
Status: DISPENSED
Start: 2017-09-22

## (undated) RX ORDER — LIDOCAINE HYDROCHLORIDE 10 MG/ML
INJECTION, SOLUTION EPIDURAL; INFILTRATION; INTRACAUDAL; PERINEURAL
Status: DISPENSED
Start: 2017-10-13

## (undated) RX ORDER — LIDOCAINE HYDROCHLORIDE 10 MG/ML
INJECTION, SOLUTION EPIDURAL; INFILTRATION; INTRACAUDAL; PERINEURAL
Status: DISPENSED
Start: 2017-09-22

## (undated) RX ORDER — FENTANYL CITRATE 50 UG/ML
INJECTION, SOLUTION INTRAMUSCULAR; INTRAVENOUS
Status: DISPENSED
Start: 2017-09-22

## (undated) RX ORDER — DIAZEPAM 5 MG
TABLET ORAL
Status: DISPENSED
Start: 2017-10-13

## (undated) RX ORDER — LIDOCAINE HYDROCHLORIDE 10 MG/ML
INJECTION, SOLUTION EPIDURAL; INFILTRATION; INTRACAUDAL; PERINEURAL
Status: DISPENSED
Start: 2023-03-02